# Patient Record
Sex: FEMALE | Race: WHITE | NOT HISPANIC OR LATINO | Employment: PART TIME | URBAN - METROPOLITAN AREA
[De-identification: names, ages, dates, MRNs, and addresses within clinical notes are randomized per-mention and may not be internally consistent; named-entity substitution may affect disease eponyms.]

---

## 2017-01-30 ENCOUNTER — OFFICE VISIT (OUTPATIENT)
Dept: DERMATOLOGY | Facility: CLINIC | Age: 52
End: 2017-01-30
Payer: COMMERCIAL

## 2017-01-30 VITALS — SYSTOLIC BLOOD PRESSURE: 120 MMHG | DIASTOLIC BLOOD PRESSURE: 85 MMHG | OXYGEN SATURATION: 95 % | HEART RATE: 87 BPM

## 2017-01-30 DIAGNOSIS — B07.0 VERRUCA PLANTARIS: Primary | ICD-10-CM

## 2017-01-30 PROCEDURE — 11900 INJECT SKIN LESIONS </W 7: CPT | Performed by: PHYSICIAN ASSISTANT

## 2017-01-30 NOTE — NURSING NOTE
"Initial /85 mmHg  Pulse 87  SpO2 95% Estimated body mass index is 32.25 kg/(m^2) as calculated from the following:    Height as of 12/28/16: 1.626 m (5' 4\").    Weight as of 12/28/16: 85.276 kg (188 lb). .      "

## 2017-01-30 NOTE — PROGRESS NOTES
HPI:   Susu Lagos is a 51 year old female who presents for recheck of warts on the bottom of the foot s/p candin x 1  chief complaint  Location: left plantar surface   Condition present for:  years.   Previous treatments include: LN2, cantharidin, candin x 1    Review Of Systems  Eyes: negative  Ears/Nose/Throat: negative  Respiratory: No shortness of breath, dyspnea on exertion, cough, or hemoptysis  Cardiovascular: negative  Gastrointestinal: negative  Genitourinary: negative  Musculoskeletal: negative  Neurologic: negative  Psychiatric: negative        PHYSICAL EXAM:      Skin exam performed as follows: Type 2 skin. Mood appropriate  Alert and Oriented X 3. Well developed, well nourished in no distress.  General appearance: Normal  Head including face: Normal  Eyes: conjunctiva and lids: Normal  Mouth: Lips, teeth, gums: Normal  Neck: Normal  Chest-breast/axillae: Normal  Back: Normal  Spleen and liver: Normal  Cardiovascular: Exam of peripheral vascular system by observation for swelling, varicosities, edema: Normal  Genitalia: groin, buttocks: Normal  Extremities: digits/nails (clubbing): Normal  Eccrine and Apocrine glands: Normal  Right upper extremity: Normal  Left upper extremity: Normal  Right lower extremity: Normal  Left lower extremity: Normal  Skin: Scalp and body hair: See below    1. Verrucous plaques x 5 on left plantar surface; x 1 on left 2nd periungual toe    ASSESSMENT/PLAN:     Wart on left plantar surface x 6 - advised on diagnosis and treatment options. S/p Candin x 1, has also tried LN2, cantharidin in the past. Discussed LN2, cantharidin, Candin, imiquimod and OTC treatments. Discussed likely need for multiple treatments. After lengthy discussion, she would like to proceed with candin.   --Candin injected to left plantar surfaces x 3. A total of 0.3 cc used. Pt tolerated well, no complications. Advised that there will be mild tenderness for the next few days; call if area is very  tender, red or swollen.           Follow-up: 1 month  CC:   Scribed By: Caroline Calderon MS, PAMargeC

## 2017-02-17 ENCOUNTER — OFFICE VISIT (OUTPATIENT)
Dept: URGENT CARE | Facility: URGENT CARE | Age: 52
End: 2017-02-17
Payer: COMMERCIAL

## 2017-02-17 ENCOUNTER — RADIANT APPOINTMENT (OUTPATIENT)
Dept: GENERAL RADIOLOGY | Facility: CLINIC | Age: 52
End: 2017-02-17
Attending: NURSE PRACTITIONER
Payer: COMMERCIAL

## 2017-02-17 VITALS
RESPIRATION RATE: 22 BRPM | HEART RATE: 93 BPM | TEMPERATURE: 100 F | SYSTOLIC BLOOD PRESSURE: 119 MMHG | DIASTOLIC BLOOD PRESSURE: 82 MMHG | OXYGEN SATURATION: 96 %

## 2017-02-17 DIAGNOSIS — R05.9 COUGH: ICD-10-CM

## 2017-02-17 DIAGNOSIS — R07.0 THROAT PAIN: ICD-10-CM

## 2017-02-17 DIAGNOSIS — J20.9 ACUTE BRONCHITIS, UNSPECIFIED ORGANISM: Primary | ICD-10-CM

## 2017-02-17 LAB
DEPRECATED S PYO AG THROAT QL EIA: NORMAL
MICRO REPORT STATUS: NORMAL
SPECIMEN SOURCE: NORMAL

## 2017-02-17 PROCEDURE — 99214 OFFICE O/P EST MOD 30 MIN: CPT | Performed by: NURSE PRACTITIONER

## 2017-02-17 PROCEDURE — 87081 CULTURE SCREEN ONLY: CPT | Performed by: NURSE PRACTITIONER

## 2017-02-17 PROCEDURE — 87880 STREP A ASSAY W/OPTIC: CPT | Performed by: NURSE PRACTITIONER

## 2017-02-17 PROCEDURE — 71020 XR CHEST 2 VW: CPT

## 2017-02-17 RX ORDER — PREDNISONE 20 MG/1
20 TABLET ORAL 2 TIMES DAILY
Qty: 10 TABLET | Refills: 0 | Status: SHIPPED | OUTPATIENT
Start: 2017-02-17 | End: 2018-02-16

## 2017-02-17 RX ORDER — ALBUTEROL SULFATE 90 UG/1
2 AEROSOL, METERED RESPIRATORY (INHALATION) EVERY 6 HOURS PRN
Qty: 1 INHALER | Refills: 0 | Status: SHIPPED | OUTPATIENT
Start: 2017-02-17 | End: 2018-02-16

## 2017-02-17 NOTE — MR AVS SNAPSHOT
"              After Visit Summary   2/17/2017    Susu Lagos    MRN: [id number]           Patient Information     Date Of Birth          1965        Visit Information        Provider Department      2/17/2017 5:45 PM Jackelyn Hicks APRN [facility]        Today's Diagnoses     Cough    -  1    Throat pain        Acute bronchitis, unspecified organism          Care Instructions    Use Medication as directed    Hydrate with fluids, rest, cool humidifier.  May use acetaminophen, ibuprofen prn.    For your Cough   The Buckwheat Honey Dose: Given   hour Prior to Bedtime  For children age under 1 year -Do not use due to botulism risk     2-5 years -  tsp (2.5 mL)    6-11 years -1 tsp (5 mL)    12-18 years -2 tsp (10 mL)     Guaifenesin     Adult dose -Not to exceed 2.4 g (2400mg) per day    Child age 6-12 years -100 mg every 4 hr, not to exceed 1.2 g (1200mg) per day     Pediatric, 2-6 years -50 mg every 4 hr as needed, not to exceed 600 mg per day    Cough medications is not recommended in children under 2 years.  With use of cough medications have combination medications be aware of products in the cough medication you are using to avoid overdose    Follow up with PCP in 2 weeks.    Go to Emergency Room if sx worsen or change, Shortness of breath, chest pain, persistent fevers, or painful breathing occur.     Patient voiced understanding of instructions given.                Using an Inhaler  Your healthcare provider may prescribe medicine that you breathe in using a metered-dose inhaler (MDI). An inhaler sends a measured amount of medicine in a fine mist.  Step 1:    Shake the inhaler and remove the cap.    Take a deep breath and let it out.  Step 2:    Close your lips around the end of the inhaler mouthpiece. Or if you were told to use the \"open-mouth\" method, hold the inhaler 1 to 2 inches from your mouth.  Step 3:    Breathe in slowly and deeply as you press down on " the inhaler to release the medication.    Inhale fully.  Step 4:    Hold your breath for a count of 10.    Then breathe out slowly through your mouth.    Repeat these steps for each puff of medicine prescribed.          Important    If the inhaler is being used for the first time or has not been used for a while, prime it as directed by the product maker. You can find important information about the medicine in the package insert. This is the paper that comes with the medicine.    If you use more than one inhaler, make sure you know which one to use first.    Your healthcare provider or pharmacist can show you how to use your inhaler the right way. Even if you think you are using it the right way, it is still a good idea to check.     1607-1850 [employer]. [address] [place] [address], [place] [address]. All rights reserved. This information is not intended as a substitute for professional medical care. Always follow your healthcare professional's instructions.        Bronchitis, Viral (Adult)    You have a viral bronchitis. Bronchitis is inflammation and swelling of the lining of the lungs. This is often caused by an infection. Symptoms include a dry, hacking cough that is worse at night. The cough may bring up yellow-green mucus. You may also feel short of breath or wheeze. Other symptoms may include tiredness, chest discomfort, and chills.  Bronchitis that is caused by a virus is not treated with antibiotics. Instead, medicines may be given to help relieve symptoms. Symptoms can last up to 2 weeks, although the cough may last much longer.  This illness is contagious during the first few days and is spread through the air by coughing and sneezing, or by direct contact (touching the sick person and then touching your own eyes, nose, or mouth).  Most viral illnesses resolve within 10 to 14 days with rest and simple home remedies, although they may sometimes last for several weeks.  Home care    If symptoms  are severe, rest at home for the first 2 to 3 days. When you go back to your usual activities, don't let yourself get too tired.    Do not smoke. Also avoid being exposed to secondhand smoke.    You may use over-the-counter medicine to control fever or pain, unless another pain medicine was prescribed. (Note: If you have chronic liver or kidney disease or have ever had a stomach ulcer or gastrointestinal bleeding, talk with your healthcare provider before using these medicines. Also talk to your provider if you are taking medicine to prevent blood clots.) Aspirin should never be given to anyone younger than 18 years of age who is ill with a viral infection or fever. It may cause severe liver or brain damage.    Your appetite may be poor, so a light diet is fine. Avoid dehydration by drinking 6 to 8 glasses of fluids per day (such as water, soft drinks, sports drinks, juices, tea, or soup). Extra fluids will help loosen secretions in the nose and lungs.    Over-the-counter cough, cold, and sore-throat medicines will not shorten the length of the illness, but they may help to reduce symptoms. (Note: Do not use decongestants if you have high blood pressure.)  Follow-up care  Follow up with your healthcare provider, or as advised.  If you had an X-ray or ECG (electrocardiogram), a specialist will review it. You will be notified of any new findings that may affect your care.  Note: If you are age 65 or older, or if you have a chronic lung disease or condition that affects your immune system, or you smoke, talk to your healthcare provider about having pneumococcal vaccinations and a yearly influenza vaccination (flu shot).  When to seek medical advice   Call your healthcare provider right away if any of these occur:    Fever of 100.4 F (38 C) or higher    Coughing up increased amounts of colored sputum    Weakness, drowsiness, headache, facial pain, ear pain, or a stiff neck  Call 911, or get immediate medical  care  Contact emergency services right away if any of these occur:    Coughing up blood    Worsening weakness, drowsiness, headache, or stiff neck    Trouble breathing, wheezing, or pain with breathing    0894-0462 [employer]. [address] [place] [address], [place] [address]. All rights reserved. This information is not intended as a substitute for professional medical care. Always follow your healthcare professional's instructions.              Follow-ups after your visit        Your next 10 appointments already scheduled     Feb 27, 2017 10:15 AM CST   [facility]    [address] [facility] [place] [address] [phone]              Who to contact     If you have questions or need follow up information about today's clinic visit or your schedule please contact [facility] directly at [phone].  Normal or non-critical lab and imaging results will be communicated to you by MyChart, letter or phone within 4 business days after the clinic has received the results. If you do not hear from us within 7 days, please contact the clinic through [employer] or phone. If you have a critical or abnormal lab result, we will notify you by phone as soon as possible.  Submit refill requests through [employer] or call your pharmacy and they will forward the refill request to us. Please allow 3 business days for your refill to be completed.          Additional Information About Your Visit        [facility] Information     [employer] gives you secure access to your electronic health record. If you see a primary care provider, you can also send messages to your care team and make appointments. If you have questions, please call your primary care clinic.  If you do not have a primary care provider, please call [phone] and they will assist you.        Care EveryWhere ID     This is your Care EveryWhere ID. This  could be used by other organizations to access your [facility] medical records  [id number]        Your Vitals Were     Pulse Temperature Respirations Pulse Oximetry          93 100  F (37.8  C) (Tympanic) 22 96%         Blood Pressure from Last 3 Encounters:   02/17/17 119/82   01/30/17 120/85   12/29/16 119/81    Weight from Last 3 Encounters:   12/28/16 188 lb (85.3 kg)   12/14/16 188 lb (85.3 kg)   08/19/16 188 lb 3.2 oz (85.4 kg)              We Performed the Following     Beta strep group A culture     Strep, Rapid Screen          Today's Medication Changes          These changes are accurate as of: 2/17/17  6:38 PM.  If you have any questions, ask your nurse or doctor.               Start taking these medicines.        Dose/Directions    albuterol 108 (90 BASE) MCG/ACT Inhaler   Commonly known as:  PROAIR HFA/PROVENTIL HFA/VENTOLIN HFA   Used for:  Throat pain, Acute bronchitis, unspecified organism   Started by:  Jackelyn Hicks APRN CNP        Dose:  2 puff   Inhale 2 puffs into the lungs every 6 hours as needed for shortness of breath / dyspnea or wheezing   Quantity:  1 Inhaler   Refills:  0       predniSONE 20 MG tablet   Commonly known as:  DELTASONE   Used for:  Acute bronchitis, unspecified organism   Started by:  Jackelyn Hicks APRN CNP        Dose:  20 mg   Take 1 tablet (20 mg) by mouth 2 times daily   Quantity:  10 tablet   Refills:  0            Where to get your medicines      These medications were sent to [id number] [address]  [address] [facility] [address]    Hours:  Closed 10-16-08 business to [facility] Phone:  [phone]     albuterol 108 (90 BASE) MCG/ACT Inhaler    predniSONE 20 MG tablet                Primary Care Provider    [facility]       No address on file        Thank you!     Thank you for choosing [facility]  for your care. Our goal is always to provide you with  excellent care. Hearing back from our patients is one way we can continue to improve our services. Please take a few minutes to complete the written survey that you may receive in the mail after your visit with us. Thank you!             Your Updated Medication List - Protect others around you: Learn how to safely use, store and throw away your medicines at www.disposemymeds.org.          This list is accurate as of: 2/17/17  6:38 PM.  Always use your most recent med list.                   Brand Name Dispense Instructions for use    albuterol 108 (90 BASE) MCG/ACT Inhaler    PROAIR HFA/PROVENTIL HFA/VENTOLIN HFA    1 Inhaler    Inhale 2 puffs into the lungs every 6 hours as needed for shortness of breath / dyspnea or wheezing       ibuprofen 200 MG tablet    ADVIL/MOTRIN     Take 3 tablets (600 mg) by mouth every 6 hours as needed for mild pain       MULTI-VITAMINS PO          predniSONE 20 MG tablet    DELTASONE    10 tablet    Take 1 tablet (20 mg) by mouth 2 times daily       VITAMIN D (CHOLECALCIFEROL) PO      Take by mouth daily Reported on 2/17/2017

## 2017-02-18 NOTE — PATIENT INSTRUCTIONS
"Use Medication as directed    Hydrate with fluids, rest, cool humidifier.  May use acetaminophen, ibuprofen prn.    For your Cough   The Buckwheat Honey Dose: Given   hour Prior to Bedtime  For children age under 1 year -Do not use due to botulism risk     2-5 years -  tsp (2.5 mL)    6-11 years -1 tsp (5 mL)    12-18 years -2 tsp (10 mL)     Guaifenesin     Adult dose -Not to exceed 2.4 g (2400mg) per day    Child age 6-12 years -100 mg every 4 hr, not to exceed 1.2 g (1200mg) per day     Pediatric, 2-6 years -50 mg every 4 hr as needed, not to exceed 600 mg per day    Cough medications is not recommended in children under 2 years.  With use of cough medications have combination medications be aware of products in the cough medication you are using to avoid overdose    Follow up with PCP in 2 weeks.    Go to Emergency Room if sx worsen or change, Shortness of breath, chest pain, persistent fevers, or painful breathing occur.     Patient voiced understanding of instructions given.                Using an Inhaler  Your healthcare provider may prescribe medicine that you breathe in using a metered-dose inhaler (MDI). An inhaler sends a measured amount of medicine in a fine mist.  Step 1:    Shake the inhaler and remove the cap.    Take a deep breath and let it out.  Step 2:    Close your lips around the end of the inhaler mouthpiece. Or if you were told to use the \"open-mouth\" method, hold the inhaler 1 to 2 inches from your mouth.  Step 3:    Breathe in slowly and deeply as you press down on the inhaler to release the medication.    Inhale fully.  Step 4:    Hold your breath for a count of 10.    Then breathe out slowly through your mouth.    Repeat these steps for each puff of medicine prescribed.          Important    If the inhaler is being used for the first time or has not been used for a while, prime it as directed by the product maker. You can find important information about the medicine in the package " insert. This is the paper that comes with the medicine.    If you use more than one inhaler, make sure you know which one to use first.    Your healthcare provider or pharmacist can show you how to use your inhaler the right way. Even if you think you are using it the right way, it is still a good idea to check.     8469-9781 [employer]. [address] [place] [address]. All rights reserved. This information is not intended as a substitute for professional medical care. Always follow your healthcare professional's instructions.        Bronchitis, Viral (Adult)    You have a viral bronchitis. Bronchitis is inflammation and swelling of the lining of the lungs. This is often caused by an infection. Symptoms include a dry, hacking cough that is worse at night. The cough may bring up yellow-green mucus. You may also feel short of breath or wheeze. Other symptoms may include tiredness, chest discomfort, and chills.  Bronchitis that is caused by a virus is not treated with antibiotics. Instead, medicines may be given to help relieve symptoms. Symptoms can last up to 2 weeks, although the cough may last much longer.  This illness is contagious during the first few days and is spread through the air by coughing and sneezing, or by direct contact (touching the sick person and then touching your own eyes, nose, or mouth).  Most viral illnesses resolve within 10 to 14 days with rest and simple home remedies, although they may sometimes last for several weeks.  Home care    If symptoms are severe, rest at home for the first 2 to 3 days. When you go back to your usual activities, don't let yourself get too tired.    Do not smoke. Also avoid being exposed to secondhand smoke.    You may use over-the-counter medicine to control fever or pain, unless another pain medicine was prescribed. (Note: If you have chronic liver or kidney disease or have ever had a stomach ulcer or gastrointestinal bleeding, talk with  your healthcare provider before using these medicines. Also talk to your provider if you are taking medicine to prevent blood clots.) Aspirin should never be given to anyone younger than 18 years of age who is ill with a viral infection or fever. It may cause severe liver or brain damage.    Your appetite may be poor, so a light diet is fine. Avoid dehydration by drinking 6 to 8 glasses of fluids per day (such as water, soft drinks, sports drinks, juices, tea, or soup). Extra fluids will help loosen secretions in the nose and lungs.    Over-the-counter cough, cold, and sore-throat medicines will not shorten the length of the illness, but they may help to reduce symptoms. (Note: Do not use decongestants if you have high blood pressure.)  Follow-up care  Follow up with your healthcare provider, or as advised.  If you had an X-ray or ECG (electrocardiogram), a specialist will review it. You will be notified of any new findings that may affect your care.  Note: If you are age 65 or older, or if you have a chronic lung disease or condition that affects your immune system, or you smoke, talk to your healthcare provider about having pneumococcal vaccinations and a yearly influenza vaccination (flu shot).  When to seek medical advice   Call your healthcare provider right away if any of these occur:    Fever of 100.4 F (38 C) or higher    Coughing up increased amounts of colored sputum    Weakness, drowsiness, headache, facial pain, ear pain, or a stiff neck  Call 911, or get immediate medical care  Contact emergency services right away if any of these occur:    Coughing up blood    Worsening weakness, drowsiness, headache, or stiff neck    Trouble breathing, wheezing, or pain with breathing    [id number] [employer]. [address] [place] [address], [place] [address]. All rights reserved. This information is not intended as a substitute for professional medical care. Always follow your healthcare professional's  instructions.

## 2017-02-18 NOTE — PROGRESS NOTES
SUBJECTIVE:  Susu Lagos is a 51 year old female who presents to the clinic today with a chief complaint of cough  for 5 day(s).  Her cough is described as nonproductive.    The patient's symptoms are moderate and worsening.  Associated symptoms include congestion, fever, nasal congestion and shortness of breath. The patient's symptoms are exacerbated by no particular triggers  Patient has been using ibuprofen  to improve symptoms.    Past Medical History   Diagnosis Date     Ovarian cyst 6/4/2015     Right, 6 cm dermoid on MRI-recommend removal.  7/29/2015 Surgery for right salpingoophorectomy.         Social History   Substance Use Topics     Smoking status: Never Smoker     Smokeless tobacco: Never Used     Alcohol use 0.0 oz/week     0 Standard drinks or equivalent per week      Comment: 1-2 per month          ROS  CONSTITUTIONAL:POSITIVE  for fever 102.0  INTEGUMENTARY/SKIN: NEGATIVE for worrisome rashes, moles or lesions  EYES: NEGATIVE for vision changes or irritation  ENT/MOUTH: NEGATIVE for ear, mouth and throat problems  RESP:See above   CV: NEGATIVE for chest pain, palpitations or peripheral edema  MUSCULOSKELETAL: NEGATIVE for significant arthralgias or myalgia  NEURO: NEGATIVE for weakness, dizziness or paresthesias    OBJECTIVE:  /82  Pulse 93  Temp 100  F (37.8  C) (Tympanic)  Resp 22  SpO2 96%  GENERAL APPEARANCE: healthy, alert and no distress  EYES: EOMI,  PERRL, conjunctiva clear  HENT: ear canals and TM's normal.  Nose and mouth without ulcers, erythema or lesions  NECK: supple, nontender, no lymphadenopathy  RESP: lungs clear to auscultation - no rales, rhonchi or wheezes  CV: regular rates and rhythm, normal S1 S2, no murmur noted  NEURO: Normal strength and tone, sensory exam grossly normal,  normal speech and mentation  SKIN: no suspicious lesions or rashes    Results for orders placed or performed in visit on 02/17/17   Strep, Rapid Screen   Result Value Ref Range    Specimen  Description Throat     Rapid Strep A Screen       NEGATIVE: No Group A streptococcal antigen detected by immunoassay, await   culture report.      Micro Report Status FINAL 02/17/2017        X-RAY:   XR CHEST 2 VW 2/17/2017 6:27 PM      HISTORY: Cough     COMPARISON: None.     FINDINGS: The heart is negative. The lungs are clear. The pulmonary  vasculature is normal. The bones and soft tissues are unremarkable.         IMPRESSION: No active infiltrate.     ASSESSMENT:      ICD-10-CM    1. Acute bronchitis, unspecified organism J20.9 predniSONE (DELTASONE) 20 MG tablet     albuterol (PROAIR HFA/PROVENTIL HFA/VENTOLIN HFA) 108 (90 BASE) MCG/ACT Inhaler   2. Cough R05 XR Chest 2 Views   3. Throat pain R07.0 Strep, Rapid Screen     albuterol (PROAIR HFA/PROVENTIL HFA/VENTOLIN HFA) 108 (90 BASE) MCG/ACT Inhaler     Beta strep group A culture       PLAN:  Patient Instructions     Use Medication as directed    Hydrate with fluids, rest, cool humidifier.  May use acetaminophen, ibuprofen prn.    For your Cough   The Buckwheat Honey Dose: Given   hour Prior to Bedtime  For children age under 1 year -Do not use due to botulism risk     2-5 years -  tsp (2.5 mL)    6-11 years -1 tsp (5 mL)    12-18 years -2 tsp (10 mL)     Guaifenesin     Adult dose -Not to exceed 2.4 g (2400mg) per day    Child age 6-12 years -100 mg every 4 hr, not to exceed 1.2 g (1200mg) per day     Pediatric, 2-6 years -50 mg every 4 hr as needed, not to exceed 600 mg per day    Cough medications is not recommended in children under 2 years.  With use of cough medications have combination medications be aware of products in the cough medication you are using to avoid overdose    Follow up with PCP in 2 weeks.    Go to Emergency Room if sx worsen or change, Shortness of breath, chest pain, persistent fevers, or painful breathing occur.     Patient voiced understanding of instructions given.                Using an Inhaler  Your healthcare provider may  "prescribe medicine that you breathe in using a metered-dose inhaler (MDI). An inhaler sends a measured amount of medicine in a fine mist.  Step 1:    Shake the inhaler and remove the cap.    Take a deep breath and let it out.  Step 2:    Close your lips around the end of the inhaler mouthpiece. Or if you were told to use the \"open-mouth\" method, hold the inhaler 1 to 2 inches from your mouth.  Step 3:    Breathe in slowly and deeply as you press down on the inhaler to release the medication.    Inhale fully.  Step 4:    Hold your breath for a count of 10.    Then breathe out slowly through your mouth.    Repeat these steps for each puff of medicine prescribed.          Important    If the inhaler is being used for the first time or has not been used for a while, prime it as directed by the product maker. You can find important information about the medicine in the package insert. This is the paper that comes with the medicine.    If you use more than one inhaler, make sure you know which one to use first.    Your healthcare provider or pharmacist can show you how to use your inhaler the right way. Even if you think you are using it the right way, it is still a good idea to check.     4022-8771 [employer]. [address] [place] [address], [place] [id number]. All rights reserved. This information is not intended as a substitute for professional medical care. Always follow your healthcare professional's instructions.        Bronchitis, Viral (Adult)    You have a viral bronchitis. Bronchitis is inflammation and swelling of the lining of the lungs. This is often caused by an infection. Symptoms include a dry, hacking cough that is worse at night. The cough may bring up yellow-green mucus. You may also feel short of breath or wheeze. Other symptoms may include tiredness, chest discomfort, and chills.  Bronchitis that is caused by a virus is not treated with antibiotics. Instead, medicines may be given to help " relieve symptoms. Symptoms can last up to 2 weeks, although the cough may last much longer.  This illness is contagious during the first few days and is spread through the air by coughing and sneezing, or by direct contact (touching the sick person and then touching your own eyes, nose, or mouth).  Most viral illnesses resolve within 10 to 14 days with rest and simple home remedies, although they may sometimes last for several weeks.  Home care    If symptoms are severe, rest at home for the first 2 to 3 days. When you go back to your usual activities, don't let yourself get too tired.    Do not smoke. Also avoid being exposed to secondhand smoke.    You may use over-the-counter medicine to control fever or pain, unless another pain medicine was prescribed. (Note: If you have chronic liver or kidney disease or have ever had a stomach ulcer or gastrointestinal bleeding, talk with your healthcare provider before using these medicines. Also talk to your provider if you are taking medicine to prevent blood clots.) Aspirin should never be given to anyone younger than 18 years of age who is ill with a viral infection or fever. It may cause severe liver or brain damage.    Your appetite may be poor, so a light diet is fine. Avoid dehydration by drinking 6 to 8 glasses of fluids per day (such as water, soft drinks, sports drinks, juices, tea, or soup). Extra fluids will help loosen secretions in the nose and lungs.    Over-the-counter cough, cold, and sore-throat medicines will not shorten the length of the illness, but they may help to reduce symptoms. (Note: Do not use decongestants if you have high blood pressure.)  Follow-up care  Follow up with your healthcare provider, or as advised.  If you had an X-ray or ECG (electrocardiogram), a specialist will review it. You will be notified of any new findings that may affect your care.  Note: If you are age 65 or older, or if you have a chronic lung disease or condition that  affects your immune system, or you smoke, talk to your healthcare provider about having pneumococcal vaccinations and a yearly influenza vaccination (flu shot).  When to seek medical advice   Call your healthcare provider right away if any of these occur:    Fever of 100.4 F (38 C) or higher    Coughing up increased amounts of colored sputum    Weakness, drowsiness, headache, facial pain, ear pain, or a stiff neck  Call 911, or get immediate medical care  Contact emergency services right away if any of these occur:    Coughing up blood    Worsening weakness, drowsiness, headache, or stiff neck    Trouble breathing, wheezing, or pain with breathing    1013-7726 [employer]. [address] [place], [place] [id number]. All rights reserved. This information is not intended as a substitute for professional medical care. Always follow your healthcare professional's instructions.            RADHA Griffin CNP

## 2017-02-19 LAB
BACTERIA SPEC CULT: NORMAL
MICRO REPORT STATUS: NORMAL
SPECIMEN SOURCE: NORMAL

## 2017-02-27 ENCOUNTER — OFFICE VISIT (OUTPATIENT)
Dept: DERMATOLOGY | Facility: CLINIC | Age: 52
End: 2017-02-27
Payer: COMMERCIAL

## 2017-02-27 VITALS — DIASTOLIC BLOOD PRESSURE: 89 MMHG | OXYGEN SATURATION: 97 % | SYSTOLIC BLOOD PRESSURE: 131 MMHG | HEART RATE: 90 BPM

## 2017-02-27 DIAGNOSIS — B07.0 VERRUCA PLANTARIS: Primary | ICD-10-CM

## 2017-02-27 PROCEDURE — 11900 INJECT SKIN LESIONS </W 7: CPT | Performed by: PHYSICIAN ASSISTANT

## 2017-02-27 NOTE — MR AVS SNAPSHOT
After Visit Summary   2/27/2017    Susu Lagos    MRN: [id number]           Patient Information     Date Of Birth          1965        Visit Information        Provider Department      2/27/2017 10:15 AM Caroline Calderon PA-C [facility]        Today's Diagnoses     Verruca plantaris    -  1       Follow-ups after your visit        Your next 10 appointments already scheduled     Mar 27, 2017 10:00 AM CDT   Return Visit with Caroline Calderon PA-C   [facility]    [address] [address]   [phone]              Who to contact     If you have questions or need follow up information about today's clinic visit or your schedule please contact [facility] directly at [phone].  Normal or non-critical lab and imaging results will be communicated to you by MyChart, letter or phone within 4 business days after the clinic has received the results. If you do not hear from us within 7 days, please contact the clinic through buuteeqhart or phone. If you have a critical or abnormal lab result, we will notify you by phone as soon as possible.  Submit refill requests through ieCrowd or call your pharmacy and they will forward the refill request to us. Please allow 3 business days for your refill to be completed.          Additional Information About Your Visit        MyChart Information     [employer]d gives you secure access to your electronic health record. If you see a primary care provider, you can also send messages to your care team and make appointments. If you have questions, please call your primary care clinic.  If you do not have a primary care provider, please call [phone] and they will assist you.        Care EveryWhere ID     This is your Care EveryWhere ID. This could be used by other organizations to access your [facility] medical records  [id number]        Your Vitals Were     Pulse Pulse  Oximetry                90 97%           Blood Pressure from Last 3 Encounters:   02/27/17 131/89   02/17/17 119/82   01/30/17 120/85    Weight from Last 3 Encounters:   12/28/16 85.3 kg (188 lb)   12/14/16 85.3 kg (188 lb)   08/19/16 85.4 kg (188 lb 3.2 oz)              We Performed the Following     DRUGS UNCLASSIFIED INJECTION     INJECTION INTO SKIN LESIONS <=7        Primary Care Provider    [facility]       No address on file        Thank you!     Thank you for choosing [facility]  for your care. Our goal is always to provide you with excellent care. Hearing back from our patients is one way we can continue to improve our services. Please take a few minutes to complete the written survey that you may receive in the mail after your visit with us. Thank you!             Your Updated Medication List - Protect others around you: Learn how to safely use, store and throw away your medicines at www.disposemymeds.org.          This list is accurate as of: 2/27/17 10:40 AM.  Always use your most recent med list.                   Brand Name Dispense Instructions for use    albuterol 108 (90 BASE) MCG/ACT Inhaler    PROAIR HFA/PROVENTIL HFA/VENTOLIN HFA    1 Inhaler    Inhale 2 puffs into the lungs every 6 hours as needed for shortness of breath / dyspnea or wheezing       ibuprofen 200 MG tablet    ADVIL/MOTRIN     Take 3 tablets (600 mg) by mouth every 6 hours as needed for mild pain       MULTI-VITAMINS PO          predniSONE 20 MG tablet    DELTASONE    10 tablet    Take 1 tablet (20 mg) by mouth 2 times daily       VITAMIN D (CHOLECALCIFEROL) PO      Take by mouth daily Reported on 2/17/2017

## 2017-02-27 NOTE — PROGRESS NOTES
HPI:   Susu Lagos is a 51 year old female who presents for recheck of warts on the bottom of the foot s/p candin x 2 - seems to be improving; had a lot of peeling on largest wart  chief complaint  Location: left plantar surface   Condition present for:  years.   Previous treatments include: LN2, cantharidin, candin x 1    Review Of Systems  Eyes: negative  Ears/Nose/Throat: negative  Respiratory: No shortness of breath, dyspnea on exertion, cough, or hemoptysis  Cardiovascular: negative  Gastrointestinal: negative  Genitourinary: negative  Musculoskeletal: negative  Neurologic: negative  Psychiatric: negative        PHYSICAL EXAM:      Skin exam performed as follows: Type 2 skin. Mood appropriate  Alert and Oriented X 3. Well developed, well nourished in no distress.  General appearance: Normal  Head including face: Normal  Eyes: conjunctiva and lids: Normal  Mouth: Lips, teeth, gums: Normal  Neck: Normal  Chest-breast/axillae: Normal  Back: Normal  Spleen and liver: Normal  Cardiovascular: Exam of peripheral vascular system by observation for swelling, varicosities, edema: Normal  Genitalia: groin, buttocks: Normal  Extremities: digits/nails (clubbing): Normal  Eccrine and Apocrine glands: Normal  Right upper extremity: Normal  Left upper extremity: Normal  Right lower extremity: Normal  Left lower extremity: Normal  Skin: Scalp and body hair: See below    1. Verrucous plaques x 5 on left plantar surface; x 1 on left 2nd periungual toe    ASSESSMENT/PLAN:     Wart on left plantar surface x 6 - advised on diagnosis and treatment options. S/p Candin x 2, has also tried LN2, cantharidin in the past. Discussed LN2, cantharidin, Candin, imiquimod and OTC treatments. Discussed likely need for multiple treatments. After lengthy discussion, she would like to proceed with candin.   --Candin injected to left plantar surfaces x 3. A total of 0.3 cc used. Pt tolerated well, no complications. Advised that there will be  mild tenderness for the next few days; call if area is very tender, red or swollen.           Follow-up: 1 month  CC:   Scribed By: Caroline Calderon MS, PAANGELA

## 2017-02-27 NOTE — NURSING NOTE
"Chief Complaint   Patient presents with     Derm Problem     recheck plantar warts       Initial /89  Pulse 90  SpO2 97% Estimated body mass index is 32.27 kg/(m^2) as calculated from the following:    Height as of 12/28/16: 1.626 m (5' 4\").    Weight as of 12/28/16: 85.3 kg (188 lb).  BP completed using cuff size: linda Toussaint LPN    "

## 2017-03-27 ENCOUNTER — OFFICE VISIT (OUTPATIENT)
Dept: DERMATOLOGY | Facility: CLINIC | Age: 52
End: 2017-03-27
Payer: COMMERCIAL

## 2017-03-27 VITALS — HEIGHT: 64 IN | DIASTOLIC BLOOD PRESSURE: 90 MMHG | HEART RATE: 76 BPM | SYSTOLIC BLOOD PRESSURE: 127 MMHG

## 2017-03-27 DIAGNOSIS — B07.0 VERRUCA PLANTARIS: Primary | ICD-10-CM

## 2017-03-27 PROCEDURE — 11900 INJECT SKIN LESIONS </W 7: CPT | Performed by: PHYSICIAN ASSISTANT

## 2017-03-27 NOTE — MR AVS SNAPSHOT
After Visit Summary   3/27/2017    Susu Lagos    MRN: [id number]           Patient Information     Date Of Birth          1965        Visit Information        Provider Department      3/27/2017 10:00 AM Caroline Calderon PA-C [facility]        Today's Diagnoses     Verruca plantaris    -  1       Follow-ups after your visit        Your next 10 appointments already scheduled     Apr 24, 2017 10:00 AM CDT   Return Visit with Caroline Calderon PA-C   [facility] ([facility]    [address] [address]   [phone]              Who to contact     If you have questions or need follow up information about today's clinic visit or your schedule please contact [facility] directly at [phone].  Normal or non-critical lab and imaging results will be communicated to you by Sportboomhart, letter or phone within 4 business days after the clinic has received the results. If you do not hear from us within 7 days, please contact the clinic through Sportboomhart or phone. If you have a critical or abnormal lab result, we will notify you by phone as soon as possible.  Submit refill requests through [employer] or call your pharmacy and they will forward the refill request to us. Please allow 3 business days for your refill to be completed.          Additional Information About Your Visit        MyChart Information     [employer] gives you secure access to your electronic health record. If you see a primary care provider, you can also send messages to your care team and make appointments. If you have questions, please call your primary care clinic.  If you do not have a primary care provider, please call [phone] and they will assist you.        Care EveryWhere ID     This is your Care EveryWhere ID. This could be used by other organizations to access your [facility] medical records  [id number]        Your Vitals Were     Pulse Height        "         76 1.626 m (5' 4\")           Blood Pressure from Last 3 Encounters:   03/27/17 127/90   02/27/17 131/89   02/17/17 119/82    Weight from Last 3 Encounters:   12/28/16 85.3 kg (188 lb)   12/14/16 85.3 kg (188 lb)   08/19/16 85.4 kg (188 lb 3.2 oz)              We Performed the Following     DRUGS UNCLASSIFIED INJECTION     INJECTION INTO SKIN LESIONS <=7        Primary Care Provider    [facility]       No address on file        Thank you!     Thank you for choosing [facility]  for your care. Our goal is always to provide you with excellent care. Hearing back from our patients is one way we can continue to improve our services. Please take a few minutes to complete the written survey that you may receive in the mail after your visit with us. Thank you!             Your Updated Medication List - Protect others around you: Learn how to safely use, store and throw away your medicines at www.disposemymeds.org.          This list is accurate as of: 3/27/17 11:12 AM.  Always use your most recent med list.                   Brand Name Dispense Instructions for use    albuterol 108 (90 BASE) MCG/ACT Inhaler    PROAIR HFA/PROVENTIL HFA/VENTOLIN HFA    1 Inhaler    Inhale 2 puffs into the lungs every 6 hours as needed for shortness of breath / dyspnea or wheezing       ibuprofen 200 MG tablet    ADVIL/MOTRIN     Take 3 tablets (600 mg) by mouth every 6 hours as needed for mild pain       MULTI-VITAMINS PO          predniSONE 20 MG tablet    DELTASONE    10 tablet    Take 1 tablet (20 mg) by mouth 2 times daily       VITAMIN D (CHOLECALCIFEROL) PO      Take by mouth daily Reported on 2/17/2017         "

## 2017-03-27 NOTE — PROGRESS NOTES
HPI:   Susu Lagos is a 51 year old female who presents for recheck of warts on the bottom of the foot s/p candin x 3 - seems to be improving; had a lot of peeling on largest wart  chief complaint  Location: left plantar surface   Condition present for:  years.   Previous treatments include: LN2, cantharidin, candin x 1    Review Of Systems  Eyes: negative  Ears/Nose/Throat: negative  Respiratory: No shortness of breath, dyspnea on exertion, cough, or hemoptysis  Cardiovascular: negative  Gastrointestinal: negative  Genitourinary: negative  Musculoskeletal: negative  Neurologic: negative  Psychiatric: negative        PHYSICAL EXAM:      Skin exam performed as follows: Type 2 skin. Mood appropriate  Alert and Oriented X 3. Well developed, well nourished in no distress.  General appearance: Normal  Head including face: Normal  Eyes: conjunctiva and lids: Normal  Mouth: Lips, teeth, gums: Normal  Neck: Normal  Chest-breast/axillae: Normal  Back: Normal  Spleen and liver: Normal  Cardiovascular: Exam of peripheral vascular system by observation for swelling, varicosities, edema: Normal  Genitalia: groin, buttocks: Normal  Extremities: digits/nails (clubbing): Normal  Eccrine and Apocrine glands: Normal  Right upper extremity: Normal  Left upper extremity: Normal  Right lower extremity: Normal  Left lower extremity: Normal  Skin: Scalp and body hair: See below    1. Verrucous plaques x 5 on left plantar surface; x 1 on left 2nd periungual toe    ASSESSMENT/PLAN:     Wart on left plantar surface x 6 - advised on diagnosis and treatment options. S/p Candin x 3, has also tried LN2, cantharidin in the past. Discussed LN2, cantharidin, Candin, imiquimod and OTC treatments. Discussed likely need for multiple treatments. After lengthy discussion, she would like to proceed with candin.   --Candin injected to left plantar surfaces x 3. A total of 0.3 cc used. Pt tolerated well, no complications. Advised that there will be  mild tenderness for the next few days; call if area is very tender, red or swollen.   --Can call for imiquimod to use at [facility] if desires; our clinic is now out of network for her.           Follow-up: 1 month  CC:   Scribed By: Caroline Calderon MS, PA-C

## 2017-03-27 NOTE — NURSING NOTE
"Initial /90  Pulse 76  Ht 1.626 m (5' 4\") Estimated body mass index is 32.27 kg/(m^2) as calculated from the following:    Height as of 12/28/16: 1.626 m (5' 4\").    Weight as of 12/28/16: 85.3 kg (188 lb). .      "

## 2017-04-20 ENCOUNTER — TELEPHONE (OUTPATIENT)
Dept: DERMATOLOGY | Facility: CLINIC | Age: 52
End: 2017-04-20

## 2017-04-20 DIAGNOSIS — B07.9 VIRAL WARTS, UNSPECIFIED TYPE: Primary | ICD-10-CM

## 2017-04-20 RX ORDER — IMIQUIMOD 12.5 MG/.25G
CREAM TOPICAL
Qty: 20 PACKET | Refills: 3 | Status: SHIPPED | OUTPATIENT
Start: 2017-04-20 | End: 2018-02-16

## 2017-04-20 NOTE — TELEPHONE ENCOUNTER
Pt calling stating she would like to try the at home option for warts on feet.   Last office visit states:  --Can call for imiquimod to use at  if desires; our clinic is now out of network for her  Please send to [facility] in [place]     Please call    Ester Ward  Specialty CSS

## 2017-04-20 NOTE — TELEPHONE ENCOUNTER
Unable to reach patient. Message left that rx sent to [facility] pharmacy as requested. Call if questions.  Harmony Alberto RN

## 2018-02-16 ENCOUNTER — OFFICE VISIT (OUTPATIENT)
Dept: FAMILY MEDICINE | Facility: CLINIC | Age: 53
End: 2018-02-16
Payer: COMMERCIAL

## 2018-02-16 VITALS
DIASTOLIC BLOOD PRESSURE: 76 MMHG | BODY MASS INDEX: 32.26 KG/M2 | HEIGHT: 65 IN | TEMPERATURE: 98 F | SYSTOLIC BLOOD PRESSURE: 114 MMHG | HEART RATE: 72 BPM | WEIGHT: 193.6 LBS

## 2018-02-16 DIAGNOSIS — M21.619 BUNION: ICD-10-CM

## 2018-02-16 DIAGNOSIS — Z01.818 PREOP GENERAL PHYSICAL EXAM: Primary | ICD-10-CM

## 2018-02-16 PROCEDURE — 99214 OFFICE O/P EST MOD 30 MIN: CPT | Performed by: PHYSICIAN ASSISTANT

## 2018-02-16 NOTE — PATIENT INSTRUCTIONS
Cleared for surgery    Set up full physical in April/May  Before Your Surgery      Call your surgeon if there is any change in your health. This includes signs of a cold or flu (such as a sore throat, runny nose, cough, rash or fever).    Do not smoke, drink alcohol or take over the counter medicine (unless your surgeon or primary care doctor tells you to) for the 24 hours before and after surgery.    If you take prescribed drugs: Follow your doctor s orders about which medicines to take and which to stop until after surgery.    Eating and drinking prior to surgery: follow the instructions from your surgeon    Take a shower or bath the night before surgery. Use the soap your surgeon gave you to gently clean your skin. If you do not have soap from your surgeon, use your regular soap. Do not shave or scrub the surgery site.  Wear clean pajamas and have clean sheets on your bed.

## 2018-02-16 NOTE — PROGRESS NOTES
[facility]  [address] [place] [id number]  [phone]  Dept: [phone]    PRE-OP EVALUATION:  Today's date: 2018    Susu Lagos (: 1965) presents for pre-operative evaluation assessment as requested by Dr. Sesar Giles.  She requires evaluation and anesthesia risk assessment prior to undergoing surgery/procedure for treatment of bunion right foot.  Proposed procedure: bunion repair    Date of Surgery/ Procedure: 2018  Time of Surgery/ Procedure: to be determined  Hospital/Surgical Facility: [facility]  Fax number for surgical facility: [phone]  Primary Physician: Gabriel Samuels [facility]  Type of Anesthesia Anticipated: to be determined    Patient has a Health Care Directive or Living Will:  NO    1. NO - Do you have a history of heart attack, stroke, stent, bypass or surgery on an artery in the head, neck, heart or legs?  2. NO - Do you ever have any pain or discomfort in your chest?  3. NO - Do you have a history of  Heart Failure?  4. NO - Are you troubled by shortness of breath when: walking on the level, up a slight hill or at night?  5. NO - Do you currently have a cold, bronchitis or other respiratory infection?  6. NO - Do you have a cough, shortness of breath or wheezing?  7. NO - Do you sometimes get pains in the calves of your legs when you walk?  8. NO - Do you or anyone in your family have previous history of blood clots?  9. NO - Do you or does anyone in your family have a serious bleeding problem such as prolonged bleeding following surgeries or cuts?  10. YES - Have you ever had problems with anemia or been told to take iron pills?  menorrhagia  11. YES - HAVE YOU HAD ANY ABNORMAL BLOOD LOSS SUCH AS BLACK, TARRY OR BLOODY STOOLS, OR ABNORMAL VAGINAL BLEEDING? History of menorrhagia, resolved  12. NO - Have you ever had a blood transfusion?  13. YES - HAVE YOU OR ANY OF YOUR RELATIVES EVER HAD PROBLEMS WITH  ANESTHESIA? Grandmother, can't recall what problem was.  Patient has been fine with past surgeries.    14. NO - Do you have sleep apnea, excessive snoring or daytime drowsiness?  15. NO - Do you have any prosthetic heart valves?  16. NO - Do you have prosthetic joints?  17. NO - Is there any chance that you may be pregnant?  11. Yes - Have you had any abnormal blood loss such as black, tarry or bloody stools, or abnormal vaginal bleeding?  menorrhagia    HPI:                                                      Brief HPI related to upcoming procedure: bunion    See problem list for active medical problems.  Problems all longstanding and stable, except as noted/documented.  See ROS for pertinent symptoms related to these conditions.                                                                                                 MEDICAL HISTORY:                                                      Patient Active Problem List    Diagnosis Date Noted     Menorrhagia with irregular cycle 02/28/2016     Priority: Medium     Perimenopause 06/04/2015     Priority: Medium     Hyperlipidemia with target LDL less than 160 02/25/2015     Priority: Medium      Past Medical History:   Diagnosis Date     Ovarian cyst 6/4/2015    Right, 6 cm dermoid on MRI-recommend removal.  7/29/2015 Surgery for right salpingoophorectomy.       Past Surgical History:   Procedure Laterality Date     CHOLECYSTECTOMY  2013    C     DILATION AND CURETTAGE, OPERATIVE HYSTEROSCOPY, COMBINED N/A 2/29/2016    Procedure: COMBINED DILATION AND CURETTAGE, OPERATIVE HYSTEROSCOPY;  Surgeon: Panda Knight MD;  Location: [facility] OR     GYN SURGERY  1995    tubes tied     LAPAROSCOPY DIAGNOSTIC (GYN) Right 7/29/2015    Procedure: LAPAROSCOPY DIAGNOSTIC (GYN);  Surgeon: Lian Betancur DO;  Location: [facility] OR     Current Outpatient Prescriptions   Medication Sig Dispense Refill     VITAMIN D, CHOLECALCIFEROL, PO Take by mouth daily Reported on  "2/17/2017       Multiple Vitamin (MULTI-VITAMINS PO)        OTC products: None, except as noted above    Allergies   Allergen Reactions     Rabies Vaccine Unknown     Scratch test resulted, it was not a good idea to give it to her.     Shellfish-Derived Products Nausea and Vomiting     Sulfa Drugs Rash      Latex Allergy: NO    Social History   Substance Use Topics     Smoking status: Never Smoker     Smokeless tobacco: Never Used     Alcohol use 0.0 oz/week     0 Standard drinks or equivalent per week      Comment: 1-2 per month      History   Drug Use No       REVIEW OF SYSTEMS:                                                    Constitutional, neuro, ENT, endocrine, pulmonary, cardiac, gastrointestinal, genitourinary, musculoskeletal, integument and psychiatric systems are negative, except as otherwise noted.    EXAM:                                                    /76 (BP Location: Right arm, Patient Position: Chair, Cuff Size: Adult Large)  Pulse 72  Temp 98  F (36.7  C) (Tympanic)  Ht 5' 4.5\" (1.638 m)  Wt 193 lb 9.6 oz (87.8 kg)  BMI 32.72 kg/m2    GENERAL APPEARANCE: healthy, alert and no distress     EYES: PERRL     HENT: ear canals and TM's normal and nose and mouth without ulcers or lesions     NECK: no adenopathy, no asymmetry, masses, or scars and thyroid normal to palpation     RESP: lungs clear to auscultation - no rales, rhonchi or wheezes     CV: regular rates and rhythm, normal S1 S2, no S3 or S4 and no murmur, click or rub     ABDOMEN:  soft, nontender, no HSM or masses and bowel sounds normal     MS: extremities normal- no gross deformities noted, no evidence of inflammation in joints, FROM in all extremities.     SKIN: no suspicious lesions or rashes     NEURO: Normal strength and tone, sensory exam grossly normal, mentation intact and speech normal     PSYCH: mentation appears normal. and affect normal/bright    DIAGNOSTICS:                                                    No " labs or EKG required for low risk surgery (cataract, skin procedure, breast biopsy, etc)    Recent Labs   Lab Test  02/05/16   1455  01/21/16   1357   05/07/15   1607  02/20/15   1548   HGB  12.2  13.3   < >  10.1*  13.3   PLT   --   234   --   261  201   NA   --    --    --    --   138   POTASSIUM   --    --    --    --   3.6   CR   --    --    --    --   0.71    < > = values in this interval not displayed.     IMPRESSION:                                                    Reason for surgery/procedure: bunion    The proposed surgical procedure is considered LOW risk.    REVISED CARDIAC RISK INDEX  The patient has the following serious cardiovascular risks for perioperative complications such as (MI, PE, VFib and 3  AV Block):  No serious cardiac risks  INTERPRETATION: 0 risks: Class I (very low risk - 0.4% complication rate)    The patient has the following additional risks for perioperative complications:  No identified additional risks      ICD-10-CM    1. Preop general physical exam Z01.818    2. Bunion M21.619        RECOMMENDATIONS:                                                      --Consult hospital rounder / IM to assist post-op medical management    --Patient is to take all scheduled medications on the day of surgery EXCEPT for modifications listed below.    APPROVAL GIVEN to proceed with proposed procedure, without further diagnostic evaluation       Signed Electronically by: Lakesha Aguirre PA-C    Copy of this evaluation report is provided to requesting physician.    [facility] Preop Guidelines    Patient Instructions   Cleared for surgery    Set up full physical in April/May  Before Your Surgery      Call your surgeon if there is any change in your health. This includes signs of a cold or flu (such as a sore throat, runny nose, cough, rash or fever).    Do not smoke, drink alcohol or take over the counter medicine (unless your surgeon or primary care doctor tells you to) for the 24 hours before  and after surgery.    If you take prescribed drugs: Follow your doctor s orders about which medicines to take and which to stop until after surgery.    Eating and drinking prior to surgery: follow the instructions from your surgeon    Take a shower or bath the night before surgery. Use the soap your surgeon gave you to gently clean your skin. If you do not have soap from your surgeon, use your regular soap. Do not shave or scrub the surgery site.  Wear clean pajamas and have clean sheets on your bed.

## 2018-02-16 NOTE — MR AVS SNAPSHOT
After Visit Summary   2/16/2018    Susu Lagos    MRN: [id number]           Patient Information     Date Of Birth          1965        Visit Information        Provider Department      2/16/2018 8:40 AM Lakesha Aguirre PA-C [facility]        Today's Diagnoses     Preop general physical exam    -  1      Care Instructions    Cleared for surgery    Set up full physical in April/May  Before Your Surgery      Call your surgeon if there is any change in your health. This includes signs of a cold or flu (such as a sore throat, runny nose, cough, rash or fever).    Do not smoke, drink alcohol or take over the counter medicine (unless your surgeon or primary care doctor tells you to) for the 24 hours before and after surgery.    If you take prescribed drugs: Follow your doctor s orders about which medicines to take and which to stop until after surgery.    Eating and drinking prior to surgery: follow the instructions from your surgeon    Take a shower or bath the night before surgery. Use the soap your surgeon gave you to gently clean your skin. If you do not have soap from your surgeon, use your regular soap. Do not shave or scrub the surgery site.  Wear clean pajamas and have clean sheets on your bed.           Follow-ups after your visit        Who to contact     If you have questions or need follow up information about today's clinic visit or your schedule please contact [facility] directly at [phone].  Normal or non-critical lab and imaging results will be communicated to you by MyChart, letter or phone within 4 business days after the clinic has received the results. If you do not hear from us within 7 days, please contact the clinic through [employer] or phone. If you have a critical or abnormal lab result, we will notify you by phone as soon as possible.  Submit refill requests through [employer] or call your pharmacy and they will forward the  "refill request to us. Please allow 3 business days for your refill to be completed.          Additional Information About Your Visit        [employer] Information     [employer] gives you secure access to your electronic health record. If you see a primary care provider, you can also send messages to your care team and make appointments. If you have questions, please call your primary care clinic.  If you do not have a primary care provider, please call [phone] and they will assist you.        Care EveryWhere ID     This is your Care EveryWhere ID. This could be used by other organizations to access your [facility] medical records  [id number]        Your Vitals Were     Pulse Temperature Height BMI (Body Mass Index)          72 98  F (36.7  C) (Tympanic) 5' 4.5\" (1.638 m) 32.72 kg/m2         Blood Pressure from Last 3 Encounters:   02/16/18 114/76   03/27/17 127/90   02/27/17 131/89    Weight from Last 3 Encounters:   02/16/18 193 lb 9.6 oz (87.8 kg)   12/28/16 188 lb (85.3 kg)   12/14/16 188 lb (85.3 kg)              Today, you had the following     No orders found for display       Primary Care Provider Office Phone # Fax #    [facility] [phone] [address] [facility] [address]        Equal Access to Services     HAILY GUERRERO : Hadii aad ku hadasho Soomaali, waaxda luqadaha, qaybta kaalmada adeegyada, prince trejo. So [facility] [phone].    ATENCIÓN: Si habla español, tiene a tiwari disposición servicios gratuitos de asistencia lingüística. Llame al [phone].    We comply with applicable federal civil rights laws and [place] laws. We do not discriminate on the basis of race, color, national origin, age, disability, sex, sexual orientation, or gender identity.            Thank you!     Thank you for choosing [facility]  for your care. Our goal is always to provide you with excellent care. Hearing back from our patients is " one way we can continue to improve our services. Please take a few minutes to complete the written survey that you may receive in the mail after your visit with us. Thank you!             Your Updated Medication List - Protect others around you: Learn how to safely use, store and throw away your medicines at www.disposemymeds.org.          This list is accurate as of 2/16/18  9:15 AM.  Always use your most recent med list.                   Brand Name Dispense Instructions for use Diagnosis    MULTI-VITAMINS PO           VITAMIN D (CHOLECALCIFEROL) PO      Take by mouth daily Reported on 2/17/2017

## 2018-02-16 NOTE — NURSING NOTE
"Chief Complaint   Patient presents with     Pre-Op Exam       Initial /76 (BP Location: Right arm, Patient Position: Chair, Cuff Size: Adult Large)  Pulse 72  Temp 98  F (36.7  C) (Tympanic)  Ht 5' 4.5\" (1.638 m)  Wt 193 lb 9.6 oz (87.8 kg)  BMI 32.72 kg/m2 Estimated body mass index is 32.72 kg/(m^2) as calculated from the following:    Height as of this encounter: 5' 4.5\" (1.638 m).    Weight as of this encounter: 193 lb 9.6 oz (87.8 kg).      Health Maintenance that is potentially due pending provider review:  NONE    n/a    Is there anyone who you would like to be able to receive your results? No  If yes have patient fill out WILLY    "

## 2018-04-04 ENCOUNTER — TELEPHONE (OUTPATIENT)
Dept: FAMILY MEDICINE | Facility: CLINIC | Age: 53
End: 2018-04-04

## 2018-04-04 NOTE — TELEPHONE ENCOUNTER
4/4/2018      Patient scheduled VIP Mammogram and will wait on Colonoscopy & Pap.             Outreach ,  Aurelia Corrales

## 2018-04-04 NOTE — TELEPHONE ENCOUNTER
4/4/2018    Attempt 1    Contacted patient in regards to scheduling VIP mammogram  Message on voicemail     Patient is also due for - Preventive Health Screening Colonoscopy and Cervical/PAP    Comments:       Outreach   AT

## 2018-04-16 ENCOUNTER — RADIANT APPOINTMENT (OUTPATIENT)
Dept: MAMMOGRAPHY | Facility: CLINIC | Age: 53
End: 2018-04-16
Payer: COMMERCIAL

## 2018-04-16 DIAGNOSIS — Z12.31 VISIT FOR SCREENING MAMMOGRAM: ICD-10-CM

## 2018-04-16 PROCEDURE — 77067 SCR MAMMO BI INCL CAD: CPT | Mod: TC

## 2018-05-09 ENCOUNTER — HOSPITAL ENCOUNTER (OUTPATIENT)
Dept: PHYSICAL THERAPY | Facility: CLINIC | Age: 53
Setting detail: THERAPIES SERIES
End: 2018-05-09
Attending: PODIATRIST
Payer: COMMERCIAL

## 2018-05-09 PROCEDURE — 40000718 ZZHC STATISTIC PT DEPARTMENT ORTHO VISIT: Performed by: PHYSICAL THERAPIST

## 2018-05-09 PROCEDURE — 97161 PT EVAL LOW COMPLEX 20 MIN: CPT | Mod: GP | Performed by: PHYSICAL THERAPIST

## 2018-05-09 PROCEDURE — 97110 THERAPEUTIC EXERCISES: CPT | Mod: GP | Performed by: PHYSICAL THERAPIST

## 2018-05-09 NOTE — PROGRESS NOTES
05/09/18 1300   General Information   Type of Visit Initial OP Ortho PT Evaluation   Start of Care Date 05/09/18   Referring Physician Tisha   Patient/Family Goals Statement walking, stairs, balance to carry heavy objects, bicycling   Orders Evaluate and Treat   Date of Order 04/30/18   Insurance Type Health Digerati   Medical Diagnosis R Toe   Surgical/Medical history reviewed Yes   Precautions/Limitations no known precautions/limitations   Body Part(s)   Body Part(s) Ankle/Foot   Presentation and Etiology   Pertinent history of current problem (include personal factors and/or comorbidities that impact the POC) 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 uses her walking boot from car to classroom, elevator. middle school substitute teaching. pt does wear her regular shoes all day. in the classroom, they are careful about not coming too close, teaches math. pt feeds birds outside, difficulty walking while holding objects. surgery 2/28/2018. having the other one done in June   Impairments A. Pain;B. Decreased WB tolerance;C. Swelling;D. Decreased ROM;E. Decreased flexibility;K. Numbness   Functional Limitations perform activities of daily living;perform desired leisure / sports activities   Symptom Location R toe   How/Where did it occur Other  (post op)   Onset date of current episode/exacerbation 02/28/18   Chronicity New   Pain rating (0-10 point scale) Best (/10);Worst (/10)   Best (/10) 0   Worst (/10) 5   Pain quality C. Aching;H. Other  (while taking a stride)   Frequency of pain/symptoms C. With activity   Pain/symptoms exacerbated by B. Walking   Pain/symptoms eased by C. Rest;H. Cold   Current Level of Function   Patient role/employment history A. Employed   Fall Risk Screen   Fall screen completed by PT   Have you fallen 2 or more times in the past year? No   Have you fallen and had an injury in the past year? No   Is patient a fall risk? No   Ankle/Foot Objective Findings   Side (if  bilateral, select both right and left) Right   Integumentary surgical incision closed with normal wound healing observed.   Gait/Locomotion decreased step length and decreased WB on RLE   Ankle/Foot Strength Comments great toe flexion 3+/5 R, flexion 3+/5 R   Palpation increased tightness along closed surgical incision over first MET.   Right DF (Knee Ext) AROM 6* left. 0* R.    Right PF AROM lacking 5 * from full plantarflexion.    Right Great Toe Flexion AROM lacking 50% R   Right PF Strength 4-/5 R. 5/5 L   Planned Therapy Interventions   Planned Therapy Interventions balance training;IADL retraining;joint mobilization;manual therapy;motor coordination training;neuromuscular re-education;ROM;strengthening;stretching;transfer training;bed mobility training   Clinical Impression   Criteria for Skilled Therapeutic Interventions Met yes, treatment indicated   PT Diagnosis Decreased ROM and strength secondary to R bunionectomy   Influenced by the following impairments decreased ROM and strength   Functional limitations due to impairments decreased participation with lifting, walking   Clinical Presentation Stable/Uncomplicated   Clinical Presentation Rationale pt presents with stable comorbidities, motivated to partiicpate   Clinical Decision Making (Complexity) Low complexity   Therapy Frequency 1 time/week   Predicted Duration of Therapy Intervention (days/wks) 8 weeks   Risk & Benefits of therapy have been explained Yes   Patient, Family & other staff in agreement with plan of care Yes   Education Assessment   Preferred Learning Style Pictures/video   Barriers to Learning No barriers   ORTHO GOALS   PT Ortho Eval Goals 1;2;3   Ortho Goal 1   Goal Identifier ROM   Goal Description Pt will demonstrate atleast 6* dorsiflexion RLE in order to participate with gait and stairs.   Target Date 07/04/18   Ortho Goal 2   Goal Identifier Strength   Goal Description Pt will demonstrate improved great toe strength of atleast  4+/5 in order to participate with carrying objects.   Target Date 07/04/18   Ortho Goal 3   Goal Identifier HEP   Goal Description Pt will demonstrates independence with HEP in order to encourage self progression depending on symptoms.   Target Date 06/20/18   Total Evaluation Time   Total Evaluation Time 25

## 2018-05-16 ENCOUNTER — HOSPITAL ENCOUNTER (OUTPATIENT)
Dept: PHYSICAL THERAPY | Facility: CLINIC | Age: 53
Setting detail: THERAPIES SERIES
End: 2018-05-16
Attending: PODIATRIST
Payer: COMMERCIAL

## 2018-05-16 PROCEDURE — 40000718 ZZHC STATISTIC PT DEPARTMENT ORTHO VISIT: Performed by: PHYSICAL THERAPIST

## 2018-05-16 PROCEDURE — 97110 THERAPEUTIC EXERCISES: CPT | Mod: GP | Performed by: PHYSICAL THERAPIST

## 2018-05-21 ENCOUNTER — HOSPITAL ENCOUNTER (OUTPATIENT)
Dept: PHYSICAL THERAPY | Facility: CLINIC | Age: 53
Setting detail: THERAPIES SERIES
End: 2018-05-21
Attending: PODIATRIST
Payer: COMMERCIAL

## 2018-05-21 PROCEDURE — 40000718 ZZHC STATISTIC PT DEPARTMENT ORTHO VISIT: Performed by: PHYSICAL THERAPIST

## 2018-05-21 PROCEDURE — 97110 THERAPEUTIC EXERCISES: CPT | Mod: GP | Performed by: PHYSICAL THERAPIST

## 2018-06-04 ENCOUNTER — HOSPITAL ENCOUNTER (OUTPATIENT)
Dept: PHYSICAL THERAPY | Facility: CLINIC | Age: 53
Setting detail: THERAPIES SERIES
End: 2018-06-04
Attending: PODIATRIST
Payer: COMMERCIAL

## 2018-06-04 PROCEDURE — 40000718 ZZHC STATISTIC PT DEPARTMENT ORTHO VISIT: Performed by: PHYSICAL THERAPIST

## 2018-06-04 PROCEDURE — 97110 THERAPEUTIC EXERCISES: CPT | Mod: GP | Performed by: PHYSICAL THERAPIST

## 2018-06-11 ENCOUNTER — OFFICE VISIT (OUTPATIENT)
Dept: FAMILY MEDICINE | Facility: CLINIC | Age: 53
End: 2018-06-11
Payer: COMMERCIAL

## 2018-06-11 VITALS
DIASTOLIC BLOOD PRESSURE: 78 MMHG | HEART RATE: 64 BPM | HEIGHT: 65 IN | RESPIRATION RATE: 16 BRPM | BODY MASS INDEX: 32.15 KG/M2 | SYSTOLIC BLOOD PRESSURE: 114 MMHG | TEMPERATURE: 97.9 F | WEIGHT: 193 LBS

## 2018-06-11 DIAGNOSIS — Z13.220 LIPID SCREENING: ICD-10-CM

## 2018-06-11 DIAGNOSIS — Z12.11 SPECIAL SCREENING FOR MALIGNANT NEOPLASMS, COLON: ICD-10-CM

## 2018-06-11 DIAGNOSIS — Z13.1 SCREENING FOR DIABETES MELLITUS: ICD-10-CM

## 2018-06-11 DIAGNOSIS — Z01.818 PREOP GENERAL PHYSICAL EXAM: Primary | ICD-10-CM

## 2018-06-11 DIAGNOSIS — M21.619 BUNION: ICD-10-CM

## 2018-06-11 PROCEDURE — 99214 OFFICE O/P EST MOD 30 MIN: CPT | Performed by: PHYSICIAN ASSISTANT

## 2018-06-11 NOTE — PATIENT INSTRUCTIONS
Cleared for surgery     Suggest stopping herbal supplement until after surgery     Colonoscopy ordered, schedule when ready     Labs for cholesterol and blood sugar whenever you'd like - lab only appointment     Optional shingles vaccine    Pap due by April 2020    Call if questions  Before Your Surgery      Call your surgeon if there is any change in your health. This includes signs of a cold or flu (such as a sore throat, runny nose, cough, rash or fever).    Do not smoke, drink alcohol or take over the counter medicine (unless your surgeon or primary care doctor tells you to) for the 24 hours before and after surgery.    If you take prescribed drugs: Follow your doctor s orders about which medicines to take and which to stop until after surgery.    Eating and drinking prior to surgery: follow the instructions from your surgeon    Take a shower or bath the night before surgery. Use the soap your surgeon gave you to gently clean your skin. If you do not have soap from your surgeon, use your regular soap. Do not shave or scrub the surgery site.  Wear clean pajamas and have clean sheets on your bed.

## 2018-06-11 NOTE — PROGRESS NOTES
[facility]  [address] [place] [id number]  [phone]  Dept: [phone]    PRE-OP EVALUATION:  Today's date: 2018    Susu Lagos (: 1965) presents for pre-operative evaluation assessment as requested by Dr. Ayesha Giles.  She requires evaluation and anesthesia risk assessment prior to undergoing surgery/procedure for treatment of Bunion, left.    Fax number for surgical facility: [phone]  Primary Physician: Lakesha Aguirre  Type of Anesthesia Anticipated: Local with MAC    Patient has a Health Care Directive or Living Will:  NO    Preop Questions 2018   Who is doing your surgery? Dr Sesar Giles   What are you having done? bunionectomy   Date of Surgery/Procedure: 2018   Facility or Hospital where procedure/surgery will be performed: [facility]   1.  Do you have a history of Heart attack, stroke, stent, coronary bypass surgery, or other heart surgery? No   2.  Do you ever have any pain or discomfort in your chest? No   3.  Do you have a history of  Heart Failure? No   4.   Are you troubled by shortness of breath when:  walking on a level surface, or up a slight hill, or at night? No   5.  Do you currently have a cold, bronchitis or other respiratory infection? No   6.  Do you have a cough, shortness of breath, or wheezing? No   7.  Do you sometimes get pains in the calves of your legs when you walk? No   8. Do you or anyone in your family have previous history of blood clots? No   9.  Do you or does anyone in your family have a serious bleeding problem such as prolonged bleeding following surgeries or cuts? No   10. Have you ever had problems with anemia or been told to take iron pills? YES - history of menorrhagia, resolved   11. Have you had any abnormal blood loss such as black, tarry or bloody stools, or abnormal vaginal bleeding? YES - History of menorrhagia, resolved   12. Have you ever had a blood transfusion? No   13.  Have you or any of your relatives ever had problems with anesthesia? No   14. Do you have sleep apnea, excessive snoring or daytime drowsiness? No   15. Do you have any prosthetic heart valves? No   16. Do you have prosthetic joints? No   17. Is there any chance that you may be pregnant? No - tubal ligation     HPI:     HPI related to upcoming procedure: bunion     See problem list for active medical problems.  Problems all longstanding and stable, except as noted/documented.  See ROS for pertinent symptoms related to these conditions.     MEDICAL HISTORY:     Patient Active Problem List    Diagnosis Date Noted     Menorrhagia with irregular cycle 02/28/2016     Priority: Medium     Perimenopause 06/04/2015     Priority: Medium     Hyperlipidemia with target LDL less than 160 02/25/2015     Priority: Medium      Past Medical History:   Diagnosis Date     Ovarian cyst 6/4/2015    Right, 6 cm dermoid on MRI-recommend removal.  7/29/2015 Surgery for right salpingoophorectomy.       Past Surgical History:   Procedure Laterality Date     CHOLECYSTECTOMY  2013    LSC     DILATION AND CURETTAGE, OPERATIVE HYSTEROSCOPY, COMBINED N/A 2/29/2016    Procedure: COMBINED DILATION AND CURETTAGE, OPERATIVE HYSTEROSCOPY;  Surgeon: Panda Knight MD;  Location: [facility] OR     GYN SURGERY  1995    tubes tied     LAPAROSCOPY DIAGNOSTIC (GYN) Right 7/29/2015    Procedure: LAPAROSCOPY DIAGNOSTIC (GYN);  Surgeon: Lian Betancur DO;  Location: [facility] OR     Current Outpatient Prescriptions   Medication Sig Dispense Refill     Multiple Vitamin (MULTI-VITAMINS PO)        VITAMIN D, CHOLECALCIFEROL, PO Take by mouth daily Reported on 2/17/2017       OTC products: herbals and vitamins - unknown supplement combination    Allergies   Allergen Reactions     Rabies Vaccine Unknown     Scratch test resulted, it was not a good idea to give it to her.     Shellfish-Derived Products Nausea and Vomiting     Sulfa Drugs Rash      Latex  "Allergy: NO    Social History   Substance Use Topics     Smoking status: Never Smoker     Smokeless tobacco: Never Used     Alcohol use 0.0 oz/week     0 Standard drinks or equivalent per week      Comment: 1-2 per month      History   Drug Use No       REVIEW OF SYSTEMS:   Constitutional, neuro, ENT, endocrine, pulmonary, cardiac, gastrointestinal, genitourinary, musculoskeletal, integument and psychiatric systems are negative, except as otherwise noted.    EXAM:   /78 (BP Location: Right arm, Patient Position: Chair, Cuff Size: Adult Large)  Pulse 64  Temp 97.9  F (36.6  C) (Tympanic)  Resp 16  Ht 5' 4.5\" (1.638 m)  Wt 193 lb (87.5 kg)  BMI 32.62 kg/m2    GENERAL APPEARANCE: healthy, alert and no distress     EYES: EOMI, PERRL     HENT: ear canals and TM's normal and nose and mouth without ulcers or lesions     NECK: no adenopathy, no asymmetry, masses, or scars and thyroid normal to palpation     RESP: lungs clear to auscultation - no rales, rhonchi or wheezes     CV: regular rates and rhythm, normal S1 S2, no S3 or S4 and no murmur, click or rub     ABDOMEN:  soft, nontender, no HSM or masses and bowel sounds normal     MS: extremities normal- no gross deformities noted, no evidence of inflammation in joints, FROM in all extremities.     SKIN: no suspicious lesions or rashes     NEURO: Normal strength and tone, sensory exam grossly normal, mentation intact and speech normal     PSYCH: mentation appears normal. and affect normal/bright    DIAGNOSTICS:   No labs or EKG required for low risk surgery (cataract, skin procedure, breast biopsy, etc)    Recent Labs   Lab Test  02/05/16   1455  01/21/16   1357   05/07/15   1607  02/20/15   1548   HGB  12.2  13.3   < >  10.1*  13.3   PLT   --   234   --   261  201   NA   --    --    --    --   138   POTASSIUM   --    --    --    --   3.6   CR   --    --    --    --   0.71    < > = values in this interval not displayed.     IMPRESSION:   Reason for " surgery/procedure: bunion    The proposed surgical procedure is considered LOW risk.    REVISED CARDIAC RISK INDEX  The patient has the following serious cardiovascular risks for perioperative complications such as (MI, PE, VFib and 3  AV Block):  No serious cardiac risks  INTERPRETATION: 0 risks: Class I (very low risk - 0.4% complication rate)    The patient has the following additional risks for perioperative complications:  No identified additional risks      ICD-10-CM    1. Preop general physical exam Z01.818    2. Bunion M21.619    3. Special screening for malignant neoplasms, colon Z12.11 GASTROENTEROLOGY ADULT REF PROCEDURE ONLY [facility] [phone]   4. Screening for diabetes mellitus Z13.1 Glucose   5. Lipid screening Z13.220 Lipid panel reflex to direct LDL Non-fasting       RECOMMENDATIONS:     --Consult hospital rounder / IM to assist post-op medical management    --Patient is to take all scheduled medications on the day of surgery EXCEPT for modifications listed below.  --Patient is on no chronic medications    APPROVAL GIVEN to proceed with proposed procedure, without further diagnostic evaluation    Signed Electronically by: Lakesha Aguirre PA-C    Copy of this evaluation report is provided to requesting physician.    [facility] Preop Guidelines    Revised Cardiac Risk Index    Patient Instructions   Cleared for surgery     Suggest stopping herbal supplement until after surgery     Colonoscopy ordered, schedule when ready     Labs for cholesterol and blood sugar whenever you'd like - lab only appointment     Optional shingles vaccine    Pap due by April 2020    Call if questions  Before Your Surgery      Call your surgeon if there is any change in your health. This includes signs of a cold or flu (such as a sore throat, runny nose, cough, rash or fever).    Do not smoke, drink alcohol or take over the counter medicine (unless your surgeon or primary care doctor tells you to) for the 24  hours before and after surgery.    If you take prescribed drugs: Follow your doctor s orders about which medicines to take and which to stop until after surgery.    Eating and drinking prior to surgery: follow the instructions from your surgeon    Take a shower or bath the night before surgery. Use the soap your surgeon gave you to gently clean your skin. If you do not have soap from your surgeon, use your regular soap. Do not shave or scrub the surgery site.  Wear clean pajamas and have clean sheets on your bed.

## 2018-06-11 NOTE — NURSING NOTE
"Chief Complaint   Patient presents with     Pre-Op Exam       Initial /78 (BP Location: Right arm, Patient Position: Chair, Cuff Size: Adult Large)  Pulse 64  Temp 97.9  F (36.6  C) (Tympanic)  Resp 16  Ht 5' 4.5\" (1.638 m)  Wt 193 lb (87.5 kg)  BMI 32.62 kg/m2 Estimated body mass index is 32.62 kg/(m^2) as calculated from the following:    Height as of this encounter: 5' 4.5\" (1.638 m).    Weight as of this encounter: 193 lb (87.5 kg).      Health Maintenance that is potentially due pending provider review:  Pap Smear and Colonoscopy/FIT    Pt will schedule physical appt.    Is there anyone who you would like to be able to receive your results? No  If yes have patient fill out WILLY      "

## 2018-06-11 NOTE — MR AVS SNAPSHOT
After Visit Summary   6/11/2018    Susu Lagos    MRN: [id number]           Patient Information     Date Of Birth          1965        Visit Information        Provider Department      6/11/2018 8:00 AM Lakesha Aguirre PA-C [facility]        Today's Diagnoses     Preop general physical exam    -  1    Special screening for malignant neoplasms, colon        Screening for diabetes mellitus        Lipid screening          Care Instructions    Cleared for surgery     Suggest stopping herbal supplement until after surgery     Colonoscopy ordered, schedule when ready     Labs for cholesterol and blood sugar whenever you'd like - lab only appointment     Optional shingles vaccine    Pap due by April 2020    Call if questions  Before Your Surgery      Call your surgeon if there is any change in your health. This includes signs of a cold or flu (such as a sore throat, runny nose, cough, rash or fever).    Do not smoke, drink alcohol or take over the counter medicine (unless your surgeon or primary care doctor tells you to) for the 24 hours before and after surgery.    If you take prescribed drugs: Follow your doctor s orders about which medicines to take and which to stop until after surgery.    Eating and drinking prior to surgery: follow the instructions from your surgeon    Take a shower or bath the night before surgery. Use the soap your surgeon gave you to gently clean your skin. If you do not have soap from your surgeon, use your regular soap. Do not shave or scrub the surgery site.  Wear clean pajamas and have clean sheets on your bed.           Follow-ups after your visit        Additional Services     GASTROENTEROLOGY ADULT REF PROCEDURE ONLY [facility] [phone]       Last Lab Result: Creatinine (mg/dL)       Date                     Value                 02/20/2015               0.71             ----------  Body mass index is 32.62  kg/(m^2).     Needed:  No  Language:  English    Patient will be contacted to schedule procedure.     Please be aware that coverage of these services is subject to the terms and limitations of your health insurance plan.  Call member services at your health plan with any benefit or coverage questions.  Any procedures must be performed at a [facility] facility OR coordinated by your clinic's referral office.    Please bring the following with you to your appointment:    (1) Any X-Rays, CTs or MRIs which have been performed.  Contact the facility where they were done to arrange for  prior to your scheduled appointment.    (2) List of current medications   (3) This referral request   (4) Any documents/labs given to you for this referral                  Your next 10 appointments already scheduled     Jun 20, 2018  7:00 AM CDT   Ortho Treatment with Frida Gutierrez PT   [facility] ([facility])    [address] [place] [id number]   [phone]              Future tests that were ordered for you today     Open Future Orders        Priority Expected Expires Ordered    Lipid panel reflex to direct LDL Non-fasting Routine  6/11/2019 6/11/2018    Glucose Routine  6/11/2019 6/11/2018            Who to contact     If you have questions or need follow up information about today's clinic visit or your schedule please contact [facility] directly at [phone].  Normal or non-critical lab and imaging results will be communicated to you by MyChart, letter or phone within 4 business days after the clinic has received the results. If you do not hear from us within 7 days, please contact the clinic through MyChart or phone. If you have a critical or abnormal lab result, we will notify you by phone as soon as possible.  Submit refill requests through [facility] or call your pharmacy and they will forward the refill request to us. Please allow 3  "business days for your refill to be completed.          Additional Information About Your Visit        MyChart Information     [facility] gives you secure access to your electronic health record. If you see a primary care provider, you can also send messages to your care team and make appointments. If you have questions, please call your primary care clinic.  If you do not have a primary care provider, please call [phone] and they will assist you.        Care EveryWhere ID     This is your Care EveryWhere ID. This could be used by other organizations to access your [facility] medical records  [id number]        Your Vitals Were     Pulse Temperature Respirations Height BMI (Body Mass Index)       64 97.9  F (36.6  C) (Tympanic) 16 5' 4.5\" (1.638 m) 32.62 kg/m2        Blood Pressure from Last 3 Encounters:   06/11/18 114/78   02/16/18 114/76   03/27/17 127/90    Weight from Last 3 Encounters:   06/11/18 193 lb (87.5 kg)   02/16/18 193 lb 9.6 oz (87.8 kg)   12/28/16 188 lb (85.3 kg)              We Performed the Following     GASTROENTEROLOGY ADULT REF PROCEDURE ONLY [facility] [phone]        Primary Care Provider Office Phone # Fax #    Lakesha Aguirre PA-C [phone] [address] [place] [address]        Equal Access to Services     MINGO GUERRERO : Hadii aad ku hadasho Sogregorioali, waaxda luqadaha, qaybta kaalmada adeshamiryada, prince fernandez . So [facility] [phone].    ATENCIÓN: Si habla español, tiene a tiwari disposición servicios gratuitos de asistencia lingüística. Llame al [phone].    We comply with applicable federal civil rights laws and [place] laws. We do not discriminate on the basis of race, color, national origin, age, disability, sex, sexual orientation, or gender identity.            Thank you!     Thank you for choosing [facility]  for your care. Our goal is always to provide you with excellent care. Hearing back " from our patients is one way we can continue to improve our services. Please take a few minutes to complete the written survey that you may receive in the mail after your visit with us. Thank you!             Your Updated Medication List - Protect others around you: Learn how to safely use, store and throw away your medicines at www.disposemymeds.org.          This list is accurate as of 6/11/18  8:33 AM.  Always use your most recent med list.                   Brand Name Dispense Instructions for use Diagnosis    MULTI-VITAMINS PO           VITAMIN D (CHOLECALCIFEROL) PO      Take by mouth daily Reported on 2/17/2017

## 2018-06-20 ENCOUNTER — HOSPITAL ENCOUNTER (OUTPATIENT)
Dept: PHYSICAL THERAPY | Facility: CLINIC | Age: 53
Setting detail: THERAPIES SERIES
End: 2018-06-20
Attending: PODIATRIST
Payer: COMMERCIAL

## 2018-06-20 PROCEDURE — 97110 THERAPEUTIC EXERCISES: CPT | Mod: GP | Performed by: PHYSICAL THERAPIST

## 2018-06-20 PROCEDURE — 40000718 ZZHC STATISTIC PT DEPARTMENT ORTHO VISIT: Performed by: PHYSICAL THERAPIST

## 2018-06-20 NOTE — PROGRESS NOTES
Outpatient Physical Therapy Discharge Note     Patient: Susu Lagos  : 1965    Beginning/End Dates of Reporting Period:  2018 to 2018    Referring Provider: Tisha    Therapy Diagnosis:  Decreased ROM and strength secondary to R foot surgery.     Client Self Report: pt states she feels full strength in her R foot.    Objective Measurements:  Objective Measure: strength R toe  Details:   Objective Measure: AROM  Details: 8* dorsiflexion R. 70* plantarflexion R.         Goals:  Goal Identifier ROM   Goal Description Pt will demonstrate atleast 6* dorsiflexion RLE in order to participate with gait and stairs.   Target Date 18   Date Met  18   Progress:     Goal Identifier Strength   Goal Description Pt will demonstrate improved great toe strength of atleast 4+/5 in order to participate with carrying objects.   Target Date 18   Date Met  18   Progress:     Goal Identifier HEP   Goal Description Pt will demonstrates independence with HEP in order to encourage self progression depending on symptoms.   Target Date 18   Date Met  18   Progress:       Progress Toward Goals:   Progress this reporting period: pt demonstrates full achievement of goals and is motivated to continue HEP independently.          Plan:  Discharge from therapy.    Discharge:    Reason for Discharge: Patient has met all goals.    Equipment Issued: none    Discharge Plan: Patient to continue home program.

## 2018-10-02 ENCOUNTER — HOSPITAL ENCOUNTER (OUTPATIENT)
Dept: PHYSICAL THERAPY | Facility: CLINIC | Age: 53
Setting detail: THERAPIES SERIES
End: 2018-10-02
Attending: PODIATRIST
Payer: COMMERCIAL

## 2018-10-02 PROCEDURE — 40000718 ZZHC STATISTIC PT DEPARTMENT ORTHO VISIT

## 2018-10-02 PROCEDURE — 97161 PT EVAL LOW COMPLEX 20 MIN: CPT | Mod: GP

## 2018-10-02 PROCEDURE — 97110 THERAPEUTIC EXERCISES: CPT | Mod: GP

## 2018-10-02 NOTE — PROGRESS NOTES
10/02/18 1300   General Information   Type of Visit Initial OP Ortho PT Evaluation   Start of Care Date 10/02/18   Referring Physician Dr Giles   Patient/Family Goals Statement getting more ROM in the toe so she squat down and lift up on her toes   Orders Evaluate and Treat   Orders Comment work on ROM 1st MTP joint   Date of Order 09/25/18   Insurance Type [employer]   Medical Diagnosis s/p Hallux valgus correction   Surgical/Medical history reviewed Yes  (anemia, menopause, bunionectomy 2/2018 and 6/2018)   Body Part(s)   Body Part(s) Ankle/Foot   Presentation and Etiology   Pertinent history of current problem (include personal factors and/or comorbidities that impact the POC) Pt underwent L bunion surgery end of June 2018.    Impairments D. Decreased ROM;E. Decreased flexibility;G. Impaired balance;A. Pain   Functional Limitations perform activities of daily living;perform required work activities;perform desired leisure / sports activities  (squatting, lifting up on toes, lifting)   Symptom Location L MTP joint top and bottom, L med ankle   Onset date of current episode/exacerbation 06/27/18   Chronicity New   Pain rating (0-10 point scale) Best (/10);Worst (/10)  (currently, 0/10 sitting, 1-2/10 walking)   Best (/10) 0   Worst (/10) 5  (working out)   Pain quality B. Dull;C. Aching   Frequency of pain/symptoms C. With activity   Pain/symptoms are: Worse in the morning   Pain/symptoms exacerbated by B. Walking;D. Carrying;G. Certain positions;H. Overhead reach   Pain/symptoms eased by C. Rest;H. Cold   Progression of symptoms since onset: Improved   Prior Level of Function   Functional Level Prior Comment independent   Current Level of Function   Current Community Support Family/friend caregiver   Patient role/employment history A. Employed   Employment Comments teacher   Living environment House/townhome   Home/community accessibility stairs, drives   Current equipment-Gait/Locomotion None   Fall Risk  "Screen   Fall screen completed by PT   Have you fallen 2 or more times in the past year? No   Have you fallen and had an injury in the past year? Yes   Timed Up and Go score (seconds) 12   Is patient a fall risk? No   Fall screen comments pt tripped up the stairs   Functional Scales   Functional Scales Other   Other Scales  LEFS 44/80   Ankle/Foot Objective Findings   Side (if bilateral, select both right and left) Left   Integumentary girth around MTP joints: L 23.75 cm, R 24 cm   Gait/Locomotion Amb without AD with slight limp on L   Balance/ Proprioception (Single Leg Stance) L 45\", R 60+\"   Ankle/Foot ROM Comment B ankle ROM WNL; L great toe abd 0*, R lacking 5*   Ankle/Foot Strength Comments R 5/5   Palpation tender medial MTP joint and along incision   Left Great Toe Flexion AROM 5*  (R 5*)   Left DF/Inversion Strength 4/5   Left DF/Eversion Strength 5/5   Left PF Strength 3-/5   Left Great Toe Ext AROM 10*  (R 50* )   Planned Therapy Interventions   Planned Therapy Interventions joint mobilization;manual therapy;ROM;strengthening;stretching;gait training   Clinical Impression   Criteria for Skilled Therapeutic Interventions Met yes, treatment indicated   PT Diagnosis s/p L bunionectomy   Influenced by the following impairments pain, weakness, decr ROM   Functional limitations due to impairments walking, stairs, squatting, lifting, tip toes   Clinical Presentation Stable/Uncomplicated   Clinical Presentation Rationale clinical judgement   Clinical Decision Making (Complexity) Low complexity   Therapy Frequency 1 time/week   Predicted Duration of Therapy Intervention (days/wks) 6 weeks   Risk & Benefits of therapy have been explained Yes   Patient, Family & other staff in agreement with plan of care Yes   Education Assessment   Preferred Learning Style Listening;Demonstration;Pictures/video   Barriers to Learning No barriers   ORTHO GOALS   PT Ortho Eval Goals 1;2;3;4   Ortho Goal 1   Goal Identifier 1   Goal " Description Pt will be able to amb with a normal gait pattern.   Target Date 10/23/18   Ortho Goal 2   Goal Identifier 2   Goal Description Pt will be able to navigate stairs reciprocally.   Target Date 11/06/18   Ortho Goal 3   Goal Identifier 3   Goal Description Pt will be able to stand on tip toes to reach a high shelf.   Target Date 11/13/18   Ortho Goal 4   Goal Identifier 4   Goal Description Pt will be independent with HEP for optimal functional recovery.   Target Date 11/13/18   Total Evaluation Time   Total Evaluation Time 20     Alayna Lagos PT

## 2018-10-09 ENCOUNTER — HOSPITAL ENCOUNTER (OUTPATIENT)
Dept: PHYSICAL THERAPY | Facility: CLINIC | Age: 53
Setting detail: THERAPIES SERIES
End: 2018-10-09
Attending: PODIATRIST
Payer: COMMERCIAL

## 2018-10-09 PROCEDURE — 40000718 ZZHC STATISTIC PT DEPARTMENT ORTHO VISIT

## 2018-10-09 PROCEDURE — 97110 THERAPEUTIC EXERCISES: CPT | Mod: GP

## 2018-10-15 ENCOUNTER — HOSPITAL ENCOUNTER (OUTPATIENT)
Dept: PHYSICAL THERAPY | Facility: CLINIC | Age: 53
Setting detail: THERAPIES SERIES
End: 2018-10-15
Attending: PODIATRIST
Payer: COMMERCIAL

## 2018-10-15 PROCEDURE — 97110 THERAPEUTIC EXERCISES: CPT | Mod: GP

## 2018-10-15 PROCEDURE — 40000718 ZZHC STATISTIC PT DEPARTMENT ORTHO VISIT

## 2018-10-15 PROCEDURE — 97140 MANUAL THERAPY 1/> REGIONS: CPT | Mod: GP

## 2018-10-30 ENCOUNTER — HOSPITAL ENCOUNTER (OUTPATIENT)
Dept: PHYSICAL THERAPY | Facility: CLINIC | Age: 53
Setting detail: THERAPIES SERIES
End: 2018-10-30
Attending: PODIATRIST
Payer: COMMERCIAL

## 2018-10-30 PROCEDURE — 97110 THERAPEUTIC EXERCISES: CPT | Mod: GP

## 2018-10-30 PROCEDURE — 40000718 ZZHC STATISTIC PT DEPARTMENT ORTHO VISIT

## 2018-10-30 PROCEDURE — 97140 MANUAL THERAPY 1/> REGIONS: CPT | Mod: GP

## 2018-11-12 ENCOUNTER — ANESTHESIA EVENT (OUTPATIENT)
Dept: GASTROENTEROLOGY | Facility: CLINIC | Age: 53
End: 2018-11-12
Payer: COMMERCIAL

## 2018-11-12 NOTE — ANESTHESIA PREPROCEDURE EVALUATION
Anesthesia Evaluation     . Pt has had prior anesthetic. Type: General and MAC    No history of anesthetic complications          ROS/MED HX    ENT/Pulmonary:  - neg pulmonary ROS     Neurologic:  - neg neurologic ROS     Cardiovascular:     (+) Dyslipidemia, ----. : . . . :. .       METS/Exercise Tolerance:  >4 METS   Hematologic:  - neg hematologic  ROS       Musculoskeletal:  - neg musculoskeletal ROS       GI/Hepatic:  - neg GI/hepatic ROS       Renal/Genitourinary:  - ROS Renal section negative       Endo:  - neg endo ROS       Psychiatric:  - neg psychiatric ROS       Infectious Disease:  - neg infectious disease ROS       Malignancy:      - no malignancy   Other:                     Physical Exam  Normal systems: cardiovascular, pulmonary and dental    Airway   Mallampati: II  TM distance: >3 FB  Neck ROM: full    Dental     Cardiovascular       Pulmonary                     Anesthesia Plan      History & Physical Review  History and physical reviewed and following examination; no interval change.    ASA Status:  1 .    NPO Status:  > 4 hours    Plan for MAC Reason for MAC:  Deep or markedly invasive procedure (G8)         Postoperative Care      Consents  Anesthetic plan, risks, benefits and alternatives discussed with:  Patient and Spouse..                          .

## 2018-11-13 ENCOUNTER — SURGERY (OUTPATIENT)
Age: 53
End: 2018-11-13

## 2018-11-13 ENCOUNTER — HOSPITAL ENCOUNTER (OUTPATIENT)
Facility: CLINIC | Age: 53
Discharge: HOME | End: 2018-11-13
Attending: SURGERY | Admitting: SURGERY
Payer: COMMERCIAL

## 2018-11-13 ENCOUNTER — ANESTHESIA (OUTPATIENT)
Dept: GASTROENTEROLOGY | Facility: CLINIC | Age: 53
End: 2018-11-13
Payer: COMMERCIAL

## 2018-11-13 VITALS
HEIGHT: 65 IN | SYSTOLIC BLOOD PRESSURE: 115 MMHG | BODY MASS INDEX: 30.82 KG/M2 | OXYGEN SATURATION: 98 % | RESPIRATION RATE: 16 BRPM | DIASTOLIC BLOOD PRESSURE: 78 MMHG | TEMPERATURE: 98 F | WEIGHT: 185 LBS

## 2018-11-13 LAB — COLONOSCOPY: NORMAL

## 2018-11-13 PROCEDURE — 25000125 ZZHC RX 250: Performed by: SURGERY

## 2018-11-13 PROCEDURE — 25000128 H RX IP 250 OP 636: Performed by: SURGERY

## 2018-11-13 PROCEDURE — 25000125 ZZHC RX 250: Performed by: NURSE ANESTHETIST, CERTIFIED REGISTERED

## 2018-11-13 PROCEDURE — 37000008 ZZH ANESTHESIA TECHNICAL FEE, 1ST 30 MIN: Performed by: SURGERY

## 2018-11-13 PROCEDURE — G0121 COLON CA SCRN NOT HI RSK IND: HCPCS | Performed by: SURGERY

## 2018-11-13 PROCEDURE — 45378 DIAGNOSTIC COLONOSCOPY: CPT | Performed by: SURGERY

## 2018-11-13 PROCEDURE — 25000128 H RX IP 250 OP 636: Performed by: NURSE ANESTHETIST, CERTIFIED REGISTERED

## 2018-11-13 RX ORDER — PROPOFOL 10 MG/ML
INJECTION, EMULSION INTRAVENOUS PRN
Status: DISCONTINUED | OUTPATIENT
Start: 2018-11-13 | End: 2018-11-13

## 2018-11-13 RX ORDER — PROPOFOL 10 MG/ML
INJECTION, EMULSION INTRAVENOUS CONTINUOUS PRN
Status: DISCONTINUED | OUTPATIENT
Start: 2018-11-13 | End: 2018-11-13

## 2018-11-13 RX ORDER — SODIUM CHLORIDE, SODIUM LACTATE, POTASSIUM CHLORIDE, CALCIUM CHLORIDE 600; 310; 30; 20 MG/100ML; MG/100ML; MG/100ML; MG/100ML
INJECTION, SOLUTION INTRAVENOUS CONTINUOUS
Status: DISCONTINUED | OUTPATIENT
Start: 2018-11-13 | End: 2018-11-13 | Stop reason: HOSPADM

## 2018-11-13 RX ORDER — LIDOCAINE 40 MG/G
CREAM TOPICAL
Status: DISCONTINUED | OUTPATIENT
Start: 2018-11-13 | End: 2018-11-13 | Stop reason: HOSPADM

## 2018-11-13 RX ORDER — LIDOCAINE HYDROCHLORIDE 10 MG/ML
INJECTION, SOLUTION INFILTRATION; PERINEURAL PRN
Status: DISCONTINUED | OUTPATIENT
Start: 2018-11-13 | End: 2018-11-13

## 2018-11-13 RX ORDER — ONDANSETRON 2 MG/ML
4 INJECTION INTRAMUSCULAR; INTRAVENOUS
Status: DISCONTINUED | OUTPATIENT
Start: 2018-11-13 | End: 2018-11-13 | Stop reason: HOSPADM

## 2018-11-13 RX ADMIN — PROPOFOL 200 MCG/KG/MIN: 10 INJECTION, EMULSION INTRAVENOUS at 09:20

## 2018-11-13 RX ADMIN — SODIUM CHLORIDE, POTASSIUM CHLORIDE, SODIUM LACTATE AND CALCIUM CHLORIDE: 600; 310; 30; 20 INJECTION, SOLUTION INTRAVENOUS at 07:54

## 2018-11-13 RX ADMIN — PROPOFOL 100 MG: 10 INJECTION, EMULSION INTRAVENOUS at 09:18

## 2018-11-13 RX ADMIN — LIDOCAINE HYDROCHLORIDE 1 ML: 10 INJECTION, SOLUTION EPIDURAL; INFILTRATION; INTRACAUDAL; PERINEURAL at 07:54

## 2018-11-13 RX ADMIN — PROPOFOL 50 MG: 10 INJECTION, EMULSION INTRAVENOUS at 09:22

## 2018-11-13 RX ADMIN — PROPOFOL 175 MCG/KG/MIN: 10 INJECTION, EMULSION INTRAVENOUS at 09:18

## 2018-11-13 RX ADMIN — LIDOCAINE HYDROCHLORIDE 50 MG: 10 INJECTION, SOLUTION INFILTRATION; PERINEURAL at 09:18

## 2018-11-13 NOTE — H&P
53 year old year old female here for colonoscopy for screening.    Patient Active Problem List   Diagnosis     Hyperlipidemia with target LDL less than 160     Perimenopause     Menorrhagia with irregular cycle       Past Medical History:   Diagnosis Date     Ovarian cyst 6/4/2015    Right, 6 cm dermoid on MRI-recommend removal.  7/29/2015 Surgery for right salpingoophorectomy.         Past Surgical History:   Procedure Laterality Date     CHOLECYSTECTOMY  2013    LSC     DILATION AND CURETTAGE, OPERATIVE HYSTEROSCOPY, COMBINED N/A 2/29/2016    Procedure: COMBINED DILATION AND CURETTAGE, OPERATIVE HYSTEROSCOPY;  Surgeon: Panda Knight MD;  Location: [facility]     GYN SURGERY  1995    tubes tied     LAPAROSCOPY DIAGNOSTIC (GYN) Right 7/29/2015    Procedure: LAPAROSCOPY DIAGNOSTIC (GYN);  Surgeon: Lian Betancur DO;  Location: [facility]       @[facility]@     current outpatient prescriptions on file.       Allergies   Allergen Reactions     Rabies Vaccine Unknown     Scratch test resulted, it was not a good idea to give it to her.     Shellfish-Derived Products Nausea and Vomiting     Sulfa Drugs Rash       Pt reports that she has never smoked. She has never used smokeless tobacco. She reports that she drinks alcohol. She reports that she does not use illicit drugs.    Exam:  LMP 01/29/2016    Awake, Alert OX3  Lungs - CTA bilaterally  CV - RRR, no murmurs, distal pulses intact  Abd - soft, non-distended, non-tender, +BS  Extr - No cyanosis or edema    A/P 53 year old year old female in need of colonoscopy for screening. Risks, benefits, alternatives, and complications were discussed including the possibility of perforation and the patient agreed to proceed    Sesar Dos Santos MD

## 2018-11-13 NOTE — BRIEF OP NOTE
[facility]   Brief Operative Note    Pre-operative diagnosis: screening   Post-operative diagnosis normal colon     Procedure: Procedure(s):  colonoscopy   Surgeon(s): Surgeon(s) and Role:     * Sesar Dos Santos MD - Primary   Estimated blood loss: * No values recorded between 11/13/2018 12:00 AM and 11/13/2018  9:29 AM *    Specimens: * No specimens in log *   Findings: Normal colon

## 2018-11-13 NOTE — ANESTHESIA POSTPROCEDURE EVALUATION
Patient: Ssuu Lagos    Procedure(s):  colonoscopy    Diagnosis:screening  Diagnosis Additional Information: No value filed.    Anesthesia Type:  MAC    Note:  Anesthesia Post Evaluation    Patient location during evaluation: Phase 2 and Bedside  Patient participation: Able to fully participate in evaluation  Level of consciousness: awake and alert  Pain management: adequate  Airway patency: patent  Cardiovascular status: acceptable  Respiratory status: acceptable  Hydration status: acceptable  PONV: none     Anesthetic complications: None          Last vitals:  Vitals:    11/13/18 0733 11/13/18 0934 11/13/18 0945   BP: 121/90 119/85 115/79   Resp: 18 16    Temp: 36.7  C (98  F)     SpO2: 95% 96% 96%         Electronically Signed By: RADHA Oro CRNA  November 13, 2018  9:51 AM

## 2018-11-13 NOTE — ANESTHESIA CARE TRANSFER NOTE
Patient: Susu Lagos    Procedure(s):  colonoscopy    Diagnosis: screening  Diagnosis Additional Information: No value filed.    Anesthesia Type:   MAC     Note:  Airway :Room Air  Patient transferred to:Phase II  Comments: Patient's VSS. Spontaneous respirations. Patient awake and oriented. IV patent. Report to RN.Handoff Report: Identifed the Patient, Identified the Reponsible Provider, Reviewed the pertinent medical history, Discussed the surgical course, Reviewed Intra-OP anesthesia mangement and issues during anesthesia, Set expectations for post-procedure period and Allowed opportunity for questions and acknowledgement of understanding      Vitals: (Last set prior to Anesthesia Care Transfer)    CRNA VITALS  11/13/2018 0902 - 11/13/2018 0932      11/13/2018             Pulse: 78    SpO2: 98 %                Electronically Signed By: RADHA Oro CRNA  November 13, 2018  9:32 AM

## 2018-12-07 NOTE — PROGRESS NOTES
Outpatient Physical Therapy Discharge Note     Patient: Susu Lagos  : 1965    Beginning/End Dates of Reporting Period:  10/2/2018 to 10/30/2018    Referring Provider: Dr Giles    Therapy Diagnosis: s/p L bunionectomy     Client Self Report: Pt reports toe is a little sore today. Might be from the heel raises. Instructed pt to decrease reps so pain does not increase more than 2 numbers on the pain scale.    Objective Measurements:  Objective Measure: L great toe AROM  Details: flex 17*, ext 31*    Objective Measure: Strength  Details: L inv 4/5, PF 4+/5           Goals:  Goal Identifier 1   Goal Description Pt will be able to amb with a normal gait pattern.   Target Date 10/23/18   Date Met  10/15/18   Progress:     Goal Identifier 2   Goal Description Pt will be able to navigate stairs reciprocally.   Target Date 18   Date Met  10/30/18   Progress:     Goal Identifier 3   Goal Description Pt will be able to stand on tip toes to reach a high shelf.   Target Date 18   Date Met      Progress:     Goal Identifier 4   Goal Description Pt will be independent with HEP for optimal functional recovery.   Target Date 18   Date Met      Progress:         Progress Toward Goals:   Progress this reporting period: Pt has made good progress towards more function and less pain. She met 2/4 goals. She has not returned to PT so current status is unknown.          Plan:  Discharge from therapy.    Discharge:    Reason for Discharge: Patient has failed to schedule further appointments.      Discharge Plan: Patient to continue home program.    Alayna Lagos PT

## 2019-04-19 ENCOUNTER — ANCILLARY PROCEDURE (OUTPATIENT)
Dept: GENERAL RADIOLOGY | Facility: CLINIC | Age: 54
End: 2019-04-19
Attending: NURSE PRACTITIONER
Payer: COMMERCIAL

## 2019-04-19 ENCOUNTER — OFFICE VISIT (OUTPATIENT)
Dept: FAMILY MEDICINE | Facility: CLINIC | Age: 54
End: 2019-04-19
Payer: COMMERCIAL

## 2019-04-19 VITALS
HEART RATE: 64 BPM | DIASTOLIC BLOOD PRESSURE: 56 MMHG | SYSTOLIC BLOOD PRESSURE: 118 MMHG | TEMPERATURE: 98 F | RESPIRATION RATE: 18 BRPM

## 2019-04-19 DIAGNOSIS — S62.502A FRACTURE OF UNSPECIFIED PHALANX OF LEFT THUMB, INITIAL ENCOUNTER FOR CLOSED FRACTURE: Primary | ICD-10-CM

## 2019-04-19 DIAGNOSIS — M79.645 PAIN OF LEFT THUMB: ICD-10-CM

## 2019-04-19 PROCEDURE — 73140 X-RAY EXAM OF FINGER(S): CPT | Mod: LT

## 2019-04-19 PROCEDURE — 99214 OFFICE O/P EST MOD 30 MIN: CPT | Performed by: NURSE PRACTITIONER

## 2019-04-19 ASSESSMENT — PAIN SCALES - GENERAL: PAINLEVEL: MILD PAIN (2)

## 2019-04-19 NOTE — PROGRESS NOTES
SUBJECTIVE:   Susu Lagos is a 53 year old female who presents to clinic today for the following   health issues:    Joint Pain    Onset: 2/15/2019    Description:   Location: Left thumb   Character: Dull ache and Burning    Intensity: moderate    Progression of Symptoms: same    Accompanying Signs & Symptoms:  Other symptoms: warmth some times     History:   Previous similar pain: YES- Has sprained thumbs before      Precipitating factors:   Trauma or overuse: YES- was shoveling heavy snow at end of driveway around Valentines Day    Alleviating factors:  Improved by: nothing    Therapies Tried and outcome: Has been wearing a brace.  Just seems to not be healing the like before             Additional history: as documented    Reviewed  and updated as needed this visit by clinical staff  Tobacco  Allergies  Meds  Problems  Med Hx  Surg Hx  Fam Hx  Soc Hx          Reviewed and updated as needed this visit by Provider  Tobacco  Allergies  Meds  Problems  Med Hx  Surg Hx  Fam Hx         Patient Active Problem List   Diagnosis     Hyperlipidemia with target LDL less than 160     Perimenopause     Menorrhagia with irregular cycle     Past Surgical History:   Procedure Laterality Date     CHOLECYSTECTOMY  2013    LSC     COLONOSCOPY N/A 11/13/2018    Procedure: colonoscopy;  Surgeon: Sesar Dos Santos MD;  Location: [facility]     DILATION AND CURETTAGE, OPERATIVE HYSTEROSCOPY, COMBINED N/A 2/29/2016    Procedure: COMBINED DILATION AND CURETTAGE, OPERATIVE HYSTEROSCOPY;  Surgeon: Panda Knight MD;  Location: [facility]     GYN SURGERY  1995    tubes tied     LAPAROSCOPY DIAGNOSTIC (GYN) Right 7/29/2015    Procedure: LAPAROSCOPY DIAGNOSTIC (GYN);  Surgeon: Lian Betancur DO;  Location: [facility]       Social History     Tobacco Use     Smoking status: Never Smoker     Smokeless tobacco: Never Used   Substance Use Topics     Alcohol use: Yes     Alcohol/week: 0.0 oz     Comment: 1-2 per month       Family History   Problem Relation Age of Onset     Hypertension Father      Myocardial Infarction Maternal Grandfather      Melanoma No family hx of          Current Outpatient Medications   Medication Sig Dispense Refill     Multiple Vitamin (MULTI-VITAMINS PO)        order for DME Equipment being ordered: thumb splint 1 Device 0     VITAMIN D, CHOLECALCIFEROL, PO Take by mouth daily Reported on 2/17/2017       Allergies   Allergen Reactions     Rabies Vaccine Unknown     Scratch test resulted, it was not a good idea to give it to her.     Shellfish-Derived Products Nausea and Vomiting     Sulfa Drugs Rash     Labs reviewed in EPIC    ROS:  Constitutional, HEENT, cardiovascular, pulmonary, GI, , musculoskeletal, neuro, skin, endocrine and psych systems are negative, except as otherwise noted.    OBJECTIVE:     /56 (BP Location: Right arm, Patient Position: Chair, Cuff Size: Adult Regular)   Pulse 64   Temp 98  F (36.7  C) (Tympanic)   Resp 18   LMP 01/29/2016   There is no height or weight on file to calculate BMI.   GENERAL: healthy, alert and no distress, nontoxic in appearance  EYES: Eyes grossly normal to inspection, PERRL and conjunctivae and sclerae normal  HENT: normocephalic  NECK: supple with full ROM  ABDOMEN: soft, nontender  MS: no gross musculoskeletal defects noted, no edema, base of left thumb is painful. No snuff box tenderness. Normal ROM and no swelling or redness.    Diagnostic Test Results:LEFT FINGER TWO OR MORE VIEWS   4/19/2019 12:55 PM      HISTORY: Pain of left thumb.     COMPARISON: None.                                                                      IMPRESSION: Questionable nondisplaced intra-articular fracture through  the base of the proximal phalanx of the thumb along the dorsoradial  surface.     ESAU MORENO MD  No results found for this or any previous visit (from the past 24 hour(s)).    ASSESSMENT/PLAN:     Problem List Items Addressed This Visit     None       Visit Diagnoses     Fracture of unspecified phalanx of left thumb, initial encounter for closed fracture    -  Primary    Relevant Medications    order for DME    Other Relevant Orders    ORTHO  REFERRAL    Pain of left thumb        Relevant Orders    XR Finger Left G/E 2 Views (Completed)               Patient Instructions   Set up appointment with orthopedics    Keep thumb splinted.    Increase rest and fluids. Tylenol and/or Ibuprofen for comfort.      Indications for emergent return to emergency department discussed with patient, who verbalized good understanding and agreement.  Patient understands the limitations of today's evaluation.           Patient Education     Closed Thumb Fracture  You have a broken (fractured) thumb. This causes local pain, swelling, and often bruising. This injury will usually take about 4 to 6 weeks or longer to heal. Thumb fractures may be treated with a splint or cast. This protects the thumb and holds the bone in place while it heals. More serious fractures may need surgery.     If the thumbnail has been severely injured, it may fall off in 1 to 2 weeks. A new thumbnail will usually start to grow back within a month.  Home care  Follow these guidelines when caring for yourself at home:    Keep your arm elevated to reduce pain and swelling. When sitting or lying down elevate your arm above the level of your heart. You can do this by placing your arm on a pillow that rests on your chest or on a pillow at your side. This is most important during the first 2 days (48 hours) after the injury.    Put an ice pack on the injured area. Do this for 20 minutes every 1 to 2 hours the first day for pain relief. You can make an ice pack by wrapping a plastic bag of ice cubes in a thin towel. As the ice melts, be careful that the cast or splint doesn t get wet. Continue using the ice pack 3 to 4 times a day for the next 2 days. Then use the ice pack as needed to ease pain and  swelling.    If a splint was put on, leave this in place for the time advised. This will keep the bones from moving out of position.    Keep the cast or splint completely dry at all times. Bathe with your cast or splint out of the water. Protect it with a large plastic bag, rubber-banded at the top end. If a fiberglass cast or splint gets wet, you can dry it with a hair dryer.    You may use acetaminophen or ibuprofen to control pain, unless another pain medicine was prescribed. If you have chronic liver or kidney disease, talk with your healthcare provider before using these medicines. Also talk with your provider if you ve had a stomach ulcer or gastrointestinal bleeding.    Don t put creams or objects under the cast if you have itching.  Follow-up care  Follow up with your healthcare provider in 1 week, or as advised. This is to make sure the bone is healing the way it should. Talk with your provider about when it is safe to return to sports or work.  X-rays may be taken. You will be told of any new findings that may affect your care.  When to seek medical advice  Call your healthcare provider right away if any of these occur:    The cast or splint cracks    The plaster cast or splint becomes wet or soft    The fiberglass cast or splint stays wet for more than 24 hours    Bad odor from the cast or wound fluid stains the cast    Pain or swelling gets worse    Redness or warmth in the hand    Fingers or hand become cold, blue, numb, or tingly    You can t move your hand or fingers    Skin around cast or splint becomes red    Fever of 100.4 F (38 C) or higher, or as directed by your healthcare provider  Date Last Reviewed: 2/1/2017 2000-2018 [employer]. [address] [place] [id number]. All rights reserved. This information is not intended as a substitute for professional medical care. Always follow your healthcare professional's instructions.               RADHA Velasco  [facility]

## 2019-04-19 NOTE — PATIENT INSTRUCTIONS
Set up appointment with orthopedics    Keep thumb splinted.    Increase rest and fluids. Tylenol and/or Ibuprofen for comfort.      Indications for emergent return to emergency department discussed with patient, who verbalized good understanding and agreement.  Patient understands the limitations of today's evaluation.           Patient Education     Closed Thumb Fracture  You have a broken (fractured) thumb. This causes local pain, swelling, and often bruising. This injury will usually take about 4 to 6 weeks or longer to heal. Thumb fractures may be treated with a splint or cast. This protects the thumb and holds the bone in place while it heals. More serious fractures may need surgery.     If the thumbnail has been severely injured, it may fall off in 1 to 2 weeks. A new thumbnail will usually start to grow back within a month.  Home care  Follow these guidelines when caring for yourself at home:    Keep your arm elevated to reduce pain and swelling. When sitting or lying down elevate your arm above the level of your heart. You can do this by placing your arm on a pillow that rests on your chest or on a pillow at your side. This is most important during the first 2 days (48 hours) after the injury.    Put an ice pack on the injured area. Do this for 20 minutes every 1 to 2 hours the first day for pain relief. You can make an ice pack by wrapping a plastic bag of ice cubes in a thin towel. As the ice melts, be careful that the cast or splint doesn t get wet. Continue using the ice pack 3 to 4 times a day for the next 2 days. Then use the ice pack as needed to ease pain and swelling.    If a splint was put on, leave this in place for the time advised. This will keep the bones from moving out of position.    Keep the cast or splint completely dry at all times. Bathe with your cast or splint out of the water. Protect it with a large plastic bag, rubber-banded at the top end. If a fiberglass cast or splint gets wet,  you can dry it with a hair dryer.    You may use acetaminophen or ibuprofen to control pain, unless another pain medicine was prescribed. If you have chronic liver or kidney disease, talk with your healthcare provider before using these medicines. Also talk with your provider if you ve had a stomach ulcer or gastrointestinal bleeding.    Don t put creams or objects under the cast if you have itching.  Follow-up care  Follow up with your healthcare provider in 1 week, or as advised. This is to make sure the bone is healing the way it should. Talk with your provider about when it is safe to return to sports or work.  X-rays may be taken. You will be told of any new findings that may affect your care.  When to seek medical advice  Call your healthcare provider right away if any of these occur:    The cast or splint cracks    The plaster cast or splint becomes wet or soft    The fiberglass cast or splint stays wet for more than 24 hours    Bad odor from the cast or wound fluid stains the cast    Pain or swelling gets worse    Redness or warmth in the hand    Fingers or hand become cold, blue, numb, or tingly    You can t move your hand or fingers    Skin around cast or splint becomes red    Fever of 100.4 F (38 C) or higher, or as directed by your healthcare provider  Date Last Reviewed: 2/1/2017 2000-2018 [employer]. [address] [place] [address], [place] [address]. All rights reserved. This information is not intended as a substitute for professional medical care. Always follow your healthcare professional's instructions.

## 2019-04-19 NOTE — NURSING NOTE
"Chief Complaint   Patient presents with     Thumb Discomfort       Initial /56 (BP Location: Right arm, Patient Position: Chair, Cuff Size: Adult Regular)   Pulse 64   Temp 98  F (36.7  C) (Tympanic)   Resp 18   LMP 01/29/2016  Estimated body mass index is 31.26 kg/m  as calculated from the following:    Height as of 11/13/18: 1.638 m (5' 4.5\").    Weight as of 11/13/18: 83.9 kg (185 lb).    Patient presents to the clinic using No DME    Health Maintenance that is potentially due pending provider review:  NONE    n/a    Is there anyone who you would like to be able to receive your results? No  If yes have patient fill out WILLY  Felipe Felix M.A.        "

## 2019-04-22 ENCOUNTER — TRANSFERRED RECORDS (OUTPATIENT)
Dept: HEALTH INFORMATION MANAGEMENT | Facility: CLINIC | Age: 54
End: 2019-04-22

## 2019-10-03 ENCOUNTER — HEALTH MAINTENANCE LETTER (OUTPATIENT)
Age: 54
End: 2019-10-03

## 2020-02-05 ENCOUNTER — OFFICE VISIT (OUTPATIENT)
Dept: FAMILY MEDICINE | Facility: CLINIC | Age: 55
End: 2020-02-05
Payer: COMMERCIAL

## 2020-02-05 VITALS
SYSTOLIC BLOOD PRESSURE: 110 MMHG | OXYGEN SATURATION: 98 % | HEART RATE: 79 BPM | DIASTOLIC BLOOD PRESSURE: 74 MMHG | WEIGHT: 171.4 LBS | RESPIRATION RATE: 12 BRPM | BODY MASS INDEX: 28.56 KG/M2 | HEIGHT: 65 IN | TEMPERATURE: 97.6 F

## 2020-02-05 DIAGNOSIS — K92.1 BLOOD IN STOOL: Primary | ICD-10-CM

## 2020-02-05 LAB
ERYTHROCYTE [DISTWIDTH] IN BLOOD BY AUTOMATED COUNT: 12.1 % (ref 10–15)
HCT VFR BLD AUTO: 46 % (ref 35–47)
HGB BLD-MCNC: 15.4 G/DL (ref 11.7–15.7)
MCH RBC QN AUTO: 31.2 PG (ref 26.5–33)
MCHC RBC AUTO-ENTMCNC: 33.5 G/DL (ref 31.5–36.5)
MCV RBC AUTO: 93 FL (ref 78–100)
PLATELET # BLD AUTO: 193 10E9/L (ref 150–450)
RBC # BLD AUTO: 4.93 10E12/L (ref 3.8–5.2)
WBC # BLD AUTO: 6.8 10E9/L (ref 4–11)

## 2020-02-05 PROCEDURE — 99214 OFFICE O/P EST MOD 30 MIN: CPT | Performed by: NURSE PRACTITIONER

## 2020-02-05 PROCEDURE — 36415 COLL VENOUS BLD VENIPUNCTURE: CPT | Performed by: NURSE PRACTITIONER

## 2020-02-05 PROCEDURE — 85027 COMPLETE CBC AUTOMATED: CPT | Performed by: NURSE PRACTITIONER

## 2020-02-05 ASSESSMENT — MIFFLIN-ST. JEOR: SCORE: 1384.6

## 2020-02-05 NOTE — PATIENT INSTRUCTIONS
Patient Education     Evaluating and Treating Rectal Bleeding     As part of your evaluation, a flexible sigmoidoscopy or colonoscopy may be done. You may also have an upper endoscopy if your stools are darker.     To find the site and cause of your bleeding, you will have a physical exam. You will be asked about your health history. Tests may be done to help confirm the diagnosis and plan your treatment.  Tests you may have  Any of these procedures may be done:    Stool sample. A small amount of your stool will be checked for blood.    Anoscopy. This test uses a small tube (anoscope) to examine the anus. It checks for problems such as hemorrhoids.    Sigmoidoscopy. This test uses a lighted tube to check your rectum and the part of the large intestine that is closest to the rectum (the sigmoid colon).    Colonoscopy. This test looks at your rectum and entire colon. You may be given medicine through an IV to help you relax.    Lower GI (gastrointestinal) series, or barium enema. This is an X-ray test to view your colon. A milky liquid containing barium is passed through your rectum and into the colon. This liquid makes it easy to see your colon on the X-ray.    Upper endoscopy. This test checks your esophagus, stomach, and upper small intestine. It is done in cases of rectal bleeding along with other symptoms like low blood pressure and rapid heartbeat. This test may also be done if your stools are dark black and tarry.    Capsule endoscopy. For this test, you swallow a pill that has a tiny camera inside. The camera takes pictures of your small intestine. It can get to areas that are hard to reach with colonoscopy and upper endoscopy.    Balloon enteroscopy. This test uses a special tube (scope) to get deep into the small intestine.    Tagged red blood cell scan. This test marks (tags) red blood cells with very small amounts of radioactive material. The cells can then be seen and tracked on a scan.    Angiography.  This test threads a tube (catheter) through a vein, often in the leg. The tube injects dye into your blood vessels to see where the bleeding is taking place.  Your treatment plan  Your treatment will depend on the cause of your rectal bleeding. Your healthcare provider will create a treatment plan that s right for you. Sometimes rectal bleeding stops on its own. If it does, be sure to see your provider to check that the problem wasn t serious.  What you can do  Follow all your doctor s instructions. Keep working with your doctor after your treatment. Make and keep your follow-up visits. If you have more rectal bleeding, call your doctor. It may be a sign of the same or another health problem.   Date Last Reviewed: 7/1/2016 2000-2019 [employer]. [address] [place] [id number]. All rights reserved. This information is not intended as a substitute for professional medical care. Always follow your healthcare professional's instructions.           Patient Education     Understanding Rectal Bleeding    Rectal bleeding is when blood passes through your rectum and anus. It can happen with or without a bowel movement. Rectal bleeding may be a sign of a serious problem in your rectum, colon, or upper GI tract. Call your healthcare provider right away if you have any rectal bleeding.  The GI Tract  The gastrointestinal (GI) tract includes the mouth, esophagus, stomach, small intestine, large intestine (colon), rectum, and anus. The food you eat is digested as it passes through the GI tract. Solid waste leaves the body through the rectum.   Rectal bleeding and GI problems  The cause of rectal bleeding may be found in any region of the GI tract. The colon or rectum may be the site of your bleeding problem. Or, bleeding may be due to problems farther up the GI tract, such as in the small intestine, duodenum, or stomach.  Causes of rectal bleeding  Rectal bleeding causes include the  following:    Hemorrhoids (swollen veins in the rectum and anus)    Fissures (tears in or near the anus)    Diverticulosis (inflamed pockets in the colon wall)    Infection    Ischemia (low blood flow)    Radiation damage    Inflammatory bowel disease (Crohn's disease or ulcerative colitis)    Ulcers in the upper GI tract and inflammation of the large intestine    Abnormal tissue growths (tumors or polyps) in the GI tract    A bulging rectum (also called a rectal prolapse)    Abnormal blood vessels in the small intestine or in the colon  Common symptoms  Common symptoms include the following:    Rectal pain, itching, or soreness    Belly pain or epigastric pain    Minor occasional drops of blood that appear on the stool or toilet paper, to greater amounts of stool that appear black or tarry   Rectal bleeding can also happen without pain.  Date Last Reviewed: 7/1/2016 2000-2019 [employer]. [address] [place] [id number]. All rights reserved. This information is not intended as a substitute for professional medical care. Always follow your healthcare professional's instructions.

## 2020-02-05 NOTE — PROGRESS NOTES
Subjective     Susu Lagos is a 54 year old female who presents to clinic today for the following health issues:    HPI   Blood in stool     Onset: 3-4 months    Description:   Frequency of bowel movements: 1-2 times daily.  Stool consistency: soft formed    Progression of Symptoms:  worsening    Accompanying Signs & Symptoms:  Abdominal pain (cramping?): no   Blood in stool: YES  Rectal pain: no   Nausea/vomiting: no   Weight loss or gain: no    History:   History of abdominal surgery: YES- gallbladder removal, tubal ligation, ovarian cyst removal    Precipitating factors:   Recent use of narcotics, anticholinergics, calcium channel blockers, antacids, or iron supplements: no   Chronic Laxative Use: no          Therapies Tried and outcome: none      Patient Active Problem List   Diagnosis     Hyperlipidemia with target LDL less than 160     Perimenopause     Menorrhagia with irregular cycle     Past Surgical History:   Procedure Laterality Date     CHOLECYSTECTOMY  2013    LSC     COLONOSCOPY N/A 11/13/2018    Procedure: colonoscopy;  Surgeon: Sesar Dos Santos MD;  Location: [facility]     DILATION AND CURETTAGE, OPERATIVE HYSTEROSCOPY, COMBINED N/A 2/29/2016    Procedure: COMBINED DILATION AND CURETTAGE, OPERATIVE HYSTEROSCOPY;  Surgeon: Panda Knight MD;  Location: [facility]     GYN SURGERY  1995    tubes tied     LAPAROSCOPY DIAGNOSTIC (GYN) Right 7/29/2015    Procedure: LAPAROSCOPY DIAGNOSTIC (GYN);  Surgeon: Lian Betancur DO;  Location: [facility]       Social History     Tobacco Use     Smoking status: Never Smoker     Smokeless tobacco: Never Used   Substance Use Topics     Alcohol use: Yes     Alcohol/week: 0.0 standard drinks     Comment: 1-2 per month      Family History   Problem Relation Age of Onset     Hypertension Father      Myocardial Infarction Maternal Grandfather      Melanoma No family hx of          Current Outpatient Medications   Medication Sig Dispense Refill     Multiple  Vitamin (MULTI-VITAMINS PO)        Omega-3 Fatty Acids (OMEGA 3 PO)        Allergies   Allergen Reactions     Rabies Vaccine Unknown     Scratch test resulted, it was not a good idea to give it to her.     Shellfish-Derived Products Nausea and Vomiting     Sulfa Drugs Rash     Recent Labs   Lab Test 05/07/15  1607 02/20/15  1548   ALT  --  33   CR  --  0.71   GFRESTIMATED  --  88   GFRESTBLACK  --  >90   GFR Calc     POTASSIUM  --  3.6   TSH 1.51 0.83      BP Readings from Last 3 Encounters:   02/05/20 110/74   04/19/19 118/56   11/13/18 115/78    Wt Readings from Last 3 Encounters:   02/05/20 77.7 kg (171 lb 6.4 oz)   11/13/18 83.9 kg (185 lb)   06/11/18 87.5 kg (193 lb)                    Reviewed and updated as needed this visit by Provider         Review of Systems   ROS COMP: Constitutional, HEENT, cardiovascular, pulmonary, gi and gu systems are negative, except as otherwise noted.      Objective    LMP 01/29/2016   Body mass index is 28.73 kg/m .     Physical Exam   GENERAL: healthy, alert and no distress  EYES: Eyes grossly normal to inspection, PERRL and conjunctivae and sclerae normal  HENT: ear canals and TM's normal, nose and mouth without ulcers or lesions  NECK: no adenopathy, no asymmetry, masses, or scars and thyroid normal to palpation  RESP: lungs clear to auscultation - no rales, rhonchi or wheezes  CV: regular rate and rhythm, normal S1 S2, no S3 or S4, no murmur, click or rub, no peripheral edema and peripheral pulses strong  ABDOMEN: soft, nontender, no hepatosplenomegaly, no masses and bowel sounds normal  MS: no gross musculoskeletal defects noted, no edema  SKIN: no suspicious lesions or rashes  NEURO: Normal strength and tone, mentation intact and speech normal  PSYCH: mentation appears normal, affect normal/bright    Results for orders placed or performed in visit on 02/05/20   CBC with platelets     Status: None   Result Value Ref Range    WBC 6.8 4.0 - 11.0 10e9/L    RBC  "Count 4.93 3.8 - 5.2 10e12/L    Hemoglobin 15.4 11.7 - 15.7 g/dL    Hematocrit 46.0 35.0 - 47.0 %    MCV 93 78 - 100 fl    MCH 31.2 26.5 - 33.0 pg    MCHC 33.5 31.5 - 36.5 g/dL    RDW 12.1 10.0 - 15.0 %    Platelet Count 193 150 - 450 10e9/L             Assessment & Plan     (K92.1) Blood in stool  (primary encounter diagnosis)  Comment: No evidence of hemorrhoids recommend having patient obtain colonoscopy.  CBC within normal limits.  Patient to continue to monitor closely if large amount of blood or blood clots noted in stool should return immediately will obtain colonoscopy  Plan: CBC with platelets, GASTROENTEROLOGY ADULT REF         PROCEDURE ONLY            BMI:   Estimated body mass index is 28.73 kg/m  as calculated from the following:    Height as of this encounter: 1.645 m (5' 4.76\").    Weight as of this encounter: 77.7 kg (171 lb 6.4 oz).           See Patient Instructions    Return in about 1 week (around 2/12/2020), or if symptoms worsen or fail to improve.    RADHA Griffin [facility]    "

## 2020-02-28 ENCOUNTER — ANESTHESIA EVENT (OUTPATIENT)
Dept: GASTROENTEROLOGY | Facility: CLINIC | Age: 55
End: 2020-02-28
Payer: COMMERCIAL

## 2020-02-28 NOTE — ANESTHESIA PREPROCEDURE EVALUATION
Anesthesia Pre-Procedure Evaluation    Patient: Susu Lagos   MRN: [id number] : 1965          Preoperative Diagnosis: Blood in stool [K92.1]    Procedure(s):  COLONOSCOPY    Past Medical History:   Diagnosis Date     Ovarian cyst 2015    Right, 6 cm dermoid on MRI-recommend removal.  2015 Surgery for right salpingoophorectomy.       Past Surgical History:   Procedure Laterality Date     CHOLECYSTECTOMY      LSC     COLONOSCOPY N/A 2018    Procedure: colonoscopy;  Surgeon: Sesar Dos Santos MD;  Location: [facility]     DILATION AND CURETTAGE, OPERATIVE HYSTEROSCOPY, COMBINED N/A 2016    Procedure: COMBINED DILATION AND CURETTAGE, OPERATIVE HYSTEROSCOPY;  Surgeon: Panda Knight MD;  Location: [facility]     GYN SURGERY      tubes tied     LAPAROSCOPY DIAGNOSTIC (GYN) Right 2015    Procedure: LAPAROSCOPY DIAGNOSTIC (GYN);  Surgeon: Lian Betancur DO;  Location: [facility]       Anesthesia Evaluation     . Pt has had prior anesthetic. Type: General and MAC    No history of anesthetic complications          ROS/MED HX    ENT/Pulmonary:  - neg pulmonary ROS     Neurologic:  - neg neurologic ROS     Cardiovascular:     (+) Dyslipidemia, ----. : . . . :. .       METS/Exercise Tolerance:     Hematologic:  - neg hematologic  ROS       Musculoskeletal:  - neg musculoskeletal ROS       GI/Hepatic: Comment: Bleeding per rectum        Renal/Genitourinary:  - ROS Renal section negative       Endo:     (+) Obesity, .      Psychiatric:  - neg psychiatric ROS       Infectious Disease:  - neg infectious disease ROS       Malignancy:      - no malignancy   Other:                          Physical Exam  Normal systems: cardiovascular, pulmonary and dental    Airway   Mallampati: II  TM distance: >3 FB  Neck ROM: full    Dental     Cardiovascular       Pulmonary             Lab Results   Component Value Date    WBC 6.8 2020    HGB 15.4 2020    HCT 46.0 2020      "02/05/2020     02/20/2015    POTASSIUM 3.6 02/20/2015    CHLORIDE 104 02/20/2015    CO2 29 02/20/2015    BUN 11 02/20/2015    CR 0.71 02/20/2015    GLC 85 02/20/2015    BARBIE 9.0 02/20/2015    ALBUMIN 3.5 02/20/2015    PROTTOTAL 7.0 02/20/2015    ALT 33 02/20/2015    AST 24 02/20/2015    ALKPHOS 47 02/20/2015    BILITOTAL 0.2 02/20/2015    TSH 1.51 05/07/2015    HCG Negative 02/29/2016       Preop Vitals  BP Readings from Last 3 Encounters:   02/05/20 110/74   04/19/19 118/56   11/13/18 115/78    Pulse Readings from Last 3 Encounters:   02/05/20 79   04/19/19 64   06/11/18 64      Resp Readings from Last 3 Encounters:   02/05/20 12   04/19/19 18   11/13/18 16    SpO2 Readings from Last 3 Encounters:   02/05/20 98%   11/13/18 98%   02/27/17 97%      Temp Readings from Last 1 Encounters:   02/05/20 36.4  C (97.6  F) (Tympanic)    Ht Readings from Last 1 Encounters:   02/05/20 1.645 m (5' 4.76\")      Wt Readings from Last 1 Encounters:   02/05/20 77.7 kg (171 lb 6.4 oz)    Estimated body mass index is 28.73 kg/m  as calculated from the following:    Height as of 2/5/20: 1.645 m (5' 4.76\").    Weight as of 2/5/20: 77.7 kg (171 lb 6.4 oz).       Anesthesia Plan      History & Physical Review  History and physical reviewed and following examination; no interval change.    ASA Status:  2 .    NPO Status:  > 4 hours    Plan for General and MAC Reason for MAC:  Deep or markedly invasive procedure (G8)         Postoperative Care      Consents  Anesthetic plan, risks, benefits and alternatives discussed with:  Patient..                 Kb Lea CRNA, APRN CRNA  "

## 2020-02-29 ENCOUNTER — NURSE TRIAGE (OUTPATIENT)
Dept: NURSING | Facility: CLINIC | Age: 55
End: 2020-02-29

## 2020-02-29 NOTE — TELEPHONE ENCOUNTER
General message left for a call back if further assistance is required.    Herminia Blake RN on 2/29/2020 at 12:55 PM        Message from Arely Villareal sent at 2/29/2020 10:36 AM CST     Summary: NONE    Reason for call:  Other   Patient called regarding (reason for call): appointment  Additional comments: Procedure at [facility] on March 3, 2020; States got a call about it; Wondering what time to arrive and information on appointment.    Phone number to reach patient:  Home number on file [phone] (home)    Best Time:  ANYTIME    Can we leave a detailed message on this number?  NO    Travel screening: Not Applicable                Additional Information    Negative: Caller is angry or rude (e.g., hangs up, verbally abusive, yelling)    Negative: Caller hangs up    Negative: Caller has already spoken with the PCP and has no further questions.    Negative: Caller has already spoken with another triager and has no further questions.    Negative: Caller has already spoken with another triager or PCP AND has further questions AND triager able to answer questions.    Message left on unidentified voice mail.  Phone number verified.    Negative: Busy signal.  First attempt to contact caller.  Follow-up call scheduled within 15 minutes.    Negative: No answer.  First attempt to contact caller.  Follow-up call scheduled within 15 minutes.    Negative: Message left on identified voice mail    Protocols used: NO CONTACT OR DUPLICATE CONTACT CALL-A-

## 2020-03-03 ENCOUNTER — ANESTHESIA (OUTPATIENT)
Dept: GASTROENTEROLOGY | Facility: CLINIC | Age: 55
End: 2020-03-03
Payer: COMMERCIAL

## 2020-03-03 ENCOUNTER — HOSPITAL ENCOUNTER (OUTPATIENT)
Facility: CLINIC | Age: 55
Discharge: HOME | End: 2020-03-03
Attending: SURGERY | Admitting: SURGERY
Payer: COMMERCIAL

## 2020-03-03 VITALS
DIASTOLIC BLOOD PRESSURE: 80 MMHG | RESPIRATION RATE: 16 BRPM | OXYGEN SATURATION: 97 % | SYSTOLIC BLOOD PRESSURE: 103 MMHG | BODY MASS INDEX: 27.66 KG/M2 | HEART RATE: 78 BPM | HEIGHT: 65 IN | TEMPERATURE: 98.1 F | WEIGHT: 166 LBS

## 2020-03-03 PROCEDURE — 88341 IMHCHEM/IMCYTCHM EA ADD ANTB: CPT | Performed by: SURGERY

## 2020-03-03 PROCEDURE — 37000009 ZZH ANESTHESIA TECHNICAL FEE, EACH ADDTL 15 MIN: Performed by: SURGERY

## 2020-03-03 PROCEDURE — 88342 IMHCHEM/IMCYTCHM 1ST ANTB: CPT | Performed by: SURGERY

## 2020-03-03 PROCEDURE — 25000125 ZZHC RX 250: Performed by: NURSE ANESTHETIST, CERTIFIED REGISTERED

## 2020-03-03 PROCEDURE — 88305 TISSUE EXAM BY PATHOLOGIST: CPT | Mod: 26 | Performed by: SURGERY

## 2020-03-03 PROCEDURE — 37000008 ZZH ANESTHESIA TECHNICAL FEE, 1ST 30 MIN: Performed by: SURGERY

## 2020-03-03 PROCEDURE — 25000128 H RX IP 250 OP 636: Performed by: NURSE ANESTHETIST, CERTIFIED REGISTERED

## 2020-03-03 PROCEDURE — 88341 IMHCHEM/IMCYTCHM EA ADD ANTB: CPT | Mod: 26 | Performed by: SURGERY

## 2020-03-03 PROCEDURE — 45380 COLONOSCOPY AND BIOPSY: CPT | Performed by: SURGERY

## 2020-03-03 PROCEDURE — 25000125 ZZHC RX 250: Performed by: SURGERY

## 2020-03-03 PROCEDURE — 45378 DIAGNOSTIC COLONOSCOPY: CPT | Performed by: SURGERY

## 2020-03-03 PROCEDURE — 25800030 ZZH RX IP 258 OP 636: Performed by: SURGERY

## 2020-03-03 PROCEDURE — 88342 IMHCHEM/IMCYTCHM 1ST ANTB: CPT | Mod: 26 | Performed by: SURGERY

## 2020-03-03 PROCEDURE — 88305 TISSUE EXAM BY PATHOLOGIST: CPT | Performed by: SURGERY

## 2020-03-03 RX ORDER — GLYCOPYRROLATE 0.2 MG/ML
INJECTION, SOLUTION INTRAMUSCULAR; INTRAVENOUS PRN
Status: DISCONTINUED | OUTPATIENT
Start: 2020-03-03 | End: 2020-03-03

## 2020-03-03 RX ORDER — LIDOCAINE HYDROCHLORIDE 10 MG/ML
INJECTION, SOLUTION INFILTRATION; PERINEURAL PRN
Status: DISCONTINUED | OUTPATIENT
Start: 2020-03-03 | End: 2020-03-03

## 2020-03-03 RX ORDER — PROPOFOL 10 MG/ML
INJECTION, EMULSION INTRAVENOUS PRN
Status: DISCONTINUED | OUTPATIENT
Start: 2020-03-03 | End: 2020-03-03

## 2020-03-03 RX ORDER — ONDANSETRON 2 MG/ML
4 INJECTION INTRAMUSCULAR; INTRAVENOUS
Status: DISCONTINUED | OUTPATIENT
Start: 2020-03-03 | End: 2020-03-03 | Stop reason: HOSPADM

## 2020-03-03 RX ORDER — SODIUM CHLORIDE, SODIUM LACTATE, POTASSIUM CHLORIDE, CALCIUM CHLORIDE 600; 310; 30; 20 MG/100ML; MG/100ML; MG/100ML; MG/100ML
INJECTION, SOLUTION INTRAVENOUS CONTINUOUS
Status: DISCONTINUED | OUTPATIENT
Start: 2020-03-03 | End: 2020-03-03 | Stop reason: HOSPADM

## 2020-03-03 RX ORDER — LIDOCAINE 40 MG/G
CREAM TOPICAL
Status: DISCONTINUED | OUTPATIENT
Start: 2020-03-03 | End: 2020-03-03 | Stop reason: HOSPADM

## 2020-03-03 RX ORDER — PROPOFOL 10 MG/ML
INJECTION, EMULSION INTRAVENOUS CONTINUOUS PRN
Status: DISCONTINUED | OUTPATIENT
Start: 2020-03-03 | End: 2020-03-03

## 2020-03-03 RX ADMIN — PROPOFOL 100 MG: 10 INJECTION, EMULSION INTRAVENOUS at 12:04

## 2020-03-03 RX ADMIN — LIDOCAINE HYDROCHLORIDE 50 MG: 10 INJECTION, SOLUTION INFILTRATION; PERINEURAL at 12:04

## 2020-03-03 RX ADMIN — GLYCOPYRROLATE 0.2 MG: 0.2 INJECTION, SOLUTION INTRAMUSCULAR; INTRAVENOUS at 12:04

## 2020-03-03 RX ADMIN — SODIUM CHLORIDE, POTASSIUM CHLORIDE, SODIUM LACTATE AND CALCIUM CHLORIDE: 600; 310; 30; 20 INJECTION, SOLUTION INTRAVENOUS at 10:46

## 2020-03-03 RX ADMIN — LIDOCAINE HYDROCHLORIDE 0.3 ML: 10 INJECTION, SOLUTION EPIDURAL; INFILTRATION; INTRACAUDAL; PERINEURAL at 10:46

## 2020-03-03 RX ADMIN — PROPOFOL 200 MCG/KG/MIN: 10 INJECTION, EMULSION INTRAVENOUS at 12:04

## 2020-03-03 ASSESSMENT — MIFFLIN-ST. JEOR: SCORE: 1360.04

## 2020-03-03 NOTE — BRIEF OP NOTE
[facility]   Brief Operative Note    Pre-operative diagnosis: Blood in stool [K92.1]   Post-operative diagnosis polyp vs mass at dentate line, otherwise normal     Procedure: Procedure(s):  COLONOSCOPY, WITH POLYPECTOMY AND BIOPSY   Surgeon(s): Surgeon(s) and Role:     * Sesar Dos Santos MD - Primary   Estimated blood loss: * No values recorded between 3/3/2020 12:08 PM and 3/3/2020 12:33 PM *    Specimens: ID Type Source Tests Collected by Time Destination   A : Mass at dentate line Biopsy Colon SURGICAL PATHOLOGY EXAM Sesar Dos Santos MD 3/3/2020 12:22 PM       Findings: 1. One cm mass at dentate line (left anterior position) - either a low polyp or an anal cancer  2. Colon otherwise normal

## 2020-03-03 NOTE — H&P
"54 year old year old female here for colonoscopy for blood in stool.    Patient Active Problem List   Diagnosis     Hyperlipidemia with target LDL less than 160     Perimenopause     Menorrhagia with irregular cycle       Past Medical History:   Diagnosis Date     Ovarian cyst 6/4/2015    Right, 6 cm dermoid on MRI-recommend removal.  7/29/2015 Surgery for right salpingoophorectomy.         Past Surgical History:   Procedure Laterality Date     CHOLECYSTECTOMY  2013    LSC     COLONOSCOPY N/A 11/13/2018    Procedure: colonoscopy;  Surgeon: Sesar Dos Santos MD;  Location: [facility]     DILATION AND CURETTAGE, OPERATIVE HYSTEROSCOPY, COMBINED N/A 2/29/2016    Procedure: COMBINED DILATION AND CURETTAGE, OPERATIVE HYSTEROSCOPY;  Surgeon: Panda Knight MD;  Location: [facility]     GYN SURGERY  1995    tubes tied     LAPAROSCOPY DIAGNOSTIC (GYN) Right 7/29/2015    Procedure: LAPAROSCOPY DIAGNOSTIC (GYN);  Surgeon: Lian Betancur DO;  Location: [facility]       @[facility]@    No current outpatient medications on file.       Allergies   Allergen Reactions     Rabies Vaccine Unknown     Scratch test resulted, it was not a good idea to give it to her.     Shellfish-Derived Products Nausea and Vomiting     Sulfa Drugs Rash       Pt reports that she has never smoked. She has never used smokeless tobacco. She reports current alcohol use. She reports that she does not use drugs.    Exam:  /86 (BP Location: Left arm)   Pulse 70   Temp 98.1  F (36.7  C) (Oral)   Resp 16   Ht 1.645 m (5' 4.76\")   Wt 75.3 kg (166 lb)   LMP 01/29/2016   SpO2 98%   BMI 27.83 kg/m      Awake, Alert OX3  Lungs - CTA bilaterally  CV - RRR, no murmurs, distal pulses intact  Abd - soft, non-distended, non-tender, +BS  Extr - No cyanosis or edema    A/P 54 year old year old female in need of colonoscopy for blood in stool. Risks, benefits, alternatives, and complications were discussed including the possibility of perforation and the patient " agreed to proceed.    Sesar Dos Santos MD

## 2020-03-03 NOTE — ANESTHESIA CARE TRANSFER NOTE
Patient: Susu Lagos    Procedure(s):  COLONOSCOPY, WITH POLYPECTOMY AND BIOPSY    Diagnosis: Blood in stool [K92.1]  Diagnosis Additional Information: No value filed.    Anesthesia Type:   General, MAC     Note:  Airway :Nasal Cannula  Patient transferred to:Phase II  Handoff Report: Identifed the Patient, Identified the Reponsible Provider, Reviewed the pertinent medical history, Discussed the surgical course, Reviewed Intra-OP anesthesia mangement and issues during anesthesia, Set expectations for post-procedure period and Allowed opportunity for questions and acknowledgement of understanding      Vitals: (Last set prior to Anesthesia Care Transfer)    CRNA VITALS  3/3/2020 1201 - 3/3/2020 1232      3/3/2020             Pulse:  76    Ht Rate:  81    SpO2:  98 %                Electronically Signed By: RADHA Oro CRNA  March 3, 2020  12:32 PM

## 2020-03-03 NOTE — ANESTHESIA POSTPROCEDURE EVALUATION
Patient: Susu Lagos    Procedure(s):  COLONOSCOPY, WITH POLYPECTOMY AND BIOPSY    Diagnosis:Blood in stool [K92.1]  Diagnosis Additional Information: No value filed.    Anesthesia Type:  General, MAC    Note:  Anesthesia Post Evaluation    Patient location during evaluation: Phase 2 and Bedside  Patient participation: Able to fully participate in evaluation  Level of consciousness: awake and alert  Pain management: adequate  Airway patency: patent  Cardiovascular status: acceptable and hemodynamically stable  Respiratory status: acceptable and room air  Hydration status: acceptable  PONV: none     Anesthetic complications: None          Last vitals:  Vitals:    03/03/20 0954   BP: 108/86   Pulse: 70   Resp: 16   Temp: 36.7  C (98.1  F)   SpO2: 98%         Electronically Signed By: RADHA Oro CRNA  March 3, 2020  12:34 PM

## 2020-03-04 LAB — COLONOSCOPY: NORMAL

## 2020-03-06 DIAGNOSIS — C21.0 ANAL SQUAMOUS CELL CARCINOMA (H): Primary | ICD-10-CM

## 2020-03-06 LAB — COPATH REPORT: NORMAL

## 2020-03-10 NOTE — TELEPHONE ENCOUNTER
Oncology/Surgical Oncology Referral Request:     Specialty Requested: Medical Oncology     Referring Provider: Dr Sesar Dos Santos MD    Referring Clinic/Organization: [facility]    Records location: [facility]     Requested Provider (if specified): Not Specified

## 2020-03-10 NOTE — TELEPHONE ENCOUNTER
"RECORDS STATUS - ALL OTHER DIAGNOSIS      RECORDS RECEIVED FROM: Epic/   DATE RECEIVED: 3/11/2020    NOTES STATUS DETAILS   OFFICE NOTE from referring provider Complete  referred internally by Sesar Luis MD   OFFICE NOTE from medical oncologist N/A    DISCHARGE SUMMARY from hospital Complete Order- 36/220 Anal squamous Cell Carcinoma     Colonoscopy 3/3/2020    DISCHARGE REPORT from the ER     OPERATIVE REPORT Complete Colonoscopies: 3/3/2020 and 11/13/2018   MEDICATION LIST Complete [facility]   CLINICAL TRIAL TREATMENTS TO DATE     LABS     PATHOLOGY REPORTS Complete  [facility] 3/3/2020   \"Colon\", at dentate line, mass, biopsy:   - Squamous cell carcinoma, focally keratinizing, cannot rule out invasion   - see description    ANYTHING RELATED TO DIAGNOSIS Complete Labs last updated on 2/5/2020    GENONOMIC TESTING     TYPE:     IMAGING (NEED IMAGES & REPORT)     CT SCANS     MRI     MAMMO     ULTRASOUND Complete US Pelvic Complete 5/14/2015    PET       Action    Action Taken 3/10/2020 1:30pm     I called pt Susu - there are no images or outside records- per pt.          "

## 2020-03-11 ENCOUNTER — ONCOLOGY VISIT (OUTPATIENT)
Dept: ONCOLOGY | Facility: CLINIC | Age: 55
End: 2020-03-11
Attending: SURGERY
Payer: COMMERCIAL

## 2020-03-11 ENCOUNTER — PRE VISIT (OUTPATIENT)
Dept: ONCOLOGY | Facility: CLINIC | Age: 55
End: 2020-03-11

## 2020-03-11 ENCOUNTER — HOSPITAL ENCOUNTER (OUTPATIENT)
Dept: LAB | Facility: CLINIC | Age: 55
Discharge: HOME | End: 2020-03-11
Attending: INTERNAL MEDICINE | Admitting: INTERNAL MEDICINE
Payer: COMMERCIAL

## 2020-03-11 VITALS
SYSTOLIC BLOOD PRESSURE: 115 MMHG | DIASTOLIC BLOOD PRESSURE: 80 MMHG | HEIGHT: 65 IN | BODY MASS INDEX: 28.54 KG/M2 | HEART RATE: 74 BPM | RESPIRATION RATE: 18 BRPM | TEMPERATURE: 96.9 F | WEIGHT: 171.3 LBS | OXYGEN SATURATION: 96 %

## 2020-03-11 DIAGNOSIS — C21.0 ANAL SQUAMOUS CELL CARCINOMA (H): ICD-10-CM

## 2020-03-11 DIAGNOSIS — C21.0 ANAL SQUAMOUS CELL CARCINOMA (H): Primary | ICD-10-CM

## 2020-03-11 LAB
ALBUMIN SERPL-MCNC: 4.1 G/DL (ref 3.4–5)
ALP SERPL-CCNC: 43 U/L (ref 40–150)
ALT SERPL W P-5'-P-CCNC: 70 U/L (ref 0–50)
ANION GAP SERPL CALCULATED.3IONS-SCNC: 3 MMOL/L (ref 3–14)
AST SERPL W P-5'-P-CCNC: 36 U/L (ref 0–45)
BASOPHILS # BLD AUTO: 0 10E9/L (ref 0–0.2)
BASOPHILS NFR BLD AUTO: 0.5 %
BILIRUB SERPL-MCNC: 0.3 MG/DL (ref 0.2–1.3)
BUN SERPL-MCNC: 18 MG/DL (ref 7–30)
CALCIUM SERPL-MCNC: 9.6 MG/DL (ref 8.5–10.1)
CHLORIDE SERPL-SCNC: 107 MMOL/L (ref 94–109)
CO2 SERPL-SCNC: 31 MMOL/L (ref 20–32)
CREAT SERPL-MCNC: 0.72 MG/DL (ref 0.52–1.04)
DIFFERENTIAL METHOD BLD: NORMAL
EOSINOPHIL # BLD AUTO: 0.2 10E9/L (ref 0–0.7)
EOSINOPHIL NFR BLD AUTO: 2.7 %
ERYTHROCYTE [DISTWIDTH] IN BLOOD BY AUTOMATED COUNT: 11.7 % (ref 10–15)
GFR SERPL CREATININE-BSD FRML MDRD: >90 ML/MIN/{1.73_M2}
GLUCOSE SERPL-MCNC: 94 MG/DL (ref 70–99)
HCT VFR BLD AUTO: 46.1 % (ref 35–47)
HGB BLD-MCNC: 15.4 G/DL (ref 11.7–15.7)
IMM GRANULOCYTES # BLD: 0 10E9/L (ref 0–0.4)
IMM GRANULOCYTES NFR BLD: 0.2 %
LYMPHOCYTES # BLD AUTO: 1.9 10E9/L (ref 0.8–5.3)
LYMPHOCYTES NFR BLD AUTO: 33.8 %
MCH RBC QN AUTO: 31.6 PG (ref 26.5–33)
MCHC RBC AUTO-ENTMCNC: 33.4 G/DL (ref 31.5–36.5)
MCV RBC AUTO: 95 FL (ref 78–100)
MONOCYTES # BLD AUTO: 0.5 10E9/L (ref 0–1.3)
MONOCYTES NFR BLD AUTO: 9.1 %
NEUTROPHILS # BLD AUTO: 3 10E9/L (ref 1.6–8.3)
NEUTROPHILS NFR BLD AUTO: 53.7 %
NRBC # BLD AUTO: 0 10*3/UL
NRBC BLD AUTO-RTO: 0 /100
PLATELET # BLD AUTO: 189 10E9/L (ref 150–450)
POTASSIUM SERPL-SCNC: 4.2 MMOL/L (ref 3.4–5.3)
PROT SERPL-MCNC: 7.8 G/DL (ref 6.8–8.8)
RBC # BLD AUTO: 4.88 10E12/L (ref 3.8–5.2)
SODIUM SERPL-SCNC: 141 MMOL/L (ref 133–144)
WBC # BLD AUTO: 5.6 10E9/L (ref 4–11)

## 2020-03-11 PROCEDURE — 36415 COLL VENOUS BLD VENIPUNCTURE: CPT | Performed by: INTERNAL MEDICINE

## 2020-03-11 PROCEDURE — 99205 OFFICE O/P NEW HI 60 MIN: CPT | Performed by: INTERNAL MEDICINE

## 2020-03-11 PROCEDURE — 80053 COMPREHEN METABOLIC PANEL: CPT | Performed by: INTERNAL MEDICINE

## 2020-03-11 PROCEDURE — 85025 COMPLETE CBC W/AUTO DIFF WBC: CPT | Performed by: INTERNAL MEDICINE

## 2020-03-11 PROCEDURE — 87389 HIV-1 AG W/HIV-1&-2 AB AG IA: CPT | Performed by: INTERNAL MEDICINE

## 2020-03-11 PROCEDURE — G0463 HOSPITAL OUTPT CLINIC VISIT: HCPCS

## 2020-03-11 ASSESSMENT — MIFFLIN-ST. JEOR: SCORE: 1379.95

## 2020-03-11 ASSESSMENT — PAIN SCALES - GENERAL: PAINLEVEL: NO PAIN (0)

## 2020-03-11 NOTE — LETTER
"    3/11/2020         RE: Susu Lagos  [address]  [facility] [address]        Dear Colleague,    Thank you for referring your patient, Susu Lagos, to the [facility]. Please see a copy of my visit note below.    Oncology Rooming Note    March 11, 2020 10:00 AM   Susu Lagos is a 54 year old female who presents for:    Chief Complaint   Patient presents with     Oncology Clinic Visit     NEW, Anal squamous cell carcinoma.      Initial Vitals: /80 (BP Location: Right arm, Patient Position: Sitting, Cuff Size: Adult Regular)   Pulse 74   Temp 96.9  F (36.1  C) (Tympanic)   Resp 18   Ht 1.638 m (5' 4.5\")   Wt 77.7 kg (171 lb 4.8 oz)   LMP 01/29/2016   SpO2 96%   Breastfeeding No   BMI 28.95 kg/m   Estimated body mass index is 28.95 kg/m  as calculated from the following:    Height as of this encounter: 1.638 m (5' 4.5\").    Weight as of this encounter: 77.7 kg (171 lb 4.8 oz). Body surface area is 1.88 meters squared.  No Pain (0) Comment: Data Unavailable   Patient's last menstrual period was 01/29/2016.  Allergies reviewed: Yes  Medications reviewed: Yes    Medications: Medication refills not needed today.  Pharmacy name entered into [employer]:    [facility], [place] - [address] [place] [address] PHARMACY [id number] [place], [place] - [address] [facility]    Clinical concerns: NEW, Anal squamous cell carcinoma. Bleeding x 4 months.       Dionna Singh CMA              DATE OF VISIT: Mar 11, 2020    REASON FOR REFERRAL: Management of anal carcinoma.    CHIEF COMPLAINT:   Chief Complaint   Patient presents with     Oncology Clinic Visit     NEW, Anal squamous cell carcinoma.        HISTORY OF PRESENT ILLNESS:   Susu Lagos is a 55-year-old female patient who presented to the primary care physician with 3 to 4 months history of blood in the stool.  Subsequently the patient was referred for colonoscopy on March 3, 2020.  The palpable rectal mass on " digital examination.  Biopsy came back positive for squamous cell carcinoma, focally keratinizing.  The patient has a previous colonoscopy on November 2018 which shows no abnormalities.  She denies any pain or nausea or vomiting or diarrhea.  She denies any fever or chills.    REVIEW OF SYSTEMS:   Constitutional: Negative for fever, chills, and night sweats.  Skin: negative.  Eyes: negative.  Ears/Nose/Throat: negative.  Respiratory: No shortness of breath, dyspnea on exertion, cough, or hemoptysis.  Cardiovascular: negative.  Gastrointestinal: Bleeding per rectum as mentioned above  Genitourinary: negative.  Musculoskeletal: negative.  Neurologic: negative.  Psychiatric: negative.  Hematologic/Lymphatic/Immunologic: negative.  Endocrine: negative.    PAST MEDICAL HISTORY:   Past Medical History:   Diagnosis Date     Ovarian cyst 6/4/2015    Right, 6 cm dermoid on MRI-recommend removal.  7/29/2015 Surgery for right salpingoophorectomy.         PAST SURGICAL HISTORY:   Past Surgical History:   Procedure Laterality Date     CHOLECYSTECTOMY  2013    [facility]     COLONOSCOPY N/A 11/13/2018    Procedure: colonoscopy;  Surgeon: Sesar Dos Santos MD;  Location: [facility]     COLONOSCOPY N/A 3/3/2020    Procedure: COLONOSCOPY, WITH POLYPECTOMY AND BIOPSY;  Surgeon: Sesar Dos Santos MD;  Location: [facility]     DILATION AND CURETTAGE, OPERATIVE HYSTEROSCOPY, COMBINED N/A 2/29/2016    Procedure: COMBINED DILATION AND CURETTAGE, OPERATIVE HYSTEROSCOPY;  Surgeon: Panda Knight MD;  Location: [facility]     GYN SURGERY  1995    tubes tied     LAPAROSCOPY DIAGNOSTIC (GYN) Right 7/29/2015    Procedure: LAPAROSCOPY DIAGNOSTIC (GYN);  Surgeon: Lian Betancur DO;  Location: [facility]       ALLERGIES:   Allergies as of 03/11/2020 - Reviewed 03/11/2020   Allergen Reaction Noted     Rabies vaccine Unknown 02/20/2015     Shellfish-derived products Nausea and Vomiting 12/29/2016     Sulfa drugs Rash 02/20/2015       MEDICATIONS:   Current  "Outpatient Medications   Medication Sig Dispense Refill     Multiple Vitamin (MULTI-VITAMINS PO)        Omega-3 Fatty Acids (OMEGA 3 PO)           FAMILY HISTORY:   Family History   Problem Relation Age of Onset     Hypertension Father      Myocardial Infarction Maternal Grandfather      Melanoma No family hx of         SOCIAL HISTORY:   Social History     Socioeconomic History     Marital status:      Spouse name: None     Number of children: 3     Years of education: None     Highest education level: None   Occupational History     None   Social Needs     Financial resource strain: None     Food insecurity     Worry: None     Inability: None     Transportation needs     Medical: None     Non-medical: None   Tobacco Use     Smoking status: Never Smoker     Smokeless tobacco: Never Used   Substance and Sexual Activity     Alcohol use: Yes     Alcohol/week: 0.0 standard drinks     Comment: 1-2 per month      Drug use: No     Sexual activity: Yes     Partners: Male     Birth control/protection: Surgical     Comment: Tubal ligation with RSO   Lifestyle     Physical activity     Days per week: None     Minutes per session: None     Stress: None   Relationships     Social connections     Talks on phone: None     Gets together: None     Attends Religion service: None     Active member of club or organization: None     Attends meetings of clubs or organizations: None     Relationship status: None     Intimate partner violence     Fear of current or ex partner: None     Emotionally abused: None     Physically abused: None     Forced sexual activity: None   Other Topics Concern     Parent/sibling w/ CABG, MI or angioplasty before 65F 55M? Yes   Social History Narrative     None       PHYSICAL EXAMINATION:   /80 (BP Location: Right arm, Patient Position: Sitting, Cuff Size: Adult Regular)   Pulse 74   Temp 96.9  F (36.1  C) (Tympanic)   Resp 18   Ht 1.638 m (5' 4.5\")   Wt 77.7 kg (171 lb 4.8 oz)   LMP " "01/29/2016   SpO2 96%   Breastfeeding No   BMI 28.95 kg/m    Wt Readings from Last 10 Encounters:   03/13/20 76.2 kg (168 lb)   03/11/20 77.7 kg (171 lb 4.8 oz)   03/03/20 75.3 kg (166 lb)   02/05/20 77.7 kg (171 lb 6.4 oz)   11/13/18 83.9 kg (185 lb)   06/11/18 87.5 kg (193 lb)   02/16/18 87.8 kg (193 lb 9.6 oz)   12/28/16 85.3 kg (188 lb)   12/14/16 85.3 kg (188 lb)   08/19/16 85.4 kg (188 lb 3.2 oz)      ECOG performance status:0  GENERAL APPEARANCE: Healthy, alert and in no acute distress.  HEENT: Sclerae anicteric. PERRLA. Oropharynx without ulcers, lesions, or thrush.  NECK: Supple. No asymmetry or masses.  LYMPHATICS: No palpable cervical, supraclavicular, axillary, or inguinal lymphadenopathy.  RESP: Lungs clear to auscultation bilaterally without rales, rhonchi or wheezes.  CARDIOVASCULAR: Regular rate and rhythm. Normal S1, S2; no S3 or S4. No murmur, gallop, or rub.  ABDOMEN: Soft, nontender. Bowel sounds normal. No palpable organomegaly or masses.  MUSCULOSKELETAL: Extremities without gross deformities noted. No edema of bilateral lower extremities.  SKIN: No suspicious lesions or rashes.  NEURO: Alert and oriented x 3. Cranial nerves II-XII grossly intact.  PSYCHIATRIC: Mentation and affect appear normal.    LABORATORY RESULTS:  Admission on 03/03/2020, Discharged on 03/03/2020   Component Date Value Ref Range Status     Copath Report 03/03/2020    Final                    Value:Patient Name: FUAD ECHAVARRIA  MR#: [id number]  Specimen #: [id number]  Collected: 3/3/2020  Received: 3/4/2020  Reported: 3/6/2020 06:35  Ordering Phy(s): RADHA CONLEY    For improved result formatting, select 'View Enhanced Report Format' under   Linked Documents section.    SPECIMEN(S):  Colon biopsy, mass at dentate line    FINAL DIAGNOSIS:  \"Colon\", at dentate line, mass, biopsy:  - Squamous cell carcinoma, focally keratinizing, cannot rule out invasion   - see description    COMMENT:  The findings were communicated to " "Dr. Dos Santos via an Epic staff message on   03/05/2020 at 3:12 PM.    Intradepartmental consultation is obtained.    Electronically signed out by:    José Miguel Flores M.D., PhD    CLINICAL HISTORY:  54 year old female.  Hematochezia.  Palpable rectal mass on colonoscopy.    GROSS:  The specimen is received in formalin with the proper patient information,   labeled \"mass at dentate line\". The  specimen consists of 6 tan-white mucosal fragments, 0.1-0.5 cm, entirely   sub                          mitted in one cassette. (Dictated  by: Rell Ayala 3/4/2020 08:09 AM)    MICROSCOPIC:  Sections show multiple tissue fragments; some show solely colorectal   glandular mucosa; some show solely  squamous mucosa, while others show a combination of both.  The squamous   mucosa shows architectural and  cytologic atypia with occasional mitotic figures and apoptotic bodies.    There is focal involvement of the  glandular crypts by atypical squamous epithelium.   Focal keratinization   is present.  The biopsy is  superficial which limits assessment of invasion.  However, in the context   of a mass and in the presence of  focal questionable desmoplasia, invasion is likely and cannot be excluded.    The neoplastic cells show diffuse  and strong immunoreactivity for p16.  Ki67 index is high in the neoplastic   cells.  Based on the reported location of the biopsy (dentate line); this probably   represents anal squamous cell  carcinoma; however endoscopic/radiologic correlation is recommended.                              The technical component of this testing was completed at the [facility], with the professional component performed   at the [facility], [address] [place] [phone] ([phone])    CPT Codes:  A: 99863-MG0, 25376-ONJ, 91971-VUL    COLLECTION SITE:  Client: [facility]  Location: [facility] ([facility])       COLONOSCOPY 03/03/2020    Corrected                    Value:_______________________________________________________________________________  Patient Name: Susu Lagos         Procedure Date: 3/3/2020 11:58 AM  MRN: [id number]                       YOB: 1965  Admit Type: Outpatient                Age: 54  Gender: Female                        Attending MD: Sesar Dos Santos MD  Total Sedation Time:                    _______________________________________________________________________________     Procedure:           Colonoscopy  Indications:         Hematochezia  Providers:           Sesar Dos Santos MD, Vanessa Be, KELVIN  Referring MD:        Jackelyn Hicks  Medicines:           Monitored Anesthesia Care  Complications:       No immediate complications.  _______________________________________________________________________________  Procedure:           Pre-Anesthesia Assessment:                       - Prior to the procedure, a History and Physical was                        performed, and patient medications, allergies and                                                  sensitivities were reviewed. The patient's tolerance of                        previous anesthesia was reviewed.                       - The risks and benefits of the procedure and the                        sedation options and risks were discussed with the                        patient. All questions were answered and informed                        consent was obtained.                       - Patient identification and proposed procedure were                        verified prior to the procedure by the physician, the                        nurse, the anesthetist and the technician. The procedure                        was verified in the pre-procedure area in the procedure                        room in the endoscopy suite.                       After obtaining informed consent, the  colonoscope was                        passed under direct vision. Throughout the procedure,                        the patient's blood pressure, pulse, and oxygen                                                  saturations were monitored continuously. The Colonoscope                        was introduced through the anus and advanced to the                        cecum, identified by appendiceal orifice and ileocecal                        valve. The colonoscopy was performed without difficulty.                        The patient tolerated the procedure well. The quality of                        the bowel preparation was good.                                                                                   Findings:       The perianal examination was normal.       The digital rectal exam findings include palpable rectal mass.       The exam was otherwise normal throughout the examined colon.       No additional abnormalities were found on retroflexion.                                                                                   Impression:          - Palpable rectal mass found on digital rectal exam.                       - No specimens collected.  Recommendation:      - Discharge                           patient to home (ambulatory).                       - Await pathology results.                       - Repeat colonoscopy after studies are complete for                        surveillance based on pathology results.                                                                                     Signed electronically by Sesar Dos Santos MD  _______________  Sesar Dos Santos MD  3/3/2020 12:41:54 PM  I was physically present for the entire viewing portion of the exam.  [id number] MD Raya  Number of Addenda: 1    Note Initiated On: 3/3/2020 11:58 AM  Scope In: 12:07:38 PM  Scope Out: 12:30:51 PM  _______________________________________________________________________________    Addendum Number: 1    Addendum Date: 3/4/2020 11:57:52 AM           A polypoid mass was seen at the dentate line in the left anterior        position. This was palpable and was seen on retroflexion       Multiple biopsies were obtained with a biopsy forceps.       The mass was less than 1/3rd circumferential. The                           original Provation        note did not include the biopsy.        Signed electronically by Sesar Dos Santos MD  _______________  Sesar Dos Santos MD  3/4/2020 12:00:41 PM  I was physically present for the entire viewing portion of the exam.  [id number] MD Raya         IMAGING RESULTS:  No results found for this or any previous visit (from the past 744 hour(s)).    ASSESSMENT AND PLAN:  (C21.0) Anal squamous cell carcinoma (H)  (primary encounter diagnosis)  This is a 54-year-old female patient with a squamous cell carcinoma of the anal canal.  I discussed with the patient the natural history of the disease.  I would recommend to proceed with staging work-up including a PET scan and pelvic MRI.  We will also refer the patient to see OB/GYN to rule out cervical carcinoma.  I gave the patient an overview about management of anal carcinoma.  Treatment will include concurrent chemoradiation therapy.  We will referred the patient to see radiation therapy as well.  I will see the patient when the results are available to discuss further management plan.    The patient is ready to learn, no apparent learning barriers were identified, Diagnosis and treatment plans were explained to the patient. The patient expressed understanding of the content. The patient questions were answered to her satisfaction.    Sophia Real MD    Chart documentation with Dragon Voice recognition Software. Although reviewed after completion, some words and grammatical errors may remain.      Again, thank you for allowing me to participate in the care of your patient.        Sincerely,        Sophia Real MD

## 2020-03-11 NOTE — PROGRESS NOTES
"Oncology Rooming Note    March 11, 2020 10:00 AM   Susu Lagos is a 54 year old female who presents for:    Chief Complaint   Patient presents with     Oncology Clinic Visit     NEW, Anal squamous cell carcinoma.      Initial Vitals: /80 (BP Location: Right arm, Patient Position: Sitting, Cuff Size: Adult Regular)   Pulse 74   Temp 96.9  F (36.1  C) (Tympanic)   Resp 18   Ht 1.638 m (5' 4.5\")   Wt 77.7 kg (171 lb 4.8 oz)   LMP 01/29/2016   SpO2 96%   Breastfeeding No   BMI 28.95 kg/m   Estimated body mass index is 28.95 kg/m  as calculated from the following:    Height as of this encounter: 1.638 m (5' 4.5\").    Weight as of this encounter: 77.7 kg (171 lb 4.8 oz). Body surface area is 1.88 meters squared.  No Pain (0) Comment: Data Unavailable   Patient's last menstrual period was 01/29/2016.  Allergies reviewed: Yes  Medications reviewed: Yes    Medications: Medication refills not needed today.  Pharmacy name entered into [employer]:    [facility], [place] - [address] [place] [address] PHARMACY [address] [place], [place] - [address] [facility]    Clinical concerns: NEW, Anal squamous cell carcinoma. Bleeding x 4 months.       Dionna Singh, [facility]            "

## 2020-03-12 LAB — HIV 1+2 AB+HIV1 P24 AG SERPL QL IA: NONREACTIVE

## 2020-03-13 ENCOUNTER — OFFICE VISIT (OUTPATIENT)
Dept: OBGYN | Facility: CLINIC | Age: 55
End: 2020-03-13
Payer: COMMERCIAL

## 2020-03-13 VITALS
HEART RATE: 90 BPM | DIASTOLIC BLOOD PRESSURE: 76 MMHG | OXYGEN SATURATION: 98 % | SYSTOLIC BLOOD PRESSURE: 113 MMHG | TEMPERATURE: 97.7 F | HEIGHT: 65 IN | WEIGHT: 168 LBS | BODY MASS INDEX: 27.99 KG/M2

## 2020-03-13 DIAGNOSIS — Z12.4 CERVICAL CANCER SCREENING: Primary | ICD-10-CM

## 2020-03-13 DIAGNOSIS — C21.0 ANAL SQUAMOUS CELL CARCINOMA (H): ICD-10-CM

## 2020-03-13 PROCEDURE — G0145 SCR C/V CYTO,THINLAYER,RESCR: HCPCS | Performed by: OBSTETRICS & GYNECOLOGY

## 2020-03-13 PROCEDURE — 87624 HPV HI-RISK TYP POOLED RSLT: CPT | Performed by: OBSTETRICS & GYNECOLOGY

## 2020-03-13 PROCEDURE — 99203 OFFICE O/P NEW LOW 30 MIN: CPT | Performed by: OBSTETRICS & GYNECOLOGY

## 2020-03-13 ASSESSMENT — MIFFLIN-ST. JEOR: SCORE: 1364.98

## 2020-03-13 NOTE — PROGRESS NOTES
CC; Susu Lagos was referred to me by Dr. Eulalia Real for evaluation of cervical cancer screening.    HPI: Susu Lagos is a 54 year old  who was recently diagnosed with squamous cell carcinoma of the anus.  She was referred to me by her oncologist for cervical cancer screening prior to start of any therapy.  She feels like she has a remote history of abnormal pap, but no memorable follow-up or procedures related.  Her last pap was  and was nil, neg HPV.  She is otherwise without complaints.    ROS:  C: NEGATIVE for fever, chills, change in weight  I: NEGATIVE for worrisome rashes, moles or lesions  E: NEGATIVE for vision changes or irritation  E/M: NEGATIVE for ear, mouth and throat problems  R: NEGATIVE for significant cough or SOB  CV: NEGATIVE for chest pain, palpitations or peripheral edema  GI: NEGATIVE for nausea, abdominal pain, heartburn, or change in bowel habits  : NEGATIVE for frequency, dysuria, hematuria, vaginal discharge  M: NEGATIVE for significant arthralgias or myalgia  N: NEGATIVE for weakness, dizziness or paresthesias  E: NEGATIVE for temperature intolerance, skin/hair changes  P: NEGATIVE for changes in mood or affect    Past Medical History:   Diagnosis Date     Ovarian cyst 2015    Right, 6 cm dermoid on MRI-recommend removal.  2015 Surgery for right salpingoophorectomy.       Past Surgical History:   Procedure Laterality Date     CHOLECYSTECTOMY      [facility]     COLONOSCOPY N/A 2018    Procedure: colonoscopy;  Surgeon: Sesar Dos Santos MD;  Location: [facility]     COLONOSCOPY N/A 3/3/2020    Procedure: COLONOSCOPY, WITH POLYPECTOMY AND BIOPSY;  Surgeon: Sesar Dos Santos MD;  Location: [facility]     DILATION AND CURETTAGE, OPERATIVE HYSTEROSCOPY, COMBINED N/A 2016    Procedure: COMBINED DILATION AND CURETTAGE, OPERATIVE HYSTEROSCOPY;  Surgeon: Panda Knight MD;  Location: [facility]     GYN SURGERY      tubes tied     LAPAROSCOPY DIAGNOSTIC (GYN)  Right 2015    Procedure: LAPAROSCOPY DIAGNOSTIC (GYN);  Surgeon: Lian Betancur DO;  Location: [facility]     OB History    Para Term  AB Living   3 3 3 0 0 3   SAB TAB Ectopic Multiple Live Births   0 0 0 0 0      # Outcome Date GA Lbr Gagan/2nd Weight Sex Delivery Anes PTL Lv   3 Term    4.564 kg (10 lb 1 oz) M       2 Term    4.72 kg (10 lb 6.5 oz) M       1 Term     F          Obstetric Comments   PPTL        Allergies   Allergen Reactions     Rabies Vaccine Unknown     Scratch test resulted, it was not a good idea to give it to her.     Shellfish-Derived Products Nausea and Vomiting     Sulfa Drugs Rash       Current Outpatient Medications:      Multiple Vitamin (MULTI-VITAMINS PO), , Disp: , Rfl:      Omega-3 Fatty Acids (OMEGA 3 PO), , Disp: , Rfl:     Social History     Socioeconomic History     Marital status:      Spouse name: Not on file     Number of children: 3     Years of education: Not on file     Highest education level: Not on file   Occupational History     Not on file   Social Needs     Financial resource strain: Not on file     Food insecurity     Worry: Not on file     Inability: Not on file     Transportation needs     Medical: Not on file     Non-medical: Not on file   Tobacco Use     Smoking status: Never Smoker     Smokeless tobacco: Never Used   Substance and Sexual Activity     Alcohol use: Yes     Alcohol/week: 0.0 standard drinks     Comment: 1-2 per month      Drug use: No     Sexual activity: Yes     Partners: Male     Birth control/protection: Surgical     Comment: Tubal ligation with RSO   Lifestyle     Physical activity     Days per week: Not on file     Minutes per session: Not on file     Stress: Not on file   Relationships     Social connections     Talks on phone: Not on file     Gets together: Not on file     Attends Christianity service: Not on file     Active member of club or organization: Not on file     Attends meetings  "of clubs or organizations: Not on file     Relationship status: Not on file     Intimate partner violence     Fear of current or ex partner: Not on file     Emotionally abused: Not on file     Physically abused: Not on file     Forced sexual activity: Not on file   Other Topics Concern     Parent/sibling w/ CABG, MI or angioplasty before 65F 55M? Yes   Social History Narrative     Not on file     Past medical, surgical, social and family history were reviewed and updated in EPIC.    PE: /76   Pulse 90   Temp 97.7  F (36.5  C) (Oral)   Ht 1.638 m (5' 4.5\")   Wt 76.2 kg (168 lb)   LMP 01/29/2016   SpO2 98%   BMI 28.39 kg/m      Psych: normal affect, appropriate eye contact  Resp: no increased work of breathing  CV: no peripheral edema  Abd; SNT, no palpable masses  Lymph: no enlarged inquinal nodes  Pelvic: normal appearing vulva and vagina without obvious lesions or skin changes. Cervix without lesions or CMT.  Pap smear done  Skin: no visible rashes or lesions.    A/P: cervical cancer screening   Pap/HPV done, discussed results should be sent via [employer] within 1-2wks, but oncologist or PCP can check chart for results sooner as there may be a slight lag time from pap RN resulting.  Questions answered.      ANTONY LEE MD      "

## 2020-03-17 LAB
COPATH REPORT: NORMAL
PAP: NORMAL

## 2020-03-17 NOTE — PROGRESS NOTES
DATE OF VISIT: Mar 11, 2020    REASON FOR REFERRAL: Management of anal carcinoma.    CHIEF COMPLAINT:   Chief Complaint   Patient presents with     Oncology Clinic Visit     NEW, Anal squamous cell carcinoma.        HISTORY OF PRESENT ILLNESS:   Susu Lagos is a 55-year-old female patient who presented to the primary care physician with 3 to 4 months history of blood in the stool.  Subsequently the patient was referred for colonoscopy on March 3, 2020.  The palpable rectal mass on digital examination.  Biopsy came back positive for squamous cell carcinoma, focally keratinizing.  The patient has a previous colonoscopy on November 2018 which shows no abnormalities.  She denies any pain or nausea or vomiting or diarrhea.  She denies any fever or chills.    REVIEW OF SYSTEMS:   Constitutional: Negative for fever, chills, and night sweats.  Skin: negative.  Eyes: negative.  Ears/Nose/Throat: negative.  Respiratory: No shortness of breath, dyspnea on exertion, cough, or hemoptysis.  Cardiovascular: negative.  Gastrointestinal: Bleeding per rectum as mentioned above  Genitourinary: negative.  Musculoskeletal: negative.  Neurologic: negative.  Psychiatric: negative.  Hematologic/Lymphatic/Immunologic: negative.  Endocrine: negative.    PAST MEDICAL HISTORY:   Past Medical History:   Diagnosis Date     Ovarian cyst 6/4/2015    Right, 6 cm dermoid on MRI-recommend removal.  7/29/2015 Surgery for right salpingoophorectomy.         PAST SURGICAL HISTORY:   Past Surgical History:   Procedure Laterality Date     CHOLECYSTECTOMY  2013    [facility]     COLONOSCOPY N/A 11/13/2018    Procedure: colonoscopy;  Surgeon: Sesar Dos Santos MD;  Location: [facility]     COLONOSCOPY N/A 3/3/2020    Procedure: COLONOSCOPY, WITH POLYPECTOMY AND BIOPSY;  Surgeon: Sesar Dos Santos MD;  Location: [facility]     DILATION AND CURETTAGE, OPERATIVE HYSTEROSCOPY, COMBINED N/A 2/29/2016    Procedure: COMBINED DILATION AND CURETTAGE, OPERATIVE HYSTEROSCOPY;   Surgeon: Panda Knight MD;  Location: [facility]     GYN SURGERY  1995    tubes tied     LAPAROSCOPY DIAGNOSTIC (GYN) Right 7/29/2015    Procedure: LAPAROSCOPY DIAGNOSTIC (GYN);  Surgeon: Lian Betancur DO;  Location: [facility]       ALLERGIES:   Allergies as of 03/11/2020 - Reviewed 03/11/2020   Allergen Reaction Noted     Rabies vaccine Unknown 02/20/2015     Shellfish-derived products Nausea and Vomiting 12/29/2016     Sulfa drugs Rash 02/20/2015       MEDICATIONS:   Current Outpatient Medications   Medication Sig Dispense Refill     Multiple Vitamin (MULTI-VITAMINS PO)        Omega-3 Fatty Acids (OMEGA 3 PO)           FAMILY HISTORY:   Family History   Problem Relation Age of Onset     Hypertension Father      Myocardial Infarction Maternal Grandfather      Melanoma No family hx of         SOCIAL HISTORY:   Social History     Socioeconomic History     Marital status:      Spouse name: None     Number of children: 3     Years of education: None     Highest education level: None   Occupational History     None   Social Needs     Financial resource strain: None     Food insecurity     Worry: None     Inability: None     Transportation needs     Medical: None     Non-medical: None   Tobacco Use     Smoking status: Never Smoker     Smokeless tobacco: Never Used   Substance and Sexual Activity     Alcohol use: Yes     Alcohol/week: 0.0 standard drinks     Comment: 1-2 per month      Drug use: No     Sexual activity: Yes     Partners: Male     Birth control/protection: Surgical     Comment: Tubal ligation with RSO   Lifestyle     Physical activity     Days per week: None     Minutes per session: None     Stress: None   Relationships     Social connections     Talks on phone: None     Gets together: None     Attends [employer] service: None     Active member of club or organization: None     Attends meetings of clubs or organizations: None     Relationship status: None     Intimate partner violence      "Fear of current or ex partner: None     Emotionally abused: None     Physically abused: None     Forced sexual activity: None   Other Topics Concern     Parent/sibling w/ CABG, MI or angioplasty before 65F 55M? Yes   Social History Narrative     None       PHYSICAL EXAMINATION:   /80 (BP Location: Right arm, Patient Position: Sitting, Cuff Size: Adult Regular)   Pulse 74   Temp 96.9  F (36.1  C) (Tympanic)   Resp 18   Ht 1.638 m (5' 4.5\")   Wt 77.7 kg (171 lb 4.8 oz)   LMP 01/29/2016   SpO2 96%   Breastfeeding No   BMI 28.95 kg/m    Wt Readings from Last 10 Encounters:   03/13/20 76.2 kg (168 lb)   03/11/20 77.7 kg (171 lb 4.8 oz)   03/03/20 75.3 kg (166 lb)   02/05/20 77.7 kg (171 lb 6.4 oz)   11/13/18 83.9 kg (185 lb)   06/11/18 87.5 kg (193 lb)   02/16/18 87.8 kg (193 lb 9.6 oz)   12/28/16 85.3 kg (188 lb)   12/14/16 85.3 kg (188 lb)   08/19/16 85.4 kg (188 lb 3.2 oz)      ECOG performance status:0  GENERAL APPEARANCE: Healthy, alert and in no acute distress.  HEENT: Sclerae anicteric. PERRLA. Oropharynx without ulcers, lesions, or thrush.  NECK: Supple. No asymmetry or masses.  LYMPHATICS: No palpable cervical, supraclavicular, axillary, or inguinal lymphadenopathy.  RESP: Lungs clear to auscultation bilaterally without rales, rhonchi or wheezes.  CARDIOVASCULAR: Regular rate and rhythm. Normal S1, S2; no S3 or S4. No murmur, gallop, or rub.  ABDOMEN: Soft, nontender. Bowel sounds normal. No palpable organomegaly or masses.  MUSCULOSKELETAL: Extremities without gross deformities noted. No edema of bilateral lower extremities.  SKIN: No suspicious lesions or rashes.  NEURO: Alert and oriented x 3. Cranial nerves II-XII grossly intact.  PSYCHIATRIC: Mentation and affect appear normal.    LABORATORY RESULTS:  Admission on 03/03/2020, Discharged on 03/03/2020   Component Date Value Ref Range Status     Copath Report 03/03/2020    Final                    Value:Patient Name: FUAD ECHAVARRIA  MR#: " "[id number]  Specimen #: [id number]  Collected: 3/3/2020  Received: 3/4/2020  Reported: 3/6/2020 06:35  Ordering Phy(s): RADHA DOS SANTOS    For improved result formatting, select 'View Enhanced Report Format' under   Linked Documents section.    SPECIMEN(S):  Colon biopsy, mass at dentate line    FINAL DIAGNOSIS:  \"Colon\", at dentate line, mass, biopsy:  - Squamous cell carcinoma, focally keratinizing, cannot rule out invasion   - see description    COMMENT:  The findings were communicated to Dr. Dos Santos via an Epic staff message on   03/05/2020 at 3:12 PM.    Intradepartmental consultation is obtained.    Electronically signed out by:    José Miguel Flores M.D., PhD    CLINICAL HISTORY:  54 year old female.  Hematochezia.  Palpable rectal mass on colonoscopy.    GROSS:  The specimen is received in formalin with the proper patient information,   labeled \"mass at dentate line\". The  specimen consists of 6 tan-white mucosal fragments, 0.1-0.5 cm, entirely   sub                          mitted in one cassette. (Dictated  by: Rell Ayala 3/4/2020 08:09 AM)    MICROSCOPIC:  Sections show multiple tissue fragments; some show solely colorectal   glandular mucosa; some show solely  squamous mucosa, while others show a combination of both.  The squamous   mucosa shows architectural and  cytologic atypia with occasional mitotic figures and apoptotic bodies.    There is focal involvement of the  glandular crypts by atypical squamous epithelium.   Focal keratinization   is present.  The biopsy is  superficial which limits assessment of invasion.  However, in the context   of a mass and in the presence of  focal questionable desmoplasia, invasion is likely and cannot be excluded.    The neoplastic cells show diffuse  and strong immunoreactivity for p16.  Ki67 index is high in the neoplastic   cells.  Based on the reported location of the biopsy (dentate line); this probably   represents anal squamous cell  carcinoma; however " endoscopic/radiologic correlation is recommended.                              The technical component of this testing was completed at the [facility], with the professional component performed   at the [facility], [address] [place] [id number] ([phone])    CPT Codes:  A: 54142-TW8, 54239-OQF, 15268-ZBB    COLLECTION SITE:  Client: [facility]  Location: [facility] ()       COLONOSCOPY 03/03/2020    Corrected                    Value:_______________________________________________________________________________  Patient Name: Susu Lagos         Procedure Date: 3/3/2020 11:58 AM  MRN: [id number]                       YOB: 1965  Admit Type: Outpatient                Age: 54  Gender: Female                        Attending MD: Sesar Dos Santos MD  Total Sedation Time:                    _______________________________________________________________________________     Procedure:           Colonoscopy  Indications:         Hematochezia  Providers:           Sesar Dos Santos MD, Vanessa Be RN  Referring MD:        aJckelyn Hicks  Medicines:           Monitored Anesthesia Care  Complications:       No immediate complications.  _______________________________________________________________________________  Procedure:           Pre-Anesthesia Assessment:                       - Prior to the procedure, a History and Physical was                        performed, and patient medications, allergies and                                                  sensitivities were reviewed. The patient's tolerance of                        previous anesthesia was reviewed.                       - The risks and benefits of the procedure and the                        sedation options and risks were discussed with the                        patient. All questions were  answered and informed                        consent was obtained.                       - Patient identification and proposed procedure were                        verified prior to the procedure by the physician, the                        nurse, the anesthetist and the technician. The procedure                        was verified in the pre-procedure area in the procedure                        room in the endoscopy suite.                       After obtaining informed consent, the colonoscope was                        passed under direct vision. Throughout the procedure,                        the patient's blood pressure, pulse, and oxygen                                                  saturations were monitored continuously. The Colonoscope                        was introduced through the anus and advanced to the                        cecum, identified by appendiceal orifice and ileocecal                        valve. The colonoscopy was performed without difficulty.                        The patient tolerated the procedure well. The quality of                        the bowel preparation was good.                                                                                   Findings:       The perianal examination was normal.       The digital rectal exam findings include palpable rectal mass.       The exam was otherwise normal throughout the examined colon.       No additional abnormalities were found on retroflexion.                                                                                   Impression:          - Palpable rectal mass found on digital rectal exam.                       - No specimens collected.  Recommendation:      - Discharge                           patient to home (ambulatory).                       - Await pathology results.                       - Repeat colonoscopy after studies are complete for                        surveillance based on pathology results.                                                                                      Signed electronically by Sesar Dos Santos MD  _______________  Sesar Dos Santos MD  3/3/2020 12:41:54 PM  I was physically present for the entire viewing portion of the exam.  Sharee Dos Santos MD  Number of Addenda: 1    Note Initiated On: 3/3/2020 11:58 AM  Scope In: 12:07:38 PM  Scope Out: 12:30:51 PM  _______________________________________________________________________________    Addendum Number: 1   Addendum Date: 3/4/2020 11:57:52 AM           A polypoid mass was seen at the dentate line in the left anterior        position. This was palpable and was seen on retroflexion       Multiple biopsies were obtained with a biopsy forceps.       The mass was less than 1/3rd circumferential. The                           original Provation        note did not include the biopsy.        Signed electronically by Sesar Dos Santos MD  _______________  Sesar Dos Santos MD  3/4/2020 12:00:41 PM  I was physically present for the entire viewing portion of the exam.  Sharee Dos Santos MD         IMAGING RESULTS:  No results found for this or any previous visit (from the past 744 hour(s)).    ASSESSMENT AND PLAN:  (C21.0) Anal squamous cell carcinoma (H)  (primary encounter diagnosis)  This is a 54-year-old female patient with a squamous cell carcinoma of the anal canal.  I discussed with the patient the natural history of the disease.  I would recommend to proceed with staging work-up including a PET scan and pelvic MRI.  We will also refer the patient to see OB/GYN to rule out cervical carcinoma.  I gave the patient an overview about management of anal carcinoma.  Treatment will include concurrent chemoradiation therapy.  We will referred the patient to see radiation therapy as well.  I will see the patient when the results are available to discuss further management plan.    The patient is ready to learn, no apparent learning barriers were identified,  Diagnosis and treatment plans were explained to the patient. The patient expressed understanding of the content. The patient questions were answered to her satisfaction.    Sophia Real MD    Chart documentation with Dragon Voice recognition Software. Although reviewed after completion, some words and grammatical errors may remain.

## 2020-03-19 ENCOUNTER — HOSPITAL ENCOUNTER (OUTPATIENT)
Dept: PET IMAGING | Facility: CLINIC | Age: 55
End: 2020-03-19
Attending: INTERNAL MEDICINE
Payer: COMMERCIAL

## 2020-03-19 ENCOUNTER — HOSPITAL ENCOUNTER (OUTPATIENT)
Dept: MRI IMAGING | Facility: CLINIC | Age: 55
End: 2020-03-19
Attending: INTERNAL MEDICINE
Payer: COMMERCIAL

## 2020-03-19 DIAGNOSIS — C21.0 ANAL SQUAMOUS CELL CARCINOMA (H): ICD-10-CM

## 2020-03-19 LAB
FINAL DIAGNOSIS: NORMAL
HPV HR 12 DNA CVX QL NAA+PROBE: NEGATIVE
HPV16 DNA SPEC QL NAA+PROBE: NEGATIVE
HPV18 DNA SPEC QL NAA+PROBE: NEGATIVE
SPECIMEN DESCRIPTION: NORMAL
SPECIMEN SOURCE CVX/VAG CYTO: NORMAL

## 2020-03-19 PROCEDURE — 25500064 ZZH RX 255 OP 636: Performed by: INTERNAL MEDICINE

## 2020-03-19 PROCEDURE — 34300033 ZZH RX 343: Performed by: INTERNAL MEDICINE

## 2020-03-19 PROCEDURE — 72197 MRI PELVIS W/O & W/DYE: CPT

## 2020-03-19 PROCEDURE — A9585 GADOBUTROL INJECTION: HCPCS | Performed by: INTERNAL MEDICINE

## 2020-03-19 PROCEDURE — 78816 PET IMAGE W/CT FULL BODY: CPT | Mod: PI

## 2020-03-19 PROCEDURE — A9552 F18 FDG: HCPCS | Performed by: INTERNAL MEDICINE

## 2020-03-19 RX ORDER — GADOBUTROL 604.72 MG/ML
7 INJECTION INTRAVENOUS ONCE
Status: COMPLETED | OUTPATIENT
Start: 2020-03-19 | End: 2020-03-19

## 2020-03-19 RX ADMIN — FLUDEOXYGLUCOSE F-18 13.5 MCI.: 500 INJECTION, SOLUTION INTRAVENOUS at 09:21

## 2020-03-19 RX ADMIN — GADOBUTROL 7 ML: 604.72 INJECTION INTRAVENOUS at 11:23

## 2020-03-20 PROBLEM — Z12.4 CERVICAL CANCER SCREENING: Status: ACTIVE | Noted: 2020-03-20

## 2020-03-24 ENCOUNTER — OFFICE VISIT (OUTPATIENT)
Dept: RADIATION THERAPY | Facility: OUTPATIENT CENTER | Age: 55
End: 2020-03-24
Payer: COMMERCIAL

## 2020-03-24 ENCOUNTER — TELEPHONE (OUTPATIENT)
Dept: SURGERY | Facility: CLINIC | Age: 55
End: 2020-03-24

## 2020-03-24 VITALS
DIASTOLIC BLOOD PRESSURE: 81 MMHG | WEIGHT: 172.6 LBS | RESPIRATION RATE: 18 BRPM | BODY MASS INDEX: 29.17 KG/M2 | OXYGEN SATURATION: 95 % | SYSTOLIC BLOOD PRESSURE: 119 MMHG | HEART RATE: 75 BPM

## 2020-03-24 DIAGNOSIS — C21.0 ANAL SQUAMOUS CELL CARCINOMA (H): Primary | ICD-10-CM

## 2020-03-24 ASSESSMENT — PAIN SCALES - GENERAL: PAINLEVEL: NO PAIN (0)

## 2020-03-24 NOTE — TELEPHONE ENCOUNTER
[facility] Call Center    Phone Message    May a detailed message be left on voicemail: yes     Reason for Call: Appointment Intake    Referring Provider Name: Dr. Tyree Remy at [facility]  Diagnosis and/or Symptoms: anal squamous cell carcinoma    Referral/records are in system    Action Taken: Message routed to:  [facility]    Travel Screening: Not Applicable

## 2020-03-24 NOTE — PROGRESS NOTES
Department of Radiation Oncology  Radiation Therapy Center  [facility]  [address], [address]  [place] [address]  [phone]       Consultation Note    Name: Susu Lagos MRN: [id number]   : 1965   Date of Service: 3/24/2020  Referring: Dr. Real     Reason for consultation: Squamous cell carcinoma of the anal canal, clinical T1N0.  Evaluate role for radiation therapy.    History of Present Illness   Ms. 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  She presents today to discuss potential role of radiation therapy as a part of her treatment strategy.  The patient initially presented with symptoms of hematochezia for several months eventually prompting further evaluation.    On 3/3/2020 the patient underwent colonoscopy which demonstrated a mass located at the dentate line in the left anterior position.  The mass was less than one third circumferentially involved.  Biopsy of the mass obtained demonstrated squamous cell carcinoma.  The patient was evaluated by gynecology on 3/13/2020, Pap smear was negative.  On 3/19/2020 the patient underwent a PET scan.  Imaging demonstrated a focal hypermetabolic activity in the anal canal, max SUV 12.6.  No regional or distant disease was noted.  On 3/19/2020 the patient underwent MRI which demonstrated a 1.2 x 0.9 cm mass located 2.7 cm above the anal verge.  No enlarged regional nodes were noted.  The patient was seen by Dr. Real of medical oncology who discussed treatment with chemoradiation therapy.  Patient presents to our clinic today to discuss potential role of radiation therapy as a part of treatment strategy.    Patient is overall doing fairly well.  She denies any significant pain.  Denies any current significant hematochezia.  No pelvic pressure.  No hematuria or vaginal bleeding.  She did have prior colonoscopy 2018 which was negative.  No  prior radiation.  No history inflammatory bowel disease. No pacemaker.      Past Medical History:   Past Medical History:   Diagnosis Date     Ovarian cyst 6/4/2015    Right, 6 cm dermoid on MRI-recommend removal.  7/29/2015 Surgery for right salpingoophorectomy.         Past Surgical History:   Past Surgical History:   Procedure Laterality Date     CHOLECYSTECTOMY  2013    LSC     COLONOSCOPY N/A 11/13/2018    Procedure: colonoscopy;  Surgeon: Sesar Dos Santos MD;  Location: [facility]     COLONOSCOPY N/A 3/3/2020    Procedure: COLONOSCOPY, WITH POLYPECTOMY AND BIOPSY;  Surgeon: Sesar Dos Santos MD;  Location: [facility]     DILATION AND CURETTAGE, OPERATIVE HYSTEROSCOPY, COMBINED N/A 2/29/2016    Procedure: COMBINED DILATION AND CURETTAGE, OPERATIVE HYSTEROSCOPY;  Surgeon: Panda Knight MD;  Location: [facility]     GYN SURGERY  1995    tubes tied     LAPAROSCOPY DIAGNOSTIC (GYN) Right 7/29/2015    Procedure: LAPAROSCOPY DIAGNOSTIC (GYN);  Surgeon: Lian Betancur DO;  Location: [facility]       Chemotherapy History:  None    Radiation History:  None    Pregnant: No  Implanted Cardiac Devices: No    Medications:  Current Outpatient Medications   Medication     Multiple Vitamin (MULTI-VITAMINS PO)     Omega-3 Fatty Acids (OMEGA 3 PO)     No current facility-administered medications for this visit.          Allergies:     Allergies   Allergen Reactions     Rabies Vaccine Unknown     Scratch test resulted, it was not a good idea to give it to her.     Shellfish-Derived Products Nausea and Vomiting     Sulfa Drugs Rash         Family History:  Family History   Problem Relation Age of Onset     Hypertension Father      Myocardial Infarction Maternal Grandfather      Melanoma No family hx of        Review of Systems   A 10-point review of systems was performed. Pertinent findings are noted in the HPI.    Physical Exam   ECOG Status: 1    Vitals:  LMP 01/29/2016     Gen: Alert, in NAD  Neck: Full ROM, supple, no palpable  adenopathy  Pulm: No wheezing, stridor or respiratory distress  CV: Extremities are warm and well-perfused, no cyanosis, no pedal edema  Abdominal: Normal bowel sounds, soft, nontender, no masses  Musculoskeletal: Normal bulk and tone  Skin: Normal color and turgor  Neuro: A/Ox3, CN II-XII intact, normal gait  JOSUÉ: Deferred today, will perform on day of CT simulation.     Imaging/Path/Labs   Imaging:   3/19/20  PET  FINDINGS:      HEAD/NECK:  There is no  suspicious FDG uptake in the neck.      The paranasal sinuses are clear. The mastoid air cells clear.      The mucosal pharyngeal space, the , prevertebral and carotid  spaces are within normal limits.      No masses, mass effect or pathologically enlarged lymph nodes are  evident. The thyroid gland unremarkable.     CHEST:  There is no suspicious FDG uptake in the chest.      There are no pathologically enlarged mediastinal, hilar or axillary  lymph nodes.  There are no suspicious lung nodules or evidence for infection.      There is no significant pericardial or plural effusions.     ABDOMEN AND PELVIS:  Focal area of abnormal FDG avidity involving the level of the anus  with max SUV of about 12.6. There is collapse of the anus at this  location and a localized mass is difficult to visualize. No other  areas of abnormal FDG avidity within the abdomen or pelvis. No  evidence for FDG avid lymphadenopathy/metastatic disease.     No bowel dilatation or obstruction. Appendix unremarkable. Kidneys  demonstrate normal shape, size and position. No urinary collecting  system dilatation. Adrenal glands negative. Normal-sized spleen. Fatty  infiltration adjacent to the falciform ligament of the liver.  Low-attenuation lesion in the anterior aspect medial segment left  hepatic lobe measuring 0.7 cm. Mild geographic fatty infiltration of  the liver. Normal caliber abdominal aorta.     LOWER EXTREMITIES:   No abnormal masses or hypermetabolic lesions.     BONES:  "  There are no suspicious lytic or blastic osseous lesions.  There is no  abnormal FDG uptake in the skeleton. Postoperative changes involving  both first metatarsals.                                                                         IMPRESSION:   1. Focal area of abnormal FDG avidity involving the level of the anus  with max SUV 12.6. This corresponds to the patient's recent biopsy  squamous cell carcinoma of the anus.  2. No other areas of abnormal FDG avidity. Specifically no definitive  evidence for regional or nonregional FDG avid lymphadenopathy.  3. Diffuse fatty infiltration of the liver. Low-attenuation 0.7 cm  lesion involving the anterior aspect of the medial segment left  hepatic lobe without evidence for focal abnormal FDG avidity in this  area. This likely represents a benign low-attenuation lesion such as a  cyst in the background of diffuse fatty infiltration.  4. No evidence for abnormal FDG avidity involving the neck, chest or  musculoskeletal structures.      3/19/20  MRI  FINDINGS: Approximately 2.7 cm above the anal verge, there is an area  of abnormal enhancement in the anterior rectum that measures  approximately 1.2 x 0.9 cm (series 10, image 33). No involvement of  the sphincters is demonstrated. No definite extension beyond the  muscularis propria. No invasion of the vagina. No surrounding  suspicious lymph nodes.                                                                      IMPRESSION: Small area of abnormal enhancement measures up to 1.2 cm  in the mid anus. No evidence of spread beyond the anus or into  regional lymph nodes.         Path:  3/3/20  SPECIMEN(S):   Colon biopsy, mass at dentate line     FINAL DIAGNOSIS:   \"Colon\", at dentate line, mass, biopsy:   - Squamous cell carcinoma, focally keratinizing, cannot rule out invasion   - see description     3/13/20   Pap imaged thin layer prep screening (Surepath, FocalPoint with guided   screening)   SPECIMEN ADEQUACY: "   Satisfactory for evaluation.   -Transformation zone component absent.     CYTOLOGIC INTERPRETATION:     Negative for intraepithelial lesion or malignancy    Assessment    Ms. 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  She presents today to discuss potential role of radiation therapy as a part of her treatment strategy.  The patient initially presented with symptoms of hematochezia for several months eventually prompting further evaluation.    Plan     1. Today we discussed the natural history of squamous cell carcinoma of the anal canal.      2. Today we discussed the treatment treatment options with the patient including surgical resection with APR vs. definitive chemo-radiation.  Definitive chemo-radiation is the standard of care per NCCN guidelines for anal carcinoma.  We are in agreement with pursuing concurrent chemoradiation to preserve sphincter function.      3. We discussed with the patient that treatment would consist of concurrent chemoradiation using 5-FU based therapy and mitomycin-C. Radiation treatment would target the primary tumor and draining lymphatic regions including the groin and pelvic lymph nodes. Radiation treatment would consist of 28  to a toal dose of 50.4 Gy.     4. We plan to treat according to RTOG 0529 which used IMRT concurrently with 5-FU based therapy and mitomycin- where they found that IMRT significantly reduced grade 2 hematologic and grade 3 dermatologic and GI toxicity.     5. We discussed that the risks from radiation therapy include, but are not limited to skin desquamation, fatigue, loose stools, abdominal cramping, painful urination, urinary frequency, decreased blood counts, bowel damage, bladder damage, rectal damage, rectal bleeding, bone necrosis, early menopause, vaginal stenosis, and secondary malignancies were explained to the patient. The patient has acknowledged.      6. Consent obtained. CT  simulation will be scheduled for this Thursday.  We will coordinate start of RT with chemotherapy.    7. We will also refer the patient to our colo-rectal colleagues as she will benefit from evaluation and continued oncologic surveillance.     Tyree Remy MD  Department of Radiation Oncology  [facility]

## 2020-03-24 NOTE — TELEPHONE ENCOUNTER
RECORDS RECEIVED FROM:    DATE RECEIVED: 3.31.2020   NOTES STATUS DETAILS   OFFICE NOTE from referring provider  Internal 3.24.2020 Dr. Remy, [facility]   OFFICE NOTE from other specialist   Internal 3.11.2020 Dr. Real, [facility]  2.5.2020 DENI Ortega   DISCHARGE SUMMARY from hospital  Internal Colonoscopy 3.3.2020    DISCHARGE REPORT from the ER N/A    OPERATIVE REPORT  N/A    MEDICATION LIST Internal    LABS     PFC TESTING N/A    ANAL PAP N/A    BIOPSIES/PATHOLOGY RELATED TO DIAGNOSIS Internal 3.3.2020   DIAGNOSTIC PROCEDURES     COLONOSCOPY Internal 3.3.2020  11.13.18   UPPER ENDOSCOPY (EGD) N/A    FLEX SIGMOIDOSCOPY  N/A    ERCP N/A    IMAGING (DISC & REPORT)      CT  N/A    MRI Internal 3.19.2020 pelvis   XRAY N/A    ULTRASOUND (ENDOANAL/ENDORECTAL) N/A

## 2020-03-24 NOTE — TELEPHONE ENCOUNTER
Called patient to schedule New Cancer Consult with Dr. Banuelos. Patient schedule don 3/31 with Dr. Banuelos at 9:00 am. Appointment details verified and Covid-19 restrictions verified.

## 2020-03-24 NOTE — NURSING NOTE
"REASON FOR APPOINTMENT   Type of Cancer: anal cancer  Date of Symptom Onset: bleeding with BM's since November 2019    TREATMENT TO-DATE FOR THIS CANCER  Surgery ? Needs to see surgeon   Chemotherapy ? Will need port, had initial visit with Dr. Real - needs follow up for chemo teaching, port discussion.  Other Treatments for this Cancer ? Discussion today for radiation    PERSONAL HISTORY OF CANCER   Previous Cancer ? no   Prior Radiation ? no   Prior Chemotherapy ? no   Prior Hormonal Therapy ? no     RECENT IMAGING STUDIES  PET and MRI    REFERRALS NEEDED  Surgeon consult    VITALS  /81   Pulse 75   Resp 18   Wt 78.3 kg (172 lb 9.6 oz)   LMP 01/29/2016   SpO2 95%   BMI 29.17 kg/m      PACEMAKER/IMPLANTED CARDIAC DEVICE NO    PAIN  Denies    PSYCHOSOCIAL  Marital Status:   Patient lives in [place] with -Stu  Number of children: 3 adult children. 3 adult step children  Working status:  - currently unemployed due to COVID-19/schools shut down  Do you feel safe in your home? Yes    REVIEW OF SYSTEMS  Skin: negative  Eyes: glasses  Ears/Nose/Throat: negative  Respiratory: No shortness of breath, dyspnea on exertion, cough, or hemoptysis  Cardiovascular: negative  Gastrointestinal: hematochezia  Genitourinary: negative  Musculoskeletal: negative (pt states \"I have bad hips from doing gymnastics')  Neurologic: negative  Psychiatric: negative  Hematologic/Lymphatic/Immunologic: negative  Endocrine: negative    WOMEN ONLY  Any chance you may be pregnant: No, tubal ligation, RSO. 11/2016      Radiation Oncology Patient Teaching    Current Concern: \"how will I feel, what stage am I, what about treating during COVID?\"    Person involved with teaching: Patient and spouse, Stu via speaker phone  Patient asked Questions: Yes  Patient was cooperative: Yes  Patient was receptive (willing to accept information given): Yes    Education Assessment  Comprehension ability: " Medium  Knowledge level: Medium  Factors affecting teaching: stress of brand new diagnosis, unknowns of treatment.    Education Materials Given  Radiation Therapy and You  Radiation to the Pelvis  Caring for Your Skin During Radiation ...    Educational Topics Discussed  Side effects, Medications, Pain management, Wound care, Activity, Nutrition, Adjustment to illness and When to call MD/RN    Response To Teaching  More review necessary    GYN Only  Vaginal Dilator-given and educated: not given    Do you have an advanced directive or living will? no  Are you DNR/DNI? no

## 2020-03-24 NOTE — LETTER
3/24/2020      RE: Susu Lagos  [address]  [facility] [address]       Consultation Note    Name: Susu Lagos MRN: [id number]   : 1965   Date of Service: 3/24/2020  Referring: Dr. Real     Reason for consultation: Squamous cell carcinoma of the anal canal, clinical T1N0.  Evaluate role for radiation therapy.    History of Present Illness   Ms. 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  She presents today to discuss potential role of radiation therapy as a part of her treatment strategy.  The patient initially presented with symptoms of hematochezia for several months eventually prompting further evaluation.    On 3/3/2020 the patient underwent colonoscopy which demonstrated a mass located at the dentate line in the left anterior position.  The mass was less than one third circumferentially involved.  Biopsy of the mass obtained demonstrated squamous cell carcinoma.  The patient was evaluated by gynecology on 3/13/2020, Pap smear was negative.  On 3/19/2020 the patient underwent a PET scan.  Imaging demonstrated a focal hypermetabolic activity in the anal canal, max SUV 12.6.  No regional or distant disease was noted.  On 3/19/2020 the patient underwent MRI which demonstrated a 1.2 x 0.9 cm mass located 2.7 cm above the anal verge.  No enlarged regional nodes were noted.  The patient was seen by Dr. Real of medical oncology who discussed treatment with chemoradiation therapy.  Patient presents to our clinic today to discuss potential role of radiation therapy as a part of treatment strategy.    Patient is overall doing fairly well.  She denies any significant pain.  Denies any current significant hematochezia.  No pelvic pressure.  No hematuria or vaginal bleeding.  She did have prior colonoscopy 2018 which was negative.  No prior radiation.  No history inflammatory bowel disease. No pacemaker.      Past Medical  History:   Past Medical History:   Diagnosis Date     Ovarian cyst 6/4/2015    Right, 6 cm dermoid on MRI-recommend removal.  7/29/2015 Surgery for right salpingoophorectomy.         Past Surgical History:   Past Surgical History:   Procedure Laterality Date     CHOLECYSTECTOMY  2013    LSC     COLONOSCOPY N/A 11/13/2018    Procedure: colonoscopy;  Surgeon: Sesar Dos Santos MD;  Location: [facility]     COLONOSCOPY N/A 3/3/2020    Procedure: COLONOSCOPY, WITH POLYPECTOMY AND BIOPSY;  Surgeon: Sesar Dos Santos MD;  Location: [facility]     DILATION AND CURETTAGE, OPERATIVE HYSTEROSCOPY, COMBINED N/A 2/29/2016    Procedure: COMBINED DILATION AND CURETTAGE, OPERATIVE HYSTEROSCOPY;  Surgeon: Panda Knight MD;  Location: [facility]     GYN SURGERY  1995    tubes tied     LAPAROSCOPY DIAGNOSTIC (GYN) Right 7/29/2015    Procedure: LAPAROSCOPY DIAGNOSTIC (GYN);  Surgeon: Lian Betancur DO;  Location: [facility]       Chemotherapy History:  None    Radiation History:  None    Pregnant: No  Implanted Cardiac Devices: No    Medications:  Current Outpatient Medications   Medication     Multiple Vitamin (MULTI-VITAMINS PO)     Omega-3 Fatty Acids (OMEGA 3 PO)     No current facility-administered medications for this visit.          Allergies:     Allergies   Allergen Reactions     Rabies Vaccine Unknown     Scratch test resulted, it was not a good idea to give it to her.     Shellfish-Derived Products Nausea and Vomiting     Sulfa Drugs Rash         Family History:  Family History   Problem Relation Age of Onset     Hypertension Father      Myocardial Infarction Maternal Grandfather      Melanoma No family hx of        Review of Systems   A 10-point review of systems was performed. Pertinent findings are noted in the HPI.    Physical Exam   ECOG Status: 1    Vitals:  LMP 01/29/2016     Gen: Alert, in NAD  Neck: Full ROM, supple, no palpable adenopathy  Pulm: No wheezing, stridor or respiratory distress  CV: Extremities are warm  and well-perfused, no cyanosis, no pedal edema  Abdominal: Normal bowel sounds, soft, nontender, no masses  Musculoskeletal: Normal bulk and tone  Skin: Normal color and turgor  Neuro: A/Ox3, CN II-XII intact, normal gait  JOSUÉ: Deferred today, will perform on day of CT simulation.     Imaging/Path/Labs   Imaging:   3/19/20  PET  FINDINGS:      HEAD/NECK:  There is no  suspicious FDG uptake in the neck.      The paranasal sinuses are clear. The mastoid air cells clear.      The mucosal pharyngeal space, the , prevertebral and carotid  spaces are within normal limits.      No masses, mass effect or pathologically enlarged lymph nodes are  evident. The thyroid gland unremarkable.     CHEST:  There is no suspicious FDG uptake in the chest.      There are no pathologically enlarged mediastinal, hilar or axillary  lymph nodes.  There are no suspicious lung nodules or evidence for infection.      There is no significant pericardial or plural effusions.     ABDOMEN AND PELVIS:  Focal area of abnormal FDG avidity involving the level of the anus  with max SUV of about 12.6. There is collapse of the anus at this  location and a localized mass is difficult to visualize. No other  areas of abnormal FDG avidity within the abdomen or pelvis. No  evidence for FDG avid lymphadenopathy/metastatic disease.     No bowel dilatation or obstruction. Appendix unremarkable. Kidneys  demonstrate normal shape, size and position. No urinary collecting  system dilatation. Adrenal glands negative. Normal-sized spleen. Fatty  infiltration adjacent to the falciform ligament of the liver.  Low-attenuation lesion in the anterior aspect medial segment left  hepatic lobe measuring 0.7 cm. Mild geographic fatty infiltration of  the liver. Normal caliber abdominal aorta.     LOWER EXTREMITIES:   No abnormal masses or hypermetabolic lesions.     BONES:   There are no suspicious lytic or blastic osseous lesions.  There is no  abnormal FDG uptake  "in the skeleton. Postoperative changes involving  both first metatarsals.                                                                         IMPRESSION:   1. Focal area of abnormal FDG avidity involving the level of the anus  with max SUV 12.6. This corresponds to the patient's recent biopsy  squamous cell carcinoma of the anus.  2. No other areas of abnormal FDG avidity. Specifically no definitive  evidence for regional or nonregional FDG avid lymphadenopathy.  3. Diffuse fatty infiltration of the liver. Low-attenuation 0.7 cm  lesion involving the anterior aspect of the medial segment left  hepatic lobe without evidence for focal abnormal FDG avidity in this  area. This likely represents a benign low-attenuation lesion such as a  cyst in the background of diffuse fatty infiltration.  4. No evidence for abnormal FDG avidity involving the neck, chest or  musculoskeletal structures.      3/19/20  MRI  FINDINGS: Approximately 2.7 cm above the anal verge, there is an area  of abnormal enhancement in the anterior rectum that measures  approximately 1.2 x 0.9 cm (series 10, image 33). No involvement of  the sphincters is demonstrated. No definite extension beyond the  muscularis propria. No invasion of the vagina. No surrounding  suspicious lymph nodes.                                                                      IMPRESSION: Small area of abnormal enhancement measures up to 1.2 cm  in the mid anus. No evidence of spread beyond the anus or into  regional lymph nodes.         Path:  3/3/20  SPECIMEN(S):   Colon biopsy, mass at dentate line     FINAL DIAGNOSIS:   \"Colon\", at dentate line, mass, biopsy:   - Squamous cell carcinoma, focally keratinizing, cannot rule out invasion   - see description     3/13/20   Pap imaged thin layer prep screening (Surepath, FocalPoint with guided   screening)   SPECIMEN ADEQUACY:   Satisfactory for evaluation.   -Transformation zone component absent.     CYTOLOGIC " INTERPRETATION:     Negative for intraepithelial lesion or malignancy    Assessment    Ms. 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  She presents today to discuss potential role of radiation therapy as a part of her treatment strategy.  The patient initially presented with symptoms of hematochezia for several months eventually prompting further evaluation.    Plan     1. Today we discussed the natural history of squamous cell carcinoma of the anal canal.      2. Today we discussed the treatment treatment options with the patient including surgical resection with APR vs. definitive chemo-radiation.  Definitive chemo-radiation is the standard of care per NCCN guidelines for anal carcinoma.  We are in agreement with pursuing concurrent chemoradiation to preserve sphincter function.      3. We discussed with the patient that treatment would consist of concurrent chemoradiation using 5-FU based therapy and mitomycin-C. Radiation treatment would target the primary tumor and draining lymphatic regions including the groin and pelvic lymph nodes. Radiation treatment would consist of 28  to a toal dose of 50.4 Gy.     4. We plan to treat according to RTOG 0529 which used IMRT concurrently with 5-FU based therapy and mitomycin- where they found that IMRT significantly reduced grade 2 hematologic and grade 3 dermatologic and GI toxicity.     5. We discussed that the risks from radiation therapy include, but are not limited to skin desquamation, fatigue, loose stools, abdominal cramping, painful urination, urinary frequency, decreased blood counts, bowel damage, bladder damage, rectal damage, rectal bleeding, bone necrosis, early menopause, vaginal stenosis, and secondary malignancies were explained to the patient. The patient has acknowledged.      6. Consent obtained. CT simulation will be scheduled for this Thursday.  We will coordinate start of RT with  chemotherapy.    7. We will also refer the patient to our colo-rectal colleagues as she will benefit from evaluation and continued oncologic surveillance.     Tyree Remy MD  Department of Radiation Oncology  [facility]

## 2020-03-25 ENCOUNTER — TELEPHONE (OUTPATIENT)
Dept: SURGERY | Facility: CLINIC | Age: 55
End: 2020-03-25

## 2020-03-25 ENCOUNTER — VIRTUAL VISIT (OUTPATIENT)
Dept: ONCOLOGY | Facility: CLINIC | Age: 55
End: 2020-03-25
Attending: INTERNAL MEDICINE
Payer: COMMERCIAL

## 2020-03-25 VITALS — BODY MASS INDEX: 29.47 KG/M2 | WEIGHT: 172.6 LBS | HEIGHT: 64 IN

## 2020-03-25 DIAGNOSIS — C21.0 ANAL SQUAMOUS CELL CARCINOMA (H): Primary | ICD-10-CM

## 2020-03-25 PROCEDURE — 99214 OFFICE O/P EST MOD 30 MIN: CPT | Performed by: INTERNAL MEDICINE

## 2020-03-25 ASSESSMENT — MIFFLIN-ST. JEOR: SCORE: 1385.66

## 2020-03-25 NOTE — PATIENT INSTRUCTIONS
Arrange for a Port-A-Cath.  5-FU and mitomycin regimen concurrently with radiation therapy.  Follow-up 1 week after starting treatment

## 2020-03-25 NOTE — PROGRESS NOTES
"Susu Lagos is a 54 year old female who is being evaluated via a billable telephone visit.      The patient has been notified of following:     \"This telephone visit will be conducted via a call between you and your physician/provider. We have found that certain health care needs can be provided without the need for a physical exam.  This service lets us provide the care you need with a short phone conversation.  If a prescription is necessary we can send it directly to your pharmacy.  If lab work is needed we can place an order for that and you can then stop by our lab to have the test done at a later time.    If during the course of the call the physician/provider feels a telephone visit is not appropriate, you will not be charged for this service.\"       Susu Lagos complains of    Chief Complaint   Patient presents with     Oncology Clinic Visit     Anal squamous cell carcinoma       I have reviewed and updated the patient's Past Medical History, Social History, Family History and Medication List.    ALLERGIES  Rabies vaccine; Shellfish-derived products; and Sulfa drugs    Additional provider notes:  I reviewed with the patient today the management plan in details.  I will recommend to proceed with chemoradiation therapy patient will be receiving mitomycin and infusional 5-FU.  I reviewed with the patient the potential side effects in great details.  Reviewed with her the schedule as well.  I gave an overview about the natural history of anal cancer management, follow-up and prognosis.  She will be starting her chemotherapy on the first day of radiation therapy.    Assessment/Plan:  Anal squamous cell carcinoma  We will recommend to proceed with chemoradiation therapy with 5-FU and mitomycin.    I have reviewed the note as documented above.  This accurately captures the substance of my conversation with the patient.        Phone call contact time  Call Started at 8:15 AM  Call Ended at 8:40 " "AM    Sophia Real MD     Oncology Rooming Note    March 25, 2020 8:12 AM   Susu Lagos is a 54 year old female who presents for:    Chief Complaint   Patient presents with     Oncology Clinic Visit     Anal squamous cell carcinoma     Initial Vitals: Ht 1.638 m (5' 4.49\")   Wt 78.3 kg (172 lb 9.6 oz)   LMP 01/29/2016   BMI 29.18 kg/m   Estimated body mass index is 29.18 kg/m  as calculated from the following:    Height as of this encounter: 1.638 m (5' 4.49\").    Weight as of this encounter: 78.3 kg (172 lb 9.6 oz). Body surface area is 1.89 meters squared.  No Pain (0) Comment: Data Unavailable   Patient's last menstrual period was 01/29/2016.  Allergies reviewed: Yes  Medications reviewed: Yes    Medications: Medication refills not needed today.  Pharmacy name entered into [facility]:    [facility] [place], [place] - [address] [place] [address]      Clinical concerns: Anal squamous cell carcinoma      Elizabeth Boland CMA              "

## 2020-03-25 NOTE — TELEPHONE ENCOUNTER
Reason for Call:  Other     Detailed comments: Pt has been referred for a port consult (earliest pt can start chemo and radiation is 04/13) - Please contact pt     Phone Number Patient can be reached at: Home number on file [phone] (home)    Best Time: Any    Can we leave a detailed message on this number? YES    Call taken on 3/25/2020 at 9:21 AM by Denise Behrendt

## 2020-03-26 ENCOUNTER — TELEPHONE (OUTPATIENT)
Dept: SURGERY | Facility: CLINIC | Age: 55
End: 2020-03-26

## 2020-03-26 ENCOUNTER — PATIENT OUTREACH (OUTPATIENT)
Dept: ONCOLOGY | Facility: CLINIC | Age: 55
End: 2020-03-26

## 2020-03-26 ENCOUNTER — OFFICE VISIT (OUTPATIENT)
Dept: RADIATION THERAPY | Facility: OUTPATIENT CENTER | Age: 55
End: 2020-03-26
Payer: COMMERCIAL

## 2020-03-26 ENCOUNTER — HOSPITAL ENCOUNTER (OUTPATIENT)
Facility: CLINIC | Age: 55
End: 2020-03-26
Attending: SURGERY | Admitting: SURGERY
Payer: COMMERCIAL

## 2020-03-26 DIAGNOSIS — C21.0 ANAL SQUAMOUS CELL CARCINOMA (H): Primary | ICD-10-CM

## 2020-03-26 DIAGNOSIS — C21.0 ANAL CANCER (H): ICD-10-CM

## 2020-03-26 DIAGNOSIS — C21.0 ANAL CANCER (H): Primary | ICD-10-CM

## 2020-03-26 RX ORDER — PROCHLORPERAZINE MALEATE 10 MG
10 TABLET ORAL EVERY 6 HOURS PRN
Qty: 30 TABLET | Refills: 1 | Status: SHIPPED | OUTPATIENT
Start: 2020-03-26 | End: 2020-06-10

## 2020-03-26 NOTE — TELEPHONE ENCOUNTER
Talked with Susu and set up her surgery for March 31st @ 12:15, santosh instructions were sent to her email.  Marie Locke MA

## 2020-03-26 NOTE — TELEPHONE ENCOUNTER
Talked with Susu and scheduled her appointment  with Dr. Burnett @ 1:00 on March 31st.  Marie Locke MA

## 2020-03-26 NOTE — LETTER
3/26/2020      RE: Susu Lagos   [address]  [facility] [address]       Patient underwent CT simulation.     Tyree Remy M.D.  Department of Radiation Oncology  [facility]       Tyree Remy MD

## 2020-03-27 ENCOUNTER — ANESTHESIA EVENT (OUTPATIENT)
Dept: SURGERY | Facility: CLINIC | Age: 55
End: 2020-03-27

## 2020-03-27 RX ORDER — ACETAMINOPHEN 325 MG/1
975 TABLET ORAL ONCE
Status: CANCELLED | OUTPATIENT
Start: 2020-03-27 | End: 2020-03-27

## 2020-03-27 RX ORDER — LIDOCAINE 40 MG/G
CREAM TOPICAL
Status: CANCELLED | OUTPATIENT
Start: 2020-03-27

## 2020-03-27 RX ORDER — SODIUM CHLORIDE, SODIUM LACTATE, POTASSIUM CHLORIDE, CALCIUM CHLORIDE 600; 310; 30; 20 MG/100ML; MG/100ML; MG/100ML; MG/100ML
INJECTION, SOLUTION INTRAVENOUS CONTINUOUS
Status: CANCELLED | OUTPATIENT
Start: 2020-03-27

## 2020-03-27 RX ORDER — GABAPENTIN 300 MG/1
300 CAPSULE ORAL ONCE
Status: CANCELLED | OUTPATIENT
Start: 2020-03-27 | End: 2020-03-27

## 2020-03-27 NOTE — ANESTHESIA PREPROCEDURE EVALUATION
Anesthesia Pre-Procedure Evaluation    Patient: Susu Lagos   MRN: [id number] : 1965          Preoperative Diagnosis: Anal cancer (H) [C21.0]    Procedure(s):  Port-a-cath placement    Past Medical History:   Diagnosis Date     Ovarian cyst 2015    Right, 6 cm dermoid on MRI-recommend removal.  2015 Surgery for right salpingoophorectomy.       Past Surgical History:   Procedure Laterality Date     CHOLECYSTECTOMY      LSC     COLONOSCOPY N/A 2018    Procedure: colonoscopy;  Surgeon: Sesar Dos Santos MD;  Location: [facility]     COLONOSCOPY N/A 3/3/2020    Procedure: COLONOSCOPY, WITH POLYPECTOMY AND BIOPSY;  Surgeon: Sesar Dos Santos MD;  Location: [facility]     DILATION AND CURETTAGE, OPERATIVE HYSTEROSCOPY, COMBINED N/A 2016    Procedure: COMBINED DILATION AND CURETTAGE, OPERATIVE HYSTEROSCOPY;  Surgeon: Panda Knight MD;  Location: [facility]     GYN SURGERY      tubes tied     LAPAROSCOPY DIAGNOSTIC (GYN) Right 2015    Procedure: LAPAROSCOPY DIAGNOSTIC (GYN);  Surgeon: Lian Betancur DO;  Location: [facility]       Anesthesia Evaluation     . Pt has had prior anesthetic. Type: General and MAC           ROS/MED HX    ENT/Pulmonary:       Neurologic:       Cardiovascular:     (+) Dyslipidemia, ----. : . . . :. .       METS/Exercise Tolerance:     Hematologic:         Musculoskeletal:         GI/Hepatic:         Renal/Genitourinary:         Endo:         Psychiatric:         Infectious Disease:         Malignancy:   (+) Malignancy History of Other  Other CA Anal and Cervical cancers Active status post         Other:                                 Lab Results   Component Value Date    WBC 5.6 2020    HGB 15.4 2020    HCT 46.1 2020     2020     2020    POTASSIUM 4.2 2020    CHLORIDE 107 2020    CO2 31 2020    BUN 18 2020    CR 0.72 2020    GLC 94 2020    BARBIE 9.6 2020    ALBUMIN 4.1  "03/11/2020    PROTTOTAL 7.8 03/11/2020    ALT 70 (H) 03/11/2020    AST 36 03/11/2020    ALKPHOS 43 03/11/2020    BILITOTAL 0.3 03/11/2020    TSH 1.51 05/07/2015    HCG Negative 02/29/2016       Preop Vitals  BP Readings from Last 3 Encounters:   03/24/20 119/81   03/13/20 113/76   03/11/20 115/80    Pulse Readings from Last 3 Encounters:   03/24/20 75   03/13/20 90   03/11/20 74      Resp Readings from Last 3 Encounters:   03/24/20 18   03/11/20 18   03/03/20 (P) 16    SpO2 Readings from Last 3 Encounters:   03/24/20 95%   03/13/20 98%   03/11/20 96%      Temp Readings from Last 1 Encounters:   03/13/20 36.5  C (97.7  F) (Oral)    Ht Readings from Last 1 Encounters:   03/25/20 1.638 m (5' 4.49\")      Wt Readings from Last 1 Encounters:   03/25/20 78.3 kg (172 lb 9.6 oz)    Estimated body mass index is 29.18 kg/m  as calculated from the following:    Height as of 3/25/20: 1.638 m (5' 4.49\").    Weight as of 3/25/20: 78.3 kg (172 lb 9.6 oz).                   Simran Mcmahon APRN CRNA  "

## 2020-03-30 NOTE — PROGRESS NOTES
"Colon and Rectal Surgery Clinic Note      RE: Susu Lagos  : 1965  MALGORZATA: 3/31/2020    Susu is a 54 year old female who presents today for new anal cancer.  She initially presented with rectal bleeding approximately 2 months ago.  She has a possible past history of an abnormal cervical Pap smear 10 years ago but has had no further problems related to this.  She has never smoked cigarettes.  She is joined by telephone with her  Stu.    HPI:  Colonoscopy 3/3/2020 by Dr. Sesar Dos Santos for hematochezia with \"Palpable rectal mass found on digital rectal exam\".        Pathology: Squamous cell carcinoma, focally keratinizing, cannot rule out invasion     She saw Dr. Real who recommended staging imaging and possible chemoradiation    PET 3/19/20:  1. Focal area of abnormal FDG avidity involving the level of the anus  with max SUV 12.6. This corresponds to the patient's recent biopsy  squamous cell carcinoma of the anus.  2. No other areas of abnormal FDG avidity. Specifically no definitive  evidence for regional or nonregional FDG avid lymphadenopathy.  3. Diffuse fatty infiltration of the liver. Low-attenuation 0.7 cm  lesion involving the anterior aspect of the medial segment left  hepatic lobe without evidence for focal abnormal FDG avidity in this  area. This likely represents a benign low-attenuation lesion such as a  cyst in the background of diffuse fatty infiltration.  4. No evidence for abnormal FDG avidity involving the neck, chest or  musculoskeletal structures.    MR Pelvis 3/19/20:  FINDINGS: Approximately 2.7 cm above the anal verge, there is an area  of abnormal enhancement in the anterior rectum that measures  approximately 1.2 x 0.9 cm (series 10, image 33). No involvement of  the sphincters is demonstrated. No definite extension beyond the  muscularis propria. No invasion of the vagina. No surrounding  suspicious lymph nodes.                                                          " "   IMPRESSION: Small area of abnormal enhancement measures up to 1.2 cm  in the mid anus. No evidence of spread beyond the anus or into  regional lymph nodes.      She saw  Gynecology with negative Pap smear    She met with Dr. Remy on 3/24/20 and planned for chemoradiation    Physical examination:  Examination was chaperoned by Juana White, EMT.     Vitals: /72 (BP Location: Left arm, Patient Position: Sitting, Cuff Size: Adult Regular)   Pulse 66   Temp 97.9  F (36.6  C) (Oral)   Ht 5' 4.5\"   Wt 172 lb 9.6 oz   LMP 01/29/2016   SpO2 99%   BMI 29.17 kg/m    BMI= Body mass index is 29.17 kg/m .    Groin examination shows no evidence of inguinal adenopathy.  Perianal examination shows modest skin tags but no other lesions.  Digital rectal examination shows a soft roughly 1.5-2 cm diameter lesion minimally to the left of midline anteriorly.  The lesion starts about 3 cm from the anal margin.  Anoscopy confirms the presence of this lesion almost due anterior.  It is friable and bleeds easily to the touch with a Q-tip.    Laboratory data:    Recent Labs   Lab Test 03/11/20  1101   WBC 5.6   HGB 15.4      CR 0.72   ALBUMIN 4.1   BILITOTAL 0.3   ALKPHOS 43   ALT 70*   AST 36       Assessment/plan: Otherwise healthy 54-year-old woman with a recent diagnosis of squamous cell carcinoma of the anal canal.  This is quite a small lesion and there is no evidence of regional or distant metastasis.  The patient has already seen Dr. Daniel and Dr. Remy and chemoradiation is planned.  I concur with this recommendation.  I reviewed that for anal canal cancers that this is considered to be standard therapy.  As this is a small lesion I did discuss the potential alternative of local excision, but I do not feel this would be optimal therapy and advised that in my opinion the great majority of authorities would feel the same way.  I advised follow-up with me for reexamination 2 months following completion of her " chemoradiation, and I told Susu and that she would need close periodic surveillance following that time.    I answered all of Susu and Damaris's questions to their stated satisfaction.  They expressed her understanding and agreement.    Total time 30 minutes.  Greater than half the time was spent counseling.          For details of past medical history, surgical history, family history, medications, allergies, and review of systems, please see details below.    Medical history:  Past Medical History:   Diagnosis Date     Ovarian cyst 6/4/2015    Right, 6 cm dermoid on MRI-recommend removal.  7/29/2015 Surgery for right salpingoophorectomy.         Surgical history:  Past Surgical History:   Procedure Laterality Date     CHOLECYSTECTOMY  2013    LSC     COLONOSCOPY N/A 11/13/2018    Procedure: colonoscopy;  Surgeon: Sesar Dos Santos MD;  [facility]     COLONOSCOPY N/A 3/3/2020    Procedure: COLONOSCOPY, WITH POLYPECTOMY AND BIOPSY;  Surgeon: Sesar Dos Santos MD;  [facility]     DILATION AND CURETTAGE, OPERATIVE HYSTEROSCOPY, COMBINED N/A 2/29/2016    Procedure: COMBINED DILATION AND CURETTAGE, OPERATIVE HYSTEROSCOPY;  Surgeon: Panda Knight MD;  [facility]     GYN SURGERY  1995    tubes tied     LAPAROSCOPY DIAGNOSTIC (GYN) Right 7/29/2015    Procedure: LAPAROSCOPY DIAGNOSTIC (GYN);  Surgeon: Lian Betancur DO;  [facility]       Family history:  Family History   Problem Relation Age of Onset     Hypertension Father      Myocardial Infarction Maternal Grandfather      Melanoma No family hx of        Medications:  Current Outpatient Medications   Medication Sig Dispense Refill     Magnesium Oxide 250 MG TABS Take 2 tablets by mouth 2 times daily       Multiple Vitamin (MULTI-VITAMINS PO)        Omega-3 Fatty Acids (OMEGA 3 PO)        prochlorperazine (COMPAZINE) 10 MG tablet Take 1 tablet (10 mg) by mouth every 6 hours as needed (Nausea/Vomiting) (Patient not taking: Reported  "on 3/31/2020) 30 tablet 1       Allergies:  The patientis allergic to rabies vaccine; shellfish-derived products; and sulfa drugs.    Social history:  Social History     Tobacco Use     Smoking status: Never Smoker     Smokeless tobacco: Never Used   Substance Use Topics     Alcohol use: Yes     Alcohol/week: 0.0 standard drinks     Comment: 1-2 per month      Marital status: .    Review of Systems:  Nursing Notes:   Juana White EMT  3/31/2020  9:13 AM  Signed  Chief Complaint   Patient presents with     Consult     Anal cancer.       Vitals:    03/31/20 0833   BP: 110/72   BP Location: Left arm   Patient Position: Sitting   Cuff Size: Adult Regular   Pulse: 66   Temp: 97.9  F (36.6  C)   TempSrc: Oral   SpO2: 99%   Weight: 172 lb 9.6 oz   Height: 5' 4.5\"       Body mass index is 29.17 kg/m .      Juana White, JULIA Banuelos MD   Professor and Chief  Division of Colon and Rectal Surgery  [facility]      Referring Provider:  Tyree Remy MD  [address] [place] [address]     Primary Care Provider:  Lakesha Aguirre    This note was created using speech recognition software and may contain unintended word substitutions.  "

## 2020-03-31 ENCOUNTER — PRE VISIT (OUTPATIENT)
Dept: SURGERY | Facility: CLINIC | Age: 55
End: 2020-03-31

## 2020-03-31 ENCOUNTER — OFFICE VISIT (OUTPATIENT)
Dept: SURGERY | Facility: CLINIC | Age: 55
End: 2020-03-31
Payer: COMMERCIAL

## 2020-03-31 ENCOUNTER — ANESTHESIA (OUTPATIENT)
Dept: SURGERY | Facility: CLINIC | Age: 55
End: 2020-03-31

## 2020-03-31 VITALS
DIASTOLIC BLOOD PRESSURE: 72 MMHG | HEIGHT: 65 IN | TEMPERATURE: 97.9 F | SYSTOLIC BLOOD PRESSURE: 110 MMHG | HEART RATE: 66 BPM | OXYGEN SATURATION: 99 % | BODY MASS INDEX: 28.76 KG/M2 | WEIGHT: 172.6 LBS

## 2020-03-31 DIAGNOSIS — C21.0 ANAL SQUAMOUS CELL CARCINOMA (H): Primary | ICD-10-CM

## 2020-03-31 ASSESSMENT — MIFFLIN-ST. JEOR: SCORE: 1385.85

## 2020-03-31 ASSESSMENT — PAIN SCALES - GENERAL: PAINLEVEL: NO PAIN (0)

## 2020-03-31 NOTE — NURSING NOTE
"Chief Complaint   Patient presents with     Consult     Anal cancer.       Vitals:    03/31/20 0833   BP: 110/72   BP Location: Left arm   Patient Position: Sitting   Cuff Size: Adult Regular   Pulse: 66   Temp: 97.9  F (36.6  C)   TempSrc: Oral   SpO2: 99%   Weight: 172 lb 9.6 oz   Height: 5' 4.5\"       Body mass index is 29.17 kg/m .      Juana White, EMT                      "

## 2020-03-31 NOTE — PATIENT INSTRUCTIONS
Follow up with Dr. Banuelos in clinic 2 months after completes chemo/radiation.      Please call with any questions or concerns regarding your clinic visit today.    It is a pleasure being involved in your health care.    Contacts post-consultation depending on your need:    Radiology Appointments [phone]    Schedule Clinic Appointments [phone] # 1   M-F 7:30 - 5 pm    [facility] [phone]    Clinic Fax Number [phone]    Surgery Scheduling [phone]    My Chart is available 24 hours a day and is a secure way to access your records and communicate with your care team.  I strongly recommend signing up if you haven't already done so, if you are comfortable with computers.  If you would like to inquire about this or are having problems with My Chart access, you may call [phone] or go online at [email or web address].  Please allow at least 24 hours for a response and extra time on weekends and Holidays.

## 2020-03-31 NOTE — LETTER
"3/31/2020       RE: Susu Lagos  [address]  [facility] [address]     Dear Colleague,    Thank you for referring your patient, Susu Lagos, to the [facility] COLON AND RECTAL SURGERY at [facility]. Please see a copy of my visit note below.    Colon and Rectal Surgery Clinic Note      RE: Susu Lagos  : 1965  MALGORZATA: 3/31/2020    Susu is a 54 year old female who presents today for new anal cancer.  She initially presented with rectal bleeding approximately 2 months ago.  She has a possible past history of an abnormal cervical Pap smear 10 years ago but has had no further problems related to this.  She has never smoked cigarettes.  She is joined by telephone with her  Stu.    HPI:  Colonoscopy 3/3/2020 by Dr. Sesar Dos Santos for hematochezia with \"Palpable rectal mass found on digital rectal exam\".        Pathology: Squamous cell carcinoma, focally keratinizing, cannot rule out invasion     She saw Dr. Real who recommended staging imaging and possible chemoradiation    PET 3/19/20:  1. Focal area of abnormal FDG avidity involving the level of the anus  with max SUV 12.6. This corresponds to the patient's recent biopsy  squamous cell carcinoma of the anus.  2. No other areas of abnormal FDG avidity. Specifically no definitive  evidence for regional or nonregional FDG avid lymphadenopathy.  3. Diffuse fatty infiltration of the liver. Low-attenuation 0.7 cm  lesion involving the anterior aspect of the medial segment left  hepatic lobe without evidence for focal abnormal FDG avidity in this  area. This likely represents a benign low-attenuation lesion such as a  cyst in the background of diffuse fatty infiltration.  4. No evidence for abnormal FDG avidity involving the neck, chest or  musculoskeletal structures.    MR Pelvis 3/19/20:  FINDINGS: Approximately 2.7 cm above the anal verge, there is an area  of abnormal enhancement in the anterior rectum " "that measures  approximately 1.2 x 0.9 cm (series 10, image 33). No involvement of  the sphincters is demonstrated. No definite extension beyond the  muscularis propria. No invasion of the vagina. No surrounding  suspicious lymph nodes.                                                             IMPRESSION: Small area of abnormal enhancement measures up to 1.2 cm  in the mid anus. No evidence of spread beyond the anus or into  regional lymph nodes.      She saw  Gynecology with negative Pap smear    She met with Dr. Remy on 3/24/20 and planned for chemoradiation    Physical examination:  Examination was chaperoned by Juana White, EMT.     Vitals: /72 (BP Location: Left arm, Patient Position: Sitting, Cuff Size: Adult Regular)   Pulse 66   Temp 97.9  F (36.6  C) (Oral)   Ht 5' 4.5\"   Wt 172 lb 9.6 oz   LMP 01/29/2016   SpO2 99%   BMI 29.17 kg/m    BMI= Body mass index is 29.17 kg/m .    Groin examination shows no evidence of inguinal adenopathy.  Perianal examination shows modest skin tags but no other lesions.  Digital rectal examination shows a soft roughly 1.5-2 cm diameter lesion minimally to the left of midline anteriorly.  The lesion starts about 3 cm from the anal margin.  Anoscopy confirms the presence of this lesion almost due anterior.  It is friable and bleeds easily to the touch with a Q-tip.    Laboratory data:    Recent Labs   Lab Test 03/11/20  1101   WBC 5.6   HGB 15.4      CR 0.72   ALBUMIN 4.1   BILITOTAL 0.3   ALKPHOS 43   ALT 70*   AST 36       Assessment/plan: Otherwise healthy 54-year-old woman with a recent diagnosis of squamous cell carcinoma of the anal canal.  This is quite a small lesion and there is no evidence of regional or distant metastasis.  The patient has already seen Dr. Daniel and Dr. Remy and chemoradiation is planned.  I concur with this recommendation.  I reviewed that for anal canal cancers that this is considered to be standard therapy.  As this is a " small lesion I did discuss the potential alternative of local excision, but I do not feel this would be optimal therapy and advised that in my opinion the great majority of authorities would feel the same way.  I advised follow-up with me for reexamination 2 months following completion of her chemoradiation, and I told Susu and that she would need close periodic surveillance following that time.    I answered all of Vernon's questions to their stated satisfaction.  They expressed her understanding and agreement.    Total time 30 minutes.  Greater than half the time was spent counseling.          For details of past medical history, surgical history, family history, medications, allergies, and review of systems, please see details below.    Medical history:  Past Medical History:   Diagnosis Date     Ovarian cyst 6/4/2015    Right, 6 cm dermoid on MRI-recommend removal.  7/29/2015 Surgery for right salpingoophorectomy.         Surgical history:  Past Surgical History:   Procedure Laterality Date     CHOLECYSTECTOMY  2013    LSC     COLONOSCOPY N/A 11/13/2018    Procedure: colonoscopy;  Surgeon: Sesar Dos Santos MD;  [facility]: [facility]     COLONOSCOPY N/A 3/3/2020    Procedure: COLONOSCOPY, WITH POLYPECTOMY AND BIOPSY;  Surgeon: Sesar Dos Santos MD;  [facility]     DILATION AND CURETTAGE, OPERATIVE HYSTEROSCOPY, COMBINED N/A 2/29/2016    Procedure: COMBINED DILATION AND CURETTAGE, OPERATIVE HYSTEROSCOPY;  Surgeon: Panda Knight MD;  [facility]: [facility]     GYN SURGERY  1995    tubes tied     LAPAROSCOPY DIAGNOSTIC (GYN) Right 7/29/2015    Procedure: LAPAROSCOPY DIAGNOSTIC (GYN);  Surgeon: Lina Betancur DO;  Location: [facility]       Family history:  Family History   Problem Relation Age of Onset     Hypertension Father      Myocardial Infarction Maternal Grandfather      Melanoma No family hx of        Medications:  Current Outpatient Medications   Medication Sig Dispense Refill      "Magnesium Oxide 250 MG TABS Take 2 tablets by mouth 2 times daily       Multiple Vitamin (MULTI-VITAMINS PO)        Omega-3 Fatty Acids (OMEGA 3 PO)        prochlorperazine (COMPAZINE) 10 MG tablet Take 1 tablet (10 mg) by mouth every 6 hours as needed (Nausea/Vomiting) (Patient not taking: Reported on 3/31/2020) 30 tablet 1       Allergies:  The patientis allergic to rabies vaccine; shellfish-derived products; and sulfa drugs.    Social history:  Social History     Tobacco Use     Smoking status: Never Smoker     Smokeless tobacco: Never Used   Substance Use Topics     Alcohol use: Yes     Alcohol/week: 0.0 standard drinks     Comment: 1-2 per month      Marital status: .    Review of Systems:  Nursing Notes:   Juana White EMT  3/31/2020  9:13 AM  Signed  Chief Complaint   Patient presents with     Consult     Anal cancer.       Vitals:    03/31/20 0833   BP: 110/72   BP Location: Left arm   Patient Position: Sitting   Cuff Size: Adult Regular   Pulse: 66   Temp: 97.9  F (36.6  C)   TempSrc: Oral   SpO2: 99%   Weight: 172 lb 9.6 oz   Height: 5' 4.5\"       Body mass index is 29.17 kg/m .      Juana White, EMT                             Stefano Banuelos MD   Professor and Chief  Division of Colon and Rectal Surgery  [facility]      Referring Provider:  Tyree Remy MD  [address] [place] [address]     Primary Care Provider:  Lakesha Aguirre    This note was created using speech recognition software and may contain unintended word substitutions.    Again, thank you for allowing me to participate in the care of your patient.      Sincerely,    Stefano Banuelos MD      "

## 2020-04-01 ENCOUNTER — PATIENT OUTREACH (OUTPATIENT)
Dept: ONCOLOGY | Facility: CLINIC | Age: 55
End: 2020-04-01

## 2020-04-01 ENCOUNTER — HOME INFUSION (PRE-WILLOW HOME INFUSION) (OUTPATIENT)
Dept: PHARMACY | Facility: CLINIC | Age: 55
End: 2020-04-01

## 2020-04-01 RX ORDER — HEPARIN SODIUM,PORCINE 10 UNIT/ML
2-5 VIAL (ML) INTRAVENOUS
Status: CANCELLED | OUTPATIENT
Start: 2020-04-01

## 2020-04-01 RX ORDER — LIDOCAINE 40 MG/G
CREAM TOPICAL
Status: CANCELLED | OUTPATIENT
Start: 2020-04-01

## 2020-04-01 NOTE — PROGRESS NOTES
Therapy: 5FU  Insurance: [email or web address]           Max Out of Pocket: $4100  Met: $4100    Please contact Intake with any questions, [phone] or In Basket pool, [facility] ([phone]).  IN REFERENCE TO REFERRAL MADE ON 04/01/2020 TO CHECK 5FU COVERAGE

## 2020-04-01 NOTE — PROGRESS NOTES
Notified [facility] of patients start date- referral placed as patient will be getting 5FU starting on 4/9. Also called PICC [facility] and confirmed they will be placing a picc line on 4/9 at 8:00 in the morning. Claudette Pablo RN on 4/1/2020 at 10:55 AM

## 2020-04-06 DIAGNOSIS — C21.0 ANAL SQUAMOUS CELL CARCINOMA (H): Primary | ICD-10-CM

## 2020-04-06 RX ORDER — HEPARIN SODIUM,PORCINE 10 UNIT/ML
5 VIAL (ML) INTRAVENOUS
Status: CANCELLED | OUTPATIENT
Start: 2020-04-09

## 2020-04-06 RX ORDER — MEPERIDINE HYDROCHLORIDE 25 MG/ML
25 INJECTION INTRAMUSCULAR; INTRAVENOUS; SUBCUTANEOUS EVERY 30 MIN PRN
Status: CANCELLED | OUTPATIENT
Start: 2020-05-07

## 2020-04-06 RX ORDER — HEPARIN SODIUM,PORCINE 10 UNIT/ML
5 VIAL (ML) INTRAVENOUS
Status: CANCELLED | OUTPATIENT
Start: 2020-05-07

## 2020-04-06 RX ORDER — MITOMYCIN 5 MG/10ML
10 INJECTION, POWDER, LYOPHILIZED, FOR SOLUTION INTRAVENOUS ONCE
Status: CANCELLED
Start: 2020-04-09

## 2020-04-06 RX ORDER — ALBUTEROL SULFATE 90 UG/1
1-2 AEROSOL, METERED RESPIRATORY (INHALATION)
Status: CANCELLED
Start: 2020-04-09

## 2020-04-06 RX ORDER — DIPHENHYDRAMINE HYDROCHLORIDE 50 MG/ML
50 INJECTION INTRAMUSCULAR; INTRAVENOUS
Status: CANCELLED
Start: 2020-05-07

## 2020-04-06 RX ORDER — LORAZEPAM 2 MG/ML
0.5 INJECTION INTRAMUSCULAR EVERY 4 HOURS PRN
Status: CANCELLED
Start: 2020-05-07

## 2020-04-06 RX ORDER — ALBUTEROL SULFATE 90 UG/1
1-2 AEROSOL, METERED RESPIRATORY (INHALATION)
Status: CANCELLED
Start: 2020-05-07

## 2020-04-06 RX ORDER — MEPERIDINE HYDROCHLORIDE 25 MG/ML
25 INJECTION INTRAMUSCULAR; INTRAVENOUS; SUBCUTANEOUS EVERY 30 MIN PRN
Status: CANCELLED | OUTPATIENT
Start: 2020-04-09

## 2020-04-06 RX ORDER — SODIUM CHLORIDE 9 MG/ML
1000 INJECTION, SOLUTION INTRAVENOUS CONTINUOUS PRN
Status: CANCELLED
Start: 2020-05-07

## 2020-04-06 RX ORDER — HEPARIN SODIUM (PORCINE) LOCK FLUSH IV SOLN 100 UNIT/ML 100 UNIT/ML
5 SOLUTION INTRAVENOUS
Status: CANCELLED | OUTPATIENT
Start: 2020-05-07

## 2020-04-06 RX ORDER — HEPARIN SODIUM (PORCINE) LOCK FLUSH IV SOLN 100 UNIT/ML 100 UNIT/ML
5 SOLUTION INTRAVENOUS
Status: CANCELLED | OUTPATIENT
Start: 2020-04-09

## 2020-04-06 RX ORDER — EPINEPHRINE 1 MG/ML
0.3 INJECTION, SOLUTION, CONCENTRATE INTRAVENOUS EVERY 5 MIN PRN
Status: CANCELLED | OUTPATIENT
Start: 2020-05-07

## 2020-04-06 RX ORDER — DIPHENHYDRAMINE HYDROCHLORIDE 50 MG/ML
50 INJECTION INTRAMUSCULAR; INTRAVENOUS
Status: CANCELLED
Start: 2020-04-09

## 2020-04-06 RX ORDER — MITOMYCIN 5 MG/10ML
10 INJECTION, POWDER, LYOPHILIZED, FOR SOLUTION INTRAVENOUS ONCE
Status: CANCELLED
Start: 2020-05-07

## 2020-04-06 RX ORDER — ALBUTEROL SULFATE 0.83 MG/ML
2.5 SOLUTION RESPIRATORY (INHALATION)
Status: CANCELLED | OUTPATIENT
Start: 2020-04-09

## 2020-04-06 RX ORDER — EPINEPHRINE 0.3 MG/.3ML
0.3 INJECTION SUBCUTANEOUS EVERY 5 MIN PRN
Status: CANCELLED | OUTPATIENT
Start: 2020-05-07

## 2020-04-06 RX ORDER — SODIUM CHLORIDE 9 MG/ML
1000 INJECTION, SOLUTION INTRAVENOUS CONTINUOUS PRN
Status: CANCELLED
Start: 2020-04-09

## 2020-04-06 RX ORDER — ALBUTEROL SULFATE 0.83 MG/ML
2.5 SOLUTION RESPIRATORY (INHALATION)
Status: CANCELLED | OUTPATIENT
Start: 2020-05-07

## 2020-04-06 RX ORDER — NALOXONE HYDROCHLORIDE 0.4 MG/ML
.1-.4 INJECTION, SOLUTION INTRAMUSCULAR; INTRAVENOUS; SUBCUTANEOUS
Status: CANCELLED | OUTPATIENT
Start: 2020-05-07

## 2020-04-06 RX ORDER — METHYLPREDNISOLONE SODIUM SUCCINATE 125 MG/2ML
125 INJECTION, POWDER, LYOPHILIZED, FOR SOLUTION INTRAMUSCULAR; INTRAVENOUS
Status: CANCELLED
Start: 2020-05-07

## 2020-04-06 RX ORDER — EPINEPHRINE 1 MG/ML
0.3 INJECTION, SOLUTION, CONCENTRATE INTRAVENOUS EVERY 5 MIN PRN
Status: CANCELLED | OUTPATIENT
Start: 2020-04-09

## 2020-04-06 RX ORDER — NALOXONE HYDROCHLORIDE 0.4 MG/ML
.1-.4 INJECTION, SOLUTION INTRAMUSCULAR; INTRAVENOUS; SUBCUTANEOUS
Status: CANCELLED | OUTPATIENT
Start: 2020-04-09

## 2020-04-06 RX ORDER — LORAZEPAM 2 MG/ML
0.5 INJECTION INTRAMUSCULAR EVERY 4 HOURS PRN
Status: CANCELLED
Start: 2020-04-09

## 2020-04-06 RX ORDER — METHYLPREDNISOLONE SODIUM SUCCINATE 125 MG/2ML
125 INJECTION, POWDER, LYOPHILIZED, FOR SOLUTION INTRAMUSCULAR; INTRAVENOUS
Status: CANCELLED
Start: 2020-04-09

## 2020-04-06 RX ORDER — EPINEPHRINE 0.3 MG/.3ML
0.3 INJECTION SUBCUTANEOUS EVERY 5 MIN PRN
Status: CANCELLED | OUTPATIENT
Start: 2020-04-09

## 2020-04-08 ENCOUNTER — PATIENT OUTREACH (OUTPATIENT)
Dept: ONCOLOGY | Facility: CLINIC | Age: 55
End: 2020-04-08

## 2020-04-08 NOTE — PROGRESS NOTES
Called patient to finish Chemo teach over the phone. Left message to call back. Otherwise will do with patient when she is here tomorrow for treatment if no call back. Chemo book given to patient on 3/26 and pre-meds released. Patient also having picc placement tomorrow morning. Claudette Pablo RN on 4/8/2020 at 2:11 PM

## 2020-04-08 NOTE — PROGRESS NOTES
"Chemotherapy Education    Patient is a 54 year old female called today for chemotherapy education via telephone visit,   Pt has a cancer diagnosis of Metastatic Lung Cancer.  Their Oncologist is Dr. Real..    Reviewed the following with the patient and their support person:    Treatment Goal: Palliative    Regimen: Fluorouracil and Mitomycin with Radiation    Duration: x 1 Cycles and rationale for strict adherence, specific supportive medication Compazine and side effects (including long-term and short-term) and management; skin changes/hand-foot syndrome, anemia, neutropenia, thrombocytopenia, diarrhea/constipation, hair loss syndrome, memory changes/ \"chemobrain\", mouth sores, taste changes, neuropathy, fatigue, infertility, myelosuppression, and risk of extravasation or infiltration.    Infection prevention and monitoring of lab values, what lab tests and what changes of these values meant, along with the possibility of hydration or blood product transfusion, or the need to defer or hold treatment.      General Chemotherapy Information, including ways it is excreted from the body and cleaning and containment of vomitus or other bodily fluid, use of the bathroom, sexual health and intimacy, what to do if needing to miss a treatment, when to call a provider and the need for staff to wear protective equipment.    Oral Chemotherapy: No      Importance of Central line care (port) or IV site care.    Written Information:   \"My Cancer Guidebook\" Binder   \"Getting Ready for Chemotherapy: What to Expect, Before, During, and After  your Treatment\",   Via Oncology medication information sheets,   Information on when to contact the provider,   Various programs offered at [facility], and   SonoPlot card with contact information given; Oncology Clinic, RN Case  Manager, and the after-hours Nurse Advise Line.  No barriers to learning identified. Patient and family verbalized understanding of all written and verbal " information. All questions answered to patients satisfaction.   General Orientation to the Medical Oncology department, Infusion Services department, Huc/scheduling, bathrooms and usual flow of the treatment day provided as well as introduction to the Infusion nurses.    Bottle Pump Infuser: Fluorouracil pump discussed with patient.     Other Concerns: No concerns noted at this time per patient.      Pt instructed to call with further questions or concerns.  Patient states understanding and is in agreement with this plan.  Copy of appointments, and after visit summary (AVS) given to patient. Patient discharged 4/8/20.    Claudette Pablo RN  Oncology Hematology   [facility]  Phone [phone]

## 2020-04-09 ENCOUNTER — INFUSION THERAPY VISIT (OUTPATIENT)
Dept: INFUSION THERAPY | Facility: CLINIC | Age: 55
End: 2020-04-09
Attending: INTERNAL MEDICINE
Payer: COMMERCIAL

## 2020-04-09 ENCOUNTER — HOSPITAL ENCOUNTER (OUTPATIENT)
Dept: GENERAL RADIOLOGY | Facility: CLINIC | Age: 55
Discharge: HOME | End: 2020-04-09
Attending: INTERNAL MEDICINE | Admitting: INTERNAL MEDICINE
Payer: COMMERCIAL

## 2020-04-09 ENCOUNTER — HOME INFUSION (PRE-WILLOW HOME INFUSION) (OUTPATIENT)
Dept: PHARMACY | Facility: CLINIC | Age: 55
End: 2020-04-09

## 2020-04-09 ENCOUNTER — APPOINTMENT (OUTPATIENT)
Dept: RADIATION THERAPY | Facility: OUTPATIENT CENTER | Age: 55
End: 2020-04-09
Payer: COMMERCIAL

## 2020-04-09 VITALS — HEART RATE: 68 BPM | SYSTOLIC BLOOD PRESSURE: 116 MMHG | TEMPERATURE: 98.1 F | DIASTOLIC BLOOD PRESSURE: 76 MMHG

## 2020-04-09 DIAGNOSIS — C21.0 ANAL SQUAMOUS CELL CARCINOMA (H): Primary | ICD-10-CM

## 2020-04-09 LAB
ALBUMIN SERPL-MCNC: 3.7 G/DL (ref 3.4–5)
ALP SERPL-CCNC: 39 U/L (ref 40–150)
ALT SERPL W P-5'-P-CCNC: 74 U/L (ref 0–50)
ANION GAP SERPL CALCULATED.3IONS-SCNC: 3 MMOL/L (ref 3–14)
AST SERPL W P-5'-P-CCNC: 34 U/L (ref 0–45)
BASOPHILS # BLD AUTO: 0 10E9/L (ref 0–0.2)
BASOPHILS NFR BLD AUTO: 0.7 %
BILIRUB SERPL-MCNC: 0.2 MG/DL (ref 0.2–1.3)
BUN SERPL-MCNC: 18 MG/DL (ref 7–30)
CALCIUM SERPL-MCNC: 8.9 MG/DL (ref 8.5–10.1)
CHLORIDE SERPL-SCNC: 111 MMOL/L (ref 94–109)
CO2 SERPL-SCNC: 28 MMOL/L (ref 20–32)
CREAT SERPL-MCNC: 0.58 MG/DL (ref 0.52–1.04)
DIFFERENTIAL METHOD BLD: NORMAL
EOSINOPHIL # BLD AUTO: 0.2 10E9/L (ref 0–0.7)
EOSINOPHIL NFR BLD AUTO: 2.5 %
ERYTHROCYTE [DISTWIDTH] IN BLOOD BY AUTOMATED COUNT: 11.7 % (ref 10–15)
GFR SERPL CREATININE-BSD FRML MDRD: >90 ML/MIN/{1.73_M2}
GLUCOSE SERPL-MCNC: 93 MG/DL (ref 70–99)
HCT VFR BLD AUTO: 42.4 % (ref 35–47)
HGB BLD-MCNC: 14 G/DL (ref 11.7–15.7)
IMM GRANULOCYTES # BLD: 0 10E9/L (ref 0–0.4)
IMM GRANULOCYTES NFR BLD: 0.2 %
LYMPHOCYTES # BLD AUTO: 1.9 10E9/L (ref 0.8–5.3)
LYMPHOCYTES NFR BLD AUTO: 31 %
MCH RBC QN AUTO: 30.8 PG (ref 26.5–33)
MCHC RBC AUTO-ENTMCNC: 33 G/DL (ref 31.5–36.5)
MCV RBC AUTO: 93 FL (ref 78–100)
MONOCYTES # BLD AUTO: 0.5 10E9/L (ref 0–1.3)
MONOCYTES NFR BLD AUTO: 8.3 %
NEUTROPHILS # BLD AUTO: 3.5 10E9/L (ref 1.6–8.3)
NEUTROPHILS NFR BLD AUTO: 57.3 %
NRBC # BLD AUTO: 0 10*3/UL
NRBC BLD AUTO-RTO: 0 /100
PLATELET # BLD AUTO: 177 10E9/L (ref 150–450)
POTASSIUM SERPL-SCNC: 4.1 MMOL/L (ref 3.4–5.3)
PROT SERPL-MCNC: 6.7 G/DL (ref 6.8–8.8)
RBC # BLD AUTO: 4.55 10E12/L (ref 3.8–5.2)
SODIUM SERPL-SCNC: 142 MMOL/L (ref 133–144)
WBC # BLD AUTO: 6 10E9/L (ref 4–11)

## 2020-04-09 PROCEDURE — 25000128 H RX IP 250 OP 636: Performed by: INTERNAL MEDICINE

## 2020-04-09 PROCEDURE — 36569 INSJ PICC 5 YR+ W/O IMAGING: CPT

## 2020-04-09 PROCEDURE — 85025 COMPLETE CBC W/AUTO DIFF WBC: CPT | Performed by: INTERNAL MEDICINE

## 2020-04-09 PROCEDURE — 96409 CHEMO IV PUSH SNGL DRUG: CPT

## 2020-04-09 PROCEDURE — 25800030 ZZH RX IP 258 OP 636: Performed by: INTERNAL MEDICINE

## 2020-04-09 PROCEDURE — 80053 COMPREHEN METABOLIC PANEL: CPT | Performed by: INTERNAL MEDICINE

## 2020-04-09 PROCEDURE — 27211136 ZZH TRAY POWER PICC SOLO 4FR SINGLE LUMEN

## 2020-04-09 PROCEDURE — G0498 CHEMO EXTEND IV INFUS W/PUMP: HCPCS

## 2020-04-09 PROCEDURE — 96375 TX/PRO/DX INJ NEW DRUG ADDON: CPT

## 2020-04-09 PROCEDURE — 40000986 XR CHEST PORT 1 VW

## 2020-04-09 RX ORDER — MITOMYCIN 5 MG/10ML
10 INJECTION, POWDER, LYOPHILIZED, FOR SOLUTION INTRAVENOUS ONCE
Status: COMPLETED | OUTPATIENT
Start: 2020-04-09 | End: 2020-04-09

## 2020-04-09 RX ADMIN — DEXAMETHASONE SODIUM PHOSPHATE 12 MG: 10 INJECTION, SOLUTION INTRAMUSCULAR; INTRAVENOUS at 10:05

## 2020-04-09 RX ADMIN — MITOMYCIN 19 MG: 20 INJECTION, POWDER, LYOPHILIZED, FOR SOLUTION INTRAVENOUS at 10:21

## 2020-04-09 RX ADMIN — SODIUM CHLORIDE 250 ML: 9 INJECTION, SOLUTION INTRAVENOUS at 09:50

## 2020-04-09 RX ADMIN — FAMOTIDINE 20 MG: 20 INJECTION, SOLUTION INTRAVENOUS at 09:50

## 2020-04-09 NOTE — PROGRESS NOTES
[facility] PICC  Procedure Note           Peripherally Inserted Central Line Catheter (PICC):       Susu Lagos  [id number]   April 9, 2020 Indication: chemo           Pause for the cause: Consent for catheter placement procedure signed  Time out completed  Patient ID's verified using two distinct indicators  All necessary equipment is present  Site marked if extremity to be used has been predetermined   Type of line to be used: PICC   Full barrier precautions used: Yes   Skin preparation: Chloraprep   Date of insertion: April 9, 2020   Device type: Single lumen, valved, 4.0   Catheter brand: Flud   Lot number: [id number]   Insertion location: Left basilic vein   Method of placement: Venipuncture  MST  Ultrasound   Number of attempts: With ultrasound: 1   Without ultrasound: 0   Difficulty threading: No   Midline IV device: Dressing dry and intact  Transparent semmipermeable dressing applied  Chlorhexidine patch  Catheter securement device   Arm circumference: Adults 10 cm   Midline extremity circumference: 35 cm   Vein diameter:  4mm    Internal length: 49 cm   Midline visible catheter length: 1 cm   Total catheter length: 50 cm   Tip termination: svc/RA jct   Method of verification: Chest x-ray   Midline patency post placement: Positive blood return  Flushes without difficulty  Saline locked   Line flush: Line flush documented on the eMAR  y   Placement verified by: Physician   Catheter placed by: Terri Avendaño   Discontinuation form initiated: No   Patient tolerance: Tolerated well   PICC Educational information to patient (Information from PICC package):  Yes   VAD flushing orders entered:  Yes     Summary:  This procedure was performed without difficulty. There were no immediate complications. OK to use

## 2020-04-09 NOTE — PROGRESS NOTES
Infusion Nursing Note:  Susu Lagos presents today for C1D1 Mitomycin/5 F U pump   Patient seen by provider today: No   present during visit today: Not Applicable.    Note: Orientated pt to chair space and infusion center. Went over symptoms with pt of mitomycin and Fluorouracil 4 day pump.  Pt has a thermometer and is aware of when to call with neutropenic s/sx.  Discussed care of picc line and pump.   Gave pt information on pump and paperwork signed.  Answered all of pt's questions to her satisfaction and she verbalized understanding of information provided.     Intravenous Access:  Labs drawn without difficulty.  PICC.    Treatment Conditions:  Lab Results   Component Value Date    HGB 14.0 04/09/2020     Lab Results   Component Value Date    WBC 6.0 04/09/2020      Lab Results   Component Value Date    ANEU 3.5 04/09/2020     Lab Results   Component Value Date     04/09/2020      Lab Results   Component Value Date     04/09/2020                   Lab Results   Component Value Date    POTASSIUM 4.1 04/09/2020           No results found for: MAG         Lab Results   Component Value Date    CR 0.58 04/09/2020                   Lab Results   Component Value Date    BARBIE 8.9 04/09/2020                Lab Results   Component Value Date    BILITOTAL 0.2 04/09/2020           Lab Results   Component Value Date    ALBUMIN 3.7 04/09/2020                    Lab Results   Component Value Date    ALT 74 04/09/2020           Lab Results   Component Value Date    AST 34 04/09/2020       Results reviewed, labs MET treatment parameters, ok to proceed with treatment.      Post Infusion Assessment:  Patient tolerated infusion without incident.  Blood return noted pre and post infusion.  Site patent and intact, free from redness, edema or discomfort.  No evidence of extravasations.     Prior to discharge: Port is secured in place with tegaderm and flushed with 10cc NS with positive blood return noted.   "Continuous home infusion Dosi-Fuser pump connected.    All connectors secured in place and clamps taped open.    Pump started, \"running\" noted on display (CADD): Not Applicable.  Patient instructed to call our clinic or [facility] with any questions or concerns at home.  Patient verbalized understanding.    Patient set up for pump disconnect at our clinic on 4/13/20 at 2:30 pm.        Discharge Plan:   Patient discharged in stable condition accompanied by: self.  Departure Mode: Ambulatory.  Pt to return on 4/13/20 at 2:30 pm for pump disconnect.      Swati Duff RN                        "

## 2020-04-10 ENCOUNTER — APPOINTMENT (OUTPATIENT)
Dept: RADIATION THERAPY | Facility: OUTPATIENT CENTER | Age: 55
End: 2020-04-10
Payer: COMMERCIAL

## 2020-04-10 ENCOUNTER — HOME INFUSION (PRE-WILLOW HOME INFUSION) (OUTPATIENT)
Dept: PHARMACY | Facility: CLINIC | Age: 55
End: 2020-04-10

## 2020-04-11 ENCOUNTER — HOME INFUSION (PRE-WILLOW HOME INFUSION) (OUTPATIENT)
Dept: PHARMACY | Facility: CLINIC | Age: 55
End: 2020-04-11

## 2020-04-13 ENCOUNTER — PATIENT OUTREACH (OUTPATIENT)
Dept: ONCOLOGY | Facility: CLINIC | Age: 55
End: 2020-04-13

## 2020-04-13 ENCOUNTER — INFUSION THERAPY VISIT (OUTPATIENT)
Dept: INFUSION THERAPY | Facility: CLINIC | Age: 55
End: 2020-04-13
Attending: INTERNAL MEDICINE
Payer: COMMERCIAL

## 2020-04-13 ENCOUNTER — APPOINTMENT (OUTPATIENT)
Dept: RADIATION THERAPY | Facility: OUTPATIENT CENTER | Age: 55
End: 2020-04-13
Payer: COMMERCIAL

## 2020-04-13 VITALS — DIASTOLIC BLOOD PRESSURE: 64 MMHG | TEMPERATURE: 98.1 F | SYSTOLIC BLOOD PRESSURE: 118 MMHG

## 2020-04-13 DIAGNOSIS — R11.0 NAUSEA: Primary | ICD-10-CM

## 2020-04-13 DIAGNOSIS — C21.0 ANAL SQUAMOUS CELL CARCINOMA (H): Primary | ICD-10-CM

## 2020-04-13 RX ORDER — HEPARIN SODIUM (PORCINE) LOCK FLUSH IV SOLN 100 UNIT/ML 100 UNIT/ML
5 SOLUTION INTRAVENOUS
Status: DISCONTINUED | OUTPATIENT
Start: 2020-04-13 | End: 2020-04-13 | Stop reason: HOSPADM

## 2020-04-13 RX ORDER — ONDANSETRON 8 MG/1
8 TABLET, FILM COATED ORAL EVERY 8 HOURS PRN
Qty: 30 TABLET | Refills: 0 | Status: ON HOLD | OUTPATIENT
Start: 2020-04-13 | End: 2020-05-20

## 2020-04-13 NOTE — PROGRESS NOTES
This is a recent snapshot of the patient's [facility] medical record.  For current drug dose and complete information and questions, call [phone] or In Basket pool, fv home infusion ([id number])  CSN Number:  [id number]

## 2020-04-13 NOTE — PROGRESS NOTES
Infusion Nursing Note:  Susu Lagos presents today for pump disconnect.    Patient seen by provider today: No   present during visit today: Not Applicable.    Note: N/A.    Intravenous Access:  PICC.  Dressing and cap changes done with betadine and opsite dressing, see note below.  Treatment Conditions:  Fluorouracil infusion completed.  Pt c/o itching around dressing and there is visible redness at the edges of the dressing.      Discharge Plan:   Patient discharged in stable condition accompanied by: self.  Departure Mode: Ambulatory.    Frida Mayen RN

## 2020-04-13 NOTE — PROGRESS NOTES
Called to do status check with patient today after receiving first chemo treatment last week. Patient states she is doing ok. She has been having some frequent naussea/queasiness with no emesis. Compazine helps some but not for the whole 6 hours. She also has some decreased taste noted, mild fatigue and a little looser stools. She states she has not been using the immodium as she only has a few a day and she states her body is also adjusting to being off her keto diet over thel last year. Patient states she will take the immodium if the loose stools become more frequent. Will also call in zofran for patient today. Instructions reviewed with patient and patient will call if symptoms worsen. Claudette Pablo RN on 4/13/2020 at 11:57 AM

## 2020-04-13 NOTE — PROGRESS NOTES
This is a recent snapshot of the patient's [facility] medical record.  For current drug dose and complete information and questions, call [phone] or In Basket pool, fv home infusion ([id number])  CSN Number:  [id number]

## 2020-04-14 ENCOUNTER — APPOINTMENT (OUTPATIENT)
Dept: RADIATION THERAPY | Facility: OUTPATIENT CENTER | Age: 55
End: 2020-04-14
Payer: COMMERCIAL

## 2020-04-15 ENCOUNTER — APPOINTMENT (OUTPATIENT)
Dept: RADIATION THERAPY | Facility: OUTPATIENT CENTER | Age: 55
End: 2020-04-15
Payer: COMMERCIAL

## 2020-04-15 ENCOUNTER — OFFICE VISIT (OUTPATIENT)
Dept: RADIATION THERAPY | Facility: OUTPATIENT CENTER | Age: 55
End: 2020-04-15
Payer: COMMERCIAL

## 2020-04-15 VITALS
BODY MASS INDEX: 28.56 KG/M2 | HEART RATE: 86 BPM | SYSTOLIC BLOOD PRESSURE: 124 MMHG | OXYGEN SATURATION: 96 % | RESPIRATION RATE: 18 BRPM | WEIGHT: 169 LBS | DIASTOLIC BLOOD PRESSURE: 82 MMHG

## 2020-04-15 DIAGNOSIS — G89.3 CANCER RELATED PAIN: ICD-10-CM

## 2020-04-15 DIAGNOSIS — K12.31 MUCOSITIS DUE TO CHEMOTHERAPY: Primary | ICD-10-CM

## 2020-04-15 DIAGNOSIS — G89.3 CANCER RELATED PAIN: Primary | ICD-10-CM

## 2020-04-15 ASSESSMENT — PAIN SCALES - GENERAL: PAINLEVEL: NO PAIN (0)

## 2020-04-15 NOTE — LETTER
4/15/2020      RE: Susu Lagos  [address]  [facility] [address]       [facility] PHYSICIANS  SPECIALIZING IN BREAKTHROUGHS  Radiation Oncology    On Treatment Visit Note      Susu Lagos      Date: 4/15/2020   MRN: [id number]   : 1965  Diagnosis: Anal ca      Reason for Visit:  On Radiation Treatment Visit     Treatment Summary to Date  Treatment Site: pelvis Current Dose: 900/5040 cGy Fractions:       Chemotherapy  Chemo concurrent with radx?: Yes  Oncologist: Dr. Real  Drug Name/Frequency 1: 5FU  Drug Name/Frequency 1: Mitomycin    Subjective:   Doing well. No tunde-anal pain. Intermittent diarrhea, controlled with Imodium. No bleeding. Some oral mouth sores from chemo.     Nursing ROS:   Nutrition Alteration  Diet Type: Patient's Preference  Skin  Skin Reaction: 0 - No changes     ENT and Mouth Exam  ENT/Mouth Note: has mouth sores secondary to chemo infusion - doing salt/soda  Cardiovascular  Respiratory effort: 1 - Normal - without distress  Gastrointestinal  Diarrhea: 0 - None  Genitourinary  Urinary Status: 0 - Normal     Pain Assessment  0-10 Pain Scale: 0      Objective:   /82   Pulse 86   Resp 18   Wt 76.7 kg (169 lb)   LMP 2016   SpO2 96%   BMI 28.56 kg/m    +oral mouth sores from chemo.  Did not examine tunde-anal region today.    Labs:  CBC RESULTS:   Recent Labs   Lab Test 20  0915   WBC 6.0   RBC 4.55   HGB 14.0   HCT 42.4   MCV 93   MCH 30.8   MCHC 33.0   RDW 11.7        ELECTROLYTES:  Recent Labs   Lab Test 20  0915      POTASSIUM 4.1   CHLORIDE 111*   BARBIE 8.9   CO2 28   BUN 18   CR 0.58   GLC 93       Assessment:  Ms. 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  She is undergoing definitve CRT.     Tolerating radiation therapy well.  All questions and concerns addressed.    Plan:   1. Continue current therapy.    2. Mucositis from chemo. Magic mouthwash  PRN.   3. Skin care. Aquaphor, Squirt bottle for irrigation, baby wipes. Will send morphine gel to pharmacy.      Mosaiq chart and setup information reviewed  Ports checked    Medication Review  Med list reviewed with patient?: Yes    Educational Topic Discussed  Education Instructions: reviewed skin care - creams, sitz baths      Tyree Remy MD

## 2020-04-15 NOTE — PROGRESS NOTES
[facility] PHYSICIANS  SPECIALIZING IN BREAKTHROUGHS  Radiation Oncology    On Treatment Visit Note      Susu Lagos      Date: 4/15/2020   MRN: [id number]   : 1965  Diagnosis: Anal ca      Reason for Visit:  On Radiation Treatment Visit     Treatment Summary to Date  Treatment Site: pelvis Current Dose: 900/5040 cGy Fractions:       Chemotherapy  Chemo concurrent with radx?: Yes  Oncologist: Dr. Real  Drug Name/Frequency 1: 5FU  Drug Name/Frequency 1: Mitomycin    Subjective:   Doing well. No tunde-anal pain. Intermittent diarrhea, controlled with Imodium. No bleeding. Some oral mouth sores from chemo.     Nursing ROS:   Nutrition Alteration  Diet Type: Patient's Preference  Skin  Skin Reaction: 0 - No changes     ENT and Mouth Exam  ENT/Mouth Note: has mouth sores secondary to chemo infusion - doing salt/soda  Cardiovascular  Respiratory effort: 1 - Normal - without distress  Gastrointestinal  Diarrhea: 0 - None  Genitourinary  Urinary Status: 0 - Normal     Pain Assessment  0-10 Pain Scale: 0      Objective:   /82   Pulse 86   Resp 18   Wt 76.7 kg (169 lb)   LMP 2016   SpO2 96%   BMI 28.56 kg/m    +oral mouth sores from chemo.  Did not examine tunde-anal region today.    Labs:  CBC RESULTS:   Recent Labs   Lab Test 20  0915   WBC 6.0   RBC 4.55   HGB 14.0   HCT 42.4   MCV 93   MCH 30.8   MCHC 33.0   RDW 11.7        ELECTROLYTES:  Recent Labs   Lab Test 20  0915      POTASSIUM 4.1   CHLORIDE 111*   BARBIE 8.9   CO2 28   BUN 18   CR 0.58   GLC 93       Assessment:  Ms. 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  She is undergoing definitve CRT.     Tolerating radiation therapy well.  All questions and concerns addressed.    Plan:   1. Continue current therapy.    2. Mucositis from chemo. Magic mouthwash PRN.   3. Skin care. Aquaphor, Squirt bottle for irrigation, baby wipes. Will  send morphine gel to pharmacy.      Mosaiq chart and setup information reviewed  Ports checked    Medication Review  Med list reviewed with patient?: Yes    Educational Topic Discussed  Education Instructions: reviewed skin care - creams, sitz baths      Tyree Remy MD

## 2020-04-16 ENCOUNTER — APPOINTMENT (OUTPATIENT)
Dept: RADIATION THERAPY | Facility: OUTPATIENT CENTER | Age: 55
End: 2020-04-16
Payer: COMMERCIAL

## 2020-04-16 ENCOUNTER — INFUSION THERAPY VISIT (OUTPATIENT)
Dept: INFUSION THERAPY | Facility: CLINIC | Age: 55
End: 2020-04-16
Attending: INTERNAL MEDICINE
Payer: COMMERCIAL

## 2020-04-16 DIAGNOSIS — C21.0 ANAL SQUAMOUS CELL CARCINOMA (H): Primary | ICD-10-CM

## 2020-04-16 PROCEDURE — G0463 HOSPITAL OUTPT CLINIC VISIT: HCPCS

## 2020-04-16 NOTE — PROGRESS NOTES
Infusion Nursing Note:  Susu Lagos presents today for PICC dressing change.    Patient seen by provider today: No   present during visit today: Not Applicable.    Note: PICC dressing change per policy/procedure. Due to sensitivity, site cleaned with betadine & Opsite dressing applied.    Intravenous Access:  PICC.    Treatment Conditions:  Not Applicable.      Post Infusion Assessment:  + blood return. PICC SL'd.  Site patent and intact, free from redness, edema or discomfort.  No evidence of extravasations.       Discharge Plan:   Patient discharged in stable condition accompanied by: self.  Departure Mode: Ambulatory.    Gurvinder Panda RN

## 2020-04-16 NOTE — PROGRESS NOTES
[facility] Hematology and Oncology Progress Note    Patient: Susu Lagos  MRN: [id number]  Date of Service: [unfilled]        Reason for Visit    1. Anal squamous cell cancer, chemoradiaton    ______________________________________________________________________________    History of Present Illness/ Interval History    Ms. Susu Lagos  is a 54 year old who recently initiated concurrent chemoradiation with 5FU and mitomycin  on 4/9. She is back for a one-week toxicity evaluation.    Review of Systems    Tolerated chemo generally well.  Nausea without emesis, controlled with Compazine or Zofran.  Intermittent diarrhea, controlled with Imodium. No bleeding.  Oral mouth sores from chemo - doing salt/soda rinses. Magic Mouthwash as needed.   Eating and drinking well.   No change in rectal pain, but has skincare measures on hand    Oncology History/Treatment  Diagnosis/Stage:   March 3, 2020: anal squamous cell cancer  -3-4 mth blood in stool  -Colonoscopy: palpable rectal mass on digital exam. Mass located at dentate line in left anterior position <1/3 circumferentially involved. Biopsy + squamous cell cancer focally keratinizing (Colonscopy 2018 was negative)  -Gyne exam and Pap smear neg for cervical primary  -PET: anal canal mass without regional nor distant mets  -Pelvic MRI: 1.2 cm mass located 2.7 cm above anal verge. No nodes.    Treatment:  4/9/2020 - present: Concurrent chemoradiation with 5FU 1000mg/m2 4-day infusion and mitomycin 10 mg/m2. 28 fx of Radiation planned      Past History  Past Medical History:   Diagnosis Date     Ovarian cyst 6/4/2015    Right, 6 cm dermoid on MRI-recommend removal.  7/29/2015 Surgery for right salpingoophorectomy.           Past Surgical History:   Procedure Laterality Date     CHOLECYSTECTOMY  2013    LSC     COLONOSCOPY N/A 11/13/2018    Procedure: colonoscopy;  Surgeon: Sesar Dos Santos MD;  Location: [facility]     COLONOSCOPY N/A 3/3/2020     Procedure: COLONOSCOPY, WITH POLYPECTOMY AND BIOPSY;  Surgeon: Sesar Dos Santos MD;  Location: [facility]     DILATION AND CURETTAGE, OPERATIVE HYSTEROSCOPY, COMBINED N/A 2/29/2016    Procedure: COMBINED DILATION AND CURETTAGE, OPERATIVE HYSTEROSCOPY;  Surgeon: Panda Knight MD;  Location: [facility]     GYN SURGERY  1995    tubes tied     LAPAROSCOPY DIAGNOSTIC (GYN) Right 7/29/2015    Procedure: LAPAROSCOPY DIAGNOSTIC (GYN);  Surgeon: Lian Betancur DO;  Location: [facility]       Physical Exam    No flowsheet data found.    GENERAL: Alert and oriented to time place and person. Seated comfortably. In no distress.  HEAD: Atraumatic and normocephalic. No alopecia.  EYES: ABIGAIL, EOMI. No erythema. No icterus.  ORAL CAVITY: Moist. No mucosal lesion or tonsillar enlargement noted.  LYMPH NODES: No palpable supraclavicular, cervical lymphadenopathy.  CHEST: clear to auscultation bilaterally. Resonant to percussion throughout bilaterally. Symmetrical breath movements bilaterally.  CVS: S1 and S2 are heard. Regular rate and rhythm. No murmur or gallop or rub heard.  ABDOMEN: Soft. Not tender. Not distended. No palpable hepatomegaly or splenomegaly. No other mass palpable. Bowel sounds present.    Lab Results    None    Imaging    None    Assessment/Plan  1. Anal squamous cell cancer  Tolerating treatment well, thus far.   She will return on day 29 (5/7/2020) for her next cycle.     2.   Chemotherapy-induced stomatitis  Well-managed with rinses and Magic Mouthwash. Eating and drinking well. Discussed modified soft diet and supplement drinks when needed.    Billing  Total face to face time 15 minutes, with 10 minutes of that dedicated to counseling, education or coordination of care.     Signed by: Ashly Espino NP

## 2020-04-17 ENCOUNTER — APPOINTMENT (OUTPATIENT)
Dept: RADIATION THERAPY | Facility: OUTPATIENT CENTER | Age: 55
End: 2020-04-17
Payer: COMMERCIAL

## 2020-04-17 ENCOUNTER — ONCOLOGY VISIT (OUTPATIENT)
Dept: ONCOLOGY | Facility: CLINIC | Age: 55
End: 2020-04-17
Attending: INTERNAL MEDICINE
Payer: COMMERCIAL

## 2020-04-17 VITALS
RESPIRATION RATE: 16 BRPM | BODY MASS INDEX: 28.56 KG/M2 | HEART RATE: 83 BPM | TEMPERATURE: 97.2 F | HEIGHT: 65 IN | SYSTOLIC BLOOD PRESSURE: 126 MMHG | OXYGEN SATURATION: 97 % | WEIGHT: 171.4 LBS | DIASTOLIC BLOOD PRESSURE: 72 MMHG

## 2020-04-17 DIAGNOSIS — C21.0 ANAL SQUAMOUS CELL CARCINOMA (H): Primary | ICD-10-CM

## 2020-04-17 DIAGNOSIS — K12.30 STOMATITIS AND MUCOSITIS: ICD-10-CM

## 2020-04-17 DIAGNOSIS — K12.1 STOMATITIS AND MUCOSITIS: ICD-10-CM

## 2020-04-17 PROCEDURE — G0463 HOSPITAL OUTPT CLINIC VISIT: HCPCS

## 2020-04-17 PROCEDURE — 99213 OFFICE O/P EST LOW 20 MIN: CPT | Performed by: NURSE PRACTITIONER

## 2020-04-17 ASSESSMENT — PAIN SCALES - GENERAL: PAINLEVEL: NO PAIN (0)

## 2020-04-17 ASSESSMENT — MIFFLIN-ST. JEOR: SCORE: 1380.41

## 2020-04-17 NOTE — PROGRESS NOTES
"Oncology Rooming Note    April 17, 2020 3:28 PM   Susu Lagos is a 54 year old female who presents for:    Chief Complaint   Patient presents with     Oncology Clinic Visit     1 week follow up anal squamous cell carcinoma.      Initial Vitals: Temp 97.2  F (36.2  C) (Tympanic)   Resp 16   Ht 1.638 m (5' 4.5\")   Wt 77.7 kg (171 lb 6.4 oz)   LMP 01/29/2016   Breastfeeding No   BMI 28.97 kg/m   Estimated body mass index is 28.97 kg/m  as calculated from the following:    Height as of this encounter: 1.638 m (5' 4.5\").    Weight as of this encounter: 77.7 kg (171 lb 6.4 oz). Body surface area is 1.88 meters squared.  No Pain (0) Comment: Data Unavailable   Patient's last menstrual period was 01/29/2016.  Allergies reviewed: Yes  Medications reviewed: Yes    Medications: Medication refills not needed today.  Pharmacy name entered into [facility]:    [facility] [place], [place] - [address] [place] [address] [id number] [place], [place] - [address] [facility]    Clinical concerns: 1 week follow up anal squamous cell carcinoma.       Dionna Singh, MARAL            "

## 2020-04-17 NOTE — LETTER
4/17/2020         RE: Susu Lagos  [address]  [facility] [address]        Dear Colleague,    Thank you for referring your patient, Susu Lagos, to the [facility]. Please see a copy of my visit note below.    [facility] Progress Note    Patient: Susu Lagos  MRN: [id number]  Date of Service: [unfilled]        Reason for Visit    1. Anal squamous cell cancer, chemoradiaton    ______________________________________________________________________________    History of Present Illness/ Interval History    Ms. Susu Lagos  is a 54 year old who recently initiated concurrent chemoradiation with 5FU and mitomycin  on 4/9. She is back for a one-week toxicity evaluation.    Review of Systems    Tolerated chemo generally well.  Nausea without emesis, controlled with Compazine or Zofran.  Intermittent diarrhea, controlled with Imodium. No bleeding.  Oral mouth sores from chemo - doing salt/soda rinses. Magic Mouthwash as needed.   Eating and drinking well.   No change in rectal pain, but has skincare measures on hand    Oncology History/Treatment  Diagnosis/Stage:   March 3, 2020: anal squamous cell cancer  -3-4 mth blood in stool  -Colonoscopy: palpable rectal mass on digital exam. Mass located at dentate line in left anterior position <1/3 circumferentially involved. Biopsy + squamous cell cancer focally keratinizing (Colonscopy 2018 was negative)  -Gyne exam and Pap smear neg for cervical primary  -PET: anal canal mass without regional nor distant mets  -Pelvic MRI: 1.2 cm mass located 2.7 cm above anal verge. No nodes.    Treatment:  4/9/2020 - present: Concurrent chemoradiation with 5FU 1000mg/m2 4-day infusion and mitomycin 10 mg/m2. 28 fx of Radiation planned      Past History  Past Medical History:   Diagnosis Date     Ovarian cyst 6/4/2015    Right, 6 cm dermoid on MRI-recommend removal.  7/29/2015 Surgery for right salpingoophorectomy.            Past Surgical History:   Procedure Laterality Date     CHOLECYSTECTOMY  2013    LSC     COLONOSCOPY N/A 11/13/2018    Procedure: colonoscopy;  Surgeon: Sesar Dos Santos MD;  Location: [facility]     COLONOSCOPY N/A 3/3/2020    Procedure: COLONOSCOPY, WITH POLYPECTOMY AND BIOPSY;  Surgeon: Sesar Dos Santos MD;  Location: [facility]     DILATION AND CURETTAGE, OPERATIVE HYSTEROSCOPY, COMBINED N/A 2/29/2016    Procedure: COMBINED DILATION AND CURETTAGE, OPERATIVE HYSTEROSCOPY;  Surgeon: Panda Knight MD;  Location: [facility]     GYN SURGERY  1995    tubes tied     LAPAROSCOPY DIAGNOSTIC (GYN) Right 7/29/2015    Procedure: LAPAROSCOPY DIAGNOSTIC (GYN);  Surgeon: Lian Betancur DO;  Location: [facility]       Physical Exam    No flowsheet data found.    GENERAL: Alert and oriented to time place and person. Seated comfortably. In no distress.  HEAD: Atraumatic and normocephalic. No alopecia.  EYES: ABIGAIL, EOMI. No erythema. No icterus.  ORAL CAVITY: Moist. No mucosal lesion or tonsillar enlargement noted.  LYMPH NODES: No palpable supraclavicular, cervical lymphadenopathy.  CHEST: clear to auscultation bilaterally. Resonant to percussion throughout bilaterally. Symmetrical breath movements bilaterally.  CVS: S1 and S2 are heard. Regular rate and rhythm. No murmur or gallop or rub heard.  ABDOMEN: Soft. Not tender. Not distended. No palpable hepatomegaly or splenomegaly. No other mass palpable. Bowel sounds present.    Lab Results    None    Imaging    None    Assessment/Plan  1. Anal squamous cell cancer  Tolerating treatment well, thus far.   She will return on day 29 (5/7/2020) for her next cycle.     2.   Chemotherapy-induced stomatitis  Well-managed with rinses and Magic Mouthwash. Eating and drinking well. Discussed modified soft diet and supplement drinks when needed.    Billing  Total face to face time 15 minutes, with 10 minutes of that dedicated to counseling, education or coordination of care.     Signed  "by: Ashly Espino NP      Oncology Rooming Note    April 17, 2020 3:28 PM   Susu Lagos is a 54 year old female who presents for:    Chief Complaint   Patient presents with     Oncology Clinic Visit     1 week follow up anal squamous cell carcinoma.      Initial Vitals: Temp 97.2  F (36.2  C) (Tympanic)   Resp 16   Ht 1.638 m (5' 4.5\")   Wt 77.7 kg (171 lb 6.4 oz)   LMP 01/29/2016   Breastfeeding No   BMI 28.97 kg/m   Estimated body mass index is 28.97 kg/m  as calculated from the following:    Height as of this encounter: 1.638 m (5' 4.5\").    Weight as of this encounter: 77.7 kg (171 lb 6.4 oz). Body surface area is 1.88 meters squared.  No Pain (0) Comment: Data Unavailable   Patient's last menstrual period was 01/29/2016.  Allergies reviewed: Yes  Medications reviewed: Yes    Medications: Medication refills not needed today.  Pharmacy name entered into [facility]:    [facility], [place] - [address] [place] [address] PHARMACY [id number] [place], [place] - [address] [facility]    Clinical concerns: 1 week follow up anal squamous cell carcinoma.       Dionna Singh, [facility]              Again, thank you for allowing me to participate in the care of your patient.        Sincerely,        Ashly Espino NP    "

## 2020-04-20 ENCOUNTER — APPOINTMENT (OUTPATIENT)
Dept: RADIATION THERAPY | Facility: OUTPATIENT CENTER | Age: 55
End: 2020-04-20
Payer: COMMERCIAL

## 2020-04-21 ENCOUNTER — APPOINTMENT (OUTPATIENT)
Dept: RADIATION THERAPY | Facility: OUTPATIENT CENTER | Age: 55
End: 2020-04-21
Payer: COMMERCIAL

## 2020-04-22 ENCOUNTER — OFFICE VISIT (OUTPATIENT)
Dept: RADIATION THERAPY | Facility: OUTPATIENT CENTER | Age: 55
End: 2020-04-22
Payer: COMMERCIAL

## 2020-04-22 ENCOUNTER — APPOINTMENT (OUTPATIENT)
Dept: RADIATION THERAPY | Facility: OUTPATIENT CENTER | Age: 55
End: 2020-04-22
Payer: COMMERCIAL

## 2020-04-22 DIAGNOSIS — G89.3 CANCER RELATED PAIN: ICD-10-CM

## 2020-04-22 DIAGNOSIS — L58.9 RADIATION DERMATITIS: Primary | ICD-10-CM

## 2020-04-22 RX ORDER — SILVER SULFADIAZINE 10 MG/G
CREAM TOPICAL 2 TIMES DAILY
Qty: 50 G | Refills: 1 | Status: ON HOLD | OUTPATIENT
Start: 2020-04-22 | End: 2020-05-20

## 2020-04-22 RX ORDER — OXYCODONE HYDROCHLORIDE 5 MG/1
5 TABLET ORAL EVERY 6 HOURS PRN
Qty: 60 TABLET | Refills: 0 | Status: ON HOLD | OUTPATIENT
Start: 2020-04-22 | End: 2020-05-20

## 2020-04-22 NOTE — PROGRESS NOTES
[facility] PHYSICIANS  SPECIALIZING IN BREAKTHROUGHS  Radiation Oncology    On Treatment Visit Note      Susu Lagos      Date: 2020   MRN: [id number]   : 1965  Diagnosis: Anal ca      Reason for Visit:  On Radiation Treatment Visit     Treatment Summary to Date  Treatment Site: pelvis Current Dose: 1800/5040 cGy Fractions: 10/28      Chemotherapy  Chemo concurrent with radx?: Yes  Oncologist: Dr. Real  Drug Name/Frequency 1: 5FU  Drug Name/Frequency 1: Mitomycin    Subjective:   Doing well. Mild tunde-anal pain. Intermittent diarrhea, controlled with Imodium. No bleeding. Oral mouth sores from chemo improved.     Nursing ROS:   Nutrition Alteration  Diet Type: Patient's Preference  Skin  Skin Reaction: 0 - No changes     ENT and Mouth Exam  ENT/Mouth Note: has mouth sores secondary to chemo infusion - doing salt/soda  Cardiovascular  Respiratory effort: 1 - Normal - without distress  Gastrointestinal  Diarrhea: 0 - None  Genitourinary  Urinary Status: 0 - Normal     Pain Assessment  0-10 Pain Scale: 0      Objective:     Mild erythema without desquamation in tunde-anal, tunde-vaginal, and groin.    Labs:  CBC RESULTS:   Recent Labs   Lab Test 20  0915   WBC 6.0   RBC 4.55   HGB 14.0   HCT 42.4   MCV 93   MCH 30.8   MCHC 33.0   RDW 11.7        ELECTROLYTES:  Recent Labs   Lab Test 2015      POTASSIUM 4.1   CHLORIDE 111*   BARBIE 8.9   CO2 28   BUN 18   CR 0.58   GLC 93       Assessment:  Ms. 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  She is undergoing definitve CRT.     Tolerating radiation therapy well.  All questions and concerns addressed.    Plan:   1. Continue current therapy.    2. Mucositis from chemo. Magic mouthwash PRN.   3. Skin care. Aquaphor, Squirt bottle for irrigation, baby wipes. Will send morphine gel to pharmacy. Silvadene BID.   4. Cancer related pain. Oxycodone.       Mosaiq  chart and setup information reviewed  Ports checked    Medication Review  Med list reviewed with patient?: Yes    Educational Topic Discussed  Education Instructions: reviewed skin care - creams, sitz baths      Tyree Remy MD

## 2020-04-22 NOTE — LETTER
2020      RE: Susu Lagos  [address]  [facility] [place] [address]       [facility] PHYSICIANS  SPECIALIZING IN BREAKTHROUGHS  Radiation Oncology    On Treatment Visit Note      Susu Lagos      Date: 2020   MRN: [id number]   : 1965  Diagnosis: Anal ca      Reason for Visit:  On Radiation Treatment Visit     Treatment Summary to Date  Treatment Site: pelvis Current Dose: 1800/5040 cGy Fractions: 10/28      Chemotherapy  Chemo concurrent with radx?: Yes  Oncologist: Dr. Real  Drug Name/Frequency 1: 5FU  Drug Name/Frequency 1: Mitomycin    Subjective:   Doing well. Mild tunde-anal pain. Intermittent diarrhea, controlled with Imodium. No bleeding. Oral mouth sores from chemo improved.     Nursing ROS:   Nutrition Alteration  Diet Type: Patient's Preference  Skin  Skin Reaction: 0 - No changes     ENT and Mouth Exam  ENT/Mouth Note: has mouth sores secondary to chemo infusion - doing salt/soda  Cardiovascular  Respiratory effort: 1 - Normal - without distress  Gastrointestinal  Diarrhea: 0 - None  Genitourinary  Urinary Status: 0 - Normal     Pain Assessment  0-10 Pain Scale: 0      Objective:     Mild erythema without desquamation in tunde-anal, tunde-vaginal, and groin.    Labs:  CBC RESULTS:   Recent Labs   Lab Test 20  0915   WBC 6.0   RBC 4.55   HGB 14.0   HCT 42.4   MCV 93   MCH 30.8   MCHC 33.0   RDW 11.7        ELECTROLYTES:  Recent Labs   Lab Test 20  0915      POTASSIUM 4.1   CHLORIDE 111*   BARBIE 8.9   CO2 28   BUN 18   CR 0.58   GLC 93       Assessment:  Ms. 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  She is undergoing definitve CRT.     Tolerating radiation therapy well.  All questions and concerns addressed.    Plan:   1. Continue current therapy.    2. Mucositis from chemo. Magic mouthwash PRN.   3. Skin care. Aquaphor, Squirt bottle for irrigation, baby wipes. Will send morphine gel  to pharmacy. Silvadene BID.   4. Cancer related pain. Oxycodone.       Mosaiq chart and setup information reviewed  Ports checked    Medication Review  Med list reviewed with patient?: Yes    Educational Topic Discussed  Education Instructions: reviewed skin care - creams, sitz baths      MD Tyree Kuo MD

## 2020-04-23 ENCOUNTER — APPOINTMENT (OUTPATIENT)
Dept: RADIATION THERAPY | Facility: OUTPATIENT CENTER | Age: 55
End: 2020-04-23
Payer: COMMERCIAL

## 2020-04-23 ENCOUNTER — INFUSION THERAPY VISIT (OUTPATIENT)
Dept: INFUSION THERAPY | Facility: CLINIC | Age: 55
End: 2020-04-23
Attending: INTERNAL MEDICINE
Payer: COMMERCIAL

## 2020-04-23 DIAGNOSIS — C21.0 ANAL SQUAMOUS CELL CARCINOMA (H): Primary | ICD-10-CM

## 2020-04-23 PROCEDURE — 40000268 ZZH STATISTIC NO CHARGES

## 2020-04-23 NOTE — PROGRESS NOTES
Infusion Nursing Note:  Susu Lagos presents today for PICC dsg change.    Patient seen by provider today: No   present during visit today: Not Applicable.    Note: Dressing change per policy/procedure    Intravenous Access:  PICC.    Treatment Conditions:  Not Applicable.      Post Infusion Assessment:  Site patent and intact, free from redness, edema or discomfort.  No evidence of extravasations.       Discharge Plan:   Patient discharged in stable condition accompanied by: self.  Departure Mode: Ambulatory.    Gurvinder Panda RN

## 2020-04-24 ENCOUNTER — APPOINTMENT (OUTPATIENT)
Dept: RADIATION THERAPY | Facility: OUTPATIENT CENTER | Age: 55
End: 2020-04-24
Payer: COMMERCIAL

## 2020-04-27 ENCOUNTER — APPOINTMENT (OUTPATIENT)
Dept: RADIATION THERAPY | Facility: OUTPATIENT CENTER | Age: 55
End: 2020-04-27
Payer: COMMERCIAL

## 2020-04-28 ENCOUNTER — APPOINTMENT (OUTPATIENT)
Dept: RADIATION THERAPY | Facility: OUTPATIENT CENTER | Age: 55
End: 2020-04-28
Payer: COMMERCIAL

## 2020-04-29 ENCOUNTER — OFFICE VISIT (OUTPATIENT)
Dept: RADIATION THERAPY | Facility: OUTPATIENT CENTER | Age: 55
End: 2020-04-29
Payer: COMMERCIAL

## 2020-04-29 ENCOUNTER — APPOINTMENT (OUTPATIENT)
Dept: RADIATION THERAPY | Facility: OUTPATIENT CENTER | Age: 55
End: 2020-04-29
Payer: COMMERCIAL

## 2020-04-29 VITALS
DIASTOLIC BLOOD PRESSURE: 76 MMHG | WEIGHT: 165 LBS | BODY MASS INDEX: 27.88 KG/M2 | SYSTOLIC BLOOD PRESSURE: 120 MMHG | HEART RATE: 85 BPM

## 2020-04-29 DIAGNOSIS — C21.0 ANAL SQUAMOUS CELL CARCINOMA (H): Primary | ICD-10-CM

## 2020-04-29 NOTE — PROGRESS NOTES
[facility] PHYSICIANS  SPECIALIZING IN BREAKTHROUGHS  Radiation Oncology    On Treatment Visit Note      Susu Lagos      Date: 2020   MRN: [id number]   : 1965  Diagnosis: Anal ca      Reason for Visit:  On Radiation Treatment Visit     Treatment Summary to Date  Treatment Site: pelvis Current Dose: 2700/5040 cGy Fractions: 15/28      Chemotherapy  Chemo concurrent with radx?: Yes  Oncologist: Dr. Real  Drug Name/Frequency 1: 5FU  Drug Name/Frequency 1: Mitomycin    Subjective:   Moderate tunde-anal pain. Intermittent diarrhea, controlled with Imodium. Some intermittent bleeding.       Nursing ROS:   Nutrition Alteration  Diet Type: Patient's Preference  Skin  Skin Reaction: 0 - No changes     ENT and Mouth Exam  ENT/Mouth Note: has mouth sores secondary to chemo infusion - doing salt/soda  Cardiovascular  Respiratory effort: 1 - Normal - without distress  Gastrointestinal  Diarrhea: 0 - None  Genitourinary  Urinary Status: 0 - Normal     Pain Assessment  0-10 Pain Scale: 0      Objective:     Mild erythema without desquamation in tunde-anal, tunde-vaginal, and groin.    Labs:  CBC RESULTS:   Recent Labs   Lab Test 20  0915   WBC 6.0   RBC 4.55   HGB 14.0   HCT 42.4   MCV 93   MCH 30.8   MCHC 33.0   RDW 11.7        ELECTROLYTES:  Recent Labs   Lab Test 20  0915      POTASSIUM 4.1   CHLORIDE 111*   BARBIE 8.9   CO2 28   BUN 18   CR 0.58   GLC 93       Assessment:  Ms. 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  She is undergoing definitve CRT.     Tolerating radiation therapy well.  All questions and concerns addressed.    Plan:   1. Continue current therapy.    2. Skin care. Aquaphor, Squirt bottle for irrigation, baby wipes.  Morphine gel PRN. Silvadene BID.   3. Cancer related pain. Oxycodone.       Mosaiq chart and setup information reviewed  Ports checked    Medication Review  Med list reviewed  with patient?: Yes    Educational Topic Discussed  Education Instructions: reviewed skin care - creams, sitz baths      Tyree Remy MD

## 2020-04-29 NOTE — LETTER
2020      RE: Susu Lagos  [address]  [facility] [address]       [facility] PHYSICIANS  SPECIALIZING IN BREAKTHROUGHS  Radiation Oncology    On Treatment Visit Note      Susu Lagos      Date: 2020   MRN: [id number]   : 1965  Diagnosis: Anal ca      Reason for Visit:  On Radiation Treatment Visit     Treatment Summary to Date  Treatment Site: pelvis Current Dose: 2700/5040 cGy Fractions: 15/28      Chemotherapy  Chemo concurrent with radx?: Yes  Oncologist: Dr. Real  Drug Name/Frequency 1: 5FU  Drug Name/Frequency 1: Mitomycin    Subjective:   Moderate tunde-anal pain. Intermittent diarrhea, controlled with Imodium. Some intermittent bleeding.       Nursing ROS:   Nutrition Alteration  Diet Type: Patient's Preference  Skin  Skin Reaction: 0 - No changes     ENT and Mouth Exam  ENT/Mouth Note: has mouth sores secondary to chemo infusion - doing salt/soda  Cardiovascular  Respiratory effort: 1 - Normal - without distress  Gastrointestinal  Diarrhea: 0 - None  Genitourinary  Urinary Status: 0 - Normal     Pain Assessment  0-10 Pain Scale: 0      Objective:     Mild erythema without desquamation in tunde-anal, tunde-vaginal, and groin.    Labs:  CBC RESULTS:   Recent Labs   Lab Test 20  0915   WBC 6.0   RBC 4.55   HGB 14.0   HCT 42.4   MCV 93   MCH 30.8   MCHC 33.0   RDW 11.7        ELECTROLYTES:  Recent Labs   Lab Test 20  0915      POTASSIUM 4.1   CHLORIDE 111*   BARBIE 8.9   CO2 28   BUN 18   CR 0.58   GLC 93       Assessment:  Ms. 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  She is undergoing definitve CRT.     Tolerating radiation therapy well.  All questions and concerns addressed.    Plan:   1. Continue current therapy.    2. Skin care. Aquaphor, Squirt bottle for irrigation, baby wipes.  Morphine gel PRN. Silvadene BID.   3. Cancer related pain. Oxycodone.       Mosaiq chart and setup  information reviewed  Ports checked    Medication Review  Med list reviewed with patient?: Yes    Educational Topic Discussed  Education Instructions: reviewed skin care - creams, sitz baths      Tyree Remy MD

## 2020-04-30 ENCOUNTER — APPOINTMENT (OUTPATIENT)
Dept: RADIATION THERAPY | Facility: OUTPATIENT CENTER | Age: 55
End: 2020-04-30
Payer: COMMERCIAL

## 2020-04-30 ENCOUNTER — INFUSION THERAPY VISIT (OUTPATIENT)
Dept: INFUSION THERAPY | Facility: CLINIC | Age: 55
End: 2020-04-30
Attending: INTERNAL MEDICINE
Payer: COMMERCIAL

## 2020-04-30 DIAGNOSIS — C21.0 ANAL SQUAMOUS CELL CARCINOMA (H): Primary | ICD-10-CM

## 2020-04-30 PROCEDURE — 40000269 ZZH STATISTIC NO CHARGE FACILITY FEE

## 2020-05-01 ENCOUNTER — APPOINTMENT (OUTPATIENT)
Dept: RADIATION THERAPY | Facility: OUTPATIENT CENTER | Age: 55
End: 2020-05-01
Payer: COMMERCIAL

## 2020-05-04 ENCOUNTER — APPOINTMENT (OUTPATIENT)
Dept: RADIATION THERAPY | Facility: OUTPATIENT CENTER | Age: 55
End: 2020-05-04
Payer: COMMERCIAL

## 2020-05-04 DIAGNOSIS — G89.3 CANCER RELATED PAIN: Primary | ICD-10-CM

## 2020-05-04 RX ORDER — MORPHINE SULFATE 15 MG/1
15 TABLET, FILM COATED, EXTENDED RELEASE ORAL EVERY 12 HOURS
Qty: 60 TABLET | Refills: 0 | Status: ON HOLD | OUTPATIENT
Start: 2020-05-04 | End: 2020-05-20

## 2020-05-05 ENCOUNTER — APPOINTMENT (OUTPATIENT)
Dept: RADIATION THERAPY | Facility: OUTPATIENT CENTER | Age: 55
End: 2020-05-05
Payer: COMMERCIAL

## 2020-05-06 ENCOUNTER — INFUSION THERAPY VISIT (OUTPATIENT)
Dept: INFUSION THERAPY | Facility: CLINIC | Age: 55
End: 2020-05-06
Attending: INTERNAL MEDICINE
Payer: COMMERCIAL

## 2020-05-06 ENCOUNTER — OFFICE VISIT (OUTPATIENT)
Dept: RADIATION THERAPY | Facility: OUTPATIENT CENTER | Age: 55
End: 2020-05-06
Payer: COMMERCIAL

## 2020-05-06 ENCOUNTER — APPOINTMENT (OUTPATIENT)
Dept: RADIATION THERAPY | Facility: OUTPATIENT CENTER | Age: 55
End: 2020-05-06
Payer: COMMERCIAL

## 2020-05-06 ENCOUNTER — ONCOLOGY VISIT (OUTPATIENT)
Dept: ONCOLOGY | Facility: CLINIC | Age: 55
End: 2020-05-06
Attending: INTERNAL MEDICINE
Payer: COMMERCIAL

## 2020-05-06 VITALS
TEMPERATURE: 98.9 F | HEIGHT: 64 IN | DIASTOLIC BLOOD PRESSURE: 77 MMHG | WEIGHT: 167.5 LBS | RESPIRATION RATE: 16 BRPM | SYSTOLIC BLOOD PRESSURE: 111 MMHG | BODY MASS INDEX: 28.6 KG/M2 | OXYGEN SATURATION: 95 % | HEART RATE: 76 BPM

## 2020-05-06 VITALS
SYSTOLIC BLOOD PRESSURE: 111 MMHG | WEIGHT: 165 LBS | HEART RATE: 78 BPM | DIASTOLIC BLOOD PRESSURE: 77 MMHG | BODY MASS INDEX: 27.88 KG/M2

## 2020-05-06 DIAGNOSIS — C21.0 ANAL CANCER (H): Primary | ICD-10-CM

## 2020-05-06 DIAGNOSIS — E78.5 HYPERLIPIDEMIA WITH TARGET LDL LESS THAN 160: ICD-10-CM

## 2020-05-06 DIAGNOSIS — C21.0 ANAL SQUAMOUS CELL CARCINOMA (H): ICD-10-CM

## 2020-05-06 PROCEDURE — 99214 OFFICE O/P EST MOD 30 MIN: CPT | Performed by: INTERNAL MEDICINE

## 2020-05-06 PROCEDURE — G0463 HOSPITAL OUTPT CLINIC VISIT: HCPCS

## 2020-05-06 ASSESSMENT — MIFFLIN-ST. JEOR: SCORE: 1362.53

## 2020-05-06 NOTE — LETTER
2020      RE: Susu Lagos  [address]  [facility] [address]       [facility] PHYSICIANS  SPECIALIZING IN BREAKTHROUGHS  Radiation Oncology    On Treatment Visit Note      Susu Lagos      Date: 2020   MRN: [id number]   : 1965  Diagnosis: Anal ca      Reason for Visit:  On Radiation Treatment Visit     Treatment Summary to Date  Treatment Site: pelvis Current Dose: 3600/5040 cGy Fractions:       Chemotherapy  Chemo concurrent with radx?: Yes  Oncologist: Dr. Real  Drug Name/Frequency 1: 5FU  Drug Name/Frequency 1: Mitomycin    Subjective:   Moderate tunde-anal pain. Morphine 15 mg ER x 1 in AM. Oxycodone solution PRN (2-3x). Intermittent diarrhea, controlled with Imodium. Some intermittent bleeding.       Nursing ROS:   Nutrition Alteration  Diet Type: Patient's Preference  Skin  Skin Reaction: 0 - No changes     ENT and Mouth Exam  ENT/Mouth Note: has mouth sores secondary to chemo infusion - doing salt/soda  Cardiovascular  Respiratory effort: 1 - Normal - without distress  Gastrointestinal  Diarrhea: 0 - None  Genitourinary  Urinary Status: 0 - Normal     Pain Assessment  0-10 Pain Scale: 0      Objective:     Mild erythema with small area of  desquamation in tunde-anal region. No tunde-vaginal; or groin desquamation.    Labs:  Lab Results   Component Value Date    WBC 4.0 2020     Lab Results   Component Value Date    RBC 4.22 2020     Lab Results   Component Value Date    HGB 13.2 2020     Lab Results   Component Value Date    HCT 39.5 2020     No components found for: MCT  Lab Results   Component Value Date    MCV 94 2020     Lab Results   Component Value Date    MCH 31.3 2020     Lab Results   Component Value Date    MCHC 33.4 2020     Lab Results   Component Value Date    RDW 12.7 2020     Lab Results   Component Value Date     2020         Assessment:  Ms. Lagos is a 54 year old female a diagnosis  of squamous cell carcinoma of the anal canal, clinical T1N0.  She is undergoing definitve CRT.     Tolerating radiation therapy well.  All questions and concerns addressed.    Plan:   1. Continue current therapy.    2. Skin care. Aquaphor, Squirt bottle for irrigation, baby wipes.  Morphine gel PRN. Silvadene BID.   3. Cancer related pain. Oxycodone PRN. Morphine 15 mg ER BID (using once daily currently).      Mosaiq chart and setup information reviewed  Ports checked    Medication Review  Med list reviewed with patient?: Yes    Educational Topic Discussed  Education Instructions: reviewed skin care - creams, sitz baths      Tyree Remy MD

## 2020-05-06 NOTE — PROGRESS NOTES
"Oncology Rooming Note    May 6, 2020 3:34 PM   Susu Lagos is a 54 year old female who presents for:    Chief Complaint   Patient presents with     Oncology Clinic Visit     Recheck Anal squamous cell carcinoma, review labs / Chemo tomorrow 5-7-20     Initial Vitals: /77 (BP Location: Left arm, Patient Position: Sitting, Cuff Size: Adult Regular)   Pulse 76   Temp 98.9  F (37.2  C) (Tympanic)   Resp 16   Ht 1.638 m (5' 4.49\")   Wt 76 kg (167 lb 8 oz)   LMP 01/29/2016   SpO2 95%   BMI 28.32 kg/m   Estimated body mass index is 28.32 kg/m  as calculated from the following:    Height as of this encounter: 1.638 m (5' 4.49\").    Weight as of this encounter: 76 kg (167 lb 8 oz). Body surface area is 1.86 meters squared.  Moderate Pain (5) Comment: Data Unavailable   Patient's last menstrual period was 01/29/2016.  Allergies reviewed: Yes  Medications reviewed: Yes    Medications: Medication refills not needed today.  Pharmacy name entered into [facility]:    [facility] [place], [place] - [address] [place] [address]      Clinical concerns: Recheck Anal squamous cell carcinoma, review labs / Chemo tomorrow 5-7-20.      Elizabeth Boland [facility]              "

## 2020-05-06 NOTE — PROGRESS NOTES
[facility] PHYSICIANS  SPECIALIZING IN BREAKTHROUGHS  Radiation Oncology    On Treatment Visit Note      Susu Lagos      Date: 2020   MRN: [id number]   : 1965  Diagnosis: Anal ca      Reason for Visit:  On Radiation Treatment Visit     Treatment Summary to Date  Treatment Site: pelvis Current Dose: 3600/5040 cGy Fractions:       Chemotherapy  Chemo concurrent with radx?: Yes  Oncologist: Dr. Real  Drug Name/Frequency 1: 5FU  Drug Name/Frequency 1: Mitomycin    Subjective:   Moderate tunde-anal pain. Morphine 15 mg ER x 1 in AM. Oxycodone solution PRN (2-3x). Intermittent diarrhea, controlled with Imodium. Some intermittent bleeding.       Nursing ROS:   Nutrition Alteration  Diet Type: Patient's Preference  Skin  Skin Reaction: 0 - No changes     ENT and Mouth Exam  ENT/Mouth Note: has mouth sores secondary to chemo infusion - doing salt/soda  Cardiovascular  Respiratory effort: 1 - Normal - without distress  Gastrointestinal  Diarrhea: 0 - None  Genitourinary  Urinary Status: 0 - Normal     Pain Assessment  0-10 Pain Scale: 0      Objective:     Mild erythema with small area of  desquamation in tunde-anal region. No tunde-vaginal; or groin desquamation.    Labs:  Lab Results   Component Value Date    WBC 4.0 2020     Lab Results   Component Value Date    RBC 4.22 2020     Lab Results   Component Value Date    HGB 13.2 2020     Lab Results   Component Value Date    HCT 39.5 2020     No components found for: MCT  Lab Results   Component Value Date    MCV 94 2020     Lab Results   Component Value Date    MCH 31.3 2020     Lab Results   Component Value Date    MCHC 33.4 2020     Lab Results   Component Value Date    RDW 12.7 2020     Lab Results   Component Value Date     2020         Assessment:  Ms. 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  She is undergoing  definitve CRT.     Tolerating radiation therapy well.  All questions and concerns addressed.    Plan:   1. Continue current therapy.    2. Skin care. Aquaphor, Squirt bottle for irrigation, baby wipes.  Morphine gel PRN. Silvadene BID.   3. Cancer related pain. Oxycodone PRN. Morphine 15 mg ER BID (using once daily currently).      Mosaiq chart and setup information reviewed  Ports checked    Medication Review  Med list reviewed with patient?: Yes    Educational Topic Discussed  Education Instructions: reviewed skin care - creams, sitz baths      Tyree Remy MD

## 2020-05-06 NOTE — LETTER
"    5/6/2020         RE: Susu Lagos  [address]  [facility] [address]        Dear Colleague,    Thank you for referring your patient, Susu Lagos, to the [facility]. Please see a copy of my visit note below.    Oncology Rooming Note    May 6, 2020 3:34 PM   Susu Lagos is a 54 year old female who presents for:    Chief Complaint   Patient presents with     Oncology Clinic Visit     Recheck Anal squamous cell carcinoma, review labs / Chemo tomorrow 5-7-20     Initial Vitals: /77 (BP Location: Left arm, Patient Position: Sitting, Cuff Size: Adult Regular)   Pulse 76   Temp 98.9  F (37.2  C) (Tympanic)   Resp 16   Ht 1.638 m (5' 4.49\")   Wt 76 kg (167 lb 8 oz)   LMP 01/29/2016   SpO2 95%   BMI 28.32 kg/m   Estimated body mass index is 28.32 kg/m  as calculated from the following:    Height as of this encounter: 1.638 m (5' 4.49\").    Weight as of this encounter: 76 kg (167 lb 8 oz). Body surface area is 1.86 meters squared.  Moderate Pain (5) Comment: Data Unavailable   Patient's last menstrual period was 01/29/2016.  Allergies reviewed: Yes  Medications reviewed: Yes    Medications: Medication refills not needed today.  Pharmacy name entered into [employer]:    [facility] [place], [place] - [address] [place] [address]      Clinical concerns: Recheck Anal squamous cell carcinoma, review labs / Chemo tomorrow 5-7-20.      [facility]                Hematology/ Oncology Follow-up Visit:  May 6, 2020    Reason for Visit:   Chief Complaint   Patient presents with     Oncology Clinic Visit     Recheck Anal squamous cell carcinoma, review labs / Chemo tomorrow 5-7-20       Oncologic History:    Anal squamous cell carcinoma (H)  Susu Lagos presented to the primary care physician with 3 to 4 months history of blood in the stool.  Subsequently the patient was referred for colonoscopy on March 3, 2020.  The palpable rectal mass on digital examination.  Biopsy " came back positive for squamous cell carcinoma, focally keratinizing.  The patient has a previous colonoscopy on November 2018 which shows no abnormalities.  The patient was started on chemoradiation regimen with 5-FU and mitomycin.      Interval History:  Patient is here today for follow-up.  She has been tolerating radiation therapy very well.  She has mild fatigue.  She denies any pain or nausea or vomiting or diarrhea.  She denies any fever or chills.  She denies any shortness of breath or cough or wheezing.    Review Of Systems:  Constitutional: Negative for fever, chills, and night sweats.  Skin: negative.  Eyes: negative.  Ears/Nose/Throat: negative.  Respiratory: No shortness of breath, dyspnea on exertion, cough, or hemoptysis.  Cardiovascular: negative.  Gastrointestinal: negative.  Genitourinary: negative.  Musculoskeletal: negative.  Neurologic: negative.  Psychiatric: negative.  Hematologic/Lymphatic/Immunologic: negative.  Endocrine: negative.    All other ROS negative unless mentioned in interval history.    Past medical, social, surgical, and family histories reviewed.    Allergies:  Allergies as of 05/06/2020 - Reviewed 05/06/2020   Allergen Reaction Noted     Rabies vaccine Unknown 02/20/2015     Shellfish-derived products Nausea and Vomiting 12/29/2016     Sulfa drugs Rash 02/20/2015       Current Medications:  Current Outpatient Medications   Medication Sig Dispense Refill     magic mouthwash (ENTER INGREDIENTS IN COMMENTS) suspension Swish, gargle, and spit one to two teaspoonfuls every six hours as needed. 240 mL 3     Magnesium Oxide 250 MG TABS Take 2 tablets by mouth 2 times daily       morphine (MS CONTIN) 15 MG CR tablet Take 1 tablet (15 mg) by mouth every 12 hours maximum 2 tablet(s) per day 60 tablet 0     morphine 0.1% in intrasite topical gel Place 1 g onto the skin 4 times daily as needed for pain 100 g 0     Multiple Vitamin (MULTI-VITAMINS PO)        Omega-3 Fatty Acids (OMEGA 3  "PO)        ondansetron (ZOFRAN) 8 MG tablet Take 1 tablet (8 mg) by mouth every 8 hours as needed for nausea 30 tablet 0     oxyCODONE (ROXICODONE) 5 MG tablet Take 1 tablet (5 mg) by mouth every 6 hours as needed for severe pain 60 tablet 0     prochlorperazine (COMPAZINE) 10 MG tablet Take 1 tablet (10 mg) by mouth every 6 hours as needed (Nausea/Vomiting) 30 tablet 1     silver sulfADIAZINE (SILVADENE) 1 % external cream Apply topically 2 times daily 50 g 1        Physical Exam:  /77 (BP Location: Left arm, Patient Position: Sitting, Cuff Size: Adult Regular)   Pulse 76   Temp 98.9  F (37.2  C) (Tympanic)   Resp 16   Ht 1.638 m (5' 4.49\")   Wt 76 kg (167 lb 8 oz)   LMP 01/29/2016   SpO2 95%   BMI 28.32 kg/m    Wt Readings from Last 12 Encounters:   05/06/20 76 kg (167 lb 8 oz)   05/06/20 74.8 kg (165 lb)   04/29/20 74.8 kg (165 lb)   04/17/20 77.7 kg (171 lb 6.4 oz)   04/15/20 76.7 kg (169 lb)   03/31/20 78.3 kg (172 lb 9.6 oz)   03/25/20 78.3 kg (172 lb 9.6 oz)   03/24/20 78.3 kg (172 lb 9.6 oz)   03/13/20 76.2 kg (168 lb)   03/11/20 77.7 kg (171 lb 4.8 oz)   03/03/20 75.3 kg (166 lb)   02/05/20 77.7 kg (171 lb 6.4 oz)     ECOG performance status: 1  GENERAL APPEARANCE: Healthy, alert and in no acute distress.  HEENT: Sclerae anicteric. PERRLA. Oropharynx without ulcers, lesions, or thrush.  NECK: Supple. No asymmetry or masses.  LYMPHATICS: No palpable cervical, supraclavicular, axillary, or inguinal lymphadenopathy.  RESP: Lungs clear to auscultation bilaterally without rales, rhonchi or wheezes.  CARDIOVASCULAR: Regular rate and rhythm. Normal S1, S2; no S3 or S4. No murmur, gallop, or rub.  ABDOMEN: Soft, nontender. Bowel sounds normal. No palpable organomegaly or masses.  MUSCULOSKELETAL: Extremities without gross deformities noted. No edema of bilateral lower extremities.  SKIN: No suspicious lesions or rashes.  NEURO: Alert and oriented x 3. Cranial nerves II-XII grossly " intact.  PSYCHIATRIC: Mentation and affect appear normal.    Laboratory/Imaging Studies:  No visits with results within 2 Week(s) from this visit.   Latest known visit with results is:   Infusion Therapy Visit on 04/09/2020   Component Date Value Ref Range Status     WBC 04/09/2020 6.0  4.0 - 11.0 10e9/L Final     RBC Count 04/09/2020 4.55  3.8 - 5.2 10e12/L Final     Hemoglobin 04/09/2020 14.0  11.7 - 15.7 g/dL Final     Hematocrit 04/09/2020 42.4  35.0 - 47.0 % Final     MCV 04/09/2020 93  78 - 100 fl Final     MCH 04/09/2020 30.8  26.5 - 33.0 pg Final     MCHC 04/09/2020 33.0  31.5 - 36.5 g/dL Final     RDW 04/09/2020 11.7  10.0 - 15.0 % Final     Platelet Count 04/09/2020 177  150 - 450 10e9/L Final     Diff Method 04/09/2020 Automated Method   Final     % Neutrophils 04/09/2020 57.3  % Final     % Lymphocytes 04/09/2020 31.0  % Final     % Monocytes 04/09/2020 8.3  % Final     % Eosinophils 04/09/2020 2.5  % Final     % Basophils 04/09/2020 0.7  % Final     % Immature Granulocytes 04/09/2020 0.2  % Final     Nucleated RBCs 04/09/2020 0  0 /100 Final     Absolute Neutrophil 04/09/2020 3.5  1.6 - 8.3 10e9/L Final     Absolute Lymphocytes 04/09/2020 1.9  0.8 - 5.3 10e9/L Final     Absolute Monocytes 04/09/2020 0.5  0.0 - 1.3 10e9/L Final     Absolute Eosinophils 04/09/2020 0.2  0.0 - 0.7 10e9/L Final     Absolute Basophils 04/09/2020 0.0  0.0 - 0.2 10e9/L Final     Abs Immature Granulocytes 04/09/2020 0.0  0 - 0.4 10e9/L Final     Absolute Nucleated RBC 04/09/2020 0.0   Final     Sodium 04/09/2020 142  133 - 144 mmol/L Final     Potassium 04/09/2020 4.1  3.4 - 5.3 mmol/L Final     Chloride 04/09/2020 111* 94 - 109 mmol/L Final     Carbon Dioxide 04/09/2020 28  20 - 32 mmol/L Final     Anion Gap 04/09/2020 3  3 - 14 mmol/L Final     Glucose 04/09/2020 93  70 - 99 mg/dL Final     Urea Nitrogen 04/09/2020 18  7 - 30 mg/dL Final     Creatinine 04/09/2020 0.58  0.52 - 1.04 mg/dL Final     GFR Estimate 04/09/2020 >90   >60 mL/min/[1.73_m2] Final    Comment: Non  GFR Calc  Starting 12/18/2018, serum creatinine based estimated GFR (eGFR) will be   calculated using the Chronic Kidney Disease Epidemiology Collaboration   (CKD-EPI) equation.       GFR Estimate If Black 04/09/2020 >90  >60 mL/min/[1.73_m2] Final    Comment:  GFR Calc  Starting 12/18/2018, serum creatinine based estimated GFR (eGFR) will be   calculated using the Chronic Kidney Disease Epidemiology Collaboration   (CKD-EPI) equation.       Calcium 04/09/2020 8.9  8.5 - 10.1 mg/dL Final     Bilirubin Total 04/09/2020 0.2  0.2 - 1.3 mg/dL Final     Albumin 04/09/2020 3.7  3.4 - 5.0 g/dL Final     Protein Total 04/09/2020 6.7* 6.8 - 8.8 g/dL Final     Alkaline Phosphatase 04/09/2020 39* 40 - 150 U/L Final     ALT 04/09/2020 74* 0 - 50 U/L Final     AST 04/09/2020 34  0 - 45 U/L Final          Assessment and plan:    (C21.0) Anal cancer (H)  (primary encounter diagnosis)  Patient has been tolerating treatment well.  We will continue with the current treatment plan.  I will see the patient again in 3 months time or sooner if there are new developments or concerns.    Cancer associated pain.  Patient currently on oxycodone 5 mg every 6 hours as needed for breakthrough pain.  She is also on MS Contin 15 mg once daily.    The patient is ready to learn, no apparent learning barriers were identified.  Diagnosis and treatment plans were explained to the patient. The patient expressed understanding of the content. The patient asked appropriate questions. The patient questions were answered to her satisfaction.    Chart documentation with Dragon Voice recognition Software. Although reviewed after completion, some words and grammatical errors may remain.      Again, thank you for allowing me to participate in the care of your patient.        Sincerely,        Sophia Real MD

## 2020-05-07 ENCOUNTER — INFUSION THERAPY VISIT (OUTPATIENT)
Dept: INFUSION THERAPY | Facility: CLINIC | Age: 55
End: 2020-05-07
Attending: INTERNAL MEDICINE
Payer: COMMERCIAL

## 2020-05-07 ENCOUNTER — APPOINTMENT (OUTPATIENT)
Dept: RADIATION THERAPY | Facility: OUTPATIENT CENTER | Age: 55
End: 2020-05-07
Payer: COMMERCIAL

## 2020-05-07 VITALS — DIASTOLIC BLOOD PRESSURE: 72 MMHG | SYSTOLIC BLOOD PRESSURE: 106 MMHG | TEMPERATURE: 96.5 F | HEART RATE: 80 BPM

## 2020-05-07 DIAGNOSIS — C21.0 ANAL SQUAMOUS CELL CARCINOMA (H): Primary | ICD-10-CM

## 2020-05-07 PROCEDURE — 96409 CHEMO IV PUSH SNGL DRUG: CPT

## 2020-05-07 PROCEDURE — 25000128 H RX IP 250 OP 636: Performed by: INTERNAL MEDICINE

## 2020-05-07 PROCEDURE — 96375 TX/PRO/DX INJ NEW DRUG ADDON: CPT

## 2020-05-07 PROCEDURE — G0498 CHEMO EXTEND IV INFUS W/PUMP: HCPCS

## 2020-05-07 PROCEDURE — 25800030 ZZH RX IP 258 OP 636: Performed by: INTERNAL MEDICINE

## 2020-05-07 RX ORDER — HEPARIN SODIUM (PORCINE) LOCK FLUSH IV SOLN 100 UNIT/ML 100 UNIT/ML
5 SOLUTION INTRAVENOUS
Status: CANCELLED | OUTPATIENT
Start: 2020-05-07

## 2020-05-07 RX ORDER — MITOMYCIN 5 MG/10ML
10 INJECTION, POWDER, LYOPHILIZED, FOR SOLUTION INTRAVENOUS ONCE
Status: COMPLETED | OUTPATIENT
Start: 2020-05-07 | End: 2020-05-07

## 2020-05-07 RX ADMIN — FAMOTIDINE 20 MG: 20 INJECTION, SOLUTION INTRAVENOUS at 11:15

## 2020-05-07 RX ADMIN — MITOMYCIN 19 MG: 20 INJECTION, POWDER, LYOPHILIZED, FOR SOLUTION INTRAVENOUS at 11:59

## 2020-05-07 RX ADMIN — DEXAMETHASONE SODIUM PHOSPHATE 12 MG: 10 INJECTION, SOLUTION INTRAMUSCULAR; INTRAVENOUS at 11:27

## 2020-05-07 RX ADMIN — SODIUM CHLORIDE 250 ML: 9 INJECTION, SOLUTION INTRAVENOUS at 11:16

## 2020-05-07 NOTE — PROGRESS NOTES
Infusion Nursing Note:  Susu Lagos presents today for Mitomycin/5FU.    Patient seen by provider today: No   present during visit today: Not Applicable.    Note: N/A.    Intravenous Access:  PICC.    Treatment Conditions:  Lab Results   Component Value Date    HGB 13.2 05/06/2020     Lab Results   Component Value Date    WBC 4.0 05/06/2020      Lab Results   Component Value Date    ANEU 2.7 05/06/2020     Lab Results   Component Value Date     05/06/2020      Results reviewed, labs MET treatment parameters, ok to proceed with treatment.      Post Infusion Assessment:  Patient tolerated infusion without incident.  Blood return noted pre and post infusion.  Site patent and intact, free from redness, edema or discomfort.  No evidence of extravasations.       Discharge Plan:   Patient discharged in stable condition accompanied by: self.  Departure Mode: Ambulatory. Pt will return after radiation on Monday per her request at 1500.    Joaquim Cherry RN

## 2020-05-08 ENCOUNTER — APPOINTMENT (OUTPATIENT)
Dept: RADIATION THERAPY | Facility: OUTPATIENT CENTER | Age: 55
End: 2020-05-08
Payer: COMMERCIAL

## 2020-05-08 RX ORDER — HEPARIN SODIUM (PORCINE) LOCK FLUSH IV SOLN 100 UNIT/ML 100 UNIT/ML
5 SOLUTION INTRAVENOUS
Status: CANCELLED | OUTPATIENT
Start: 2020-05-11

## 2020-05-11 ENCOUNTER — INFUSION THERAPY VISIT (OUTPATIENT)
Dept: INFUSION THERAPY | Facility: CLINIC | Age: 55
End: 2020-05-11
Attending: INTERNAL MEDICINE
Payer: COMMERCIAL

## 2020-05-11 ENCOUNTER — APPOINTMENT (OUTPATIENT)
Dept: RADIATION THERAPY | Facility: OUTPATIENT CENTER | Age: 55
End: 2020-05-11
Payer: COMMERCIAL

## 2020-05-11 VITALS — SYSTOLIC BLOOD PRESSURE: 116 MMHG | HEART RATE: 88 BPM | DIASTOLIC BLOOD PRESSURE: 74 MMHG | TEMPERATURE: 97.6 F

## 2020-05-11 DIAGNOSIS — C21.0 ANAL SQUAMOUS CELL CARCINOMA (H): Primary | ICD-10-CM

## 2020-05-11 PROCEDURE — 25800030 ZZH RX IP 258 OP 636: Performed by: INTERNAL MEDICINE

## 2020-05-11 PROCEDURE — 96360 HYDRATION IV INFUSION INIT: CPT

## 2020-05-11 RX ORDER — HEPARIN SODIUM (PORCINE) LOCK FLUSH IV SOLN 100 UNIT/ML 100 UNIT/ML
5 SOLUTION INTRAVENOUS
Status: DISCONTINUED | OUTPATIENT
Start: 2020-05-11 | End: 2020-05-11 | Stop reason: HOSPADM

## 2020-05-11 RX ADMIN — SODIUM CHLORIDE 1000 ML: 9 INJECTION, SOLUTION INTRAVENOUS at 15:35

## 2020-05-11 NOTE — ASSESSMENT & PLAN NOTE
Susu Lagos presented to the primary care physician with 3 to 4 months history of blood in the stool.  Subsequently the patient was referred for colonoscopy on March 3, 2020.  The palpable rectal mass on digital examination.  Biopsy came back positive for squamous cell carcinoma, focally keratinizing.  The patient has a previous colonoscopy on November 2018 which shows no abnormalities.  The patient was started on chemoradiation regimen with 5-FU and mitomycin.

## 2020-05-11 NOTE — PROGRESS NOTES
Hematology/ Oncology Follow-up Visit:  May 6, 2020    Reason for Visit:   Chief Complaint   Patient presents with     Oncology Clinic Visit     Recheck Anal squamous cell carcinoma, review labs / Chemo tomorrow 5-7-20       Oncologic History:    Anal squamous cell carcinoma (H)  Susu Lagos presented to the primary care physician with 3 to 4 months history of blood in the stool.  Subsequently the patient was referred for colonoscopy on March 3, 2020.  The palpable rectal mass on digital examination.  Biopsy came back positive for squamous cell carcinoma, focally keratinizing.  The patient has a previous colonoscopy on November 2018 which shows no abnormalities.  The patient was started on chemoradiation regimen with 5-FU and mitomycin.      Interval History:  Patient is here today for follow-up.  She has been tolerating radiation therapy very well.  She has mild fatigue.  She denies any pain or nausea or vomiting or diarrhea.  She denies any fever or chills.  She denies any shortness of breath or cough or wheezing.    Review Of Systems:  Constitutional: Negative for fever, chills, and night sweats.  Skin: negative.  Eyes: negative.  Ears/Nose/Throat: negative.  Respiratory: No shortness of breath, dyspnea on exertion, cough, or hemoptysis.  Cardiovascular: negative.  Gastrointestinal: negative.  Genitourinary: negative.  Musculoskeletal: negative.  Neurologic: negative.  Psychiatric: negative.  Hematologic/Lymphatic/Immunologic: negative.  Endocrine: negative.    All other ROS negative unless mentioned in interval history.    Past medical, social, surgical, and family histories reviewed.    Allergies:  Allergies as of 05/06/2020 - Reviewed 05/06/2020   Allergen Reaction Noted     Rabies vaccine Unknown 02/20/2015     Shellfish-derived products Nausea and Vomiting 12/29/2016     Sulfa drugs Rash 02/20/2015       Current Medications:  Current Outpatient Medications   Medication Sig Dispense Refill     magic  "mouthwash (ENTER INGREDIENTS IN COMMENTS) suspension Swish, gargle, and spit one to two teaspoonfuls every six hours as needed. 240 mL 3     Magnesium Oxide 250 MG TABS Take 2 tablets by mouth 2 times daily       morphine (MS CONTIN) 15 MG CR tablet Take 1 tablet (15 mg) by mouth every 12 hours maximum 2 tablet(s) per day 60 tablet 0     morphine 0.1% in intrasite topical gel Place 1 g onto the skin 4 times daily as needed for pain 100 g 0     Multiple Vitamin (MULTI-VITAMINS PO)        Omega-3 Fatty Acids (OMEGA 3 PO)        ondansetron (ZOFRAN) 8 MG tablet Take 1 tablet (8 mg) by mouth every 8 hours as needed for nausea 30 tablet 0     oxyCODONE (ROXICODONE) 5 MG tablet Take 1 tablet (5 mg) by mouth every 6 hours as needed for severe pain 60 tablet 0     prochlorperazine (COMPAZINE) 10 MG tablet Take 1 tablet (10 mg) by mouth every 6 hours as needed (Nausea/Vomiting) 30 tablet 1     silver sulfADIAZINE (SILVADENE) 1 % external cream Apply topically 2 times daily 50 g 1        Physical Exam:  /77 (BP Location: Left arm, Patient Position: Sitting, Cuff Size: Adult Regular)   Pulse 76   Temp 98.9  F (37.2  C) (Tympanic)   Resp 16   Ht 1.638 m (5' 4.49\")   Wt 76 kg (167 lb 8 oz)   LMP 01/29/2016   SpO2 95%   BMI 28.32 kg/m    Wt Readings from Last 12 Encounters:   05/06/20 76 kg (167 lb 8 oz)   05/06/20 74.8 kg (165 lb)   04/29/20 74.8 kg (165 lb)   04/17/20 77.7 kg (171 lb 6.4 oz)   04/15/20 76.7 kg (169 lb)   03/31/20 78.3 kg (172 lb 9.6 oz)   03/25/20 78.3 kg (172 lb 9.6 oz)   03/24/20 78.3 kg (172 lb 9.6 oz)   03/13/20 76.2 kg (168 lb)   03/11/20 77.7 kg (171 lb 4.8 oz)   03/03/20 75.3 kg (166 lb)   02/05/20 77.7 kg (171 lb 6.4 oz)     ECOG performance status: 1  GENERAL APPEARANCE: Healthy, alert and in no acute distress.  HEENT: Sclerae anicteric. PERRLA. Oropharynx without ulcers, lesions, or thrush.  NECK: Supple. No asymmetry or masses.  LYMPHATICS: No palpable cervical, supraclavicular, " axillary, or inguinal lymphadenopathy.  RESP: Lungs clear to auscultation bilaterally without rales, rhonchi or wheezes.  CARDIOVASCULAR: Regular rate and rhythm. Normal S1, S2; no S3 or S4. No murmur, gallop, or rub.  ABDOMEN: Soft, nontender. Bowel sounds normal. No palpable organomegaly or masses.  MUSCULOSKELETAL: Extremities without gross deformities noted. No edema of bilateral lower extremities.  SKIN: No suspicious lesions or rashes.  NEURO: Alert and oriented x 3. Cranial nerves II-XII grossly intact.  PSYCHIATRIC: Mentation and affect appear normal.    Laboratory/Imaging Studies:  No visits with results within 2 Week(s) from this visit.   Latest known visit with results is:   Infusion Therapy Visit on 04/09/2020   Component Date Value Ref Range Status     WBC 04/09/2020 6.0  4.0 - 11.0 10e9/L Final     RBC Count 04/09/2020 4.55  3.8 - 5.2 10e12/L Final     Hemoglobin 04/09/2020 14.0  11.7 - 15.7 g/dL Final     Hematocrit 04/09/2020 42.4  35.0 - 47.0 % Final     MCV 04/09/2020 93  78 - 100 fl Final     MCH 04/09/2020 30.8  26.5 - 33.0 pg Final     MCHC 04/09/2020 33.0  31.5 - 36.5 g/dL Final     RDW 04/09/2020 11.7  10.0 - 15.0 % Final     Platelet Count 04/09/2020 177  150 - 450 10e9/L Final     Diff Method 04/09/2020 Automated Method   Final     % Neutrophils 04/09/2020 57.3  % Final     % Lymphocytes 04/09/2020 31.0  % Final     % Monocytes 04/09/2020 8.3  % Final     % Eosinophils 04/09/2020 2.5  % Final     % Basophils 04/09/2020 0.7  % Final     % Immature Granulocytes 04/09/2020 0.2  % Final     Nucleated RBCs 04/09/2020 0  0 /100 Final     Absolute Neutrophil 04/09/2020 3.5  1.6 - 8.3 10e9/L Final     Absolute Lymphocytes 04/09/2020 1.9  0.8 - 5.3 10e9/L Final     Absolute Monocytes 04/09/2020 0.5  0.0 - 1.3 10e9/L Final     Absolute Eosinophils 04/09/2020 0.2  0.0 - 0.7 10e9/L Final     Absolute Basophils 04/09/2020 0.0  0.0 - 0.2 10e9/L Final     Abs Immature Granulocytes 04/09/2020 0.0  0 - 0.4  10e9/L Final     Absolute Nucleated RBC 04/09/2020 0.0   Final     Sodium 04/09/2020 142  133 - 144 mmol/L Final     Potassium 04/09/2020 4.1  3.4 - 5.3 mmol/L Final     Chloride 04/09/2020 111* 94 - 109 mmol/L Final     Carbon Dioxide 04/09/2020 28  20 - 32 mmol/L Final     Anion Gap 04/09/2020 3  3 - 14 mmol/L Final     Glucose 04/09/2020 93  70 - 99 mg/dL Final     Urea Nitrogen 04/09/2020 18  7 - 30 mg/dL Final     Creatinine 04/09/2020 0.58  0.52 - 1.04 mg/dL Final     GFR Estimate 04/09/2020 >90  >60 mL/min/[1.73_m2] Final    Comment: Non  GFR Calc  Starting 12/18/2018, serum creatinine based estimated GFR (eGFR) will be   calculated using the Chronic Kidney Disease Epidemiology Collaboration   (CKD-EPI) equation.       GFR Estimate If Black 04/09/2020 >90  >60 mL/min/[1.73_m2] Final    Comment:  GFR Calc  Starting 12/18/2018, serum creatinine based estimated GFR (eGFR) will be   calculated using the Chronic Kidney Disease Epidemiology Collaboration   (CKD-EPI) equation.       Calcium 04/09/2020 8.9  8.5 - 10.1 mg/dL Final     Bilirubin Total 04/09/2020 0.2  0.2 - 1.3 mg/dL Final     Albumin 04/09/2020 3.7  3.4 - 5.0 g/dL Final     Protein Total 04/09/2020 6.7* 6.8 - 8.8 g/dL Final     Alkaline Phosphatase 04/09/2020 39* 40 - 150 U/L Final     ALT 04/09/2020 74* 0 - 50 U/L Final     AST 04/09/2020 34  0 - 45 U/L Final          Assessment and plan:    (C21.0) Anal cancer (H)  (primary encounter diagnosis)  Patient has been tolerating treatment well.  We will continue with the current treatment plan.  I will see the patient again in 3 months time or sooner if there are new developments or concerns.    Cancer associated pain.  Patient currently on oxycodone 5 mg every 6 hours as needed for breakthrough pain.  She is also on MS Contin 15 mg once daily.    The patient is ready to learn, no apparent learning barriers were identified.  Diagnosis and treatment plans were explained to  the patient. The patient expressed understanding of the content. The patient asked appropriate questions. The patient questions were answered to her satisfaction.    Chart documentation with Dragon Voice recognition Software. Although reviewed after completion, some words and grammatical errors may remain.

## 2020-05-11 NOTE — PROGRESS NOTES
Infusion Nursing Note:  Susu Lagos presents today for pump DC.    Patient seen by provider today: No   present during visit today: Not Applicable.    Note: Pt states that she had trouble drinking fluids today due to nausea.  Reviewed nausea med schedule with patient and 1 liter NS given IV.    Intravenous Access:  PICC.    Treatment Conditions:  Fluorouracil infusion completed.  Pt c/o nausea.       Post Infusion Assessment:  Patient tolerated infusion without incident.       Discharge Plan:   Patient discharged in stable condition accompanied by: self.  Departure Mode: Ambulatory.  Pt will return Thurs 5/14 to have PICC dressing change and fluids if needed.     Frida Mayen RN

## 2020-05-12 ENCOUNTER — APPOINTMENT (OUTPATIENT)
Dept: RADIATION THERAPY | Facility: OUTPATIENT CENTER | Age: 55
End: 2020-05-12
Payer: COMMERCIAL

## 2020-05-13 ENCOUNTER — APPOINTMENT (OUTPATIENT)
Dept: RADIATION THERAPY | Facility: OUTPATIENT CENTER | Age: 55
End: 2020-05-13
Payer: COMMERCIAL

## 2020-05-13 ENCOUNTER — OFFICE VISIT (OUTPATIENT)
Dept: RADIATION THERAPY | Facility: OUTPATIENT CENTER | Age: 55
End: 2020-05-13
Payer: COMMERCIAL

## 2020-05-13 VITALS
HEART RATE: 96 BPM | BODY MASS INDEX: 27.86 KG/M2 | WEIGHT: 164.8 LBS | SYSTOLIC BLOOD PRESSURE: 115 MMHG | DIASTOLIC BLOOD PRESSURE: 78 MMHG

## 2020-05-13 DIAGNOSIS — G89.3 CANCER RELATED PAIN: ICD-10-CM

## 2020-05-13 NOTE — LETTER
2020      RE: Susu Lagos  [address]  [facility] [address]       [facility]YS[facility]  SPECIALIZING IN BREAKTHROUGHS  Radiation Oncology    On Treatment Visit Note      Susu Lagos      Date: 2020   MRN: [id number]   : 1965  Diagnosis: Anal ca      Reason for Visit:  On Radiation Treatment Visit     Treatment Summary to Date  Treatment Site: pelvis Current Dose: 4500/5040 cGy Fractions: /      Chemotherapy  Chemo concurrent with radx?: Yes  Oncologist: Dr. Real  Drug Name/Frequency 1: 5FU  Drug Name/Frequency 1: Mitomycin    Subjective:   Moderate tunde-anal pain. Morphine 15 mg ER TID. Oxycodone solution PRN (2-3x). Intermittent diarrhea, controlled with Imodium. Some intermittent bleeding.       Nursing ROS:   Nutrition Alteration  Diet Type: Patient's Preference  Skin  Skin Reaction: 0 - No changes     ENT and Mouth Exam  ENT/Mouth Note: has mouth sores secondary to chemo infusion - doing salt/soda  Cardiovascular  Respiratory effort: 1 - Normal - without distress  Gastrointestinal  Diarrhea: 0 - None  Genitourinary  Urinary Status: 0 - Normal     Pain Assessment  0-10 Pain Scale: 0      Objective:     Mild erythema with small area of  desquamation in tunde-anal region and tunde-vaginal. No groin desquamation.    Labs:  Lab Results   Component Value Date    WBC 4.0 2020     Lab Results   Component Value Date    RBC 4.22 2020     Lab Results   Component Value Date    HGB 13.2 2020     Lab Results   Component Value Date    HCT 39.5 2020     No components found for: MCT  Lab Results   Component Value Date    MCV 94 2020     Lab Results   Component Value Date    MCH 31.3 2020     Lab Results   Component Value Date    MCHC 33.4 2020     Lab Results   Component Value Date    RDW 12.7 2020     Lab Results   Component Value Date     2020         Assessment:  Ms. Lagos is a 54 year old female a diagnosis of  squamous cell carcinoma of the anal canal, clinical T1N0.  She is undergoing definitve CRT.     Tolerating radiation therapy well.  All questions and concerns addressed.    Plan:   1. Continue current therapy.  Discussed consideration of 2 day break to allow tissue recovery. Patient would like to defer break at this time and continue finishing her remaining treatments.   2. Skin care. Aquaphor, Squirt bottle for irrigation, baby wipes.  Morphine gel PRN. Silvadene BID.   3. Cancer related pain. Oxycodone PRN. Morphine 15 mg ER TID.       Mosaiq chart and setup information reviewed  Ports checked    Medication Review  Med list reviewed with patient?: Yes    Educational Topic Discussed  Education Instructions: reviewed skin care - creams, sitz baths      Tyree Remy MD

## 2020-05-13 NOTE — PROGRESS NOTES
[facility] PHYSICIANS  SPECIALIZING IN BREAKTHROUGHS  Radiation Oncology    On Treatment Visit Note      Susu Lagos      Date: 2020   MRN: [id number]   : 1965  Diagnosis: Anal ca      Reason for Visit:  On Radiation Treatment Visit     Treatment Summary to Date  Treatment Site: pelvis Current Dose: 4500/5040 cGy Fractions:       Chemotherapy  Chemo concurrent with radx?: Yes  Oncologist: Dr. Real  Drug Name/Frequency 1: 5FU  Drug Name/Frequency 1: Mitomycin    Subjective:   Moderate tunde-anal pain. Morphine 15 mg ER TID. Oxycodone solution PRN (2-3x). Intermittent diarrhea, controlled with Imodium. Some intermittent bleeding.       Nursing ROS:   Nutrition Alteration  Diet Type: Patient's Preference  Skin  Skin Reaction: 0 - No changes     ENT and Mouth Exam  ENT/Mouth Note: has mouth sores secondary to chemo infusion - doing salt/soda  Cardiovascular  Respiratory effort: 1 - Normal - without distress  Gastrointestinal  Diarrhea: 0 - None  Genitourinary  Urinary Status: 0 - Normal     Pain Assessment  0-10 Pain Scale: 0      Objective:     Mild erythema with small area of  desquamation in tunde-anal region and tunde-vaginal. No groin desquamation.    Labs:  Lab Results   Component Value Date    WBC 4.0 2020     Lab Results   Component Value Date    RBC 4.22 2020     Lab Results   Component Value Date    HGB 13.2 2020     Lab Results   Component Value Date    HCT 39.5 2020     No components found for: MCT  Lab Results   Component Value Date    MCV 94 2020     Lab Results   Component Value Date    MCH 31.3 2020     Lab Results   Component Value Date    MCHC 33.4 2020     Lab Results   Component Value Date    RDW 12.7 2020     Lab Results   Component Value Date     2020         Assessment:  Ms. 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  She is undergoing  definitve CRT.     Tolerating radiation therapy well.  All questions and concerns addressed.    Plan:   1. Continue current therapy.  Discussed consideration of 2 day break to allow tissue recovery. Patient would like to defer break at this time and continue finishing her remaining treatments.   2. Skin care. Aquaphor, Squirt bottle for irrigation, baby wipes.  Morphine gel PRN. Silvadene BID.   3. Cancer related pain. Oxycodone PRN. Morphine 15 mg ER TID.       Mosaiq chart and setup information reviewed  Ports checked    Medication Review  Med list reviewed with patient?: Yes    Educational Topic Discussed  Education Instructions: reviewed skin care - creams, sitz baths      Tyree Remy MD

## 2020-05-14 ENCOUNTER — APPOINTMENT (OUTPATIENT)
Dept: RADIATION THERAPY | Facility: OUTPATIENT CENTER | Age: 55
End: 2020-05-14
Payer: COMMERCIAL

## 2020-05-14 ENCOUNTER — INFUSION THERAPY VISIT (OUTPATIENT)
Dept: INFUSION THERAPY | Facility: CLINIC | Age: 55
End: 2020-05-14
Attending: INTERNAL MEDICINE
Payer: COMMERCIAL

## 2020-05-14 VITALS — HEART RATE: 79 BPM | DIASTOLIC BLOOD PRESSURE: 61 MMHG | SYSTOLIC BLOOD PRESSURE: 103 MMHG | TEMPERATURE: 97.9 F

## 2020-05-14 DIAGNOSIS — C21.0 ANAL CANCER (H): Primary | ICD-10-CM

## 2020-05-14 PROCEDURE — 96360 HYDRATION IV INFUSION INIT: CPT

## 2020-05-14 PROCEDURE — 25800030 ZZH RX IP 258 OP 636: Performed by: INTERNAL MEDICINE

## 2020-05-14 RX ADMIN — SODIUM CHLORIDE 1000 ML: 9 INJECTION, SOLUTION INTRAVENOUS at 15:16

## 2020-05-14 NOTE — PROGRESS NOTES
Infusion Nursing Note:  Susu Lagos presents today for Fluids.    Patient seen by provider today: No   present during visit today: Not Applicable.    Note: Dressing and cap change per protocol, tolerated well. No s/s of infection.    Intravenous Access:  PICC.    Treatment Conditions:  Not Applicable.      Post Infusion Assessment:  Patient tolerated infusion without incident.  Blood return noted pre and post infusion.  Site patent and intact, free from redness, edema or discomfort.  No evidence of extravasations.       Discharge Plan:   Patient discharged in stable condition accompanied by: self.  Departure Mode: Ambulatory. Pt to return 5/19/2020.    Joaquim Cherry RN

## 2020-05-15 ENCOUNTER — APPOINTMENT (OUTPATIENT)
Dept: RADIATION THERAPY | Facility: OUTPATIENT CENTER | Age: 55
End: 2020-05-15
Payer: COMMERCIAL

## 2020-05-18 ENCOUNTER — APPOINTMENT (OUTPATIENT)
Dept: GENERAL RADIOLOGY | Facility: CLINIC | Age: 55
DRG: 606 | End: 2020-05-18
Attending: FAMILY MEDICINE
Payer: COMMERCIAL

## 2020-05-18 ENCOUNTER — OFFICE VISIT (OUTPATIENT)
Dept: RADIATION THERAPY | Facility: OUTPATIENT CENTER | Age: 55
End: 2020-05-18
Payer: COMMERCIAL

## 2020-05-18 ENCOUNTER — APPOINTMENT (OUTPATIENT)
Dept: RADIATION THERAPY | Facility: OUTPATIENT CENTER | Age: 55
End: 2020-05-18
Payer: COMMERCIAL

## 2020-05-18 ENCOUNTER — HOSPITAL ENCOUNTER (INPATIENT)
Facility: CLINIC | Age: 55
LOS: 1 days | Discharge: HOME | DRG: 606 | End: 2020-05-21
Attending: FAMILY MEDICINE | Admitting: FAMILY MEDICINE
Payer: COMMERCIAL

## 2020-05-18 VITALS
OXYGEN SATURATION: 97 % | SYSTOLIC BLOOD PRESSURE: 115 MMHG | HEART RATE: 125 BPM | WEIGHT: 161.8 LBS | RESPIRATION RATE: 18 BRPM | BODY MASS INDEX: 27.35 KG/M2 | DIASTOLIC BLOOD PRESSURE: 70 MMHG

## 2020-05-18 DIAGNOSIS — C21.0 ANAL SQUAMOUS CELL CARCINOMA (H): Primary | Chronic | ICD-10-CM

## 2020-05-18 DIAGNOSIS — G89.3 CANCER ASSOCIATED PAIN: ICD-10-CM

## 2020-05-18 DIAGNOSIS — R11.0 NAUSEA: ICD-10-CM

## 2020-05-18 DIAGNOSIS — R19.7 DIARRHEA, UNSPECIFIED TYPE: ICD-10-CM

## 2020-05-18 DIAGNOSIS — C21.0 ANAL SQUAMOUS CELL CARCINOMA (H): Primary | ICD-10-CM

## 2020-05-18 DIAGNOSIS — R52 INTRACTABLE PAIN: ICD-10-CM

## 2020-05-18 DIAGNOSIS — G89.3 CANCER RELATED PAIN: ICD-10-CM

## 2020-05-18 LAB
ALBUMIN SERPL-MCNC: 2.5 G/DL (ref 3.4–5)
ALBUMIN UR-MCNC: NEGATIVE MG/DL
ALP SERPL-CCNC: 45 U/L (ref 40–150)
ALT SERPL W P-5'-P-CCNC: 37 U/L (ref 0–50)
ANION GAP SERPL CALCULATED.3IONS-SCNC: 4 MMOL/L (ref 3–14)
APPEARANCE UR: CLEAR
AST SERPL W P-5'-P-CCNC: 15 U/L (ref 0–45)
BACTERIA #/AREA URNS HPF: ABNORMAL /HPF
BASOPHILS # BLD AUTO: 0 10E9/L (ref 0–0.2)
BASOPHILS NFR BLD AUTO: 0 %
BILIRUB SERPL-MCNC: 0.6 MG/DL (ref 0.2–1.3)
BILIRUB UR QL STRIP: NEGATIVE
BUN SERPL-MCNC: 10 MG/DL (ref 7–30)
CALCIUM SERPL-MCNC: 8.1 MG/DL (ref 8.5–10.1)
CHLORIDE SERPL-SCNC: 100 MMOL/L (ref 94–109)
CO2 SERPL-SCNC: 29 MMOL/L (ref 20–32)
COLOR UR AUTO: YELLOW
CREAT SERPL-MCNC: 0.65 MG/DL (ref 0.52–1.04)
DIFFERENTIAL METHOD BLD: ABNORMAL
EOSINOPHIL # BLD AUTO: 0 10E9/L (ref 0–0.7)
EOSINOPHIL NFR BLD AUTO: 4 %
ERYTHROCYTE [DISTWIDTH] IN BLOOD BY AUTOMATED COUNT: 12.3 % (ref 10–15)
GFR SERPL CREATININE-BSD FRML MDRD: >90 ML/MIN/{1.73_M2}
GLUCOSE SERPL-MCNC: 95 MG/DL (ref 70–99)
GLUCOSE UR STRIP-MCNC: NEGATIVE MG/DL
HCT VFR BLD AUTO: 27.3 % (ref 35–47)
HGB BLD-MCNC: 9.7 G/DL (ref 11.7–15.7)
HGB UR QL STRIP: ABNORMAL
KETONES UR STRIP-MCNC: 5 MG/DL
LACTATE BLD-SCNC: 1.2 MMOL/L (ref 0.7–2)
LEUKOCYTE ESTERASE UR QL STRIP: ABNORMAL
LYMPHOCYTES # BLD AUTO: 0.1 10E9/L (ref 0.8–5.3)
LYMPHOCYTES NFR BLD AUTO: 11 %
MCH RBC QN AUTO: 31.4 PG (ref 26.5–33)
MCHC RBC AUTO-ENTMCNC: 35.5 G/DL (ref 31.5–36.5)
MCV RBC AUTO: 88 FL (ref 78–100)
MONOCYTES # BLD AUTO: 0.5 10E9/L (ref 0–1.3)
MONOCYTES NFR BLD AUTO: 67 %
NEUTROPHILS # BLD AUTO: 0.1 10E9/L (ref 1.6–8.3)
NEUTROPHILS NFR BLD AUTO: 18 %
NITRATE UR QL: NEGATIVE
PH UR STRIP: 6 PH (ref 5–7)
PLATELET # BLD AUTO: 57 10E9/L (ref 150–450)
POTASSIUM SERPL-SCNC: 3.2 MMOL/L (ref 3.4–5.3)
PROT SERPL-MCNC: 5.9 G/DL (ref 6.8–8.8)
RBC # BLD AUTO: 3.09 10E12/L (ref 3.8–5.2)
RBC #/AREA URNS AUTO: 4 /HPF (ref 0–2)
SARS-COV-2 PCR COMMENT: NORMAL
SARS-COV-2 RNA SPEC QL NAA+PROBE: NEGATIVE
SARS-COV-2 RNA SPEC QL NAA+PROBE: NORMAL
SODIUM SERPL-SCNC: 133 MMOL/L (ref 133–144)
SOURCE: ABNORMAL
SP GR UR STRIP: 1 (ref 1–1.03)
SPECIMEN SOURCE: NORMAL
SPECIMEN SOURCE: NORMAL
SQUAMOUS #/AREA URNS AUTO: <1 /HPF (ref 0–1)
T4 FREE SERPL-MCNC: 1.18 NG/DL (ref 0.76–1.46)
TSH SERPL DL<=0.005 MIU/L-ACNC: 0.36 MU/L (ref 0.4–4)
UROBILINOGEN UR STRIP-MCNC: 0 MG/DL (ref 0–2)
WBC # BLD AUTO: 0.8 10E9/L (ref 4–11)
WBC #/AREA URNS AUTO: 8 /HPF (ref 0–5)

## 2020-05-18 PROCEDURE — 99220 ZZC INITIAL OBSERVATION CARE,LEVL III: CPT | Performed by: FAMILY MEDICINE

## 2020-05-18 PROCEDURE — 84439 ASSAY OF FREE THYROXINE: CPT | Performed by: FAMILY MEDICINE

## 2020-05-18 PROCEDURE — 96375 TX/PRO/DX INJ NEW DRUG ADDON: CPT

## 2020-05-18 PROCEDURE — 87040 BLOOD CULTURE FOR BACTERIA: CPT | Performed by: FAMILY MEDICINE

## 2020-05-18 PROCEDURE — 87635 SARS-COV-2 COVID-19 AMP PRB: CPT | Performed by: FAMILY MEDICINE

## 2020-05-18 PROCEDURE — 96372 THER/PROPH/DIAG INJ SC/IM: CPT

## 2020-05-18 PROCEDURE — 84132 ASSAY OF SERUM POTASSIUM: CPT | Performed by: FAMILY MEDICINE

## 2020-05-18 PROCEDURE — 87086 URINE CULTURE/COLONY COUNT: CPT | Performed by: FAMILY MEDICINE

## 2020-05-18 PROCEDURE — 25000128 H RX IP 250 OP 636: Performed by: FAMILY MEDICINE

## 2020-05-18 PROCEDURE — 99207 ZZC CDG-CODE CATEGORY CHANGED: CPT | Performed by: FAMILY MEDICINE

## 2020-05-18 PROCEDURE — 84443 ASSAY THYROID STIM HORMONE: CPT | Performed by: FAMILY MEDICINE

## 2020-05-18 PROCEDURE — 25000132 ZZH RX MED GY IP 250 OP 250 PS 637: Performed by: FAMILY MEDICINE

## 2020-05-18 PROCEDURE — 85004 AUTOMATED DIFF WBC COUNT: CPT | Performed by: FAMILY MEDICINE

## 2020-05-18 PROCEDURE — 25800030 ZZH RX IP 258 OP 636: Performed by: FAMILY MEDICINE

## 2020-05-18 PROCEDURE — 71045 X-RAY EXAM CHEST 1 VIEW: CPT

## 2020-05-18 PROCEDURE — 83605 ASSAY OF LACTIC ACID: CPT | Performed by: FAMILY MEDICINE

## 2020-05-18 PROCEDURE — 36415 COLL VENOUS BLD VENIPUNCTURE: CPT | Performed by: FAMILY MEDICINE

## 2020-05-18 PROCEDURE — 85027 COMPLETE CBC AUTOMATED: CPT | Performed by: FAMILY MEDICINE

## 2020-05-18 PROCEDURE — 25000125 ZZHC RX 250: Performed by: FAMILY MEDICINE

## 2020-05-18 PROCEDURE — 96374 THER/PROPH/DIAG INJ IV PUSH: CPT

## 2020-05-18 PROCEDURE — 85025 COMPLETE CBC W/AUTO DIFF WBC: CPT | Performed by: FAMILY MEDICINE

## 2020-05-18 PROCEDURE — 80053 COMPREHEN METABOLIC PANEL: CPT | Performed by: FAMILY MEDICINE

## 2020-05-18 PROCEDURE — G0378 HOSPITAL OBSERVATION PER HR: HCPCS

## 2020-05-18 PROCEDURE — 81001 URINALYSIS AUTO W/SCOPE: CPT | Performed by: FAMILY MEDICINE

## 2020-05-18 RX ORDER — HYDROMORPHONE HYDROCHLORIDE 1 MG/ML
0.5 INJECTION, SOLUTION INTRAMUSCULAR; INTRAVENOUS; SUBCUTANEOUS
Status: DISCONTINUED | OUTPATIENT
Start: 2020-05-18 | End: 2020-05-20

## 2020-05-18 RX ORDER — ONDANSETRON 2 MG/ML
4 INJECTION INTRAMUSCULAR; INTRAVENOUS EVERY 6 HOURS PRN
Status: DISCONTINUED | OUTPATIENT
Start: 2020-05-18 | End: 2020-05-21 | Stop reason: HOSPADM

## 2020-05-18 RX ORDER — SODIUM CHLORIDE, SODIUM LACTATE, POTASSIUM CHLORIDE, CALCIUM CHLORIDE 600; 310; 30; 20 MG/100ML; MG/100ML; MG/100ML; MG/100ML
INJECTION, SOLUTION INTRAVENOUS CONTINUOUS
Status: DISCONTINUED | OUTPATIENT
Start: 2020-05-18 | End: 2020-05-21 | Stop reason: HOSPADM

## 2020-05-18 RX ORDER — POTASSIUM CHLORIDE 1500 MG/1
20-40 TABLET, EXTENDED RELEASE ORAL
Status: DISCONTINUED | OUTPATIENT
Start: 2020-05-18 | End: 2020-05-21 | Stop reason: HOSPADM

## 2020-05-18 RX ORDER — POTASSIUM CHLORIDE 7.45 MG/ML
10 INJECTION INTRAVENOUS
Status: DISCONTINUED | OUTPATIENT
Start: 2020-05-18 | End: 2020-05-21 | Stop reason: HOSPADM

## 2020-05-18 RX ORDER — ONDANSETRON 4 MG/1
8 TABLET, ORALLY DISINTEGRATING ORAL EVERY 8 HOURS PRN
Status: DISCONTINUED | OUTPATIENT
Start: 2020-05-18 | End: 2020-05-21 | Stop reason: HOSPADM

## 2020-05-18 RX ORDER — MINERAL OIL/HYDROPHIL PETROLAT
OINTMENT (GRAM) TOPICAL PRN
COMMUNITY
End: 2021-06-18

## 2020-05-18 RX ORDER — ACETAMINOPHEN 325 MG/1
650 TABLET ORAL EVERY 4 HOURS PRN
Status: DISCONTINUED | OUTPATIENT
Start: 2020-05-18 | End: 2020-05-21 | Stop reason: HOSPADM

## 2020-05-18 RX ORDER — ACETAMINOPHEN 650 MG/1
650 SUPPOSITORY RECTAL EVERY 4 HOURS PRN
Status: DISCONTINUED | OUTPATIENT
Start: 2020-05-18 | End: 2020-05-18

## 2020-05-18 RX ORDER — POTASSIUM CL/LIDO/0.9 % NACL 10MEQ/0.1L
10 INTRAVENOUS SOLUTION, PIGGYBACK (ML) INTRAVENOUS
Status: DISCONTINUED | OUTPATIENT
Start: 2020-05-18 | End: 2020-05-21 | Stop reason: HOSPADM

## 2020-05-18 RX ORDER — NALOXONE HYDROCHLORIDE 0.4 MG/ML
.1-.4 INJECTION, SOLUTION INTRAMUSCULAR; INTRAVENOUS; SUBCUTANEOUS
Status: DISCONTINUED | OUTPATIENT
Start: 2020-05-18 | End: 2020-05-21 | Stop reason: HOSPADM

## 2020-05-18 RX ORDER — POTASSIUM CHLORIDE 1.5 G/1.58G
20-40 POWDER, FOR SOLUTION ORAL
Status: DISCONTINUED | OUTPATIENT
Start: 2020-05-18 | End: 2020-05-21 | Stop reason: HOSPADM

## 2020-05-18 RX ORDER — POTASSIUM CHLORIDE 29.8 MG/ML
20 INJECTION INTRAVENOUS
Status: DISCONTINUED | OUTPATIENT
Start: 2020-05-18 | End: 2020-05-21 | Stop reason: HOSPADM

## 2020-05-18 RX ORDER — ACETAMINOPHEN 650 MG/1
650 SUPPOSITORY RECTAL
Status: DISCONTINUED | OUTPATIENT
Start: 2020-05-18 | End: 2020-05-18

## 2020-05-18 RX ORDER — PROCHLORPERAZINE MALEATE 5 MG
10 TABLET ORAL EVERY 6 HOURS PRN
Status: DISCONTINUED | OUTPATIENT
Start: 2020-05-18 | End: 2020-05-21 | Stop reason: HOSPADM

## 2020-05-18 RX ORDER — MINERAL OIL/HYDROPHIL PETROLAT
OINTMENT (GRAM) TOPICAL 3 TIMES DAILY PRN
Status: DISCONTINUED | OUTPATIENT
Start: 2020-05-18 | End: 2020-05-21 | Stop reason: HOSPADM

## 2020-05-18 RX ORDER — ONDANSETRON 4 MG/1
4 TABLET, ORALLY DISINTEGRATING ORAL EVERY 6 HOURS PRN
Status: DISCONTINUED | OUTPATIENT
Start: 2020-05-18 | End: 2020-05-18

## 2020-05-18 RX ORDER — SILVER SULFADIAZINE 10 MG/G
CREAM TOPICAL 2 TIMES DAILY
Status: DISCONTINUED | OUTPATIENT
Start: 2020-05-18 | End: 2020-05-19

## 2020-05-18 RX ORDER — OXYCODONE HYDROCHLORIDE 5 MG/1
5-10 TABLET ORAL EVERY 4 HOURS PRN
Status: DISCONTINUED | OUTPATIENT
Start: 2020-05-18 | End: 2020-05-20

## 2020-05-18 RX ORDER — MAGNESIUM OXIDE 400 MG/1
400 TABLET ORAL 2 TIMES DAILY
Status: DISCONTINUED | OUTPATIENT
Start: 2020-05-18 | End: 2020-05-21 | Stop reason: HOSPADM

## 2020-05-18 RX ORDER — MORPHINE SULFATE 30 MG/1
30 TABLET, FILM COATED, EXTENDED RELEASE ORAL EVERY 12 HOURS SCHEDULED
Status: DISCONTINUED | OUTPATIENT
Start: 2020-05-18 | End: 2020-05-20

## 2020-05-18 RX ADMIN — Medication 400 MG: at 19:10

## 2020-05-18 RX ADMIN — FILGRASTIM 480 MCG: 480 INJECTION, SOLUTION INTRAVENOUS; SUBCUTANEOUS at 21:38

## 2020-05-18 RX ADMIN — CEFEPIME 2 G: 2 INJECTION, POWDER, FOR SOLUTION INTRAVENOUS at 22:42

## 2020-05-18 RX ADMIN — SODIUM CHLORIDE, POTASSIUM CHLORIDE, SODIUM LACTATE AND CALCIUM CHLORIDE: 600; 310; 30; 20 INJECTION, SOLUTION INTRAVENOUS at 22:41

## 2020-05-18 RX ADMIN — SODIUM CHLORIDE, POTASSIUM CHLORIDE, SODIUM LACTATE AND CALCIUM CHLORIDE 2000 ML: 600; 310; 30; 20 INJECTION, SOLUTION INTRAVENOUS at 17:44

## 2020-05-18 RX ADMIN — SILVER SULFADIAZINE: 10 CREAM TOPICAL at 23:48

## 2020-05-18 RX ADMIN — OXYCODONE HYDROCHLORIDE 5 MG: 5 TABLET ORAL at 21:43

## 2020-05-18 RX ADMIN — MORPHINE SULFATE 30 MG: 30 TABLET, FILM COATED, EXTENDED RELEASE ORAL at 17:42

## 2020-05-18 RX ADMIN — ONDANSETRON 4 MG: 2 INJECTION INTRAMUSCULAR; INTRAVENOUS at 19:05

## 2020-05-18 ASSESSMENT — PAIN SCALES - GENERAL: PAINLEVEL: WORST PAIN (10)

## 2020-05-18 NOTE — PLAN OF CARE
[facility] ADMISSION NOTE    Patient admitted to room 2310 then moved to 2315 at approximately 1615 via wheel chair from clinic. Patient was accompanied by other: oncology nurse.     Verbal SBAR report received from Parisa prior to patient arrival.     Patient ambulated to bed with stand-by assist. Patient alert and oriented X 3. Pain is controlled with current analgesics.  Medication(s) being used: narcotic analgesics including Ms contin and oxycodone.  . Admission vital signs: Blood pressure 126/77, pulse 117, temperature 100.3  F (37.9  C), temperature source Oral, resp. rate 16, last menstrual period 01/29/2016, SpO2 99 %, not currently breastfeeding. Patient was oriented to plan of care, call light, bed controls, tv, telephone, bathroom and visiting hours.     Risk Assessment    The following safety risks were identified during admission: fall and skin. Yellow risk band applied: YES.     Skin Initial Assessment    This writer admitted this patient and completed a full skin assessment and Juan score in the Adult PCS flowsheet. Appropriate interventions initiated as needed.     No secondary skin check done, PUI and patient declined.    Education    Patient has a Jackson to Observation order: Yes  Observation education completed and documented: Yes      Rashmi Michelle RN

## 2020-05-18 NOTE — H&P
[facility]    History and Physical - Hospitalist Service       Date of Admission:  5/18/2020    Assessment & Plan   Susu Lagos is a 54 year old female admitted on 5/18/2020. She presents as a direct admission from radiation therapy here at [facility] due to intractable anal pain secondary to radiation therapy for an anal cancer.    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  She is using oxycodone 2-1/2 mg as needed-I will change that to 5 to 10 mg as needed.  We will provide PRN IV Dilaudid.  I will ask for palliative care consult tomorrow if available.  Nurse wound consult has been made.  Continue Silvadene 3 times daily and Aquaphor cream as needed in between Silvadene applications.    Tachycardia  Her tachycardia is regular and relatively mild.  This may be due to volume depletion or pain.  I will obtain a CBC and complete CHEM profile along with a TSH.  Patient will be given 2 L of LR followed by maintenance fluids.Will check UA/UC.  Consider blood cultures if the patient has fever or significant white count elevation.  She does not look toxic at this point.    Covid rule out  After I saw the patient I was informed that the patient just received communication from her .  A coworker that he has been exposed to tested positive for COVID today.  We will obtain a COVID PCR and place the patient in isolation.         Diet: as tolerated  DVT Prophylaxis: ambulate  Frias Catheter: not present  Code Status: full  lines in place  PICC    Disposition Plan   Expected discharge: 1-2  Entered: Enrique Vargas MD, MD 05/18/2020, 4:23 PM     The patient's care was discussed with Dr. Remy..    Enrique Vargas MD, MD  [facility]  [place]    ______________________________________________________________________    Chief Complaint   Intractable perianal pain and skin breakdown from radiation treatment of anal cancer    History is obtained from the patient    History of Present Illness   Susu Lagos is a 54 year old female who has a history of hyperlipidemia, rosacea and a recent diagnosis of anal cancer.Patient presented earlier this year to primary care with blood in the stool.  Colonoscopy on March 3, 2020 revealed a palpable rectal mass on digital examination.  Biopsy showed a squamous cell carcinoma.  Patient was started on chemo radiation with a regimen of 5-FU and mitomycin along with radiation.  Her last date of chemo administration was 5/7/2020.  This is apparently was the last of her chemo.  She had her last radiation treatment today.  She is ultimately going to have surgical evaluation-but the hope is that she will not need surgery.  Dr. Remy sent her to the hospital for direct admit because of ongoing severe anal pain despite use of MS Contin 15 mg 3 times daily and oxycodone 5 mg's every 6 hours as needed.    The source of the patient's pain seems to be perianal irritation especially on the skin where there is skin breakdown.  She has been having multiple small loose to partially formed bowel movements.She has significant pain with these.   She has occasional abdominal cramping that is minor.  There is no fever or chills.  The pain appears to be mostly in the perianal area.  She notes hemorrhoids are present.  There is some occasional red blood with bowel movements.  She has been using Silvadene cream and Aquaphor on the perianal skin.      Review of Systems    CONSTITUTIONAL: NEGATIVE for fever, chills, change in weight  INTEGUMENTARY/SKIN: perianal skin breakdown  EYES: NEGATIVE for vision changes or irritation  ENT/MOUTH: NEGATIVE for ear, mouth and throat problems  RESP: NEGATIVE for significant cough or SOB  BREAST:  NEGATIVE for masses, tenderness or discharge  CV: NEGATIVE for chest pain, palpitations or peripheral edema  GI: Recent diarrhea, no current nausea or vomiting.  She does think she has had decreased p.o. intake recently.  : NEGATIVE for frequency, dysuria, or hematuria  MUSCULOSKELETAL: NEGATIVE for significant arthralgias or myalgia  NEURO: NEGATIVE for weakness, dizziness or paresthesias  ENDOCRINE: NEGATIVE for temperature intolerance, skin/hair changes  HEME: NEGATIVE for bleeding problems  PSYCHIATRIC: NEGATIVE for changes in mood or affect    Past Medical History    I have reviewed this patient's medical history and updated it with pertinent information if needed.   Past Medical History:   Diagnosis Date     Ovarian cyst 6/4/2015    Right, 6 cm dermoid on MRI-recommend removal.  7/29/2015 Surgery for right salpingoophorectomy.      anal cancer diagnosed 3/2020  Rosacea  hyperlipidemia     Past Surgical History   I have reviewed this patient's surgical history and updated it with pertinent information if needed.  Past Surgical History:   Procedure Laterality Date     CHOLECYSTECTOMY  2013    LSC     COLONOSCOPY N/A 11/13/2018    Procedure: colonoscopy;  Surgeon: Sesar Dos Santos MD;  Location: [facility]     COLONOSCOPY N/A 3/3/2020    Procedure: COLONOSCOPY, WITH POLYPECTOMY AND BIOPSY;  Surgeon: Sesar Dos Santos MD;  Location: [facility]     DILATION AND CURETTAGE, OPERATIVE HYSTEROSCOPY, COMBINED N/A 2/29/2016    Procedure: COMBINED DILATION AND CURETTAGE, OPERATIVE HYSTEROSCOPY;  Surgeon: Panda Knight MD;  Location: [facility]     GYN SURGERY  1995    tubes tied     LAPAROSCOPY DIAGNOSTIC (GYN) Right 7/29/2015    Procedure: LAPAROSCOPY DIAGNOSTIC (GYN);  Surgeon: Lian Betancur DO;  Location: [facility]   Refractive surgery  Lap Choley  Tubal ligation  Bunion surgery    Social History   Patient lives in [place].  She works as a  but is not currently doing that.  She is  and  has 3 kids +3 step kids.  Social History     Tobacco Use     Smoking status: Never Smoker     Smokeless tobacco: Never Used   Substance Use Topics     Alcohol use: Yes     Alcohol/week: 0.0 standard drinks     Comment: 1-2 per month      Drug use: No       Family History   I have reviewed this patient's family history and updated it with pertinent information if needed.   Family History   Problem Relation Age of Onset     Hypertension Father      Myocardial Infarction Maternal Grandfather      Melanoma No family hx of        Prior to Admission Medications   Prior to Admission Medications   Prescriptions Last Dose Informant Patient Reported? Taking?   Magnesium Oxide 250 MG TABS   Yes No   Sig: Take 2 tablets by mouth 2 times daily   Multiple Vitamin (MULTI-VITAMINS PO)   Yes No   Omega-3 Fatty Acids (OMEGA 3 PO)   Yes No   magic mouthwash (ENTER INGREDIENTS IN COMMENTS) suspension   No No   Sig: Swish, gargle, and spit one to two teaspoonfuls every six hours as needed.   morphine (MS CONTIN) 15 MG CR tablet   No No   Sig: Take 1 tablet (15 mg) by mouth every 12 hours maximum 2 tablet(s) per day   morphine 0.1% in intrasite topical gel   No No   Sig: Place 1 g onto the skin 4 times daily as needed for pain   ondansetron (ZOFRAN) 8 MG tablet   No No   Sig: Take 1 tablet (8 mg) by mouth every 8 hours as needed for nausea   oxyCODONE (ROXICODONE) 5 MG tablet   No No   Sig: Take 1 tablet (5 mg) by mouth every 6 hours as needed for severe pain   prochlorperazine (COMPAZINE) 10 MG tablet   No No   Sig: Take 1 tablet (10 mg) by mouth every 6 hours as needed (Nausea/Vomiting)   silver sulfADIAZINE (SILVADENE) 1 % external cream   No No   Sig: Apply topically 2 times daily      Facility-Administered Medications: None     Allergies   Allergies   Allergen Reactions     Rabies Vaccine Unknown     Scratch test resulted, it was not a good idea to give it to her.     Shellfish-Derived Products Nausea and Vomiting     Sulfa Drugs  Rash       Physical Exam   Vital Signs:                    Weight: 0 lbs 0 oz    General Appearance: She is alert, nondistressed her heart rate is 115  Eyes: Pupils round reactive to light  HEENT: Oral mucous membranes slightly dry, ears negative  Respiratory: Lungs clear to auscultation, no respiratory distress  Cardiovascular: S1-S2 without click rub or murmur  GI: Soft benign nontender without guarding rebound rigidity or mass  Lymph/Hematologic: No adenopathy  Genitourinary: She has a large area of skin breakdown and redness in the perianal area.  She has 2 large hemorrhoidal tags.  There may be some thrombosis and hemorrhoidal tags  Skin: Above  Musculoskeletal: Both legs without edema, Homans negative  Neurologic: She is alert, motor and cranial nerves are normal.  Psychiatric: Calm and pleasant    Data   Data reviewed today: I reviewed all medications, new labs and imaging results over the last 24 hours. I personally reviewed .    No lab results found in last 7 days.

## 2020-05-18 NOTE — PROGRESS NOTES
[facility] PHYSICIANS  SPECIALIZING IN BREAKTHROUGHS  Radiation Oncology    On Treatment Visit Note      Susu Lagos      Date: 2020   MRN: [id number]   : 1965  Diagnosis: Anal ca      Reason for Visit:  On Radiation Treatment Visit     Treatment Summary to Date  Treatment Site: pelvis Current Dose: 5040/5040 cGy Fractions:       Chemotherapy  Chemo concurrent with radx?: Yes  Oncologist: Dr. Real  Drug Name/Frequency 1: 5FU  Drug Name/Frequency 1: Mitomycin    Subjective:   Substantial increase in tunde-anal pain from radiation dermatitis. Morphine 15 mg ER TID. Oxycodone solution PRN (3-4x). Intermittent diarrhea, controlled with Imodium. Tachycardic, looking pale.      Nursing ROS:   Nutrition Alteration  Diet Type: Patient's Preference  Skin  Skin Reaction: 0 - No changes     ENT and Mouth Exam  ENT/Mouth Note: has mouth sores secondary to chemo infusion - doing salt/soda  Cardiovascular  Respiratory effort: 1 - Normal - without distress  Gastrointestinal  Diarrhea: 0 - None  Genitourinary  Urinary Status: 0 - Normal     Pain Assessment  0-10 Pain Scale: 0      Objective:   /70   Pulse 125   Resp 18   Wt 73.4 kg (161 lb 12.8 oz)   LMP 2016   SpO2 97%   BMI 27.35 kg/m    Tachycardic, pale appearing.  Moist desquamation in tunde-anal region and tunde-vaginal. No groin desquamation.      Labs:  Lab Results   Component Value Date    WBC 4.0 2020     Lab Results   Component Value Date    RBC 4.22 2020     Lab Results   Component Value Date    HGB 13.2 2020     Lab Results   Component Value Date    HCT 39.5 2020     No components found for: MCT  Lab Results   Component Value Date    MCV 94 2020     Lab Results   Component Value Date    MCH 31.3 2020     Lab Results   Component Value Date    MCHC 33.4 2020     Lab Results   Component Value Date    RDW 12.7 2020     Lab Results   Component Value Date      05/06/2020         Assessment:  Ms. 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  She is undergoing definitve CRT.     Tolerating radiation therapy well.  All questions and concerns addressed.    Plan:   1. Continue current therapy.  EOT today.   2. Cancer related pain. Substantial increase in pain despite outpatient management. Was using Oxycodone PRN. Morphine 15 mg ER TID.  Given increase in pain and tachycardia with pale appearance, I favor admission to the hospital for inpatient management of pain. Discussed with inpatient team who has accepted admission.   3. Skin care. Aquaphor, Squirt bottle for irrigation, baby wipes.  Morphine gel PRN. Silvadene BID.       Mosaiq chart and setup information reviewed  Ports checked    Medication Review  Med list reviewed with patient?: Yes    Educational Topic Discussed  Education Instructions: reviewed skin care - creams, sitz baths      Tyree Remy MD

## 2020-05-18 NOTE — PLAN OF CARE
Patient is alert and oriented x4. Up sba, some unsteadiness due to severe pain. Patient tachy and has a 100.3 temp on admission. Iv bolus infusing as ordered. Pain on bottom, worse with having to bear down to void or have a bowel movement. Ms contin 30mg given for pain. Emesis while in bathroom, unmeasured, no visible blood in emesis, pt reports due to having ate some mashed potato's at dinner. Open sores to bottom, SANDEEP now, see wound care orders.

## 2020-05-18 NOTE — LETTER
2020      RE: Susu Lagos  [address]  [facility] [address]       [facility] PHYSICIANS  SPECIALIZING IN BREAKTHROUGHS  Radiation Oncology    On Treatment Visit Note      Susu Lagos      Date: 2020   MRN: [id number]   : 1965  Diagnosis: Anal ca      Reason for Visit:  On Radiation Treatment Visit     Treatment Summary to Date  Treatment Site: pelvis Current Dose: 5040/5040 cGy Fractions:       Chemotherapy  Chemo concurrent with radx?: Yes  Oncologist: Dr. Real  Drug Name/Frequency 1: 5FU  Drug Name/Frequency 1: Mitomycin    Subjective:   Substantial increase in tunde-anal pain from radiation dermatitis. Morphine 15 mg ER TID. Oxycodone solution PRN (3-4x). Intermittent diarrhea, controlled with Imodium. Tachycardic, looking pale.      Nursing ROS:   Nutrition Alteration  Diet Type: Patient's Preference  Skin  Skin Reaction: 0 - No changes     ENT and Mouth Exam  ENT/Mouth Note: has mouth sores secondary to chemo infusion - doing salt/soda  Cardiovascular  Respiratory effort: 1 - Normal - without distress  Gastrointestinal  Diarrhea: 0 - None  Genitourinary  Urinary Status: 0 - Normal     Pain Assessment  0-10 Pain Scale: 0      Objective:   /70   Pulse 125   Resp 18   Wt 73.4 kg (161 lb 12.8 oz)   LMP 2016   SpO2 97%   BMI 27.35 kg/m    Tachycardic, pale appearing.  Moist desquamation in tunde-anal region and tunde-vaginal. No groin desquamation.      Labs:  Lab Results   Component Value Date    WBC 4.0 2020     Lab Results   Component Value Date    RBC 4.22 2020     Lab Results   Component Value Date    HGB 13.2 2020     Lab Results   Component Value Date    HCT 39.5 2020     No components found for: MCT  Lab Results   Component Value Date    MCV 94 2020     Lab Results   Component Value Date    MCH 31.3 2020     Lab Results   Component Value Date    MCHC 33.4 2020     Lab Results   Component Value Date     RDW 12.7 05/06/2020     Lab Results   Component Value Date     05/06/2020         Assessment:  Ms. 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  She is undergoing definitve CRT.     Tolerating radiation therapy well.  All questions and concerns addressed.    Plan:   1. Continue current therapy.  EOT today.   2. Cancer related pain. Substantial increase in pain despite outpatient management. Was using Oxycodone PRN. Morphine 15 mg ER TID.  Given increase in pain and tachycardia with pale appearance, I favor admission to the hospital for inpatient management of pain. Discussed with inpatient team who has accepted admission.   3. Skin care. Aquaphor, Squirt bottle for irrigation, baby wipes.  Morphine gel PRN. Silvadene BID.       Mosaiq chart and setup information reviewed  Ports checked    Medication Review  Med list reviewed with patient?: Yes    Educational Topic Discussed  Education Instructions: reviewed skin care - creams, sitz baths      MD Tyree Kuo MD

## 2020-05-18 NOTE — PLAN OF CARE
DATE:  5/18/2020   TIME OF RECEIPT FROM LAB:  1845   LAB TEST:  WBC 0.8          Platelets 57          K 3.2  LAB VALUE:  SEE ABOVE   RESULTS GIVEN WITH READ-BACK TO (PROVIDER):  Dr. Vargas was paged with results   TIME LAB VALUE REPORTED TO PROVIDER:   7036

## 2020-05-19 DIAGNOSIS — R52 INTRACTABLE PAIN: ICD-10-CM

## 2020-05-19 DIAGNOSIS — G89.3 CANCER RELATED PAIN: ICD-10-CM

## 2020-05-19 DIAGNOSIS — C21.0 ANAL SQUAMOUS CELL CARCINOMA (H): Primary | ICD-10-CM

## 2020-05-19 LAB
ANION GAP SERPL CALCULATED.3IONS-SCNC: 7 MMOL/L (ref 3–14)
BASOPHILS # BLD AUTO: 0 10E9/L (ref 0–0.2)
BASOPHILS NFR BLD AUTO: 0 %
BUN SERPL-MCNC: 8 MG/DL (ref 7–30)
CALCIUM SERPL-MCNC: 7.7 MG/DL (ref 8.5–10.1)
CHLORIDE SERPL-SCNC: 102 MMOL/L (ref 94–109)
CO2 SERPL-SCNC: 28 MMOL/L (ref 20–32)
CREAT SERPL-MCNC: 0.69 MG/DL (ref 0.52–1.04)
DACRYOCYTES BLD QL SMEAR: SLIGHT
DIFFERENTIAL METHOD BLD: ABNORMAL
DIFFERENTIAL METHOD BLD: ABNORMAL
EOSINOPHIL # BLD AUTO: 0.1 10E9/L (ref 0–0.7)
EOSINOPHIL NFR BLD AUTO: 5 %
ERYTHROCYTE [DISTWIDTH] IN BLOOD BY AUTOMATED COUNT: 12.4 % (ref 10–15)
ERYTHROCYTE [DISTWIDTH] IN BLOOD BY AUTOMATED COUNT: 12.4 % (ref 10–15)
GFR SERPL CREATININE-BSD FRML MDRD: >90 ML/MIN/{1.73_M2}
GLUCOSE SERPL-MCNC: 100 MG/DL (ref 70–99)
HCT VFR BLD AUTO: 26.1 % (ref 35–47)
HCT VFR BLD AUTO: 26.2 % (ref 35–47)
HGB BLD-MCNC: 9.2 G/DL (ref 11.7–15.7)
HGB BLD-MCNC: 9.5 G/DL (ref 11.7–15.7)
LYMPHOCYTES # BLD AUTO: 0.2 10E9/L (ref 0.8–5.3)
LYMPHOCYTES NFR BLD AUTO: 19 %
MCH RBC QN AUTO: 31.3 PG (ref 26.5–33)
MCH RBC QN AUTO: 32.2 PG (ref 26.5–33)
MCHC RBC AUTO-ENTMCNC: 35.1 G/DL (ref 31.5–36.5)
MCHC RBC AUTO-ENTMCNC: 36.4 G/DL (ref 31.5–36.5)
MCV RBC AUTO: 89 FL (ref 78–100)
MCV RBC AUTO: 89 FL (ref 78–100)
MONOCYTES # BLD AUTO: 0.4 10E9/L (ref 0–1.3)
MONOCYTES NFR BLD AUTO: 31 %
MYELOCYTES # BLD: 0 10E9/L
MYELOCYTES NFR BLD MANUAL: 2 %
NEUTROPHILS # BLD AUTO: 0.5 10E9/L (ref 1.6–8.3)
NEUTROPHILS NFR BLD AUTO: 41 %
PLATELET # BLD AUTO: 43 10E9/L (ref 150–450)
PLATELET # BLD AUTO: 49 10E9/L (ref 150–450)
PLATELET # BLD EST: ABNORMAL 10*3/UL
POLYCHROMASIA BLD QL SMEAR: SLIGHT
POTASSIUM SERPL-SCNC: 3.1 MMOL/L (ref 3.4–5.3)
POTASSIUM SERPL-SCNC: 3.2 MMOL/L (ref 3.4–5.3)
POTASSIUM SERPL-SCNC: 3.5 MMOL/L (ref 3.4–5.3)
PROMYELOCYTES # BLD MANUAL: 0 10E9/L
PROMYELOCYTES NFR BLD MANUAL: 2 %
RBC # BLD AUTO: 2.94 10E12/L (ref 3.8–5.2)
RBC # BLD AUTO: 2.95 10E12/L (ref 3.8–5.2)
SODIUM SERPL-SCNC: 137 MMOL/L (ref 133–144)
VARIANT LYMPHS BLD QL SMEAR: PRESENT
WBC # BLD AUTO: 0.9 10E9/L (ref 4–11)
WBC # BLD AUTO: 1.3 10E9/L (ref 4–11)

## 2020-05-19 PROCEDURE — G0463 HOSPITAL OUTPT CLINIC VISIT: HCPCS

## 2020-05-19 PROCEDURE — 25800030 ZZH RX IP 258 OP 636: Performed by: FAMILY MEDICINE

## 2020-05-19 PROCEDURE — 96375 TX/PRO/DX INJ NEW DRUG ADDON: CPT

## 2020-05-19 PROCEDURE — G0378 HOSPITAL OBSERVATION PER HR: HCPCS

## 2020-05-19 PROCEDURE — 99207 ZZC CDG-CODE CATEGORY CHANGED: CPT | Performed by: NURSE PRACTITIONER

## 2020-05-19 PROCEDURE — 80048 BASIC METABOLIC PNL TOTAL CA: CPT | Performed by: FAMILY MEDICINE

## 2020-05-19 PROCEDURE — 99222 1ST HOSP IP/OBS MODERATE 55: CPT | Performed by: NURSE PRACTITIONER

## 2020-05-19 PROCEDURE — 96376 TX/PRO/DX INJ SAME DRUG ADON: CPT

## 2020-05-19 PROCEDURE — 99225 ZZC SUBSEQUENT OBSERVATION CARE,LEVEL II: CPT | Performed by: INTERNAL MEDICINE

## 2020-05-19 PROCEDURE — 85025 COMPLETE CBC W/AUTO DIFF WBC: CPT | Performed by: FAMILY MEDICINE

## 2020-05-19 PROCEDURE — 25000128 H RX IP 250 OP 636: Performed by: INTERNAL MEDICINE

## 2020-05-19 PROCEDURE — 84132 ASSAY OF SERUM POTASSIUM: CPT | Performed by: INTERNAL MEDICINE

## 2020-05-19 PROCEDURE — 25000128 H RX IP 250 OP 636: Performed by: FAMILY MEDICINE

## 2020-05-19 PROCEDURE — 25000132 ZZH RX MED GY IP 250 OP 250 PS 637: Performed by: NURSE PRACTITIONER

## 2020-05-19 PROCEDURE — 25000132 ZZH RX MED GY IP 250 OP 250 PS 637: Performed by: FAMILY MEDICINE

## 2020-05-19 PROCEDURE — 96372 THER/PROPH/DIAG INJ SC/IM: CPT

## 2020-05-19 RX ORDER — SILVER SULFADIAZINE 10 MG/G
CREAM TOPICAL 3 TIMES DAILY
Status: DISCONTINUED | OUTPATIENT
Start: 2020-05-19 | End: 2020-05-21 | Stop reason: HOSPADM

## 2020-05-19 RX ORDER — LORAZEPAM 0.5 MG/1
0.25 TABLET ORAL EVERY 4 HOURS PRN
Status: DISCONTINUED | OUTPATIENT
Start: 2020-05-19 | End: 2020-05-21 | Stop reason: HOSPADM

## 2020-05-19 RX ORDER — FAMOTIDINE 20 MG/1
20 TABLET, FILM COATED ORAL 2 TIMES DAILY
Status: DISCONTINUED | OUTPATIENT
Start: 2020-05-19 | End: 2020-05-21 | Stop reason: HOSPADM

## 2020-05-19 RX ADMIN — SODIUM CHLORIDE, POTASSIUM CHLORIDE, SODIUM LACTATE AND CALCIUM CHLORIDE: 600; 310; 30; 20 INJECTION, SOLUTION INTRAVENOUS at 17:39

## 2020-05-19 RX ADMIN — WHITE PETROLATUM: 1.75 OINTMENT TOPICAL at 09:32

## 2020-05-19 RX ADMIN — CEFEPIME 2 G: 2 INJECTION, POWDER, FOR SOLUTION INTRAVENOUS at 04:27

## 2020-05-19 RX ADMIN — OXYCODONE HYDROCHLORIDE 10 MG: 5 TABLET ORAL at 20:15

## 2020-05-19 RX ADMIN — MORPHINE SULFATE 30 MG: 30 TABLET, FILM COATED, EXTENDED RELEASE ORAL at 20:23

## 2020-05-19 RX ADMIN — FAMOTIDINE 20 MG: 20 TABLET ORAL at 09:40

## 2020-05-19 RX ADMIN — ONDANSETRON 4 MG: 2 INJECTION INTRAMUSCULAR; INTRAVENOUS at 02:51

## 2020-05-19 RX ADMIN — Medication 400 MG: at 20:15

## 2020-05-19 RX ADMIN — SILVER SULFADIAZINE: 10 CREAM TOPICAL at 20:25

## 2020-05-19 RX ADMIN — SILVER SULFADIAZINE: 10 CREAM TOPICAL at 14:00

## 2020-05-19 RX ADMIN — OXYCODONE HYDROCHLORIDE 5 MG: 5 TABLET ORAL at 09:49

## 2020-05-19 RX ADMIN — MORPHINE SULFATE 30 MG: 30 TABLET, FILM COATED, EXTENDED RELEASE ORAL at 08:17

## 2020-05-19 RX ADMIN — Medication 400 MG: at 08:17

## 2020-05-19 RX ADMIN — SILVER SULFADIAZINE: 10 CREAM TOPICAL at 07:06

## 2020-05-19 RX ADMIN — FILGRASTIM 480 MCG: 480 INJECTION, SOLUTION INTRAVENOUS; SUBCUTANEOUS at 22:03

## 2020-05-19 RX ADMIN — OXYCODONE HYDROCHLORIDE 5 MG: 5 TABLET ORAL at 15:22

## 2020-05-19 RX ADMIN — ACETAMINOPHEN 650 MG: 325 TABLET, FILM COATED ORAL at 09:49

## 2020-05-19 RX ADMIN — ACETAMINOPHEN 650 MG: 325 TABLET, FILM COATED ORAL at 23:24

## 2020-05-19 RX ADMIN — OXYCODONE HYDROCHLORIDE 5 MG: 5 TABLET ORAL at 04:57

## 2020-05-19 RX ADMIN — ACETAMINOPHEN 650 MG: 325 TABLET, FILM COATED ORAL at 15:22

## 2020-05-19 RX ADMIN — CEFEPIME 2 G: 2 INJECTION, POWDER, FOR SOLUTION INTRAVENOUS at 12:56

## 2020-05-19 RX ADMIN — WHITE PETROLATUM: 1.75 OINTMENT TOPICAL at 12:58

## 2020-05-19 RX ADMIN — OXYCODONE HYDROCHLORIDE 5 MG: 5 TABLET ORAL at 08:17

## 2020-05-19 RX ADMIN — SODIUM CHLORIDE, POTASSIUM CHLORIDE, SODIUM LACTATE AND CALCIUM CHLORIDE: 600; 310; 30; 20 INJECTION, SOLUTION INTRAVENOUS at 05:37

## 2020-05-19 RX ADMIN — FAMOTIDINE 20 MG: 20 TABLET ORAL at 20:15

## 2020-05-19 RX ADMIN — CEFEPIME 2 G: 2 INJECTION, POWDER, FOR SOLUTION INTRAVENOUS at 20:23

## 2020-05-19 RX ADMIN — OXYCODONE HYDROCHLORIDE 5 MG: 5 TABLET ORAL at 12:49

## 2020-05-19 RX ADMIN — HYDROMORPHONE HYDROCHLORIDE 0.5 MG: 1 INJECTION, SOLUTION INTRAMUSCULAR; INTRAVENOUS; SUBCUTANEOUS at 15:33

## 2020-05-19 RX ADMIN — POTASSIUM CHLORIDE 20 MEQ: 29.8 INJECTION, SOLUTION INTRAVENOUS at 05:03

## 2020-05-19 RX ADMIN — HYDROMORPHONE HYDROCHLORIDE 0.5 MG: 1 INJECTION, SOLUTION INTRAMUSCULAR; INTRAVENOUS; SUBCUTANEOUS at 17:37

## 2020-05-19 NOTE — PROVIDER NOTIFICATION
DATE:  5/19/2020   TIME OF RECEIPT FROM LAB:  0858  LAB TEST:  WBC, Platelets, and absolute neutrophils  LAB VALUE:  WBC were 0.9, Platelets were 49, and absolute neutrophils were 0.2  RESULTS GIVEN WITH READ-BACK TO (PROVIDER): MD Vargas  TIME LAB VALUE REPORTED TO PROVIDER:   2139

## 2020-05-19 NOTE — PROGRESS NOTES
"Reason For Visit: Susu Lagos, 54 year old female, seen for a [facility] consultation to evaluate and treat perineal radiation induced dermatitis. Patient was admitted on 5/18/20 for intractable anal pain due to radiation of squamous cell anal cancer and radiation induced dermatitis.       History: Per H&P reviewed.  Pt has been using silvadene and aquaphor, had morphine gel also but this burned so stopped using.    States Geeta cleansing spray working well for her, baby wipes \"burn a little but better than toilet paper\"      Objective:   Physical appearance: appears stated age, average build  Mobility: independant  Current treatment plan: silvadene and aquaphor    Bilateral groin creasing with deflating and partly unroofed blisters falling in crease of groin.  Distal labia and perianal skin with moist pink/red denudement.  Mons/labia majora with slight erythema but is intact.    Pt has been having diarrhea which is making pain worse, using peribottle to flush.      Assessment:  Radiation induced dermatitis of groin, labia, perianal skin      Plan:  Can use strips of xeroform to cover raw/open areas where practical, (groin) and need to begin a strong barrier cream to protect perianal and perineal skin from loose stool, recommend triad paste.  Use dry wipes that can be wet with water and used of Geeta in place of baby wipes.      Recommend the following plan of care:    Perineal/groin wound care - three times daily and as needed following incontinence cares   1.) Rinse with warm water via peribottle   2.) Gently cleanse with Geeta Cleanse and Protect spray and dry wipes that have been wet with with warm water - can use these in place of baby wipes as baby wipes do sting some.   3.) Apply thin layer of silvadene to any open skin in groin folds, (use silvadene sparingly as can add too much moisture) as well as Aquaphor to any surrounding irritated skin)   4.) Cover with strips of xeroform, (cut from larger sheet)   5.) To " vaginal/groin area, apply silvadene sparingly then triad paste generously to coat/protect from stooling.     Discussed with pt diet for stool thickening.    Advised that skin will likely be considerable improved with in a week.    All supplies left in room at bedside.    WOC Return visit: PRN  Nursing to notify Provider and WOC Nurse if wound deteriorates.    Verbal, written, & demonstrative education provided.  Face to face time (excluding procedure): approximately 20 minutes.      Amada Lagos RN, CWOCN   [phone]

## 2020-05-19 NOTE — PROGRESS NOTES
DATE:  5/18/2020   TIME OF RECEIPT FROM LAB: 7925  LAB TEST:  COVID-19  LAB VALUE:  negative  RESULTS GIVEN WITH READ-BACK TO (PROVIDER):  Enrique Vargas MD  TIME LAB VALUE REPORTED TO PROVIDER:   Paged to Dr. Vargas at 4532  Yvonne Croft RN BSN

## 2020-05-19 NOTE — PROVIDER NOTIFICATION
DATE:  5/19/2020   TIME OF RECEIPT FROM LAB:  0601  LAB TEST:  Platelets and absolute neutrophils  LAB VALUE:  Platelets are 43 and absolute neutrophils 0.5   RESULTS GIVEN WITH READ-BACK TO (PROVIDER):  Hilary Silva  TIME LAB VALUE REPORTED TO PROVIDER:   0608

## 2020-05-19 NOTE — PLAN OF CARE
Pt has continuous pain from the radiation burns both perirectal and vaginal.  Aquafor and silver cream used with relief.  Ptl up to bathroom and cleaned and lotioned after each void.  One  BM today.  Pt sleeping at present.  ESTRELLA Fernandez RN

## 2020-05-19 NOTE — PROGRESS NOTES
8:27 PM   Pt had temp to 100.3 after admit.  Heart rate is persistently elevated.  Blood cultures times two done.  cxr neg.  UA without signs of infection  Wbc 0.8---differential still pending  Possible neutropenic fever.  Will start empiric cefepime, likely give neupogen pending CBC differential.    8:56 PM   ANC is 0.1  I ordered neupogen 480 mcg times one and ordered daily CBC with dif

## 2020-05-19 NOTE — PROGRESS NOTES
[facility]    Medicine Progress Note - Hospitalist Service       Date of Admission:  5/18/2020  Assessment & Plan   Susu Lagos is a 54 year old female admitted on 5/18/2020. She presents as a direct admission from radiation therapy here at [facility] due to intractable anal pain secondary to radiation therapy for an anal cancer.    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  She is using oxycodone 2-1/2 mg as needed-I will change that to 5 to 10 mg as needed.  We will provide PRN IV Dilaudid.  I will ask for palliative care consult tomorrow if available.  Nurse wound consult has been made.  Continue Silvadene 3 times daily and Aquaphor cream as needed in between Silvadene applications.    Neutropenic Fever  Tachycardia  Admitted w/ tachycardia, given 2 L of LR followed by maintenance fluids. Spiked Temp to 100.7, ANC 0.1,  started cefepime, given GCSF  - UA with few RBC and wbc. UC pending.    - Blood cultures x2 pending  - Continue cefepime awaiting cultures  - Repeat GCSF x1    Pancytopenia  Presume related to chemo.  - follow     Covid rule out  After I saw the patient I was informed that the patient just received communication from her .  A coworker that he has been exposed to tested positive for COVID today.    - COVID PCR negative        Diet: Advance Diet as Tolerated: Regular Diet Adult    DVT Prophylaxis: Low Risk/Ambulatory with no VTE prophylaxis indicated  Frias Catheter: not present  Code Status: Full Code           Disposition Plan   Expected discharge: 2 - 3 days, recommended to prior living arrangement once antibiotic plan established.  Entered: Jw Shah MD 05/19/2020, 6:49 PM       The patient's care was discussed with the Patient.    Jw Shah MD  Hospitalist Service  [facility]  [facility]    ______________________________________________________________________    Interval History   Pain control much better after meds adjusted    Data reviewed today: I reviewed all medications, new labs and imaging results over the last 24 hours. I personally reviewed no images or EKG's today.    Physical Exam   Vital Signs: Temp: 98  F (36.7  C) Temp src: Oral BP: 112/53 Pulse: 98   Resp: 18 SpO2: 96 % O2 Device: None (Room air)    Weight: 0 lbs 0 oz  Constitutional: awake, alert, cooperative, no apparent distress, and appears stated age    Data   CMP  Recent Labs   Lab 05/19/20  0955 05/19/20  0500 05/18/20 2245 05/18/20  1732   NA  --  137  --  133   POTASSIUM 3.5 3.1* 3.2* 3.2*   CHLORIDE  --  102  --  100   CO2  --  28  --  29   ANIONGAP  --  7  --  4   GLC  --  100*  --  95   BUN  --  8  --  10   CR  --  0.69  --  0.65   GFRESTIMATED  --  >90  --  >90   GFRESTBLACK  --  >90  --  >90   BARBIE  --  7.7*  --  8.1*   PROTTOTAL  --   --   --  5.9*   ALBUMIN  --   --   --  2.5*   BILITOTAL  --   --   --  0.6   ALKPHOS  --   --   --  45   AST  --   --   --  15   ALT  --   --   --  37     CBC  Recent Labs   Lab 05/19/20  0500 05/18/20 2245 05/18/20  1732   WBC 1.3* 0.9* 0.8*   RBC 2.94* 2.95* 3.09*   HGB 9.2* 9.5* 9.7*   HCT 26.2* 26.1* 27.3*   MCV 89 89 88   MCH 31.3 32.2 31.4   MCHC 35.1 36.4 35.5   RDW 12.4 12.4 12.3   PLT 43* 49* 57*     UA RESULTS:  Recent Labs   Lab Test 05/18/20 2050 01/21/16  1408   COLOR Yellow Yellow   APPEARANCE Clear Clear   URINEGLC Negative Negative   URINEBILI Negative Negative   URINEKETONE 5* Negative   SG 1.003 1.015   UBLD Moderate* Negative   URINEPH 6.0 7.0   PROTEIN Negative Negative   UROBILINOGEN  --  0.2   NITRITE Negative Negative   LEUKEST Small* Small*   RBCU 4* O - 2   WBCU 8* O - 2

## 2020-05-19 NOTE — CONSULTS
[facility]    Palliative Care Consultation--Inpatient  Admission Date: 5/18/2020   Visit Date: May 19, 2020  PCP: Lakesha Aguirre   Requested by: Enrique Vargas      HISTORY of PRESENT ILLNESS:  Susu Lagos is a 54 year old year old female who visited her PCP in early February with complaints of blood in stool; on 3/3/20 she underwent a colonoscopy which revealed a small mass at the dentate line which was biopsied and returned positive for squamous cell carcinoma.  She established oncologic care with Mary Real and Sandrita and has been treated with combined chemoradiation with yesterday being her last day of treatment.  She was admitted to Observation status due to increase in pain.  She was referred to Palliative Care for assistance with pain management.      ASSESSMENT & PLAN:    Squamous cell anal cancer  Radiation enteritis with frequent loose stools  Superficial radiation burns and wounds, possibly aggravated by chemotherapy  Cancer-associated rectal pain (moderate-severe) with abdominal cramping (mild)  - Trial of Pepcid 20 BID for burning sensation associated with stooling  - Monitor opioid usage; MS Contin dose was increased by 33% on admission  - Morphine 0.1% in Solosite gel PRN; patient currently holding this as it was causing a temporary increase in pain when applied  - Continue Sitz/squirt bottle PRN    Mouth ulcerations 2o chemotherapy  - gargle with saline PRN    Nausea and vomiting, possibly related to anxiety  - Ativan 0.25 mg q4h PRN  - continue Zofran PRN    Advance Care Planning:  - Code status:  Full Code  - Health Care Directive: NO  - POLST:  No        Thank you for the opportunity to be of service to this patient and family.      Tapan Miguel CNP ([facility])  Palliative Care  Private cell:  [phone]     Face to face:   4770 - 2349 and 3601-7102, 50 min, > 50% spent reviewing symptoms with patient asa well as PMFSHx with patient.   Non face to face:   0486 - 8931 and  4479-5935, 55 min, > 50% spent coordinating care with  attending, nursing and pharmacy; investigating status of morphine in Solosite gel order placed  by Dr Remy, learning it had NOT been delivered yesterday as expected by patient.      = = = = = = = = = = = = = = = =      PROBLEM LIST  Patient Active Problem List   Diagnosis     Hyperlipidemia with target LDL less than 160     Perimenopause     Menorrhagia with irregular cycle     Anal squamous cell carcinoma (H)     Cervical cancer screening     Anal cancer (H)     Intractable pain       PAST MEDICAL HISTORY  Past Medical History:   Diagnosis Date     Ovarian cyst 6/4/2015    Right, 6 cm dermoid on MRI-recommend removal.  7/29/2015 Surgery for right salpingoophorectomy.         PAST SURGICAL HISTORY  Past Surgical History:   Procedure Laterality Date     CHOLECYSTECTOMY  2013    LSC     COLONOSCOPY N/A 11/13/2018    Procedure: colonoscopy;  Surgeon: Sesar Dos Santos MD;  Location: [facility]     COLONOSCOPY N/A 3/3/2020    Procedure: COLONOSCOPY, WITH POLYPECTOMY AND BIOPSY;  Surgeon: Sesar Dos Santos MD;  Location: [facility]     DILATION AND CURETTAGE, OPERATIVE HYSTEROSCOPY, COMBINED N/A 2/29/2016    Procedure: COMBINED DILATION AND CURETTAGE, OPERATIVE HYSTEROSCOPY;  Surgeon: Panda Knight MD;  Location: [facility]     GYN SURGERY  1995    tubes tied     LAPAROSCOPY DIAGNOSTIC (GYN) Right 7/29/2015    Procedure: LAPAROSCOPY DIAGNOSTIC (GYN);  Surgeon: Lian Betancur DO;  Location: [facility]       FAMILY MEDICAL HISTORY  Family History   Problem Relation Age of Onset     Hypertension Father      Myocardial Infarction Maternal Grandfather      Melanoma No family hx of        SOCIAL HISTORY  History   Smoking Status     Never Smoker   Smokeless Tobacco     Never Used     Social History    Substance and Sexual Activity      Alcohol use: Yes        Alcohol/week: 0.0 standard drinks        Comment: 1-2 per month     History   Drug Use No     Social History      Social History Narrative    May 19, 2020:  , lives in [place], worked as a  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  Has 3 children from her first marriage, as does her .  They have grandchildren as well.  Never smoked.  Rare social drinking.    RADHA Marcelino CNP        ALLERGIES  Allergies   Allergen Reactions     Rabies Vaccine Unknown     Scratch test resulted, it was not a good idea to give it to her.     Shellfish-Derived Products Nausea and Vomiting     Sulfa Drugs Rash       MEDICATIONS  Medications Prior to Admission  Current Facility-Administered Medications   Medication     acetaminophen (TYLENOL) tablet 650 mg     ceFEPIme (MAXIPIME) 2 g vial to attach to  ml bag for ADULTS or 50 ml bag for PEDS     famotidine (PEPCID) tablet 20 mg     HYDROmorphone (PF) (DILAUDID) injection 0.5 mg     lactated ringers infusion     magnesium oxide (MAG-OX) tablet 400 mg     melatonin tablet 1 mg     mineral oil-hydrophilic petrolatum (AQUAPHOR)     morphine (MS CONTIN) 12 hr tablet 30 mg     morphine 0.1% in solosite gel 1-2 g     naloxone (NARCAN) injection 0.1-0.4 mg     ondansetron (ZOFRAN) injection 4 mg     ondansetron (ZOFRAN-ODT) ODT tab 8 mg     oxyCODONE (ROXICODONE) tablet 5-10 mg     potassium chloride (KLOR-CON) Packet 20-40 mEq     potassium chloride 10 mEq in 100 mL intermittent infusion with 10 mg lidocaine     potassium chloride 10 mEq in 100 mL sterile water intermittent infusion (premix)     potassium chloride 20 mEq in 50 mL intermittent infusion     potassium chloride ER (KLOR-CON M) CR tablet 20-40 mEq     prochlorperazine (COMPAZINE) tablet 10 mg     silver sulfADIAZINE (SILVADENE) 1 % cream       Current Medications    ceFEPIme (MAXIPIME) IV  2 g Intravenous Q8H     famotidine  20 mg Oral BID     magnesium oxide  400 mg Oral BID     morphine  30 mg Oral Q12H CLEMENCIA     silver sulfADIAZINE   Topical TID       PRN  Medications  acetaminophen, HYDROmorphone, melatonin, mineral oil-hydrophilic petrolatum, morphine 0.1% in solosite, naloxone, [DISCONTINUED] ondansetron **OR** ondansetron, ondansetron, oxyCODONE, potassium chloride, potassium chloride with lidocaine, potassium chloride, potassium chloride, potassium chloride, prochlorperazine      REVIEW OF SYSTEMS    Constitutional: Constant perianal pain, typically 6 at its worst, but yesterday up to 8.  Aggravated by sitting, accompanied by frequest loose stools which she attempts to hold   Eyes: negative   HENMT: Several mouth sores--b/l buccal surfaces and b/l tongue, undersurface.  Salt and soda mouth rinses are most soothing; tends to avoid Magic Mouthwash as it numbs her mouth so much that she then bites her cheeks.   Cardiovascular: Baseline tachycardia   Respiratory: Neg; tested negative for Covid, done as  has been exposed   Gastrointestinal: Typical stools are loose, frequent, more likely to be accompanied by fecal incontinence if standing.  Burning sensation when stool passes. Silvadene provides greater relief than does morphine in Solosite, which actually exacerbates the pain.  Had been adhering to a Keto diet until recently.  Good appetite.   Genitourinary: Continent; urine can cause a burning sensation when it has contact with  her rectal wounds, which is normal;  No actual dysuria.    Musculoskeletal: neg   Integumentary: Oral ulcers, perirectal open wounds radiation burn   Neurological: neg   Psychiatric: neg   Endocrine: neg   Heme/Lymph: Given Neupogen yesterday for low ANC       Performance Assessment:    Palliative Performance Scale, v.2     60%  Significant disease.  Normal or reduced intake. Normal LOC or confusion. Reduced ambulation, some assist w/self-care, unable to work/do housework.        PHYSICAL EXAMINATION  Patient Vitals for the past 24 hrs:   BP Temp Temp src Pulse Resp SpO2   05/19/20 0734 107/64 98.2  F (36.8  C) Oral 102 18 96 %    05/19/20 0518 105/65 98.5  F (36.9  C) Oral 103 18 95 %   05/19/20 0100 98/52 99.7  F (37.6  C) Oral 111 18 95 %   05/18/20 2000 105/63 99.2  F (37.3  C) Oral 103 18 94 %   05/18/20 1934 97/63 99.5  F (37.5  C) Oral 107 18 95 %   05/18/20 1900 -- -- -- 109 -- --   05/18/20 1859 94/57 100.7  F (38.2  C) Oral 107 16 93 %   05/18/20 1655 -- 100.3  F (37.9  C) Oral -- -- --   05/18/20 1632 126/77 -- -- 117 16 99 %      Wt Readings from Last 5 Encounters:   05/18/20 73.4 kg (161 lb 12.8 oz)   05/13/20 74.8 kg (164 lb 12.8 oz)   05/06/20 76 kg (167 lb 8 oz)   05/06/20 74.8 kg (165 lb)   04/29/20 74.8 kg (165 lb)     Constitutional:  Awake, alert, pleasant, possibly anxious   Eyes:  Anicteric, PERRL  HEENT:  B/L ulcerations on lateral undersurface of tongue, small.  Also with superficial ulcerations buccal surfaces adjacent to posterior molars   Lymph/Hematologic:  No epitrochlear, axillary, anterior or posterior cervical, or supraclavicular lymphadenopathy is appreciated  Cardiovascular:  RRR w/o m/r/g; no distal edema. Marginal tachycardia  Respiratory:  Breathing unlabored, clear to ausculatation   GI: Obese, soft to light palpation, hypoactive bowel sounds, no hepatosplenomegaly  Genitourinary:  deferred  Musculoskeletal:  Independently mobile in bed  Skin:  Generalized perianal erythema; superficial open wound in gluteal crease  Neurological:  nonfocal  Psych:  A&O x 3; pleasant but gets anxious if meds or meals are delayed even a few minutes      DATA  ROUTINE ICU LABS (Last four results)  CMP  Recent Labs   Lab 05/19/20  0500 05/18/20  2245 05/18/20  1732     --  133   POTASSIUM 3.1* 3.2* 3.2*   CHLORIDE 102  --  100   CO2 28  --  29   ANIONGAP 7  --  4   *  --  95   BUN 8  --  10   CR 0.69  --  0.65   GFRESTIMATED >90  --  >90   GFRESTBLACK >90  --  >90   BARBIE 7.7*  --  8.1*   PROTTOTAL  --   --  5.9*   ALBUMIN  --   --  2.5*   BILITOTAL  --   --  0.6   ALKPHOS  --   --  45   AST  --   --  15   ALT   "--   --  37     CBC  Recent Labs   Lab 05/19/20  0500 05/18/20  2245 05/18/20  1732   WBC 1.3* 0.9* 0.8*   RBC 2.94* 2.95* 3.09*   HGB 9.2* 9.5* 9.7*   HCT 26.2* 26.1* 27.3*   MCV 89 89 88   MCH 31.3 32.2 31.4   MCHC 35.1 36.4 35.5   RDW 12.4 12.4 12.3   PLT 43* 49* 57*     INRNo lab results found in last 7 days.  Arterial Blood GasNo lab results found in last 7 days.      Recent Results (from the past 48 hour(s))   XR Chest Port 1 View    Narrative    CHEST ONE VIEW  5/18/2020 7:46 PM     HISTORY: Leukopenia, tachycardia.    COMPARISON: April 9, 2020      Impression    IMPRESSION: No acute disease.    LISA KATHLEEN MD     Patient Name: FUAD ECHAVARRIA   MR#: [id number]   Specimen #: [id number]   Collected: 3/3/2020   Received: 3/4/2020   Reported: 3/6/2020 06:35   Ordering Phy(s): RADHA CONLEY     For improved result formatting, select 'View Enhanced Report Format' under    Linked Documents section.     SPECIMEN(S):   Colon biopsy, mass at dentate line     FINAL DIAGNOSIS:   \"Colon\", at dentate line, mass, biopsy:   - Squamous cell carcinoma, focally keratinizing, cannot rule out invasion   - see description       COMBINED REPORT OF:    PET AND CT on  3/19/2020 10:51 AM   IMPRESSION:   1. Focal area of abnormal FDG avidity involving the level of the anus  with max SUV 12.6. This corresponds to the patient's recent biopsy  squamous cell carcinoma of the anus.  2. No other areas of abnormal FDG avidity. Specifically no definitive  evidence for regional or nonregional FDG avid lymphadenopathy.  3. Diffuse fatty infiltration of the liver. Low-attenuation 0.7 cm  lesion involving the anterior aspect of the medial segment left  hepatic lobe without evidence for focal abnormal FDG avidity in this  area. This likely represents a benign low-attenuation lesion such as a  cyst in the background of diffuse fatty infiltration.  4. No evidence for abnormal FDG avidity involving the neck, chest or  musculoskeletal structures.   "   CURT L BEHRNS, MD

## 2020-05-20 PROBLEM — N19 RENAL FAILURE: Status: ACTIVE | Noted: 2020-05-20

## 2020-05-20 PROBLEM — D70.9 NEUTROPENIC FEVER (H): Status: ACTIVE | Noted: 2020-05-20

## 2020-05-20 PROBLEM — R50.81 NEUTROPENIC FEVER (H): Status: ACTIVE | Noted: 2020-05-20

## 2020-05-20 LAB
ALBUMIN SERPL-MCNC: 1.9 G/DL (ref 3.4–5)
ALP SERPL-CCNC: 35 U/L (ref 40–150)
ALT SERPL W P-5'-P-CCNC: 26 U/L (ref 0–50)
ANION GAP SERPL CALCULATED.3IONS-SCNC: 4 MMOL/L (ref 3–14)
AST SERPL W P-5'-P-CCNC: 16 U/L (ref 0–45)
BACTERIA SPEC CULT: NORMAL
BASOPHILS # BLD AUTO: 0 10E9/L (ref 0–0.2)
BASOPHILS NFR BLD AUTO: 1 %
BILIRUB SERPL-MCNC: 0.3 MG/DL (ref 0.2–1.3)
BUN SERPL-MCNC: 5 MG/DL (ref 7–30)
CALCIUM SERPL-MCNC: 7.6 MG/DL (ref 8.5–10.1)
CHLORIDE SERPL-SCNC: 107 MMOL/L (ref 94–109)
CO2 SERPL-SCNC: 30 MMOL/L (ref 20–32)
CREAT SERPL-MCNC: 0.65 MG/DL (ref 0.52–1.04)
DACRYOCYTES BLD QL SMEAR: SLIGHT
DIFFERENTIAL METHOD BLD: ABNORMAL
EOSINOPHIL # BLD AUTO: 0.3 10E9/L (ref 0–0.7)
EOSINOPHIL NFR BLD AUTO: 13 %
ERYTHROCYTE [DISTWIDTH] IN BLOOD BY AUTOMATED COUNT: 13 % (ref 10–15)
GFR SERPL CREATININE-BSD FRML MDRD: >90 ML/MIN/{1.73_M2}
GLUCOSE SERPL-MCNC: 87 MG/DL (ref 70–99)
HCT VFR BLD AUTO: 23.9 % (ref 35–47)
HGB BLD-MCNC: 8.4 G/DL (ref 11.7–15.7)
LYMPHOCYTES # BLD AUTO: 0.2 10E9/L (ref 0.8–5.3)
LYMPHOCYTES NFR BLD AUTO: 7 %
Lab: NORMAL
MCH RBC QN AUTO: 31.9 PG (ref 26.5–33)
MCHC RBC AUTO-ENTMCNC: 35.1 G/DL (ref 31.5–36.5)
MCV RBC AUTO: 91 FL (ref 78–100)
METAMYELOCYTES # BLD: 0.1 10E9/L
METAMYELOCYTES NFR BLD MANUAL: 3 %
MONOCYTES # BLD AUTO: 0.5 10E9/L (ref 0–1.3)
MONOCYTES NFR BLD AUTO: 20 %
NEUTROPHILS # BLD AUTO: 1.4 10E9/L (ref 1.6–8.3)
NEUTROPHILS NFR BLD AUTO: 56 %
PLATELET # BLD AUTO: 35 10E9/L (ref 150–450)
PLATELET # BLD EST: ABNORMAL 10*3/UL
POLYCHROMASIA BLD QL SMEAR: SLIGHT
POTASSIUM SERPL-SCNC: 3.4 MMOL/L (ref 3.4–5.3)
PROT SERPL-MCNC: 4.6 G/DL (ref 6.8–8.8)
RBC # BLD AUTO: 2.63 10E12/L (ref 3.8–5.2)
SODIUM SERPL-SCNC: 141 MMOL/L (ref 133–144)
SPECIMEN SOURCE: NORMAL
VARIANT LYMPHS BLD QL SMEAR: PRESENT
WBC # BLD AUTO: 2.5 10E9/L (ref 4–11)

## 2020-05-20 PROCEDURE — 25000132 ZZH RX MED GY IP 250 OP 250 PS 637: Performed by: INTERNAL MEDICINE

## 2020-05-20 PROCEDURE — G0378 HOSPITAL OBSERVATION PER HR: HCPCS

## 2020-05-20 PROCEDURE — 12000000 ZZH R&B MED SURG/OB

## 2020-05-20 PROCEDURE — 99231 SBSQ HOSP IP/OBS SF/LOW 25: CPT | Performed by: INTERNAL MEDICINE

## 2020-05-20 PROCEDURE — 99232 SBSQ HOSP IP/OBS MODERATE 35: CPT | Performed by: NURSE PRACTITIONER

## 2020-05-20 PROCEDURE — 25000128 H RX IP 250 OP 636: Performed by: FAMILY MEDICINE

## 2020-05-20 PROCEDURE — 25000132 ZZH RX MED GY IP 250 OP 250 PS 637: Performed by: FAMILY MEDICINE

## 2020-05-20 PROCEDURE — 85025 COMPLETE CBC W/AUTO DIFF WBC: CPT | Performed by: FAMILY MEDICINE

## 2020-05-20 PROCEDURE — 25000132 ZZH RX MED GY IP 250 OP 250 PS 637: Performed by: NURSE PRACTITIONER

## 2020-05-20 PROCEDURE — 96376 TX/PRO/DX INJ SAME DRUG ADON: CPT

## 2020-05-20 PROCEDURE — 25800030 ZZH RX IP 258 OP 636: Performed by: FAMILY MEDICINE

## 2020-05-20 PROCEDURE — 80053 COMPREHEN METABOLIC PANEL: CPT | Performed by: FAMILY MEDICINE

## 2020-05-20 RX ORDER — OXYCODONE HYDROCHLORIDE 5 MG/1
10-15 TABLET ORAL EVERY 4 HOURS PRN
Status: DISCONTINUED | OUTPATIENT
Start: 2020-05-20 | End: 2020-05-21 | Stop reason: HOSPADM

## 2020-05-20 RX ORDER — ONDANSETRON 8 MG/1
8 TABLET, FILM COATED ORAL EVERY 8 HOURS PRN
Qty: 30 TABLET | Refills: 1 | Status: SHIPPED | OUTPATIENT
Start: 2020-05-20 | End: 2020-07-16

## 2020-05-20 RX ORDER — MORPHINE SULFATE 15 MG/1
45 TABLET, FILM COATED, EXTENDED RELEASE ORAL EVERY 12 HOURS
Qty: 180 TABLET | Refills: 0 | Status: SHIPPED | OUTPATIENT
Start: 2020-05-20 | End: 2020-07-16

## 2020-05-20 RX ORDER — ACETAMINOPHEN 325 MG/1
650 TABLET ORAL EVERY 4 HOURS PRN
COMMUNITY
Start: 2020-05-20 | End: 2020-08-26

## 2020-05-20 RX ORDER — MORPHINE SULFATE 30 MG/1
30 TABLET, FILM COATED, EXTENDED RELEASE ORAL ONCE
Status: COMPLETED | OUTPATIENT
Start: 2020-05-20 | End: 2020-05-20

## 2020-05-20 RX ORDER — FAMOTIDINE 20 MG/1
20 TABLET, FILM COATED ORAL 2 TIMES DAILY
Qty: 60 TABLET | Refills: 0 | Status: SHIPPED | OUTPATIENT
Start: 2020-05-20 | End: 2020-07-16

## 2020-05-20 RX ORDER — MORPHINE SULFATE 15 MG/1
15 TABLET, FILM COATED, EXTENDED RELEASE ORAL EVERY 12 HOURS SCHEDULED
Status: DISCONTINUED | OUTPATIENT
Start: 2020-05-20 | End: 2020-05-20 | Stop reason: DRUGHIGH

## 2020-05-20 RX ORDER — OXYCODONE HYDROCHLORIDE 5 MG/1
10-15 TABLET ORAL EVERY 4 HOURS PRN
Qty: 120 TABLET | Refills: 0 | Status: SHIPPED | OUTPATIENT
Start: 2020-05-20 | End: 2020-06-05

## 2020-05-20 RX ORDER — SILVER SULFADIAZINE 10 MG/G
CREAM TOPICAL 3 TIMES DAILY
Qty: 50 G | Refills: 0 | Status: SHIPPED | OUTPATIENT
Start: 2020-05-20 | End: 2020-07-16

## 2020-05-20 RX ORDER — MORPHINE SULFATE 15 MG/1
15 TABLET, FILM COATED, EXTENDED RELEASE ORAL ONCE
Status: COMPLETED | OUTPATIENT
Start: 2020-05-20 | End: 2020-05-20

## 2020-05-20 RX ADMIN — CEFEPIME 2 G: 2 INJECTION, POWDER, FOR SOLUTION INTRAVENOUS at 12:49

## 2020-05-20 RX ADMIN — ACETAMINOPHEN 650 MG: 325 TABLET, FILM COATED ORAL at 18:12

## 2020-05-20 RX ADMIN — Medication 400 MG: at 19:51

## 2020-05-20 RX ADMIN — OXYCODONE HYDROCHLORIDE 10 MG: 5 TABLET ORAL at 18:12

## 2020-05-20 RX ADMIN — Medication 400 MG: at 05:30

## 2020-05-20 RX ADMIN — OXYCODONE HYDROCHLORIDE 10 MG: 5 TABLET ORAL at 00:52

## 2020-05-20 RX ADMIN — OXYCODONE HYDROCHLORIDE 10 MG: 5 TABLET ORAL at 23:48

## 2020-05-20 RX ADMIN — MORPHINE SULFATE 45 MG: 30 TABLET, FILM COATED, EXTENDED RELEASE ORAL at 19:52

## 2020-05-20 RX ADMIN — OXYCODONE HYDROCHLORIDE 10 MG: 5 TABLET ORAL at 09:48

## 2020-05-20 RX ADMIN — MORPHINE SULFATE 30 MG: 30 TABLET, EXTENDED RELEASE ORAL at 09:27

## 2020-05-20 RX ADMIN — SODIUM CHLORIDE, POTASSIUM CHLORIDE, SODIUM LACTATE AND CALCIUM CHLORIDE: 600; 310; 30; 20 INJECTION, SOLUTION INTRAVENOUS at 04:00

## 2020-05-20 RX ADMIN — FAMOTIDINE 20 MG: 20 TABLET ORAL at 08:27

## 2020-05-20 RX ADMIN — OXYCODONE HYDROCHLORIDE 10 MG: 5 TABLET ORAL at 13:33

## 2020-05-20 RX ADMIN — ACETAMINOPHEN 650 MG: 325 TABLET, FILM COATED ORAL at 13:33

## 2020-05-20 RX ADMIN — MORPHINE SULFATE 15 MG: 15 TABLET, EXTENDED RELEASE ORAL at 09:23

## 2020-05-20 RX ADMIN — ACETAMINOPHEN 650 MG: 325 TABLET, FILM COATED ORAL at 08:27

## 2020-05-20 RX ADMIN — SILVER SULFADIAZINE: 10 CREAM TOPICAL at 16:57

## 2020-05-20 RX ADMIN — CEFEPIME 2 G: 2 INJECTION, POWDER, FOR SOLUTION INTRAVENOUS at 21:36

## 2020-05-20 RX ADMIN — CEFEPIME 2 G: 2 INJECTION, POWDER, FOR SOLUTION INTRAVENOUS at 05:08

## 2020-05-20 RX ADMIN — WHITE PETROLATUM: 1.75 OINTMENT TOPICAL at 11:31

## 2020-05-20 RX ADMIN — OXYCODONE HYDROCHLORIDE 10 MG: 5 TABLET ORAL at 05:07

## 2020-05-20 RX ADMIN — SILVER SULFADIAZINE: 10 CREAM TOPICAL at 09:46

## 2020-05-20 RX ADMIN — FAMOTIDINE 20 MG: 20 TABLET ORAL at 19:52

## 2020-05-20 RX ADMIN — ONDANSETRON 4 MG: 2 INJECTION INTRAMUSCULAR; INTRAVENOUS at 08:23

## 2020-05-20 ASSESSMENT — ACTIVITIES OF DAILY LIVING (ADL)
ADLS_ACUITY_SCORE: 16

## 2020-05-20 ASSESSMENT — MIFFLIN-ST. JEOR: SCORE: 1336.75

## 2020-05-20 NOTE — PLAN OF CARE
"Patient reports manageable pain with PRN oxycodone, tylenol, and creams. Up to bathroom with stand by assist and iv pole for stability. Casi care provided with assist x1. Patient states no hallucinations at this time, but is aware that she was hallucinating earlier in the day. Uses call light appropriately /67 (BP Location: Left arm)   Pulse 100   Temp 97.4  F (36.3  C) (Oral)   Resp 18   Ht 1.638 m (5' 4.49\")   Wt 73.4 kg (161 lb 13.1 oz)   LMP 01/29/2016   SpO2 95%   BMI 27.36 kg/m      "

## 2020-05-20 NOTE — PLAN OF CARE
Casi rectal pain persists, exacerbated by urinating and having loose bowel movements or even moving much in bed. Cannot sit up. Patient is using warm water to rinse after each trip to the bathroom. Patient reports using dry perineal wipes moistened with warm water and yosef lotion is better than baby wipes. Silvadene and aquaphor applied as ordered. Triad paste to vaginal and rectal area. Xeroform strips to groin.  Patient crying this afternoon as pain level in creased to 8/10. IV dilaudid given twice with some relief. Has been using oxycodone 5 mg at a time 2-3 hours apart throughout the day. This nurse suggested patient try 10 mg the next dose to see if she gets better relief. Patient did report having vivid dreams on/off today and at one point saw something moving on the ceiling. On-coming nurse notified to monitor. Using call light appropriately. Eating cottage cheese or yogurt with fruit. IV fluids infusing as ordered.

## 2020-05-20 NOTE — CONSULTS
"CLINICAL NUTRITION SERVICES  -  ASSESSMENT NOTE      RECOMMENDATIONS FOR MD/PROVIDER TO ORDER:   None   Recommendations Ordered by Registered Dietitian (RD):   Magic Cup orange at 2pm daily   Future/Additional Recommendations:   Regular diet as ordered  Continue to monitor and encourage oal intake  Recommend continuing low-fiber diet upon discharge. Once diarrhea subsides, recommend slowly re-introducing fiber into the diet.   Malnutrition: Unable to determine due to NFPA not complete at this time.        REASON FOR ASSESSMENT  Susu Lagos is a 54 year old female seen by Registered Dietitian for RN Consult - \"abebe score 2, poor intake in cancer patient\"      NUTRITION HISTORY  Information obtained from EMR:  -admitted directly from radiation therapy d/t intractable anal pain 2/2 radiation therapy for anal cancer  -ca dx in March 2020. Last chemo administered on 5/7/20 and apparently this is the last of her chemo. 5/18/20 was her last radiation treatment.  -shellfish-related products allergy  -COVID-19 negative  -palliative care consult 5/19 for pain management: gargle w/ saline PRN for mouth ulcerations 2/2 chemotherapy, zofran and ativan PRN for N/V and possible anxiety, and various pain medications   -WOC consult 5/19 to eval and treat perineal radiation induced dermatitis - various techniques described in note. Also discussed diet for stool thickening.  -dislikes food here per palliative care follow-up note 5/20      Information obtained from pt via telephone:  -last week and a half: pretty much only eats plain Greek yogurt, cottage cheese, peaches, and applesauce. Can tolerate mac & cheese. These used to be between meal snacks but are now serving as meals. Would also have saltines and peanut butter.  -typical intake before side effects of radiation/chemo started:  -breakfast: turkey sausage, cauliflower muffins, and scrambled eggs  -lunch: protein shake (mix cream & water and add protein powder w/ " "vanilla orange meal for flavor)  -dinner: chicken tenderloin, cooked veggies (green beans and radishes, maybe some broccoli and cauliflower w/ sour cream or butter)  -did keto diet for 1.5 years to lose wt - was half-way through her wt loss.  -can't eat the following d/t radiation location (lower-pelvic); raw veggies, whole wheat toast/breads, cashews. Meat must be very tender and well-cooked, and only creamy peanut butter not chunky. Also recommend white bread and crackers, maybe pasta, cream of wheat, Special K cereal without seeds and fruits  -2nd chemo started feeling the nausea; diarrhea since third week of radiation - takes immodium BID at home. Pt reports diarrhea should improve 7-10 days after radiation is completed, which was 5/18/20.  -drinks water and gatorade zero      CURRENT NUTRITION ORDERS  Diet Order:     Orders Placed This Encounter      Advance Diet as Tolerated: Regular Diet Adult      Current Intake/Tolerance:  -5/18: 25% of supper  -5/19: applesauce and cottage cheese for breakfast, peaches and yogurt for lunch, cottage cheese and peaches after supper  -5/20: fruit and cottage cheese for breakfast, fruit and yogurt for lunch  -diarrhea per flowsheet entry at ~ 0156 5/21  -apple juice seemed like too much  -mouth sores have gotten better since admit  -willing to try Orange Magic Cup at 2pm. Requesting mac & cheese for lunch and supper today      NUTRITION FOCUSED PHYSICAL ASSESSMENT FOR DIAGNOSING MALNUTRITION)  No:  RDs not performing per department guidelines surrounding COVID-19 pandemic             Obtained from Chart/Interdisciplinary Team:  None    ANTHROPOMETRICS  Height: 5' 4.488\"  Weight: 161 lbs 13.08 oz   -note that pt was not weighed on admit. This wt was obtained from her radiation visit on 5/18/20.  Body mass index is 27.36 kg/m .  Weight Status:  Overweight BMI 25-29.9  IBW: 120 lbs  % IBW: 134%  Weight History:   Wt Readings from Last 15 Encounters:   05/20/20 73.4 kg (161 lb " 13.1 oz)   05/18/20 73.4 kg (161 lb 12.8 oz)   05/13/20 74.8 kg (164 lb 12.8 oz)   05/06/20 76 kg (167 lb 8 oz)   05/06/20 74.8 kg (165 lb)   04/29/20 74.8 kg (165 lb)   04/17/20 77.7 kg (171 lb 6.4 oz)   04/15/20 76.7 kg (169 lb)   03/31/20 78.3 kg (172 lb 9.6 oz)   03/25/20 78.3 kg (172 lb 9.6 oz)   03/24/20 78.3 kg (172 lb 9.6 oz)   03/13/20 76.2 kg (168 lb)   03/11/20 77.7 kg (171 lb 4.8 oz)   03/03/20 75.3 kg (166 lb)   02/05/20 77.7 kg (171 lb 6.4 oz)   -appears pt has lost 11 lbs (6.4%) over the last 3 months, which is not clinically significant. However, it could become significant if pt continues to lose wt.  -pt states she has lost wt 11-12 lbs since April 9th (when treatment started)    Vitals:    05/20/20 1100   Weight: 73.4 kg (161 lb 13.1 oz)   -net I/O since admit: +8733 mL per 24/hrs        LABS  K+ 3.3 (L)    MEDICATIONS  IVF: LR @ 100 mL/hr  Potassium supplementation      ASSESSED NUTRITION NEEDS PER APPROVED PRACTICE GUIDELINES:    Dosing Weight 73 kg (actual wt as recommended by oncology practice guidelines)  Estimated Energy Needs: 3578-1275 kcals (25-30 Kcal/Kg)  Justification: maintenance  Estimated Protein Needs:  grams protein (1.2-1.5 g pro/Kg)  Justification: increased needs with cancer treatment  Estimated Fluid Needs: (1 mL/Kcal)  Justification: maintenance    MALNUTRITION:  % Weight Loss:  Weight loss does not meet criteria for malnutrition 11 lbs (6.4%) over the last 3 months. However, does have potential to meet criteria if wt loss continues.  % Intake:  </= 50% for >/= 5 days (severe malnutrition)  Subcutaneous Fat Loss:  Unable to assess  Muscle Loss: Unable to assess  Fluid Retention:  None noted    Malnutrition Diagnosis: Unable to determine due to NFPA not complete at this time.      NUTRITION DIAGNOSIS:  Inadequate oral intake related to mouth sores, taste changes, and limited food/beverage options 2/2 side effects of cancer treatment as evidenced by oral intake </= 50%  for >/= 5 days and pt report.      NUTRITION INTERVENTIONS  Recommendations / Nutrition Prescription  Regular diet as ordered  Magic Cup orange at 2pm daily  Continue to monitor and encourage oral intake  Recommend continuing low-fiber diet upon discharge. Once diarrhea subsides, recommend slowly re-introducing fiber into the diet.      Implementation  Nutrition education: Provided education on diarrhea nutrition therapy. Encouraged slow re-introduction of fiber into the diet after diarrhea resolves. Informed pt of fiber supplements such as Citrucel and Metamucil to help add bulk to stool - pt states she has these at home.  Composition of Meals and Snacks, General/healthful diet, Medical Food Supplement: see above  Collaboration and Referral of Nutrition care: Pt POC discussed during IDT rounds this morning.      Nutrition Goals  Pt to consume >/= 75% of meals TID      MONITORING AND EVALUATION:  Progress towards goals will be monitored and evaluated per protocol and Practice Guidelines              Rosibel Elliott RDN, LD  Clinical Dietitian  [phone]

## 2020-05-20 NOTE — PROGRESS NOTES
[facility]    Medicine Progress Note - Hospitalist Service       Date of Admission:  5/18/2020  Assessment & Plan   Susu Lagos is a 54 year old female admitted on 5/18/2020. She presents as a direct admission from radiation therapy here at [facility] due to intractable anal pain secondary to radiation therapy for an anal cancer.    Intractable anal pain secondary to radiation of squamous cell anal cancer and radiation induced dermatitis  Patients narcotics adjusted by palliative care RN  Has been seen by WOC RN for topical treatment recommendations  - Had period of increased pain associated with loose stools this morning  - Doubt pepcid will affect rectal pH    Neutropenic Fever  Tachycardia  Admitted w/ tachycardia, given 2 L of LR followed by maintenance fluids. Spiked Temp to 100.7, ANC 0.1,  UA with few RBC and WBC. Started cefepime, given GCSF x2.   Afebrile, ANC >500  - UC pending.    - Blood cultures x2 pending  - Continue cefepime awaiting cultures    Pancytopenia  Presume related to chemo.  - follow     Covid rule out  After I saw the patient I was informed that the patient just received communication from her .  A coworker that he has been exposed to tested positive for COVID today.    - COVID PCR negative          Diet: Advance Diet as Tolerated: Regular Diet Adult    DVT Prophylaxis: Low Risk/Ambulatory with no VTE prophylaxis indicated  Frias Catheter: not present  Code Status: Full Code           Disposition Plan   Expected discharge: 1-2, recommended to prior living arrangement once adequate pain management/ tolerating PO medications and antibiotic plan established.  Entered: Jw Shah MD 05/20/2020, 3:28 PM       The patient's care was discussed with the Patient.    Jw Shah MD  Hospitalist Service  [facility]    ______________________________________________________________________    Interval History   Increased pain  this morning, severe burning with and after stool    Data reviewed today: I reviewed all medications, new labs and imaging results over the last 24 hours. I personally reviewed no images or EKG's today.    Physical Exam   Vital Signs: Temp: 97.4  F (36.3  C) Temp src: Oral BP: 115/67 Pulse: 100   Resp: 18 SpO2: 95 % O2 Device: None (Room air)    Weight: 161 lbs 13.08 oz  Constitutional: awake, alert, cooperative, no apparent distress, and appears stated age    Data     CMP  Recent Labs   Lab 05/20/20 0520 05/19/20  0955 05/19/20  0500 05/18/20 2245 05/18/20  1732     --  137  --  133   POTASSIUM 3.4 3.5 3.1* 3.2* 3.2*   CHLORIDE 107  --  102  --  100   CO2 30  --  28  --  29   ANIONGAP 4  --  7  --  4   GLC 87  --  100*  --  95   BUN 5*  --  8  --  10   CR 0.65  --  0.69  --  0.65   GFRESTIMATED >90  --  >90  --  >90   GFRESTBLACK >90  --  >90  --  >90   BARBIE 7.6*  --  7.7*  --  8.1*   PROTTOTAL 4.6*  --   --   --  5.9*   ALBUMIN 1.9*  --   --   --  2.5*   BILITOTAL 0.3  --   --   --  0.6   ALKPHOS 35*  --   --   --  45   AST 16  --   --   --  15   ALT 26  --   --   --  37     CBC  Recent Labs   Lab 05/20/20 0520 05/19/20 0500 05/18/20 2245 05/18/20  1732   WBC 2.5* 1.3* 0.9* 0.8*   RBC 2.63* 2.94* 2.95* 3.09*   HGB 8.4* 9.2* 9.5* 9.7*   HCT 23.9* 26.2* 26.1* 27.3*   MCV 91 89 89 88   MCH 31.9 31.3 32.2 31.4   MCHC 35.1 35.1 36.4 35.5   RDW 13.0 12.4 12.4 12.3   PLT 35* 43* 49* 57*

## 2020-05-20 NOTE — PROGRESS NOTES
Palliative Care Follow-up Hospital Note  Date of Admission:  5/18/2020   Visit Date:  May 20, 2020        HISTORY of PRESENT ILLNESS:  Susu Lagos is a 54 year old year old female who visited her PCP in early February with complaints of blood in stool; on 3/3/20 she underwent a colonoscopy which revealed a small mass at the dentate line which was biopsied and returned positive for squamous cell carcinoma.  She established oncologic care with Mary Real and Sandrita and has been treated with combined chemoradiation with yesterday being her last day of treatment.  She was admitted to Observation status due to increase in pain.  She was referred to Palliative Care for assistance with pain management.        ASSESSMENT & PLAN:     Squamous cell anal cancer  Radiation enteritis with frequent loose stools  Superficial radiation burns and wounds, possibly aggravated by chemotherapy  Cancer-associated rectal pain (moderate-severe) with abdominal cramping (mild)  - Trial of Pepcid 20 BID for burning sensation associated with stooling  - Monitor opioid usage; MS Contin dose was increased by 33% on admission  - Morphine 0.1% in Solosite gel PRN; patient currently holding this as it was causing a temporary increase in pain when applied  - Continue Sitz/squirt bottle PRN  - 5/20: pain today only 3-4, with significant relief from wound regimen prescribed by WOC RN--many thanks.  Was given the equivalent of 130 mg oral morphine yesterday, so I'll increase MS Contin to 45 mg q12, and increase Oxycodone to 10-15 mg q4h PRN     Mouth ulcerations 2o chemotherapy  - gargle with saline PRN     Nausea and vomiting, possibly related to anxiety  - Ativan 0.25 mg q4h PRN  - continue Zofran PRN     Advance Care Planning:  - Code status:  Full Code  - Health Care Directive: NO  - POLST:  No      Thank you for the opportunity to be of service to this patient and family.      Tapan Miguel (Ann), CNP  Palliative Care  Private cell:   [phone]       Face to face:   0745 - 0815, 30 min, > 50% spent discussing planned changed to medication regimen.   Non face to face:  [id number] and 0815 - 0850 and 5135-4150, 80 min > 50% spent reviewing and writing prescriptions in anticipation of discharge and coordinating care with attending and nursing; writing instructions in anticipation of discharge possibly tomorrow.     ========================    SUBJECTIVE:  >Pain:  VERY much improved due in large part to new wound care regimen recommended by Amada Lagos RN/WOC.  Pain currently 3-4.  Last night's RN bumped up the strength of the Oxycodone dose to 10 mg, and patient has continued with that dose since, also helping pain control.  >Nausea: had been alternating Compazine and Zofran on a regular basis prior to admission, though had been attempting to taper its use.  Used Zofran once this morning.    >Nutrition: Dislikes the food here.    ROS:  As described in HPI and Subjective.  Otherwise, ALL are negative.    MEDICATIONS:  Allergies   Allergen Reactions     Rabies Vaccine Unknown     Scratch test resulted, it was not a good idea to give it to her.     Shellfish-Derived Products Nausea and Vomiting     Sulfa Drugs Rash     Scheduled meds:    ceFEPIme (MAXIPIME) IV  2 g Intravenous Q8H     famotidine  20 mg Oral BID     magnesium oxide  400 mg Oral BID     morphine  15 mg Oral Q12H CLEMENCIA     morphine  45 mg Oral Q12H CLEMENCIA     silver sulfADIAZINE   Topical TID     PRN meds:  acetaminophen, LORazepam, melatonin, mineral oil-hydrophilic petrolatum, morphine 0.1% in solosite, naloxone, [DISCONTINUED] ondansetron **OR** ondansetron, ondansetron, oxyCODONE, potassium chloride, potassium chloride with lidocaine, potassium chloride, potassium chloride, potassium chloride, prochlorperazine      OBJECTIVE:  Patient Vitals for the past 24 hrs:   BP Temp Temp src Pulse Resp SpO2   05/20/20 0720 125/71 96.2  F (35.7  C) Oral 98 18 98 %   05/20/20 0339 113/65 98.4  F  (36.9  C) Oral 94 18 97 %   05/19/20 2245 100/68 98.2  F (36.8  C) Oral 94 18 96 %   05/19/20 1847 112/53 98  F (36.7  C) Oral 98 18 96 %   05/19/20 1607 -- -- -- -- 18 --   05/19/20 1606 100/68 97.2  F (36.2  C) Oral 102 18 94 %   05/19/20 1545 -- -- -- -- 18 --   05/19/20 1334 -- -- -- -- 18 --   05/19/20 1121 102/65 98.3  F (36.8  C) Oral 92 18 96 %   05/19/20 1034 -- -- -- -- 18 --   05/19/20 0902 -- -- -- -- 16 --       Wt Readings from Last 10 Encounters:   05/18/20 73.4 kg (161 lb 12.8 oz)   05/13/20 74.8 kg (164 lb 12.8 oz)   05/06/20 76 kg (167 lb 8 oz)   05/06/20 74.8 kg (165 lb)   04/29/20 74.8 kg (165 lb)   04/17/20 77.7 kg (171 lb 6.4 oz)   04/15/20 76.7 kg (169 lb)   03/31/20 78.3 kg (172 lb 9.6 oz)   03/25/20 78.3 kg (172 lb 9.6 oz)   03/24/20 78.3 kg (172 lb 9.6 oz)   Awake, alert, appears much more comfortable   Vitals reviewed; less tachycardic today   Ambulates freely in her room    Intake/Output Summary (Last 24 hours) at 5/20/2020 0836  Last data filed at 5/19/2020 1409  Gross per 24 hour   Intake 865 ml   Output --   Net 865 ml         ROUTINE ICU LABS (Last four results)  CMP  Recent Labs   Lab 05/20/20  0520 05/19/20  0955 05/19/20  0500 05/18/20  2245 05/18/20  1732     --  137  --  133   POTASSIUM 3.4 3.5 3.1* 3.2* 3.2*   CHLORIDE 107  --  102  --  100   CO2 30  --  28  --  29   ANIONGAP 4  --  7  --  4   GLC 87  --  100*  --  95   BUN 5*  --  8  --  10   CR 0.65  --  0.69  --  0.65   GFRESTIMATED >90  --  >90  --  >90   GFRESTBLACK >90  --  >90  --  >90   BARBIE 7.6*  --  7.7*  --  8.1*   PROTTOTAL 4.6*  --   --   --  5.9*   ALBUMIN 1.9*  --   --   --  2.5*   BILITOTAL 0.3  --   --   --  0.6   ALKPHOS 35*  --   --   --  45   AST 16  --   --   --  15   ALT 26  --   --   --  37     CBC  Recent Labs   Lab 05/20/20  0520 05/19/20  0500 05/18/20  2245 05/18/20  1732   WBC 2.5* 1.3* 0.9* 0.8*   RBC 2.63* 2.94* 2.95* 3.09*   HGB 8.4* 9.2* 9.5* 9.7*   HCT 23.9* 26.2* 26.1* 27.3*   MCV 91 89 89  88   MCH 31.9 31.3 32.2 31.4   MCHC 35.1 35.1 36.4 35.5   RDW 13.0 12.4 12.4 12.3   PLT 35* 43* 49* 57*     INRNo lab results found in last 7 days.  Arterial Blood GasNo lab results found in last 7 days.    Recent Results (from the past 48 hour(s))   XR Chest Port 1 View    Narrative    CHEST ONE VIEW  5/18/2020 7:46 PM     HISTORY: Leukopenia, tachycardia.    COMPARISON: April 9, 2020      Impression    IMPRESSION: No acute disease.    LISA KATHLEEN MD

## 2020-05-20 NOTE — PLAN OF CARE
Pt wounds are clean although bleeding more today near perirectal area and vaginal area.  Wounds in the groin folds are markedly improved.  Wound care nurse was able to leave a consistent order of care which is being followed.  I  have increased pt oxycodone to full dose and she appears more comfortable and relaxed.  Pt states she has vivid dreams from the narcotics but they are helping the pain.  ESTRELLA Fernandez RN

## 2020-05-20 NOTE — PLAN OF CARE
Pain has improved today but still has periods of flare ups. Perirectal area mostly improving except lower vaginal area and around anus where tissue is bright red and bleeding.  Uses tunde bottle to wash after voiding and having loose stools. Patient does tunde care with 1 assist of staff. Staff applies all creams and ointments after each toileting episode. Eating some cottage cheese with peaches for some meals and yogurt with peaches at other times. Pain medications increased today, patient reports continuing to have vivid dreams and some hallucinations. Iv fluid and antibiotics as ordered. Afebrile.

## 2020-05-21 ENCOUNTER — TELEPHONE (OUTPATIENT)
Dept: PALLIATIVE MEDICINE | Facility: CLINIC | Age: 55
End: 2020-05-21

## 2020-05-21 VITALS
HEART RATE: 98 BPM | BODY MASS INDEX: 27.63 KG/M2 | WEIGHT: 161.82 LBS | RESPIRATION RATE: 16 BRPM | DIASTOLIC BLOOD PRESSURE: 69 MMHG | OXYGEN SATURATION: 95 % | SYSTOLIC BLOOD PRESSURE: 114 MMHG | TEMPERATURE: 96.5 F | HEIGHT: 64 IN

## 2020-05-21 LAB
ALBUMIN SERPL-MCNC: 2 G/DL (ref 3.4–5)
ALBUMIN UR-MCNC: NEGATIVE MG/DL
ALP SERPL-CCNC: 36 U/L (ref 40–150)
ALT SERPL W P-5'-P-CCNC: 25 U/L (ref 0–50)
ANION GAP SERPL CALCULATED.3IONS-SCNC: 4 MMOL/L (ref 3–14)
APPEARANCE UR: CLEAR
AST SERPL W P-5'-P-CCNC: 17 U/L (ref 0–45)
BASOPHILS # BLD AUTO: 0 10E9/L (ref 0–0.2)
BASOPHILS NFR BLD AUTO: 0 %
BILIRUB SERPL-MCNC: 0.2 MG/DL (ref 0.2–1.3)
BILIRUB UR QL STRIP: NEGATIVE
BUN SERPL-MCNC: 5 MG/DL (ref 7–30)
CALCIUM SERPL-MCNC: 7.9 MG/DL (ref 8.5–10.1)
CHLORIDE SERPL-SCNC: 107 MMOL/L (ref 94–109)
CO2 SERPL-SCNC: 29 MMOL/L (ref 20–32)
COLOR UR AUTO: ABNORMAL
CREAT SERPL-MCNC: 0.64 MG/DL (ref 0.52–1.04)
DIFFERENTIAL METHOD BLD: ABNORMAL
EOSINOPHIL # BLD AUTO: 0.4 10E9/L (ref 0–0.7)
EOSINOPHIL NFR BLD AUTO: 10 %
ERYTHROCYTE [DISTWIDTH] IN BLOOD BY AUTOMATED COUNT: 13.2 % (ref 10–15)
GFR SERPL CREATININE-BSD FRML MDRD: >90 ML/MIN/{1.73_M2}
GLUCOSE SERPL-MCNC: 84 MG/DL (ref 70–99)
GLUCOSE UR STRIP-MCNC: NEGATIVE MG/DL
HCT VFR BLD AUTO: 25.3 % (ref 35–47)
HGB BLD-MCNC: 8.8 G/DL (ref 11.7–15.7)
HGB UR QL STRIP: NEGATIVE
KETONES UR STRIP-MCNC: NEGATIVE MG/DL
LEUKOCYTE ESTERASE UR QL STRIP: NEGATIVE
LYMPHOCYTES # BLD AUTO: 0.3 10E9/L (ref 0.8–5.3)
LYMPHOCYTES NFR BLD AUTO: 7 %
MCH RBC QN AUTO: 31.9 PG (ref 26.5–33)
MCHC RBC AUTO-ENTMCNC: 34.8 G/DL (ref 31.5–36.5)
MCV RBC AUTO: 92 FL (ref 78–100)
METAMYELOCYTES # BLD: 0.1 10E9/L
METAMYELOCYTES NFR BLD MANUAL: 2 %
MONOCYTES # BLD AUTO: 0.4 10E9/L (ref 0–1.3)
MONOCYTES NFR BLD AUTO: 8 %
MUCOUS THREADS #/AREA URNS LPF: PRESENT /LPF
MYELOCYTES # BLD: 0.2 10E9/L
MYELOCYTES NFR BLD MANUAL: 4 %
NEUTROPHILS # BLD AUTO: 3 10E9/L (ref 1.6–8.3)
NEUTROPHILS NFR BLD AUTO: 69 %
NITRATE UR QL: NEGATIVE
PH UR STRIP: 6 PH (ref 5–7)
PLATELET # BLD AUTO: 40 10E9/L (ref 150–450)
PLATELET # BLD EST: ABNORMAL 10*3/UL
POLYCHROMASIA BLD QL SMEAR: SLIGHT
POTASSIUM SERPL-SCNC: 3.3 MMOL/L (ref 3.4–5.3)
PROT SERPL-MCNC: 4.9 G/DL (ref 6.8–8.8)
RBC # BLD AUTO: 2.76 10E12/L (ref 3.8–5.2)
RBC #/AREA URNS AUTO: <1 /HPF (ref 0–2)
RBC INCLUSIONS BLD: SLIGHT
SODIUM SERPL-SCNC: 140 MMOL/L (ref 133–144)
SOURCE: ABNORMAL
SP GR UR STRIP: 1.01 (ref 1–1.03)
UROBILINOGEN UR STRIP-MCNC: 0 MG/DL (ref 0–2)
WBC # BLD AUTO: 4.4 10E9/L (ref 4–11)
WBC #/AREA URNS AUTO: 1 /HPF (ref 0–5)

## 2020-05-21 PROCEDURE — 80053 COMPREHEN METABOLIC PANEL: CPT | Performed by: FAMILY MEDICINE

## 2020-05-21 PROCEDURE — 99239 HOSP IP/OBS DSCHRG MGMT >30: CPT | Performed by: INTERNAL MEDICINE

## 2020-05-21 PROCEDURE — 85025 COMPLETE CBC W/AUTO DIFF WBC: CPT | Performed by: FAMILY MEDICINE

## 2020-05-21 PROCEDURE — 25800030 ZZH RX IP 258 OP 636: Performed by: FAMILY MEDICINE

## 2020-05-21 PROCEDURE — 25000132 ZZH RX MED GY IP 250 OP 250 PS 637: Performed by: NURSE PRACTITIONER

## 2020-05-21 PROCEDURE — 25000128 H RX IP 250 OP 636: Performed by: FAMILY MEDICINE

## 2020-05-21 PROCEDURE — 81001 URINALYSIS AUTO W/SCOPE: CPT | Performed by: INTERNAL MEDICINE

## 2020-05-21 PROCEDURE — 25000132 ZZH RX MED GY IP 250 OP 250 PS 637: Performed by: FAMILY MEDICINE

## 2020-05-21 PROCEDURE — 99231 SBSQ HOSP IP/OBS SF/LOW 25: CPT | Performed by: NURSE PRACTITIONER

## 2020-05-21 RX ORDER — POTASSIUM CHLORIDE 750 MG/1
10 TABLET, EXTENDED RELEASE ORAL DAILY
Qty: 30 TABLET | Refills: 0 | Status: SHIPPED | OUTPATIENT
Start: 2020-05-21 | End: 2020-07-16

## 2020-05-21 RX ORDER — LOPERAMIDE HCL 2 MG
2 CAPSULE ORAL 4 TIMES DAILY PRN
Status: DISCONTINUED | OUTPATIENT
Start: 2020-05-21 | End: 2020-05-21 | Stop reason: HOSPADM

## 2020-05-21 RX ORDER — LOPERAMIDE HCL 2 MG
2 CAPSULE ORAL 2 TIMES DAILY
Status: DISCONTINUED | OUTPATIENT
Start: 2020-05-21 | End: 2020-05-21 | Stop reason: HOSPADM

## 2020-05-21 RX ADMIN — WHITE PETROLATUM: 1.75 OINTMENT TOPICAL at 10:00

## 2020-05-21 RX ADMIN — SILVER SULFADIAZINE: 10 CREAM TOPICAL at 01:30

## 2020-05-21 RX ADMIN — OXYCODONE HYDROCHLORIDE 10 MG: 5 TABLET ORAL at 09:10

## 2020-05-21 RX ADMIN — OXYCODONE HYDROCHLORIDE 10 MG: 5 TABLET ORAL at 04:11

## 2020-05-21 RX ADMIN — MORPHINE SULFATE 45 MG: 30 TABLET, FILM COATED, EXTENDED RELEASE ORAL at 09:10

## 2020-05-21 RX ADMIN — OXYCODONE HYDROCHLORIDE 10 MG: 5 TABLET ORAL at 14:19

## 2020-05-21 RX ADMIN — SODIUM CHLORIDE, POTASSIUM CHLORIDE, SODIUM LACTATE AND CALCIUM CHLORIDE: 600; 310; 30; 20 INJECTION, SOLUTION INTRAVENOUS at 01:39

## 2020-05-21 RX ADMIN — SILVER SULFADIAZINE: 10 CREAM TOPICAL at 09:13

## 2020-05-21 RX ADMIN — LOPERAMIDE HYDROCHLORIDE 2 MG: 2 CAPSULE ORAL at 09:10

## 2020-05-21 RX ADMIN — ACETAMINOPHEN 650 MG: 325 TABLET, FILM COATED ORAL at 09:09

## 2020-05-21 RX ADMIN — CEFEPIME 2 G: 2 INJECTION, POWDER, FOR SOLUTION INTRAVENOUS at 05:36

## 2020-05-21 RX ADMIN — Medication 400 MG: at 09:10

## 2020-05-21 RX ADMIN — POTASSIUM CHLORIDE 40 MEQ: 20 TABLET, EXTENDED RELEASE ORAL at 12:40

## 2020-05-21 RX ADMIN — ACETAMINOPHEN 650 MG: 325 TABLET, FILM COATED ORAL at 01:56

## 2020-05-21 ASSESSMENT — ACTIVITIES OF DAILY LIVING (ADL)
ADLS_ACUITY_SCORE: 16

## 2020-05-21 NOTE — PROGRESS NOTES
Palliative Care Follow-up Hospital Note  Date of Admission:  5/18/2020   Visit Date:  May 21, 2020        HISTORY of PRESENT ILLNESS:  Susu Lagos is a 54 year old year old female who visited her PCP in early February with complaints of blood in stool; on 3/3/20 she underwent a colonoscopy which revealed a small mass at the dentate line which was biopsied and returned positive for squamous cell carcinoma.  She established oncologic care with Mary Real and Sandrita and has been treated with combined chemoradiation with yesterday being her last day of treatment.  She was admitted to Observation status due to increase in pain.  She was referred to Palliative Care for assistance with pain management.        ASSESSMENT & PLAN:     Squamous cell anal cancer  Radiation enteritis with frequent loose stools  Superficial radiation burns and wounds, possibly aggravated by chemotherapy  Cancer-associated rectal pain (moderate-severe) with abdominal cramping (mild)  - Trial of Pepcid 20 BID for burning sensation associated with stooling  - Monitor opioid usage; MS Contin dose was increased by 33% on admission  - Morphine 0.1% in Solosite gel PRN; patient currently holding this as it was causing a temporary increase in pain when applied  - Continue Sitz/squirt bottle PRN  - 5/20: pain today only 3-4, with significant relief from wound regimen prescribed by C RN--many thanks.  Was given the equivalent of 130 mg oral morphine yesterday, so I'll increase MS Contin to 45 mg q12, and increase Oxycodone to 10-15 mg q4h PRN  - 5/21: satisfied with level of pain control on current opioid regimen; has resumed use of morphine in Solosite gel with good relief.  No visual hallucinations today.  Took a total of 60 mg oral oxycodone yesterday.  Returns to Dr Remy on 5/26.     Frequent diarrhea, onset during radiation and thought 2o radiation   - resume Loperamide 2 mg bid, as she was taking it at home    Mouth ulcerations 2o  chemotherapy  - gargle with saline PRN     Nausea and vomiting, possibly related to anxiety  - Ativan 0.25 mg q4h PRN  - continue Zofran PRN     Advance Care Planning:  - Code status:  Full Code  - Health Care Directive: NO  - POLST:  No      Thank you for the opportunity to be of service to this patient and family.      Tapan Miguel (Ann), CNP  Palliative Care  Private cell:  [phone]       Non face to face:   [id number] and 0850 - 0910, 30 min, > 50% spent coordinating care with  Nursing; updating discharge instructions that were written yesterday  .   Face to face:  0830 - 0850, 20 min, > 50% spent reviewing home-going instructions, advising her to bring her records of OxyIR and MS Contin usage to her 5/26 appointment with Dr Remy  =========================    Discharge Instructions:  From Tapan Miguel, Palliative Care NP      OK to modify diet as suggested by Amada, and to use Loperamide cautiously.  As the radiation side effects keron, however, your stools will firm up and you MUST be certain that your bowels move at least once daily as you're on a significant dose of pain meds, and the higher the dose, the greater the risk of constipation.    I'm discharging you on slow-release morphine 45 mg every 12 hours.    Do NOT automatically take Oxycodone every 4 hours, especially since you're having visual hallucinations and vivid dreams.  Let your pain be your guide on how often to take it and the dose that you take.  I've increased the dose to 10-15 mg every 4 hours IF needed.  Record the date, time and dose each time you take it.  The 15 mg dose should be reserved for especially severe pain around 8 or so on the pain scale.  Most of the time, the 10 mg dose should be sufficient.  And very very soon, you will find you can drop it back to just 5 mg.  I sent home a couple of yellow forms you can use, if you like, specifically for tracking Oxycodone dose and usage.  Since the morphine dose will be reduced fairly  rapidly, please keep track of the day by day doses you're taking on the lavender form I sent home.      If you're able to manage your pain using no more than 10 mg of Oxycodone for breakthrough pain for 3 consecutive days, you can lower your morphine dose from 45mg twice daily to 30 mg twice daily.  Don't be surprised if you find your use of Oxycodone increases by 5-10 mg at that point, but persevere.  It's OK to add Tylenol to the Oxycodone as it WILL potentiate it a smidge.     Please direct any questions on dose adjustments to Dr Remy as he'll be seeing you on an ongoing  basis; their main number is [phone].      If you need more wound care supplies, contact [facility], [phone].  Do NOT discard any empty tubes, packages (even the paper package containing the dry wipes) as you'll need to be able to identify the specific items you need when you call them.  If they aren't helpful, call the hospital at [phone] and ask for the [facility] RN office; you can leave a message.    I did send prescriptions for the slow-release morphine and the Oxycodone to the [facility] at [place].  ========================    SUBJECTIVE:  No visual hallucinations.  Pain currently a 6 immediately following use of bathroom and before fresh topicals could be applied.  Expects her  to be at home to assist her in her tunde cares.  Pain sufficiently relieved that she can now stand up straight and walk; on admission, she could do neither.      ROS:  As described in HPI and Subjective.  Otherwise, ALL are negative.    MEDICATIONS:  Allergies   Allergen Reactions     Rabies Vaccine Unknown     Scratch test resulted, it was not a good idea to give it to her.     Shellfish-Derived Products Nausea and Vomiting     Sulfa Drugs Rash     Scheduled meds:    ceFEPIme (MAXIPIME) IV  2 g Intravenous Q8H     famotidine  20 mg Oral BID     loperamide  2 mg Oral BID     magnesium oxide  400 mg Oral BID     morphine  45 mg Oral Q12H  "CLEMENCIA     silver sulfADIAZINE   Topical TID     PRN meds:  acetaminophen, LORazepam, melatonin, mineral oil-hydrophilic petrolatum, morphine 0.1% in solosite, naloxone, [DISCONTINUED] ondansetron **OR** ondansetron, ondansetron, oxyCODONE, potassium chloride, potassium chloride with lidocaine, potassium chloride, potassium chloride, potassium chloride, prochlorperazine      OBJECTIVE:  Patient Vitals for the past 24 hrs:   BP Temp Temp src Pulse Resp SpO2 Height Weight   05/21/20 0745 103/67 97.6  F (36.4  C) Oral 96 16 92 % -- --   05/21/20 0414 91/51 97.7  F (36.5  C) Oral 89 16 93 % -- --   05/21/20 0245 -- -- -- -- 16 -- -- --   05/20/20 2245 112/70 98  F (36.7  C) Axillary 94 18 95 % -- --   05/20/20 1900 109/69 98.1  F (36.7  C) Oral -- 18 95 % -- --   05/20/20 1812 -- -- -- -- 18 -- -- --   05/20/20 1400 115/67 97.4  F (36.3  C) Oral 100 18 95 % -- --   05/20/20 1100 118/69 97.4  F (36.3  C) Oral 97 20 97 % 1.638 m (5' 4.49\") 73.4 kg (161 lb 13.1 oz)   05/20/20 0927 -- -- -- -- 18 -- -- --       Wt Readings from Last 10 Encounters:   05/20/20 73.4 kg (161 lb 13.1 oz)   05/18/20 73.4 kg (161 lb 12.8 oz)   05/13/20 74.8 kg (164 lb 12.8 oz)   05/06/20 76 kg (167 lb 8 oz)   05/06/20 74.8 kg (165 lb)   04/29/20 74.8 kg (165 lb)   04/17/20 77.7 kg (171 lb 6.4 oz)   04/15/20 76.7 kg (169 lb)   03/31/20 78.3 kg (172 lb 9.6 oz)   03/25/20 78.3 kg (172 lb 9.6 oz)   Awake, alert, in no apparent distress   Affect bright  MSK: ambulates freely w/SBA, nonantalgic gait    Intake/Output Summary (Last 24 hours) at 5/21/2020 0852  Last data filed at 5/20/2020 1335  Gross per 24 hour   Intake 1558 ml   Output --   Net 1558 ml         ROUTINE ICU LABS (Last four results)  CMP  Recent Labs   Lab 05/21/20  0607 05/20/20  0520 05/19/20  0955 05/19/20  0500  05/18/20  1732    141  --  137  --  133   POTASSIUM 3.3* 3.4 3.5 3.1*   < > 3.2*   CHLORIDE 107 107  --  102  --  100   CO2 29 30  --  28  --  29   ANIONGAP 4 4  --  7  --  " 4   GLC 84 87  --  100*  --  95   BUN 5* 5*  --  8  --  10   CR 0.64 0.65  --  0.69  --  0.65   GFRESTIMATED >90 >90  --  >90  --  >90   GFRESTBLACK >90 >90  --  >90  --  >90   BARBIE 7.9* 7.6*  --  7.7*  --  8.1*   PROTTOTAL 4.9* 4.6*  --   --   --  5.9*   ALBUMIN 2.0* 1.9*  --   --   --  2.5*   BILITOTAL 0.2 0.3  --   --   --  0.6   ALKPHOS 36* 35*  --   --   --  45   AST 17 16  --   --   --  15   ALT 25 26  --   --   --  37    < > = values in this interval not displayed.     CBC  Recent Labs   Lab 05/21/20  0607 05/20/20  0520 05/19/20  0500 05/18/20  2245   WBC 4.4 2.5* 1.3* 0.9*   RBC 2.76* 2.63* 2.94* 2.95*   HGB 8.8* 8.4* 9.2* 9.5*   HCT 25.3* 23.9* 26.2* 26.1*   MCV 92 91 89 89   MCH 31.9 31.9 31.3 32.2   MCHC 34.8 35.1 35.1 36.4   RDW 13.2 13.0 12.4 12.4   PLT 40* 35* 43* 49*     INRNo lab results found in last 7 days.  Arterial Blood GasNo lab results found in last 7 days.    No results found for this or any previous visit (from the past 48 hour(s)).

## 2020-05-21 NOTE — DISCHARGE INSTRUCTIONS
Wound Care:  Amada Lagos RN, CWOCN   Recommend the following plan of care:     Perineal/groin wound care - three times daily and as needed following incontinence cares   1.) Rinse with warm water via peribottle   2.) Gently cleanse with Geeta Cleanse and Protect spray and dry wipes that have been wet with with warm water - can use these in place of baby wipes as baby wipes do sting some.   3.) Apply thin layer of silvadene to any open skin in groin folds, (use silvadene sparingly as can add too much moisture) as well as Aquaphor to any surrounding irritated skin)   4.) Cover with strips of xeroform, (cut from larger sheet)   5.) To vaginal/groin area, apply silvadene sparingly then triad paste generously to coat/protect from stooling.      Discussed with pt diet for stool thickening. (Use caution as opioids will cause constipation)     Advised that skin will likely be considerable improved with in a week.     All supplies left in room at bedside.  ++++++++++++++++++++++++++++++++++++++++++++++++++++++++++++++++++++++++++   From Tapan Miguel, Palliative Care NP      OK to modify diet as suggested by Amada, and to use Loperamide cautiously.  As the radiation side effects keron, however, your stools will firm up and you MUST be certain that your bowels move at least once daily as you're on a significant dose of pain meds, and the higher the dose, the greater the risk of constipation.    I'm discharging you on slow-release morphine 45 mg every 12 hours.    Do NOT automatically take Oxycodone every 4 hours, especially since you're having visual hallucinations and vivid dreams.  Let your pain be your guide on how often to take it and the dose that you take.  I've increased the dose to 10-15 mg every 4 hours IF needed.  Record the date, time and dose each time you take it.  The 15 mg dose should be reserved for especially severe pain around 8 or so on the pain scale.  Most of the time, the 10 mg dose should be  sufficient.  And very very soon, you will find you can drop it back to just 5 mg.  I sent home a couple of yellow forms you can use, if you like, specifically for tracking Oxycodone dose and usage.  Since the morphine dose will be reduced fairly rapidly, please keep track of the day by day doses you're taking on the lavender form I sent home.      If you're able to manage your pain using no more than 10 mg of Oxycodone for breakthrough pain for 3 consecutive days, you can lower your morphine dose from 45mg twice daily to 30 mg twice daily.  Don't be surprised if you find your use of Oxycodone increases by 5-10 mg at that point, but persevere.  It's OK to add Tylenol to the Oxycodone as it WILL potentiate it a smidge.     Please direct any questions on dose adjustments to Dr Remy as he'll be seeing you on an ongoing  basis; their main number is [phone].      If you need more wound care supplies, contact [facility], [phone].  Do NOT discard any empty tubes, packages (even the paper package containing the dry wipes) as you'll need to be able to identify the specific items you need when you call them.  If they aren't helpful, call the hospital at [phone] and ask for the [facility] RN office; you can leave a message.    I did send prescriptions for the slow-release morphine and the Oxycodone to the [facility] at [place].

## 2020-05-21 NOTE — PLAN OF CARE
Up to bathroom frequently, loose stools continue.  Casi/wound care per order.  PRN oxycodone ant tylenol given for cofort. Up with SBA, holds IV pole-moves well.  VSS.  IV fluids at 100 ml per hour.  Calls with needs.

## 2020-05-21 NOTE — PLAN OF CARE
"Patient has been up in room with stand by assist. Denies hallucinations today. Eating more. Pain controlled with meds and wound care as ordered. Staff assisted patient with pericare every time she went to the bathroom. Straight cath done for report of low abdominal pressure, \"like I'm getting a bladder infection\" patient stated. UA negative. Patient did not stool more than a very tiny amount mixed with urine prior to discharge so NO clostridium difficile specimen collected. PICC dressing changed today.   [place] [facility] DISCHARGE NOTE    Patient discharged to home at 4:30 PM via wheel chair. Accompanied by  staff. Discharge instructions reviewed with patient, opportunity offered to ask questions. Prescriptions sent to patients preferred pharmacy. All belongings sent with patient including wound care supplies.     Vanessa Butler RN            " [facility]

## 2020-05-21 NOTE — TELEPHONE ENCOUNTER
Prior Authorization Retail Medication Request    Medication/Dose: morphine er 15 mg  ICD code (if different than what is on RX):    Previously Tried and Failed:  Fentanyl; norco; dilaudid; demerol; ms contin; roxicodone  Rationale:      Insurance Name:  "[employer]  Insurance ID:  [id number]  [phone]      Pt ahs enough until Sunday, anyway to rush this for today? She would be very appreciative :o)      Thank You,  April Hollingsworth, [facility]

## 2020-05-22 ENCOUNTER — DOCUMENTATION ONLY (OUTPATIENT)
Dept: RADIATION THERAPY | Facility: OUTPATIENT CENTER | Age: 55
End: 2020-05-22

## 2020-05-22 ENCOUNTER — INFUSION THERAPY VISIT (OUTPATIENT)
Dept: INFUSION THERAPY | Facility: CLINIC | Age: 55
End: 2020-05-22
Attending: INTERNAL MEDICINE
Payer: COMMERCIAL

## 2020-05-22 VITALS — DIASTOLIC BLOOD PRESSURE: 80 MMHG | SYSTOLIC BLOOD PRESSURE: 122 MMHG | HEART RATE: 96 BPM | TEMPERATURE: 96.2 F

## 2020-05-22 DIAGNOSIS — C21.0 ANAL SQUAMOUS CELL CARCINOMA (H): Primary | ICD-10-CM

## 2020-05-22 PROCEDURE — 96360 HYDRATION IV INFUSION INIT: CPT

## 2020-05-22 PROCEDURE — 99207 ZZC NO CHARGE NURSE ONLY: CPT

## 2020-05-22 PROCEDURE — 25800030 ZZH RX IP 258 OP 636: Performed by: INTERNAL MEDICINE

## 2020-05-22 RX ADMIN — SODIUM CHLORIDE 1000 ML: 9 INJECTION, SOLUTION INTRAVENOUS at 14:05

## 2020-05-22 NOTE — PROGRESS NOTES
Department of Radiation Oncology  Radiation Therapy Center  [facility] [place] [address]  [phone]       Radiotherapy Treatment Summary          Treatment dates: 20-20    PATIENT: Susu Lagos  MEDICAL RECORD NO: [id number]   : 1965    DIAGNOSIS: squamous cell carcinoma of the anal canal  PATHOLOGY:  squamous cell carcinoma                              STAGE: clinical T1N0  INTENT OF RADIOTHERAPY: definitve CRT  CONCURRENT SYSTEMIC THERAPY: Yes  Oncologist: Dr. Real  Drug Name/Frequency 1: 5FU  Drug Name/Frequency 1: Mitomycin    ONCOLOGIC HISTORY:   Ms. 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      The patient initially presented with symptoms of hematochezia for several months eventually prompting further evaluation.     On 3/3/2020 the patient underwent colonoscopy which demonstrated a mass located at the dentate line in the left anterior position.  The mass was less than one third circumferentially involved.  Biopsy of the mass obtained demonstrated squamous cell carcinoma.  The patient was evaluated by gynecology on 3/13/2020, Pap smear was negative.  On 3/19/2020 the patient underwent a PET scan.  Imaging demonstrated a focal hypermetabolic activity in the anal canal, max SUV 12.6.  No regional or distant disease was noted.  On 3/19/2020 the patient underwent MRI which demonstrated a 1.2 x 0.9 cm mass located 2.7 cm above the anal verge.  No enlarged regional nodes were noted.  The patient was seen by Dr. Real of medical oncology who discussed treatment with chemoradiation therapy.  Patient subsequently presented to radiation oncology clinic to discuss potential role of radiation therapy as a part of treatment strategy.           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 Morphine 15 mg ER TID. Oxycodone solution PRN (3-4x). Intermittent diarrhea, controlled with Imodium.     Tachycardic, pale appearing.  Moist desquamation in tunde-anal region and tunde-vaginal. No groin desquamation.          PAIN MANAGEMENT:    Oxycodone. Morphine 15 mg ER.                    FOLLOW UP PLAN:  1. RTC 1 week.   2. Cancer related pain. Substantial increase in pain despite outpatient management. Was using Oxycodone PRN. Morphine 15 mg ER TID.  Given increase in pain and tachycardia with pale appearance, I favor admission to the hospital for inpatient management of pain. Discussed with inpatient team who has accepted admission.   3. Skin care. Aquaphor, Squirt bottle for irrigation, baby wipes.  Morphine gel PRN. Silvadene BID.     Radiation Oncologist: Tyree Remy M.D.  Department of Radiation Oncology  [facility]

## 2020-05-22 NOTE — TELEPHONE ENCOUNTER
Central Prior Authorization Team   Phone: [phone]    [place] Initiation    Medication: morphine er 15 mg  Insurance Company: [employer] - Phone [phone] Fax [phone]  Pharmacy Filling the Rx: [place], [place] - [address] [place] [address]  Filling Pharmacy Phone: [phone]  Filling Pharmacy Fax: [phone]  Start Date: 5/22/2020

## 2020-05-22 NOTE — PROGRESS NOTES
Infusion Nursing Note:  Susu Lagos presents today for IVF.    Patient seen by provider today: No   present during visit today: Not Applicable.    Note: N/A.    Intravenous Access:  PICC.    Treatment Conditions:  Not Applicable.      Post Infusion Assessment:  Patient tolerated infusion without incident.  Blood return noted pre and post infusion.  Site patent and intact, free from redness, edema or discomfort.  No evidence of extravasations.       Discharge Plan:   AVS to patient via MYCHART.  Patient will return 5/27/2020 for next appointment.   Patient discharged in stable condition accompanied by: self.  Departure Mode: Ambulatory.    Cindy Edwards RN

## 2020-05-22 NOTE — TELEPHONE ENCOUNTER
Prior Authorization Approval    Authorization Effective Date: 4/22/2020  Authorization Expiration Date: 5/22/2023  Medication: morphine er 15 mg-APPROVED  Approved Dose/Quantity:    Reference #:     Insurance Company: [employer] - Phone [phone] Fax [phone]  Expected CoPay:       CoPay Card Available:      Foundation Assistance Needed:    Which Pharmacy is filling the prescription (Not needed for infusion/clinic administered): [facility] [address]  Pharmacy Notified: Yes  Patient Notified: Yes  **Instructed pharmacy to notify patient when script is ready to /ship.**

## 2020-05-25 LAB
BACTERIA SPEC CULT: NO GROWTH
BACTERIA SPEC CULT: NO GROWTH
SPECIMEN SOURCE: NORMAL
SPECIMEN SOURCE: NORMAL

## 2020-05-26 ENCOUNTER — OFFICE VISIT (OUTPATIENT)
Dept: RADIATION THERAPY | Facility: OUTPATIENT CENTER | Age: 55
End: 2020-05-26
Payer: COMMERCIAL

## 2020-05-26 VITALS
SYSTOLIC BLOOD PRESSURE: 124 MMHG | WEIGHT: 167.6 LBS | RESPIRATION RATE: 18 BRPM | BODY MASS INDEX: 28.33 KG/M2 | OXYGEN SATURATION: 95 % | DIASTOLIC BLOOD PRESSURE: 80 MMHG | HEART RATE: 89 BPM

## 2020-05-26 DIAGNOSIS — C21.0 ANAL SQUAMOUS CELL CARCINOMA (H): Primary | Chronic | ICD-10-CM

## 2020-05-26 NOTE — LETTER
2020         RE: Susu Lagos  [address]  [facility] [address]        Dear Colleague,    Thank you for referring your patient, Susu Lagos, to the RADIATION THERAPY CENTER. Please see a copy of my visit note below.       Department of Radiation Oncology  Radiation Therapy Center  [facility]  [place] [address]  [phone]       Radiation Oncology Follow-up Visit  May 26, 2020      Susu Lagos  MRN: [id number]   : 1965     DIAGNOSIS: squamous cell carcinoma of the anal canal  PATHOLOGY:  squamous cell carcinoma                              STAGE: clinical T1N0  INTENT OF RADIOTHERAPY: definitve CRT  CONCURRENT SYSTEMIC THERAPY: Yes  Oncologist: Dr. Real  Drug Name/Frequency 1: 5FU  Drug Name/Frequency 1: Mitomycin     ONCOLOGIC HISTORY:   Ms. 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       The patient initially presented with symptoms of hematochezia for several months eventually prompting further evaluation.     On 3/3/2020 the patient underwent colonoscopy which demonstrated a mass located at the dentate line in the left anterior position.  The mass was less than one third circumferentially involved.  Biopsy of the mass obtained demonstrated squamous cell carcinoma.  The patient was evaluated by gynecology on 3/13/2020, Pap smear was negative.  On 3/19/2020 the patient underwent a PET scan.  Imaging demonstrated a focal hypermetabolic activity in the anal canal, max SUV 12.6.  No regional or distant disease was noted.  On 3/19/2020 the patient underwent MRI which demonstrated a 1.2 x 0.9 cm mass located 2.7 cm above the anal verge.  No enlarged regional nodes were noted.  The patient was seen by Dr. Real of medical oncology who discussed treatment with chemoradiation therapy.  Patient subsequently presented to radiation oncology clinic to discuss potential role of radiation therapy as  a part of treatment strategy.            SITE OF TREATMENT: pelvis     DATES  OF TREATMENT: 4/9/20-5/18/20     TOTAL DOSE OF TREATMENT: 5040 cGy     DOSE PER FRACTION OF TREATMENT: 180 cGy x 28 fractions    INTERVAL SINCE COMPLETION OF RADIATION THERAPY:   1 week    SUBJECTIVE:   The patient presents for follow up.     She was admitted to the hospital after completion of her last radiation treatment on 5/18/20 for pain control and neutropenia.     Current pain regimen includes morphine 45mg ER BID and oxycodone 10-15 mg q4-6 hours. Pain better controlled. Using baby wipes, silvadene, squirt bottle and morphine gel.     Diarrhea improving, using intermittent imodium. No  trouble.    PHYSICAL EXAM:  Gen: Alert, in NAD  Eyes: PERRL, EOMI, sclera anicteric  Neck: Supple, full ROM, no LAD  Pulm: No wheezing, stridor or respiratory distress  CV: Well-perfused, no cyanosis, no pedal edema  Abdominal: Soft, nontender, nondistended, no hepatomegaly  Back: No step-offs or pain to palpation along the thoracolumbar spine, no CVA tenderness  Musculoskeletal: Normal bulk and tone   Skin:  + Dry desquamation in area of bilateral groin and tunde-anal and tunde-vaginal tissues.   Neurologic: A/Ox3, CN II-XII intact  Psychiatric: Appropriate mood and affect    LABS AND IMAGING:  Reviewed.    IMPRESSION:   Ms. 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  She completed definitive CRT to a total dose of 50.4 Gy in 28 fractions with concurrent 5FU-MMC  (completed on 5/18/20).    PLAN:   1. Acute toxicities lingering, but appropriately improving.     2.Cancer related pain. Current pain regimen includes morphine 45mg ER BID and oxycodone 10-15 mg q4-6 hours. Pain is fairly well controlled, will plan to taper pain regimen slowly has tissues continue to heal. Will slowly transition to Morphine 30mg BID in the next week or so and continue oxycodone 10mg q4-6 hours PRN pain.    3.  Skin care. Aquaphor, Squirt bottle for irrigation, baby wipes.  Morphine gel PRN. Silvadene BID.     4. Continue follow up with medical oncology. Will see Dr. Real on 8/12/20. Defer post treatment imaging to medical oncology.     5. The patient will benefit from oncologic surveillance from our colo-rectal colleagues (Dr. Banuelos). Will reach out to their team to let them know patient has completed definitive treatment.     6. RTC in 2 weeks for acute toxicity check.       Tyree Remy M.D.  Department of Radiation Oncology  [facility]           Again, thank you for allowing me to participate in the care of your patient.        Sincerely,        Tyree Remy MD

## 2020-05-26 NOTE — PROGRESS NOTES
Department of Radiation Oncology  Radiation Therapy Center  [facility] [place] [address]  [phone]       Radiation Oncology Follow-up Visit  May 26, 2020      Susu Lagos  MRN: [id number]   : 1965     DIAGNOSIS: squamous cell carcinoma of the anal canal  PATHOLOGY:  squamous cell carcinoma                              STAGE: clinical T1N0  INTENT OF RADIOTHERAPY: definitve CRT  CONCURRENT SYSTEMIC THERAPY: Yes  Oncologist: Dr. Real  Drug Name/Frequency 1: 5FU  Drug Name/Frequency 1: Mitomycin     ONCOLOGIC HISTORY:   Ms. 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       The patient initially presented with symptoms of hematochezia for several months eventually prompting further evaluation.     On 3/3/2020 the patient underwent colonoscopy which demonstrated a mass located at the dentate line in the left anterior position.  The mass was less than one third circumferentially involved.  Biopsy of the mass obtained demonstrated squamous cell carcinoma.  The patient was evaluated by gynecology on 3/13/2020, Pap smear was negative.  On 3/19/2020 the patient underwent a PET scan.  Imaging demonstrated a focal hypermetabolic activity in the anal canal, max SUV 12.6.  No regional or distant disease was noted.  On 3/19/2020 the patient underwent MRI which demonstrated a 1.2 x 0.9 cm mass located 2.7 cm above the anal verge.  No enlarged regional nodes were noted.  The patient was seen by Dr. Real of medical oncology who discussed treatment with chemoradiation therapy.  Patient subsequently presented to radiation oncology clinic to discuss potential role of radiation therapy as a part of treatment strategy.            SITE OF TREATMENT: pelvis     DATES  OF TREATMENT: 20-20     TOTAL DOSE OF TREATMENT: 5040 cGy     DOSE PER FRACTION OF TREATMENT: 180 cGy x 28 fractions    INTERVAL SINCE COMPLETION OF  RADIATION THERAPY:   1 week    SUBJECTIVE:   The patient presents for follow up.     She was admitted to the hospital after completion of her last radiation treatment on 5/18/20 for pain control and neutropenia.     Current pain regimen includes morphine 45mg ER BID and oxycodone 10-15 mg q4-6 hours. Pain better controlled. Using baby wipes, silvadene, squirt bottle and morphine gel.     Diarrhea improving, using intermittent imodium. No  trouble.    PHYSICAL EXAM:  Gen: Alert, in NAD  Eyes: PERRL, EOMI, sclera anicteric  Neck: Supple, full ROM, no LAD  Pulm: No wheezing, stridor or respiratory distress  CV: Well-perfused, no cyanosis, no pedal edema  Abdominal: Soft, nontender, nondistended, no hepatomegaly  Back: No step-offs or pain to palpation along the thoracolumbar spine, no CVA tenderness  Musculoskeletal: Normal bulk and tone   Skin:  + Dry desquamation in area of bilateral groin and tunde-anal and tunde-vaginal tissues.   Neurologic: A/Ox3, CN II-XII intact  Psychiatric: Appropriate mood and affect    LABS AND IMAGING:  Reviewed.    IMPRESSION:   Ms. 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  She completed definitive CRT to a total dose of 50.4 Gy in 28 fractions with concurrent 5FU-MMC  (completed on 5/18/20).    PLAN:   1. Acute toxicities lingering, but appropriately improving.     2.Cancer related pain. Current pain regimen includes morphine 45mg ER BID and oxycodone 10-15 mg q4-6 hours. Pain is fairly well controlled, will plan to taper pain regimen slowly has tissues continue to heal. Will slowly transition to Morphine 30mg BID in the next week or so and continue oxycodone 10mg q4-6 hours PRN pain.    3. Skin care. Aquaphor, Squirt bottle for irrigation, baby wipes.  Morphine gel PRN. Silvadene BID.     4. Continue follow up with medical oncology. Will see Dr. Real on 8/12/20. Defer post treatment imaging to medical oncology.     5.  The patient will benefit from oncologic surveillance from our colo-rectal colleagues (Dr. Banuelos). Will reach out to their team to let them know patient has completed definitive treatment.     6. RTC in 2 weeks for acute toxicity check.       Tyree Remy M.D.  Department of Radiation Oncology  [facility]

## 2020-05-26 NOTE — NURSING NOTE
FOLLOW-UP VISIT    Patient Name: Susu Lagos      : 1965     Age: 54 year old        ______________________________________________________________________________     Chief Complaint   Patient presents with     Radiation Therapy     follow up     /80   Pulse 89   Resp 18   Wt 76 kg (167 lb 9.6 oz)   LMP 2016   SpO2 95%   BMI 28.33 kg/m       Date Radiation Completed: 20    Pain  Current history of pain associated with this visit:   Intensity: Patient is unable to quantify.  Current: aching, stabbing and burning  Location: tunde area  Treatment: ms contin 45 mg 2x/day. Oxycodone 10 mg every 4-6 hrs prn.  Topical silvadene    Meds  Current Med List Reviewed: Yes  Medication Note: reviewed bowel managment    Imaging  None    On Chemo?: No  Nausea:none  Bowel: had been liquid. now has turned more soft/solid/formed - reviewed need for sennakot with pt as she is on narcotics  Bladder: frequency  Skin: Positive for erythema appears improved from one week ago. dry desquamoation in groin creases. moist desquamtion in buttocks and tunde area.  Energy Level: improved - but still resting/napping to regain strength. Getting IV fluids 3x/week.    Other Appointments:     Date  Oncologist: Dr. Real  2020   Surgeon: Dr. Hood  will update this team that pt has complete chemorads     Other Notes:

## 2020-05-27 ENCOUNTER — INFUSION THERAPY VISIT (OUTPATIENT)
Dept: INFUSION THERAPY | Facility: CLINIC | Age: 55
End: 2020-05-27
Attending: INTERNAL MEDICINE
Payer: COMMERCIAL

## 2020-05-27 ENCOUNTER — VIRTUAL VISIT (OUTPATIENT)
Dept: FAMILY MEDICINE | Facility: CLINIC | Age: 55
End: 2020-05-27
Payer: COMMERCIAL

## 2020-05-27 ENCOUNTER — HOSPITAL ENCOUNTER (OUTPATIENT)
Facility: CLINIC | Age: 55
Discharge: HOME | End: 2020-05-27
Attending: NURSE PRACTITIONER | Admitting: NURSE PRACTITIONER
Payer: COMMERCIAL

## 2020-05-27 VITALS — SYSTOLIC BLOOD PRESSURE: 103 MMHG | DIASTOLIC BLOOD PRESSURE: 60 MMHG | HEART RATE: 91 BPM

## 2020-05-27 DIAGNOSIS — E87.6 HYPOKALEMIA: ICD-10-CM

## 2020-05-27 DIAGNOSIS — D69.6 ANEMIA WITH LOW PLATELET COUNT (H): ICD-10-CM

## 2020-05-27 DIAGNOSIS — C21.0 ANAL SQUAMOUS CELL CARCINOMA (H): Primary | ICD-10-CM

## 2020-05-27 DIAGNOSIS — Z09 HOSPITAL DISCHARGE FOLLOW-UP: Primary | ICD-10-CM

## 2020-05-27 LAB
ANION GAP SERPL CALCULATED.3IONS-SCNC: 4 MMOL/L (ref 3–14)
BUN SERPL-MCNC: 8 MG/DL (ref 7–30)
CALCIUM SERPL-MCNC: 8.1 MG/DL (ref 8.5–10.1)
CHLORIDE SERPL-SCNC: 106 MMOL/L (ref 94–109)
CO2 SERPL-SCNC: 31 MMOL/L (ref 20–32)
CREAT SERPL-MCNC: 0.63 MG/DL (ref 0.52–1.04)
ERYTHROCYTE [DISTWIDTH] IN BLOOD BY AUTOMATED COUNT: 14.5 % (ref 10–15)
GFR SERPL CREATININE-BSD FRML MDRD: >90 ML/MIN/{1.73_M2}
GLUCOSE SERPL-MCNC: 101 MG/DL (ref 70–99)
HCT VFR BLD AUTO: 27.4 % (ref 35–47)
HGB BLD-MCNC: 9.1 G/DL (ref 11.7–15.7)
MCH RBC QN AUTO: 32.3 PG (ref 26.5–33)
MCHC RBC AUTO-ENTMCNC: 33.2 G/DL (ref 31.5–36.5)
MCV RBC AUTO: 97 FL (ref 78–100)
PLATELET # BLD AUTO: 96 10E9/L (ref 150–450)
POTASSIUM SERPL-SCNC: 3.9 MMOL/L (ref 3.4–5.3)
RBC # BLD AUTO: 2.82 10E12/L (ref 3.8–5.2)
SODIUM SERPL-SCNC: 141 MMOL/L (ref 133–144)
WBC # BLD AUTO: 2.3 10E9/L (ref 4–11)

## 2020-05-27 PROCEDURE — 25800030 ZZH RX IP 258 OP 636: Performed by: INTERNAL MEDICINE

## 2020-05-27 PROCEDURE — 99214 OFFICE O/P EST MOD 30 MIN: CPT | Mod: GT | Performed by: NURSE PRACTITIONER

## 2020-05-27 PROCEDURE — 96360 HYDRATION IV INFUSION INIT: CPT

## 2020-05-27 PROCEDURE — 85027 COMPLETE CBC AUTOMATED: CPT | Performed by: NURSE PRACTITIONER

## 2020-05-27 PROCEDURE — 80048 BASIC METABOLIC PNL TOTAL CA: CPT | Performed by: NURSE PRACTITIONER

## 2020-05-27 RX ADMIN — SODIUM CHLORIDE 1000 ML: 9 INJECTION, SOLUTION INTRAVENOUS at 14:22

## 2020-05-27 NOTE — PROGRESS NOTES
"Susu Lagos is a 54 year old female who is being evaluated via a billable video visit.      The patient has been notified of following:     \"This video visit will be conducted via a call between you and your physician/provider. We have found that certain health care needs can be provided without the need for an in-person physical exam.  This service lets us provide the care you need with a video conversation.  If a prescription is necessary we can send it directly to your pharmacy.  If lab work is needed we can place an order for that and you can then stop by our lab to have the test done at a later time.    Video visits are billed at different rates depending on your insurance coverage.  Please reach out to your insurance provider with any questions.    If during the course of the call the physician/provider feels a video visit is not appropriate, you will not be charged for this service.\"    Patient has given verbal consent for Video visit? Yes    How would you like to obtain your AVS? MyChart    Patient would like the video invitation sent by: Text to cell phone: [phone]    Will anyone else be joining your video visit? No    Subjective     Susu Lagos is a 54 year old female who presents today via video visit for the following health issues:    [facility]    Hospital Follow-up Visit:    Hospital/Nursing Home/IP Rehab Facility: [facility]  Date of Admission: 5/18/20  Date of Discharge: 5/21/20  Reason(s) for Admission: anal aquamous cell carcinoma      Was your hospitalization related to COVID-19? No   Problems taking medications regularly:  None  Medication changes since discharge: None  Problems adhering to non-medication therapy:  None    Summary of hospitalization:  [facility] discharge summary reviewed  Diagnostic Tests/Treatments reviewed.  Follow up needed: none  Other Healthcare Providers Involved in Patient s Care:         None  Update since discharge: improved.       Post " Discharge Medication Reconciliation: discharge medications reconciled, continue medications without change.  Plan of care communicated with patient                  Video Start Time: 9:56 AM      Patient Active Problem List   Diagnosis     CARDIOVASCULAR SCREENING; LDL GOAL LESS THAN 160     Anal squamous cell carcinoma (H)     Cervical cancer screening     Intractable pain     Neutropenic fever (H)     Past Surgical History:   Procedure Laterality Date     CHOLECYSTECTOMY  2013    LSC     COLONOSCOPY N/A 11/13/2018    Procedure: colonoscopy;  Surgeon: Sesar Dos Santos MD;  Location: [facility]     COLONOSCOPY N/A 3/3/2020    Procedure: COLONOSCOPY, WITH POLYPECTOMY AND BIOPSY;  Surgeon: Sesar Dos Santos MD;  Location: [facility]     DILATION AND CURETTAGE, OPERATIVE HYSTEROSCOPY, COMBINED N/A 2/29/2016    Procedure: COMBINED DILATION AND CURETTAGE, OPERATIVE HYSTEROSCOPY;  Surgeon: Panda Knight MD;  Location: [facility]     GYN SURGERY  1995    tubes tied     LAPAROSCOPY DIAGNOSTIC (GYN) Right 7/29/2015    Procedure: LAPAROSCOPY DIAGNOSTIC (GYN);  Surgeon: Lian Betancur DO;  Location: [facility]       Social History     Tobacco Use     Smoking status: Never Smoker     Smokeless tobacco: Never Used   Substance Use Topics     Alcohol use: Yes     Alcohol/week: 0.0 standard drinks     Comment: 1-2 per month      Family History   Problem Relation Age of Onset     Hypertension Father      Myocardial Infarction Maternal Grandfather      Melanoma No family hx of          Current Outpatient Medications   Medication Sig Dispense Refill     acetaminophen (TYLENOL) 325 MG tablet Take 2 tablets (650 mg) by mouth every 4 hours as needed for mild pain       famotidine (PEPCID) 20 MG tablet Take 1 tablet (20 mg) by mouth 2 times daily May discontinue if no relief from burning after one week. 60 tablet 0     magic mouthwash (ENTER INGREDIENTS IN COMMENTS) suspension Swish, gargle, and spit one to two teaspoonfuls every six hours  as needed. 240 mL 3     Magnesium Oxide 250 MG TABS Take 2 tablets by mouth 2 times daily       mineral oil-hydrophilic petrolatum (AQUAPHOR) external ointment Apply topically as needed for other (bottom soreness)       morphine (MS CONTIN) 15 MG CR tablet Take 3 tablets (45 mg) by mouth every 12 hours 180 tablet 0     morphine 0.1% in intrasite topical gel Place 1 g onto the skin 4 times daily as needed for pain 100 g 0     Multiple Vitamin (MULTI-VITAMINS PO)        Omega-3 Fatty Acids (OMEGA 3 PO)        ondansetron (ZOFRAN) 8 MG tablet Take 1 tablet (8 mg) by mouth every 8 hours as needed for nausea 30 tablet 1     oxyCODONE (ROXICODONE) 5 MG tablet Take 2-3 tablets (10-15 mg) by mouth every 4 hours as needed for breakthrough pain 120 tablet 0     potassium chloride ER (KLOR-CON M) 10 MEQ CR tablet Take 1 tablet (10 mEq) by mouth daily Take 2 today 5/21/2020 then 1 daily until diarrhea slows or resolves 30 tablet 0     prochlorperazine (COMPAZINE) 10 MG tablet Take 1 tablet (10 mg) by mouth every 6 hours as needed (Nausea/Vomiting) 30 tablet 1     silver sulfADIAZINE (SILVADENE) 1 % external cream Apply topically 3 times daily 50 g 0     Allergies   Allergen Reactions     Rabies Vaccine Unknown     Scratch test resulted, it was not a good idea to give it to her.     Shellfish-Derived Products Nausea and Vomiting     Sulfa Drugs Rash     Recent Labs   Lab Test 05/27/20  1420 05/21/20  0607 05/20/20  0520  05/18/20  1732  05/07/15  1607   ALT  --  25 26  --  37   < >  --    CR 0.63 0.64 0.65   < > 0.65   < >  --    GFRESTIMATED >90 >90 >90   < > >90   < >  --    GFRESTBLACK >90 >90 >90   < > >90   < >  --    POTASSIUM 3.9 3.3* 3.4   < > 3.2*   < >  --    TSH  --   --   --   --  0.36*  --  1.51    < > = values in this interval not displayed.      BP Readings from Last 3 Encounters:   05/27/20 103/60   05/26/20 124/80   05/22/20 122/80    Wt Readings from Last 3 Encounters:   05/26/20 76 kg (167 lb 9.6 oz)  "  05/20/20 73.4 kg (161 lb 13.1 oz)   05/18/20 73.4 kg (161 lb 12.8 oz)                    Reviewed and updated as needed this visit by Provider         Review of Systems   Constitutional, HEENT, cardiovascular, pulmonary, gi and gu systems are negative, except as otherwise noted.      Objective    LMP 01/29/2016   Estimated body mass index is 28.33 kg/m  as calculated from the following:    Height as of 5/20/20: 1.638 m (5' 4.49\").    Weight as of 5/26/20: 76 kg (167 lb 9.6 oz).  Physical Exam     GENERAL: Healthy, alert and no distress  EYES: Eyes grossly normal to inspection.  No discharge or erythema, or obvious scleral/conjunctival abnormalities.  RESP: No audible wheeze, cough, or visible cyanosis.  No visible retractions or increased work of breathing.    SKIN: Visible skin clear. No significant rash, abnormal pigmentation or lesions.  NEURO: Cranial nerves grossly intact.  Mentation and speech appropriate for age.  PSYCH: Mentation appears normal, affect normal/bright, judgement and insight intact, normal speech and appearance well-groomed.      Results for orders placed or performed in visit on 05/27/20   Basic metabolic panel     Status: Abnormal   Result Value Ref Range    Sodium 141 133 - 144 mmol/L    Potassium 3.9 3.4 - 5.3 mmol/L    Chloride 106 94 - 109 mmol/L    Carbon Dioxide 31 20 - 32 mmol/L    Anion Gap 4 3 - 14 mmol/L    Glucose 101 (H) 70 - 99 mg/dL    Urea Nitrogen 8 7 - 30 mg/dL    Creatinine 0.63 0.52 - 1.04 mg/dL    GFR Estimate >90 >60 mL/min/[1.73_m2]    GFR Estimate If Black >90 >60 mL/min/[1.73_m2]    Calcium 8.1 (L) 8.5 - 10.1 mg/dL   CBC with platelets     Status: Abnormal   Result Value Ref Range    WBC 2.3 (L) 4.0 - 11.0 10e9/L    RBC Count 2.82 (L) 3.8 - 5.2 10e12/L    Hemoglobin 9.1 (L) 11.7 - 15.7 g/dL    Hematocrit 27.4 (L) 35.0 - 47.0 %    MCV 97 78 - 100 fl    MCH 32.3 26.5 - 33.0 pg    MCHC 33.2 31.5 - 36.5 g/dL    RDW 14.5 10.0 - 15.0 %    Platelet Count 96 (L) 150 - 450 " "10e9/L             Assessment & Plan   (Z09) Hospital discharge follow-up  (primary encounter diagnosis)  Comment: Stable  Plan: *No change in treatment plan    (E87.6) Hypokalemia  Comment: Patient was noted to have hypokalemia upon discharge repeat labs do show calcium improving  Plan: **Basic metabolic panel FUTURE anytime      (D69.6) Anemia with low platelet count (H)  Comment: Patient has anemia with low platelet counts hemoglobin is trending up platelet counts are trending up.  Recommend repeating labs in 2 weeks  Plan: **CBC with platelets FUTURE anytime       BMI:   Estimated body mass index is 28.33 kg/m  as calculated from the following:    Height as of 5/20/20: 1.638 m (5' 4.49\").    Weight as of 5/26/20: 76 kg (167 lb 9.6 oz).   Weight management plan: Discussed healthy diet and exercise guidelines        See Patient Instructions    No follow-ups on file.    RADHA Griffin CNP  [facility]      Video-Visit Details    Type of service:  Video Visit    Video End Time: 10:15 AM    Originating Location (pt. Location): Home    Distant Location (provider location):  [facility]     Platform used for Video Visit: Doximity    No follow-ups on file.       RADHA Griffin CNP      "

## 2020-05-27 NOTE — PROGRESS NOTES
Infusion Nursing Note:  Susu Lagos presents today for IVF.  Patient seen by provider today: No   present during visit today: Not Applicable.    Note: Dressing change per protocol by Tess WARREN.    Intravenous Access:  PICC.    Treatment Conditions:  Not Applicable.      Post Infusion Assessment:  Patient tolerated infusion without incident.  Blood return noted pre and post infusion.  Site patent and intact, free from redness, edema or discomfort.  No evidence of extravasations.       Discharge Plan:   Patient discharged in stable condition accompanied by: self.  Departure Mode: Ambulatory. Pt to return 5/29/2020.     Joaquim Cherry RN

## 2020-05-27 NOTE — LETTER
June 1, 2020      Susu Lagos  [address]  [facility] [address]        Dear ,    We are writing to inform you of your test results.    Here are your recent results.  Your basic metabolic panel does show your potassium at 3.9 which is normal limits.  Your CBC does show improving hemoglobin at 9.1 and your platelets have increased to 96.  Recommend follow-up visit in 1 to 2 weeks and will recheck the labs at that visit.  Visit can be virtual are in office     Resulted Orders   Basic metabolic panel   Result Value Ref Range    Sodium 141 133 - 144 mmol/L    Potassium 3.9 3.4 - 5.3 mmol/L    Chloride 106 94 - 109 mmol/L    Carbon Dioxide 31 20 - 32 mmol/L    Anion Gap 4 3 - 14 mmol/L    Glucose 101 (H) 70 - 99 mg/dL    Urea Nitrogen 8 7 - 30 mg/dL    Creatinine 0.63 0.52 - 1.04 mg/dL    GFR Estimate >90 >60 mL/min/[1.73_m2]      Comment:      Non  GFR Calc  Starting 12/18/2018, serum creatinine based estimated GFR (eGFR) will be   calculated using the Chronic Kidney Disease Epidemiology Collaboration   (CKD-EPI) equation.      GFR Estimate If Black >90 >60 mL/min/[1.73_m2]      Comment:       GFR Calc  Starting 12/18/2018, serum creatinine based estimated GFR (eGFR) will be   calculated using the Chronic Kidney Disease Epidemiology Collaboration   (CKD-EPI) equation.      Calcium 8.1 (L) 8.5 - 10.1 mg/dL   CBC with platelets   Result Value Ref Range    WBC 2.3 (L) 4.0 - 11.0 10e9/L    RBC Count 2.82 (L) 3.8 - 5.2 10e12/L    Hemoglobin 9.1 (L) 11.7 - 15.7 g/dL    Hematocrit 27.4 (L) 35.0 - 47.0 %    MCV 97 78 - 100 fl    MCH 32.3 26.5 - 33.0 pg    MCHC 33.2 31.5 - 36.5 g/dL    RDW 14.5 10.0 - 15.0 %    Platelet Count 96 (L) 150 - 450 10e9/L       If you have any questions or concerns, please call the clinic at the number listed above.       Sincerely,      DONAVON Armenta CNP/ ss

## 2020-05-29 ENCOUNTER — INFUSION THERAPY VISIT (OUTPATIENT)
Dept: INFUSION THERAPY | Facility: CLINIC | Age: 55
End: 2020-05-29
Attending: INTERNAL MEDICINE
Payer: COMMERCIAL

## 2020-05-29 VITALS
RESPIRATION RATE: 16 BRPM | DIASTOLIC BLOOD PRESSURE: 68 MMHG | TEMPERATURE: 98.1 F | SYSTOLIC BLOOD PRESSURE: 105 MMHG | HEART RATE: 96 BPM

## 2020-05-29 DIAGNOSIS — C21.0 ANAL SQUAMOUS CELL CARCINOMA (H): Primary | ICD-10-CM

## 2020-05-29 PROCEDURE — 25800030 ZZH RX IP 258 OP 636: Performed by: INTERNAL MEDICINE

## 2020-05-29 PROCEDURE — 96360 HYDRATION IV INFUSION INIT: CPT

## 2020-05-29 RX ADMIN — SODIUM CHLORIDE 1000 ML: 9 INJECTION, SOLUTION INTRAVENOUS at 14:35

## 2020-05-29 NOTE — PATIENT INSTRUCTIONS
Patient Education     Hypokalemia  Hypokalemia means a low level of potassium in the blood. This most often occurs in people who take water pills (diuretics). It can also occur because of severe vomiting or diarrhea. You may also have it if you take laxatives for long periods of time. It sometimes happens if you have low magnesium (hypomagnesemia). If you have this, your healthcare provider will treat the low magnesium first.  A mild case of hypokalemia usually causes no symptoms. It is only found with blood testing. More severe potassium loss causes overall weakness, muscle or abdominal cramps, rapid or irregular heartbeats (heart palpitations), low blood pressure, and muscle weakness.   Home care    Take any potassium supplements as prescribed.    Eat foods rich in potassium. The highest amount is found in avocado, baked potatoes, spinach, cantaloupe, cod, halibut, salmon, and scallops. White, red, or jones beans are also very good sources. A modest amount of potassium is found in orange juice, bananas, carrots, and tomato juice.    If you take certain types of diuretics, you will also need to take potassium supplements. If you take a diuretic, discuss potassium supplements with your doctor.    Follow-up care  Follow up with your healthcare provider for a repeat blood test within the next week, or as advised by our staff.  When to seek medical advice  Call your healthcare provider right away if any of the following occur:    Increased weakness, fatigue, or muscle cramps    Dizziness  Call 911  Call 911 if any of the following occur:    Irregular heartbeat, extra beats, or very fast heart rate    Loss of consciousness  Date Last Reviewed: 7/1/2017 2000-2019 [employer] [address] [place] [address]. All rights reserved. This information is not intended as a substitute for professional medical care. Always follow your healthcare professional's instructions.           Patient Education    Coping with Thrombocytopenia   What is thrombocytopenia?  This is a term for a low platelet count. Platelets are blood cells that help your blood to clot. When your platelets are low, you may bruise or bleed more easily.   Signs of low platelets include:     A lot of bruises    Bleeding gums    Nosebleeds    Tiny purple spots on the feet or legs.  How is it treated?  If your platelets are too low or you have active bleeding, you may need a platelet transfusion.   In this case, we would give you donated platelets through an IV line (a small tube in your vein).   This usually takes one to two hours.   You will need blood tests to see how well your treatment is working.  What can I do to prevent bleeding?  Until your platelets are back to normal, you need to reduce your risk for bleeding or bruising.    Do not have surgery or dental work.    It is ok to take acetaminophen (Tylenol). You may also take opioids that your doctor prescribes. Do not take the following medicines unless your care team says it's okay:  ? Aspirin or products that contain aspirin  ? Aspirin-free pain relievers (such as Advil).  Be sure to read the labels on any store-bought medicines.    Do not drink alcohol unless your care team says it's okay.    Avoid foods that can make your mouth bleed, including popcorn, chips and raw vegetables.    To avoid injuries:  ? Make your home as safe as possible.  ? Take care when using knives and other tools.  ? Be careful not to burn yourself when ironing   or cooking.  ? Wear heavy gloves when working in the garden or near thorny plants.  ? Wear shoes when you walk.  ? Wear loose-fitting clothes to prevent bruising.    Do low-impact exercise such as walking or swimming. Avoid contact sports.    Keep your lips moist with lip balm. Keep your skin soft with cream or lotion.    Blow your nose gently. Never use your fingers to clean your nose.    Use an electric razor.    Use a soft toothbrush. Do not  floss.    Use a water-based gel during sex (intercourse), such as K-Y Jelly or Astroglide. Do not have sex if your platelets are below 50,000.    Do not use enemas, suppositories, douches or tampons.    Try not to strain when using the toilet. Ask your care team if you need a stool softener (such as Colace).  When should I call my care team?  Call your care team if:    You bleed from a cut or wound and it doesn't stop within 30 minutes of applying pressure.    You notice blood in your urine or stool after using the toilet.    You notice any black, tarry stools.    You see blood or dark brown spots in your vomit.    You have nosebleeds, bleeding gums or headaches that you cannot explain.    You see tiny, pinpoint-sized red or purple spots on your skin.    You have heavy bleeding from the vagina (you are soaking one pad an hour) or any change in your periods. This includes heavier bleeding or bleeding between cycles.  Comments:  _______________________________________  _______________________________________  _______________________________________  _______________________________________  _______________________________________  _______________________________________  _______________________________________  _______________________________________  _______________________________________  _______________________________________  For informational purposes only. Not to replace the advice of your health care provider. Copyright   2006 [employer]. All rights reserved. Clinically reviewed by [facility]. [employer] 100771 - REV 04/19.

## 2020-06-03 ENCOUNTER — INFUSION THERAPY VISIT (OUTPATIENT)
Dept: INFUSION THERAPY | Facility: CLINIC | Age: 55
End: 2020-06-03
Attending: INTERNAL MEDICINE
Payer: COMMERCIAL

## 2020-06-03 VITALS — SYSTOLIC BLOOD PRESSURE: 108 MMHG | DIASTOLIC BLOOD PRESSURE: 61 MMHG | TEMPERATURE: 96.2 F

## 2020-06-03 DIAGNOSIS — C21.0 ANAL SQUAMOUS CELL CARCINOMA (H): Primary | ICD-10-CM

## 2020-06-03 PROCEDURE — 99207 ZZC NO CHARGE NURSE ONLY: CPT

## 2020-06-03 PROCEDURE — 25800030 ZZH RX IP 258 OP 636: Performed by: INTERNAL MEDICINE

## 2020-06-03 PROCEDURE — 96360 HYDRATION IV INFUSION INIT: CPT

## 2020-06-03 RX ADMIN — SODIUM CHLORIDE 1000 ML: 9 INJECTION, SOLUTION INTRAVENOUS at 14:36

## 2020-06-03 NOTE — PROGRESS NOTES
Infusion Nursing Note:  Susu Lagos presents today for IVF and dressing change.    Patient seen by provider today: No   present during visit today: Not Applicable.    Note: Dressing change completed today.    Intravenous Access:  PICC.    Treatment Conditions:  Not Applicable.      Post Infusion Assessment:  Patient tolerated infusion without incident.  Blood return noted pre and post infusion.  Site patent and intact, free from redness, edema or discomfort.  No evidence of extravasations.       Discharge Plan:   AVS to patient via MYCHART.  Patient will return 6/8/2020 for next appointment.   Patient discharged in stable condition accompanied by: self.  Departure Mode: Ambulatory.    Cindy Edwards RN                         Yes

## 2020-06-05 ENCOUNTER — INFUSION THERAPY VISIT (OUTPATIENT)
Dept: INFUSION THERAPY | Facility: CLINIC | Age: 55
End: 2020-06-05
Attending: INTERNAL MEDICINE
Payer: COMMERCIAL

## 2020-06-05 VITALS — DIASTOLIC BLOOD PRESSURE: 69 MMHG | HEART RATE: 84 BPM | SYSTOLIC BLOOD PRESSURE: 121 MMHG

## 2020-06-05 DIAGNOSIS — C21.0 ANAL SQUAMOUS CELL CARCINOMA (H): Primary | ICD-10-CM

## 2020-06-05 DIAGNOSIS — G89.3 CANCER RELATED PAIN: ICD-10-CM

## 2020-06-05 PROCEDURE — 96360 HYDRATION IV INFUSION INIT: CPT

## 2020-06-05 PROCEDURE — 25800030 ZZH RX IP 258 OP 636: Performed by: INTERNAL MEDICINE

## 2020-06-05 RX ORDER — OXYCODONE HYDROCHLORIDE 5 MG/1
10-15 TABLET ORAL EVERY 4 HOURS PRN
Qty: 60 TABLET | Refills: 0 | Status: SHIPPED | OUTPATIENT
Start: 2020-06-05 | End: 2020-06-15

## 2020-06-05 RX ADMIN — SODIUM CHLORIDE 1000 ML: 9 INJECTION, SOLUTION INTRAVENOUS at 14:45

## 2020-06-05 NOTE — PROGRESS NOTES
Infusion Nursing Note:  Susu Lagos presents today for IV fluid support.    Patient seen by provider today: No   present during visit today: Not Applicable.    Note: N/A.    Intravenous Access:  PICC     Treatment Conditions:  Not Applicable.      Post Infusion Assessment:  Patient tolerated infusion without incident.       Discharge Plan:   Patient discharged in stable condition accompanied by: self.  Departure Mode: Ambulatory.      Frida Mayen RN

## 2020-06-08 ENCOUNTER — OFFICE VISIT (OUTPATIENT)
Dept: RADIATION THERAPY | Facility: OUTPATIENT CENTER | Age: 55
End: 2020-06-08
Payer: COMMERCIAL

## 2020-06-08 VITALS
RESPIRATION RATE: 16 BRPM | SYSTOLIC BLOOD PRESSURE: 119 MMHG | WEIGHT: 162.2 LBS | HEART RATE: 87 BPM | BODY MASS INDEX: 27.42 KG/M2 | DIASTOLIC BLOOD PRESSURE: 74 MMHG

## 2020-06-08 DIAGNOSIS — C21.0 ANAL SQUAMOUS CELL CARCINOMA (H): Primary | Chronic | ICD-10-CM

## 2020-06-08 NOTE — NURSING NOTE
FOLLOW-UP VISIT    Patient Name: Susu Lagos      : 1965     Age: 54 year old        ______________________________________________________________________________     Chief Complaint   Patient presents with     Radiation Therapy     Follow up visit, radiation therapy     /74 (Cuff Size: Adult Large)   Pulse 87   Resp 16   Wt 73.6 kg (162 lb 3.2 oz)   LMP 2016   BMI 27.42 kg/m       Date Radiation Completed: 20    Pain  Improved, now taking oxycontin 30mg bid and oxycodone 10 mg 2-3 x/day    Meds  Current Med List Reviewed: Yes  Medication Note: no refills needed    Imaging  None    On Chemo?: No  Bowel:  Some constipation, taking senokot bid, no bleeding, no diarrhea  Bladder: no pain, occasional discomfort w/voiding. Using water bottle and sitz baths to heal  Skin: improved, using aquaphor, silvadene bid, barrier cream from wound care nurses. No morphine gel x one week   Energy Level: improved     Other Appointments:     Date  Oncologist: Dr. Farhan Jamil in august    Surgeon: Lakisha  F/u july     Other Notes: still getting IVF 2-3 times per week

## 2020-06-08 NOTE — LETTER
2020         RE: Susu Lagos  [address]  [facility] [address]        Dear Colleague,    Thank you for referring your patient, Susu Lagos, to the [facility]. Please see a copy of my visit note below.            Radiation Oncology Follow-up Visit  2020      Susu Lagos  MRN: [id number]   : 1965     DIAGNOSIS: squamous cell carcinoma of the anal canal  PATHOLOGY:  squamous cell carcinoma                              STAGE: clinical T1N0  INTENT OF RADIOTHERAPY: definitve CRT  CONCURRENT SYSTEMIC THERAPY: Yes  Oncologist: Dr. Real  Drug Name/Frequency 1: 5FU  Drug Name/Frequency 1: Mitomycin     ONCOLOGIC HISTORY:   Ms. 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       The patient initially presented with symptoms of hematochezia for several months eventually prompting further evaluation.     On 3/3/2020 the patient underwent colonoscopy which demonstrated a mass located at the dentate line in the left anterior position.  The mass was less than one third circumferentially involved.  Biopsy of the mass obtained demonstrated squamous cell carcinoma.  The patient was evaluated by gynecology on 3/13/2020, Pap smear was negative.  On 3/19/2020 the patient underwent a PET scan.  Imaging demonstrated a focal hypermetabolic activity in the anal canal, max SUV 12.6.  No regional or distant disease was noted.  On 3/19/2020 the patient underwent MRI which demonstrated a 1.2 x 0.9 cm mass located 2.7 cm above the anal verge.  No enlarged regional nodes were noted.  The patient was seen by Dr. Real of medical oncology who discussed treatment with chemoradiation therapy.  Patient subsequently presented to radiation oncology clinic to discuss potential role of radiation therapy as a part of treatment strategy.            SITE OF TREATMENT: pelvis     DATES  OF TREATMENT: 20-20     TOTAL DOSE OF  TREATMENT: 5040 cGy     DOSE PER FRACTION OF TREATMENT: 180 cGy x 28 fractions    INTERVAL SINCE COMPLETION OF RADIATION THERAPY:   3 weeks    SUBJECTIVE:   The patient presents for follow up.     The patient is recovering appropriately. Pain continuing to improve with time. Current pain regimen includes morphine 30mg ER BID and oxycodone 10 mg q4-6 hours. Using baby wipes, silvadene, squirt bottle. No longer using morphine gel.     No longer having diarrhea. Stool well formed, soft. Using stool softeners.  No  trouble.    PHYSICAL EXAM:  Gen: Alert, in NAD  Eyes: PERRL, EOMI, sclera anicteric  Neck: Supple, full ROM, no LAD  Pulm: No wheezing, stridor or respiratory distress  CV: Well-perfused, no cyanosis, no pedal edema  Abdominal: Soft, nontender, nondistended, no hepatomegaly  Back: No step-offs or pain to palpation along the thoracolumbar spine, no CVA tenderness  Musculoskeletal: Normal bulk and tone   Skin: Resolved desquamation in bilateral groins and tunde-anal area. Small area of desquamation persists in tunde-vaginal tissues.   Neurologic: A/Ox3, CN II-XII intact  Psychiatric: Appropriate mood and affect    LABS AND IMAGING:  Reviewed.    IMPRESSION:   Ms. 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  She completed definitive CRT to a total dose of 50.4 Gy in 28 fractions with concurrent 5FU-MMC  (completed on 5/18/20).    PLAN:   1. Acute toxicities appropriately improving.     2.Cancer related pain. Current pain regimen includes morphine 30mg ER BID and oxycodone 10 mg q4-6 hours. Pain is well controlled, will plan to taper pain regimen slowly has tissues continue to heal. Discussed slowly taper regimen of long acting and short acting pain medication in the next 2-4 weeks.     3. Skin care. Aquaphor, Squirt bottle for irrigation, baby wipes.   Silvadene BID.     4. Continue follow up with medical oncology. Will see Dr. Real on 8/12/20.  Defer post treatment imaging to medical oncology.     5. The patient will benefit from oncologic surveillance from our colo-rectal colleagues (Dr. Banuelos). The patient will see Dr. Banuelos on 7/21/20 for scope.    6. RTC in 4 weeks for acute toxicity check.       Tyree Remy M.D.  Department of Radiation Oncology  [facility]

## 2020-06-08 NOTE — PROGRESS NOTES
Department of Radiation Oncology  Radiation Therapy Center  [facility] [place] [address]  [phone]       Radiation Oncology Follow-up Visit  2020      Susu Lagos  MRN: [id number]   : 1965     DIAGNOSIS: squamous cell carcinoma of the anal canal  PATHOLOGY:  squamous cell carcinoma                              STAGE: clinical T1N0  INTENT OF RADIOTHERAPY: definitve CRT  CONCURRENT SYSTEMIC THERAPY: Yes  Oncologist: Dr. Real  Drug Name/Frequency 1: 5FU  Drug Name/Frequency 1: Mitomycin     ONCOLOGIC HISTORY:   Ms. 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       The patient initially presented with symptoms of hematochezia for several months eventually prompting further evaluation.     On 3/3/2020 the patient underwent colonoscopy which demonstrated a mass located at the dentate line in the left anterior position.  The mass was less than one third circumferentially involved.  Biopsy of the mass obtained demonstrated squamous cell carcinoma.  The patient was evaluated by gynecology on 3/13/2020, Pap smear was negative.  On 3/19/2020 the patient underwent a PET scan.  Imaging demonstrated a focal hypermetabolic activity in the anal canal, max SUV 12.6.  No regional or distant disease was noted.  On 3/19/2020 the patient underwent MRI which demonstrated a 1.2 x 0.9 cm mass located 2.7 cm above the anal verge.  No enlarged regional nodes were noted.  The patient was seen by Dr. Real of medical oncology who discussed treatment with chemoradiation therapy.  Patient subsequently presented to radiation oncology clinic to discuss potential role of radiation therapy as a part of treatment strategy.            SITE OF TREATMENT: pelvis     DATES  OF TREATMENT: 20-20     TOTAL DOSE OF TREATMENT: 5040 cGy     DOSE PER FRACTION OF TREATMENT: 180 cGy x 28 fractions    INTERVAL SINCE COMPLETION OF  RADIATION THERAPY:   3 weeks    SUBJECTIVE:   The patient presents for follow up.     The patient is recovering appropriately. Pain continuing to improve with time. Current pain regimen includes morphine 30mg ER BID and oxycodone 10 mg q4-6 hours. Using baby wipes, silvadene, squirt bottle. No longer using morphine gel.     No longer having diarrhea. Stool well formed, soft. Using stool softeners.  No  trouble.    PHYSICAL EXAM:  Gen: Alert, in NAD  Eyes: PERRL, EOMI, sclera anicteric  Neck: Supple, full ROM, no LAD  Pulm: No wheezing, stridor or respiratory distress  CV: Well-perfused, no cyanosis, no pedal edema  Abdominal: Soft, nontender, nondistended, no hepatomegaly  Back: No step-offs or pain to palpation along the thoracolumbar spine, no CVA tenderness  Musculoskeletal: Normal bulk and tone   Skin: Resolved desquamation in bilateral groins and tunde-anal area. Small area of desquamation persists in tunde-vaginal tissues.   Neurologic: A/Ox3, CN II-XII intact  Psychiatric: Appropriate mood and affect    LABS AND IMAGING:  Reviewed.    IMPRESSION:   Ms. 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  She completed definitive CRT to a total dose of 50.4 Gy in 28 fractions with concurrent 5FU-MMC  (completed on 5/18/20).    PLAN:   1. Acute toxicities appropriately improving.     2.Cancer related pain. Current pain regimen includes morphine 30mg ER BID and oxycodone 10 mg q4-6 hours. Pain is well controlled, will plan to taper pain regimen slowly has tissues continue to heal. Discussed slowly taper regimen of long acting and short acting pain medication in the next 2-4 weeks.     3. Skin care. Aquaphor, Squirt bottle for irrigation, baby wipes.   Silvadene BID.     4. Continue follow up with medical oncology. Will see Dr. Real on 8/12/20. Defer post treatment imaging to medical oncology.     5. The patient will benefit from oncologic surveillance from  our colo-rectal colleagues (Dr. Banuelos). The patient will see Dr. Banuelos on 7/21/20 for scope.    6. RTC in 4 weeks for acute toxicity check.       Tyree Remy M.D.  Department of Radiation Oncology  [facility]

## 2020-06-10 ENCOUNTER — INFUSION THERAPY VISIT (OUTPATIENT)
Dept: INFUSION THERAPY | Facility: CLINIC | Age: 55
End: 2020-06-10
Attending: RADIOLOGY
Payer: COMMERCIAL

## 2020-06-10 VITALS
TEMPERATURE: 97.9 F | DIASTOLIC BLOOD PRESSURE: 68 MMHG | RESPIRATION RATE: 18 BRPM | SYSTOLIC BLOOD PRESSURE: 117 MMHG | HEART RATE: 99 BPM

## 2020-06-10 DIAGNOSIS — C21.0 ANAL SQUAMOUS CELL CARCINOMA (H): Primary | ICD-10-CM

## 2020-06-10 NOTE — PROGRESS NOTES
Infusion Nursing Note:  Susu Lagos presents today for IVF and PICC drsg change.    Patient seen by provider today: No   present during visit today: Not Applicable.    Note: N/A.    Intravenous Access:  PICC.    Treatment Conditions:  Not Applicable.      Post Infusion Assessment:  Patient tolerated infusion without incident.  Blood return noted pre and post infusion.  Site patent and intact, free from redness, edema or discomfort.  No evidence of extravasations.  Access discontinued per protocol.       Discharge Plan:   Patient and/or family verbalized understanding of discharge instructions and all questions answered.  Patient discharged in stable condition accompanied by: self.  Departure Mode: Ambulatory.    Malu Davisdon RN

## 2020-06-12 ENCOUNTER — INFUSION THERAPY VISIT (OUTPATIENT)
Dept: INFUSION THERAPY | Facility: CLINIC | Age: 55
End: 2020-06-12
Attending: INTERNAL MEDICINE
Payer: COMMERCIAL

## 2020-06-12 VITALS — SYSTOLIC BLOOD PRESSURE: 103 MMHG | DIASTOLIC BLOOD PRESSURE: 68 MMHG | HEART RATE: 78 BPM

## 2020-06-12 DIAGNOSIS — C21.0 ANAL SQUAMOUS CELL CARCINOMA (H): Primary | ICD-10-CM

## 2020-06-12 PROCEDURE — 96360 HYDRATION IV INFUSION INIT: CPT

## 2020-06-12 PROCEDURE — 25800030 ZZH RX IP 258 OP 636: Performed by: INTERNAL MEDICINE

## 2020-06-12 RX ADMIN — SODIUM CHLORIDE 1000 ML: 9 INJECTION, SOLUTION INTRAVENOUS at 14:12

## 2020-06-12 NOTE — PROGRESS NOTES
Infusion Nursing Note:  Susu Lagos presents today for IVFs.    Patient seen by provider today: No   present during visit today: Not Applicable.    Note: N/A.    Intravenous Access:  PICC.    Treatment Conditions:  Not Applicable.      Post Infusion Assessment:  Patient tolerated infusion without incident.  Blood return noted pre and post infusion.  Site patent and intact, free from redness, edema or discomfort.  No evidence of extravasations.       Discharge Plan:   Patient discharged in stable condition accompanied by: self.  Departure Mode: Ambulatory.    Gurvinder Panda RN

## 2020-06-15 DIAGNOSIS — G89.3 CANCER RELATED PAIN: ICD-10-CM

## 2020-06-15 RX ORDER — OXYCODONE HYDROCHLORIDE 5 MG/1
5-10 TABLET ORAL EVERY 4 HOURS PRN
Qty: 90 TABLET | Refills: 0 | Status: SHIPPED | OUTPATIENT
Start: 2020-06-15 | End: 2020-07-16

## 2020-06-18 ENCOUNTER — INFUSION THERAPY VISIT (OUTPATIENT)
Dept: INFUSION THERAPY | Facility: CLINIC | Age: 55
End: 2020-06-18
Attending: INTERNAL MEDICINE
Payer: COMMERCIAL

## 2020-06-18 VITALS
DIASTOLIC BLOOD PRESSURE: 59 MMHG | TEMPERATURE: 97.3 F | SYSTOLIC BLOOD PRESSURE: 97 MMHG | RESPIRATION RATE: 18 BRPM | HEART RATE: 80 BPM

## 2020-06-18 DIAGNOSIS — C21.0 ANAL SQUAMOUS CELL CARCINOMA (H): Primary | ICD-10-CM

## 2020-06-18 PROCEDURE — 25800030 ZZH RX IP 258 OP 636: Performed by: INTERNAL MEDICINE

## 2020-06-18 PROCEDURE — 96365 THER/PROPH/DIAG IV INF INIT: CPT

## 2020-06-18 RX ADMIN — SODIUM CHLORIDE 1000 ML: 9 INJECTION, SOLUTION INTRAVENOUS at 08:08

## 2020-06-18 ASSESSMENT — PAIN SCALES - GENERAL: PAINLEVEL: NO PAIN (0)

## 2020-06-18 NOTE — PATIENT INSTRUCTIONS
Your PICC line was removed today.  We placed bacitracin ointment at the site with a sterile gauze and a clear tegaderm dressing.  Please leave this dressing on for 48 hours.  If it should come off before then, it is ok to cover the site with a bandaid.  The outer tan coban wrap may be removed in 1 hour.  Please keep the dressing dry for 48 hours.  Report to your doctor any fever, redness, pain, shortness of breath, swelling, or bleeding that will not stop with pressure.

## 2020-06-18 NOTE — PROGRESS NOTES
Infusion Nursing Note:  Susu Lagos presents today for IVF.    Patient seen by provider today: No   present during visit today: Not Applicable.    Note: N/A.    Intravenous Access:  PICC; removed after infusion    Treatment Conditions:  Not Applicable.    Post Infusion Assessment:  Patient tolerated infusion without incident.  Blood return noted pre and post infusion.  Site patent and intact, free from redness, edema or discomfort.  No evidence of extravasations.  Access discontinued per protocol.     Discharge Plan:   Discharge instructions reviewed with: Patient.  Patient and/or family verbalized understanding of discharge instructions and all questions answered.  AVS to patient via [facility].  Patient will return as directed by MD for next appointment.   Patient discharged in stable condition accompanied by: self.  Departure Mode: Ambulatory.    Elaine Shepard RN

## 2020-07-16 ENCOUNTER — OFFICE VISIT (OUTPATIENT)
Dept: RADIATION THERAPY | Facility: OUTPATIENT CENTER | Age: 55
End: 2020-07-16
Payer: COMMERCIAL

## 2020-07-16 VITALS — DIASTOLIC BLOOD PRESSURE: 73 MMHG | SYSTOLIC BLOOD PRESSURE: 103 MMHG

## 2020-07-16 DIAGNOSIS — L58.9 RADIATION DERMATITIS: Primary | ICD-10-CM

## 2020-07-16 RX ORDER — SILVER SULFADIAZINE 10 MG/G
CREAM TOPICAL 2 TIMES DAILY
Qty: 85 G | Refills: 3 | Status: SHIPPED | OUTPATIENT
Start: 2020-07-16 | End: 2020-10-08

## 2020-07-16 NOTE — LETTER
2020         RE: Susu Lagos  [address]  [facility] [address]        Dear Colleague,    Thank you for referring your patient, Susu Lagos, to the [facility]. Please see a copy of my visit note below.       Department of Radiation Oncology  Radiation Therapy Center  [facility]  [place] [address]  [phone]       Radiation Oncology Follow-up Visit  2020      Susu Lagos  MRN: [id number]   : 1965     DIAGNOSIS: squamous cell carcinoma of the anal canal  PATHOLOGY:  squamous cell carcinoma                              STAGE: clinical T1N0  INTENT OF RADIOTHERAPY: definitve CRT  CONCURRENT SYSTEMIC THERAPY: Yes  Oncologist: Dr. Real  Drug Name/Frequency 1: 5FU  Drug Name/Frequency 1: Mitomycin     ONCOLOGIC HISTORY:   Ms. 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       The patient initially presented with symptoms of hematochezia for several months eventually prompting further evaluation.     On 3/3/2020 the patient underwent colonoscopy which demonstrated a mass located at the dentate line in the left anterior position.  The mass was less than one third circumferentially involved.  Biopsy of the mass obtained demonstrated squamous cell carcinoma.  The patient was evaluated by gynecology on 3/13/2020, Pap smear was negative.  On 3/19/2020 the patient underwent a PET scan.  Imaging demonstrated a focal hypermetabolic activity in the anal canal, max SUV 12.6.  No regional or distant disease was noted.  On 3/19/2020 the patient underwent MRI which demonstrated a 1.2 x 0.9 cm mass located 2.7 cm above the anal verge.  No enlarged regional nodes were noted.  The patient was seen by Dr. Real of medical oncology who discussed treatment with chemoradiation therapy.  Patient subsequently presented to radiation oncology clinic to discuss potential role of radiation therapy  as a part of treatment strategy.            SITE OF TREATMENT: pelvis     DATES  OF TREATMENT: 4/9/20-5/18/20     TOTAL DOSE OF TREATMENT: 5040 cGy     DOSE PER FRACTION OF TREATMENT: 180 cGy x 28 fractions    INTERVAL SINCE COMPLETION OF RADIATION THERAPY:   8 weeks    SUBJECTIVE:   The patient presents for follow up.     The patient is recovering appropriately. Pain essentially resolved. No longer needing pain medication. Using silvadene for skin care. No longer using morphine gel.     Intermittent bouts of diarrhea at times. Stool otherwise well formed, soft. U No  trouble. No extremity lymphedema.     PHYSICAL EXAM:  Gen: Alert, in NAD  Eyes: PERRL, EOMI, sclera anicteric  Neck: Supple, full ROM, no LAD  Pulm: No wheezing, stridor or respiratory distress  CV: Well-perfused, no cyanosis, no pedal edema  Abdominal: Soft, nontender, nondistended, no hepatomegaly  Back: No step-offs or pain to palpation along the thoracolumbar spine, no CVA tenderness  Musculoskeletal: Normal bulk and tone   / Skin: Deferred  Neurologic: A/Ox3, CN II-XII intact  Psychiatric: Appropriate mood and affect    LABS AND IMAGING:  Reviewed.    IMPRESSION:   Ms. 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  She completed definitive CRT to a total dose of 50.4 Gy in 28 fractions with concurrent 5FU-MMC  (completed on 5/18/20).    PLAN:   1. Acute toxicities appropriately improving.     2.Cancer related pain. No longer needing oral narcotic.     3. Skin care. Silvadene BID. Can slowly transition to routine skin care.     4. Continue follow up with medical oncology. Will see Dr. Real on 8/12/20. Defer post treatment PET imaging to medical oncology.     5. The patient will benefit from oncologic surveillance from our colo-rectal colleagues (Dr. Banuelos). The patient will see Dr. Banuelos on 7/21/20 for scope.    6. RTC in 6 weeks.      Tyree Remy M.D.  Department of Radiation  Oncology  [facility]           Again, thank you for allowing me to participate in the care of your patient.        Sincerely,        Tyree Remy MD

## 2020-07-16 NOTE — NURSING NOTE
FOLLOW-UP VISIT    Patient Name: Susu Lagos      : 1965     Age: 54 year old        ______________________________________________________________________________     Chief Complaint   Patient presents with     Radiation Therapy     follow up visit, radiation therapy     /73 (BP Location: Left arm, Cuff Size: Adult Regular)   LMP 2016      Date Radiation Completed: 2020    Pain  Denies    Meds  Current Med List Reviewed: Yes  Medication Note: off all pain meds    Imaging  None    On Chemo?: No  Bowel: no diarrhea, soft, occasional urgency  Bladder: negative  Skin: almost healed, still using aquaphor and silvadene  Energy Level: still very fatigued    Other Appointments:     Date  Oncologist: Farhan    Surgeon: Madoff Upcoming visit with scope     Other Notes: no longer getting IVF, has purchased herself a vaginal dilator to use

## 2020-07-16 NOTE — PROGRESS NOTES
Department of Radiation Oncology  Radiation Therapy Center  [facility] [place] [address]  [phone]       Radiation Oncology Follow-up Visit  2020      Susu Lagos  MRN: [id number]   : 1965     DIAGNOSIS: squamous cell carcinoma of the anal canal  PATHOLOGY:  squamous cell carcinoma                              STAGE: clinical T1N0  INTENT OF RADIOTHERAPY: definitve CRT  CONCURRENT SYSTEMIC THERAPY: Yes  Oncologist: Dr. Real  Drug Name/Frequency 1: 5FU  Drug Name/Frequency 1: Mitomycin     ONCOLOGIC HISTORY:   Ms. 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       The patient initially presented with symptoms of hematochezia for several months eventually prompting further evaluation.     On 3/3/2020 the patient underwent colonoscopy which demonstrated a mass located at the dentate line in the left anterior position.  The mass was less than one third circumferentially involved.  Biopsy of the mass obtained demonstrated squamous cell carcinoma.  The patient was evaluated by gynecology on 3/13/2020, Pap smear was negative.  On 3/19/2020 the patient underwent a PET scan.  Imaging demonstrated a focal hypermetabolic activity in the anal canal, max SUV 12.6.  No regional or distant disease was noted.  On 3/19/2020 the patient underwent MRI which demonstrated a 1.2 x 0.9 cm mass located 2.7 cm above the anal verge.  No enlarged regional nodes were noted.  The patient was seen by Dr. Real of medical oncology who discussed treatment with chemoradiation therapy.  Patient subsequently presented to radiation oncology clinic to discuss potential role of radiation therapy as a part of treatment strategy.            SITE OF TREATMENT: pelvis     DATES  OF TREATMENT: 20-20     TOTAL DOSE OF TREATMENT: 5040 cGy     DOSE PER FRACTION OF TREATMENT: 180 cGy x 28 fractions    INTERVAL SINCE COMPLETION OF  RADIATION THERAPY:   8 weeks    SUBJECTIVE:   The patient presents for follow up.     The patient is recovering appropriately. Pain essentially resolved. No longer needing pain medication. Using silvadene for skin care. No longer using morphine gel.     Intermittent bouts of diarrhea at times. Stool otherwise well formed, soft. U No  trouble. No extremity lymphedema.     PHYSICAL EXAM:  Gen: Alert, in NAD  Eyes: PERRL, EOMI, sclera anicteric  Neck: Supple, full ROM, no LAD  Pulm: No wheezing, stridor or respiratory distress  CV: Well-perfused, no cyanosis, no pedal edema  Abdominal: Soft, nontender, nondistended, no hepatomegaly  Back: No step-offs or pain to palpation along the thoracolumbar spine, no CVA tenderness  Musculoskeletal: Normal bulk and tone   / Skin: Deferred  Neurologic: A/Ox3, CN II-XII intact  Psychiatric: Appropriate mood and affect    LABS AND IMAGING:  Reviewed.    IMPRESSION:   Ms. 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  She completed definitive CRT to a total dose of 50.4 Gy in 28 fractions with concurrent 5FU-MMC  (completed on 5/18/20).    PLAN:   1. Acute toxicities appropriately improving.     2.Cancer related pain. No longer needing oral narcotic.     3. Skin care. Silvadene BID. Can slowly transition to routine skin care.     4. Continue follow up with medical oncology. Will see Dr. Real on 8/12/20. Defer post treatment PET imaging to medical oncology.     5. The patient will benefit from oncologic surveillance from our colo-rectal colleagues (Dr. Banuelos). The patient will see Dr. Banuelos on 7/21/20 for scope.    6. RTC in 6 weeks.      Tyree Remy M.D.  Department of Radiation Oncology  [facility]

## 2020-07-20 DIAGNOSIS — C21.0 ANAL SQUAMOUS CELL CARCINOMA (H): Primary | ICD-10-CM

## 2020-07-20 NOTE — PROGRESS NOTES
"Colon and Rectal Surgery Clinic Note      RE: Susu Lagos  : 1965  MALGORZATA: 2020    Susu is a 54 year old female who presents today for follow up of anal cancer.      HPI:  Original staging:  Colonoscopy 3/3/2020 by Dr. Sesar Dos Santos for hematochezia with \"Palpable rectal mass found on digital rectal exam\".    Pathology: Squamous cell carcinoma, focally keratinizing, cannot rule out invasion     She saw Dr. Real who recommended staging imaging and possible chemoradiation    PET 3/19/20:  1. Focal area of abnormal FDG avidity involving the level of the anus  with max SUV 12.6. This corresponds to the patient's recent biopsy  squamous cell carcinoma of the anus.  2. No other areas of abnormal FDG avidity. Specifically no definitive  evidence for regional or nonregional FDG avid lymphadenopathy.  3. Diffuse fatty infiltration of the liver. Low-attenuation 0.7 cm  lesion involving the anterior aspect of the medial segment left  hepatic lobe without evidence for focal abnormal FDG avidity in this  area. This likely represents a benign low-attenuation lesion such as a  cyst in the background of diffuse fatty infiltration.  4. No evidence for abnormal FDG avidity involving the neck, chest or  musculoskeletal structures.    MR Pelvis 3/19/20:  FINDINGS: Approximately 2.7 cm above the anal verge, there is an area  of abnormal enhancement in the anterior rectum that measures  approximately 1.2 x 0.9 cm (series 10, image 33). No involvement of  the sphincters is demonstrated. No definite extension beyond the  muscularis propria. No invasion of the vagina. No surrounding  suspicious lymph nodes.                                                              IMPRESSION: Small area of abnormal enhancement measures up to 1.2 cm  in the mid anus. No evidence of spread beyond the anus or into  regional lymph nodes.    She saw  Gynecology with negative Pap smear    She completed chemoradiation 20-20 with a " "total of 5040 cGy and 5-Follow-up and mitomycin    Physical examination:  Examination was chaperoned by Letitia Arevalo NP   Vitals: /78 (BP Location: Left arm, Patient Position: Sitting, Cuff Size: Adult Regular)   Pulse 87   Temp 98.7  F (37.1  C) (Oral)   Ht 5' 4.5\"   Wt 157 lb 4.8 oz   LMP 01/29/2016   SpO2 99%   BMI 26.58 kg/m    BMI= Body mass index is 26.58 kg/m .    Groin examination shows no evidence of inguinal adenopathy.  Perianal examination shows modest skin tags but no other lesions.  Digital rectal examination with no palpable lesions (lesion was in the anterior position previously).   Anoscopy without any visible lesions.    Laboratory data:    Recent Labs   Lab Test 03/11/20  1101   WBC 5.6   HGB 15.4      CR 0.72   ALBUMIN 4.1   BILITOTAL 0.3   ALKPHOS 43   ALT 70*   AST 36       Assessment/plan: Otherwise healthy 54-year-old woman with a recent diagnosis of squamous cell carcinoma of the anal canal status post chemoradiation.  No evidence of lesion on exam today. Will get PET and MR Pelvis and she is scheduled to see Dr. Real in mid August. I would like to see her back in 4 months for recheck. Patient's questions were answered to her stated satisfaction and she is in agreement with this plan.    Total time 15 minutes.  Greater than half the time was spent counseling.    For details of past medical history, surgical history, family history, medications, allergies, and review of systems, please see details below.    Medical history:  Past Medical History:   Diagnosis Date     Menorrhagia with irregular cycle 2/28/2016     Ovarian cyst 6/4/2015    Right, 6 cm dermoid on MRI-recommend removal.  7/29/2015 Surgery for right salpingoophorectomy.       Perimenopause 6/4/2015       Surgical history:  Past Surgical History:   Procedure Laterality Date     CHOLECYSTECTOMY  2013    LSC     COLONOSCOPY N/A 11/13/2018    Procedure: colonoscopy;  Surgeon: Sesar Dos Santos MD;  " Location: [facility]     COLONOSCOPY N/A 3/3/2020    Procedure: COLONOSCOPY, WITH POLYPECTOMY AND BIOPSY;  Surgeon: Sesar Dos Santos MD;  Location: [facility]     DILATION AND CURETTAGE, OPERATIVE HYSTEROSCOPY, COMBINED N/A 2/29/2016    Procedure: COMBINED DILATION AND CURETTAGE, OPERATIVE HYSTEROSCOPY;  Surgeon: Panda Knight MD;  Location: [facility]     GYN SURGERY  1995    tubes tied     LAPAROSCOPY DIAGNOSTIC (GYN) Right 7/29/2015    Procedure: LAPAROSCOPY DIAGNOSTIC (GYN);  Surgeon: Lian Betancur DO;  Location: [facility]       Family history:  Family History   Problem Relation Age of Onset     Hypertension Father      Myocardial Infarction Maternal Grandfather      Melanoma No family hx of        Medications:  Current Outpatient Medications   Medication Sig Dispense Refill     Magnesium Oxide 250 MG TABS Take 2 tablets by mouth 2 times daily       mineral oil-hydrophilic petrolatum (AQUAPHOR) external ointment Apply topically as needed for other (bottom soreness)       Multiple Vitamin (MULTI-VITAMINS PO)        Omega-3 Fatty Acids (OMEGA 3 PO)        silver sulfADIAZINE (SILVADENE) 1 % external cream Apply topically 2 times daily 85 g 3     acetaminophen (TYLENOL) 325 MG tablet Take 2 tablets (650 mg) by mouth every 4 hours as needed for mild pain         Allergies:  The patientis allergic to rabies vaccine; shellfish-derived products; and sulfa drugs.    Social history:  Social History     Tobacco Use     Smoking status: Never Smoker     Smokeless tobacco: Never Used   Substance Use Topics     Alcohol use: Yes     Alcohol/week: 0.0 standard drinks     Comment: 1-2 per month      Marital status: .    Review of Systems:  Nursing Notes:   Juana White, EMT  7/21/2020  3:13 PM  Signed  Chief Complaint   Patient presents with     Procedure       Vitals:    07/21/20 1455   BP: 130/78   BP Location: Left arm   Patient Position: Sitting   Cuff Size: Adult Regular   Pulse: 87   Temp: 98.7  F (37.1  C)  "  TempSrc: Oral   SpO2: 99%   Weight: 157 lb 4.8 oz   Height: 5' 4.5\"       Body mass index is 26.58 kg/m .      Juana White, EMT                             Stefaon Banuelos MD   Professor and Chief  Division of Colon and Rectal Surgery  [facility]      Referring Provider:  Tyree Remy MD  [address] [place] [address]     Primary Care Provider:  Lakesha Aguirre    This note was created using speech recognition software and may contain unintended word substitutions.  "

## 2020-07-21 ENCOUNTER — OFFICE VISIT (OUTPATIENT)
Dept: SURGERY | Facility: CLINIC | Age: 55
End: 2020-07-21
Payer: COMMERCIAL

## 2020-07-21 VITALS
HEART RATE: 87 BPM | BODY MASS INDEX: 26.21 KG/M2 | HEIGHT: 65 IN | WEIGHT: 157.3 LBS | OXYGEN SATURATION: 99 % | DIASTOLIC BLOOD PRESSURE: 78 MMHG | TEMPERATURE: 98.7 F | SYSTOLIC BLOOD PRESSURE: 130 MMHG

## 2020-07-21 DIAGNOSIS — C21.1 MALIGNANT NEOPLASM OF ANAL CANAL (H): Primary | ICD-10-CM

## 2020-07-21 ASSESSMENT — PAIN SCALES - GENERAL: PAINLEVEL: NO PAIN (0)

## 2020-07-21 ASSESSMENT — MIFFLIN-ST. JEOR: SCORE: 1316.45

## 2020-07-21 NOTE — LETTER
"2020       RE: Susu Lagos  [address]  [facility] [address]     Dear Colleague,    Thank you for referring your patient, Susu Lagos, to the [facility] COLON AND RECTAL SURGERY at [facility]. Please see a copy of my visit note below.    Colon and Rectal Surgery Clinic Note      RE: Susu Lagos  : 1965  MALGORZATA: 2020    Susu is a 54 year old female who presents today for follow up of anal cancer.      HPI:  Original staging:  Colonoscopy 3/3/2020 by Dr. Sesar Dos Santos for hematochezia with \"Palpable rectal mass found on digital rectal exam\".    Pathology: Squamous cell carcinoma, focally keratinizing, cannot rule out invasion     She saw Dr. Real who recommended staging imaging and possible chemoradiation    PET 3/19/20:  1. Focal area of abnormal FDG avidity involving the level of the anus  with max SUV 12.6. This corresponds to the patient's recent biopsy  squamous cell carcinoma of the anus.  2. No other areas of abnormal FDG avidity. Specifically no definitive  evidence for regional or nonregional FDG avid lymphadenopathy.  3. Diffuse fatty infiltration of the liver. Low-attenuation 0.7 cm  lesion involving the anterior aspect of the medial segment left  hepatic lobe without evidence for focal abnormal FDG avidity in this  area. This likely represents a benign low-attenuation lesion such as a  cyst in the background of diffuse fatty infiltration.  4. No evidence for abnormal FDG avidity involving the neck, chest or  musculoskeletal structures.    MR Pelvis 3/19/20:  FINDINGS: Approximately 2.7 cm above the anal verge, there is an area  of abnormal enhancement in the anterior rectum that measures  approximately 1.2 x 0.9 cm (series 10, image 33). No involvement of  the sphincters is demonstrated. No definite extension beyond the  muscularis propria. No invasion of the vagina. No surrounding  suspicious lymph nodes.                             " "                                 IMPRESSION: Small area of abnormal enhancement measures up to 1.2 cm  in the mid anus. No evidence of spread beyond the anus or into  regional lymph nodes.    She saw  Gynecology with negative Pap smear    She completed chemoradiation 4/9/20-5/18/20 with a total of 5040 cGy and 5-Follow-up and mitomycin    Physical examination:  Examination was chaperoned by Letitia Arevalo NP   Vitals: /78 (BP Location: Left arm, Patient Position: Sitting, Cuff Size: Adult Regular)   Pulse 87   Temp 98.7  F (37.1  C) (Oral)   Ht 5' 4.5\"   Wt 157 lb 4.8 oz   LMP 01/29/2016   SpO2 99%   BMI 26.58 kg/m    BMI= Body mass index is 26.58 kg/m .    Groin examination shows no evidence of inguinal adenopathy.  Perianal examination shows modest skin tags but no other lesions.  Digital rectal examination with no palpable lesions (lesion was in the anterior position previously).   Anoscopy without any visible lesions.    Laboratory data:    Recent Labs   Lab Test 03/11/20  1101   WBC 5.6   HGB 15.4      CR 0.72   ALBUMIN 4.1   BILITOTAL 0.3   ALKPHOS 43   ALT 70*   AST 36       Assessment/plan: Otherwise healthy 54-year-old woman with a recent diagnosis of squamous cell carcinoma of the anal canal status post chemoradiation.  No evidence of lesion on exam today. Will get PET and MR Pelvis and she is scheduled to see Dr. Real in mid August. I would like to see her back in 4 months for recheck. Patient's questions were answered to her stated satisfaction and she is in agreement with this plan.    Total time 15 minutes.  Greater than half the time was spent counseling.    For details of past medical history, surgical history, family history, medications, allergies, and review of systems, please see details below.    Medical history:  Past Medical History:   Diagnosis Date     Menorrhagia with irregular cycle 2/28/2016     Ovarian cyst 6/4/2015    Right, 6 cm dermoid on " MRI-recommend removal.  7/29/2015 Surgery for right salpingoophorectomy.       Perimenopause 6/4/2015       Surgical history:  Past Surgical History:   Procedure Laterality Date     CHOLECYSTECTOMY  2013    LSC     COLONOSCOPY N/A 11/13/2018    Procedure: colonoscopy;  Surgeon: Sesar Dos Santos MD;  Location: [facility]     COLONOSCOPY N/A 3/3/2020    Procedure: COLONOSCOPY, WITH POLYPECTOMY AND BIOPSY;  Surgeon: Sesar Dos Santos MD;  Location: [facility]     DILATION AND CURETTAGE, OPERATIVE HYSTEROSCOPY, COMBINED N/A 2/29/2016    Procedure: COMBINED DILATION AND CURETTAGE, OPERATIVE HYSTEROSCOPY;  Surgeon: Panda Knight MD;  Location: [facility]     GYN SURGERY  1995    tubes tied     LAPAROSCOPY DIAGNOSTIC (GYN) Right 7/29/2015    Procedure: LAPAROSCOPY DIAGNOSTIC (GYN);  Surgeon: Lian Betancur DO;  Location: [facility]       Family history:  Family History   Problem Relation Age of Onset     Hypertension Father      Myocardial Infarction Maternal Grandfather      Melanoma No family hx of        Medications:  Current Outpatient Medications   Medication Sig Dispense Refill     Magnesium Oxide 250 MG TABS Take 2 tablets by mouth 2 times daily       mineral oil-hydrophilic petrolatum (AQUAPHOR) external ointment Apply topically as needed for other (bottom soreness)       Multiple Vitamin (MULTI-VITAMINS PO)        Omega-3 Fatty Acids (OMEGA 3 PO)        silver sulfADIAZINE (SILVADENE) 1 % external cream Apply topically 2 times daily 85 g 3     acetaminophen (TYLENOL) 325 MG tablet Take 2 tablets (650 mg) by mouth every 4 hours as needed for mild pain         Allergies:  The patientis allergic to rabies vaccine; shellfish-derived products; and sulfa drugs.    Social history:  Social History     Tobacco Use     Smoking status: Never Smoker     Smokeless tobacco: Never Used   Substance Use Topics     Alcohol use: Yes     Alcohol/week: 0.0 standard drinks     Comment: 1-2 per month      Marital status:  ".    Review of Systems:  Nursing Notes:   Juana White, EMT  7/21/2020  3:13 PM  Signed  Chief Complaint   Patient presents with     Procedure       Vitals:    07/21/20 1455   BP: 130/78   BP Location: Left arm   Patient Position: Sitting   Cuff Size: Adult Regular   Pulse: 87   Temp: 98.7  F (37.1  C)   TempSrc: Oral   SpO2: 99%   Weight: 157 lb 4.8 oz   Height: 5' 4.5\"       Body mass index is 26.58 kg/m .      Juana White, EMT                             Stefano Banuelos MD   Professor and Chief  Division of Colon and Rectal Surgery  [facility]      Referring Provider:  Tyree Remy MD  [address] [place] [address]     Primary Care Provider:  Lakesha Aguirre    This note was created using speech recognition software and may contain unintended word substitutions.      "

## 2020-07-21 NOTE — PATIENT INSTRUCTIONS
Follow up:    We will see you back in clinic in 4 months     Please call with any questions or concerns regarding your clinic visit today.    It is a pleasure being involved in your health care.    Contacts post-consultation depending on your need:    Radiology Appointments [phone]    Schedule Clinic Appointments [phone] # 1   M-F 7:30 - 5 pm    KELVIN Velazquez [phone]    Clinic Fax Number [phone]    Surgery Scheduling [phone]    My Chart is available 24 hours a day and is a secure way to access your records and communicate with your care team.  I strongly recommend signing up if you haven't already done so, if you are comfortable with computers.  If you would like to inquire about this or are having problems with My Chart access, you may call [phone] or go online at [email or web address].  Please allow at least 24 hours for a response and extra time on weekends and Holidays.

## 2020-07-21 NOTE — NURSING NOTE
"Chief Complaint   Patient presents with     Procedure       Vitals:    07/21/20 1455   BP: 130/78   BP Location: Left arm   Patient Position: Sitting   Cuff Size: Adult Regular   Pulse: 87   Temp: 98.7  F (37.1  C)   TempSrc: Oral   SpO2: 99%   Weight: 157 lb 4.8 oz   Height: 5' 4.5\"       Body mass index is 26.58 kg/m .      Juana White, EMT                      "

## 2020-07-28 ENCOUNTER — HOSPITAL ENCOUNTER (OUTPATIENT)
Dept: PET IMAGING | Facility: CLINIC | Age: 55
Discharge: HOME | End: 2020-07-28
Attending: COLON & RECTAL SURGERY | Admitting: COLON & RECTAL SURGERY
Payer: COMMERCIAL

## 2020-07-28 DIAGNOSIS — C21.0 ANAL SQUAMOUS CELL CARCINOMA (H): ICD-10-CM

## 2020-07-28 PROCEDURE — A9552 F18 FDG: HCPCS | Performed by: COLON & RECTAL SURGERY

## 2020-07-28 PROCEDURE — 78816 PET IMAGE W/CT FULL BODY: CPT | Mod: PS

## 2020-07-28 PROCEDURE — 34300033 ZZH RX 343: Performed by: COLON & RECTAL SURGERY

## 2020-07-28 RX ADMIN — FLUDEOXYGLUCOSE F-18 10.2 MCI.: 500 INJECTION, SOLUTION INTRAVENOUS at 11:51

## 2020-07-29 ENCOUNTER — TELEPHONE (OUTPATIENT)
Dept: SURGERY | Facility: CLINIC | Age: 55
End: 2020-07-29

## 2020-08-06 ENCOUNTER — HOSPITAL ENCOUNTER (OUTPATIENT)
Dept: MRI IMAGING | Facility: CLINIC | Age: 55
Discharge: HOME | End: 2020-08-06
Attending: COLON & RECTAL SURGERY | Admitting: COLON & RECTAL SURGERY
Payer: COMMERCIAL

## 2020-08-06 DIAGNOSIS — C21.0 ANAL SQUAMOUS CELL CARCINOMA (H): ICD-10-CM

## 2020-08-06 PROCEDURE — 72197 MRI PELVIS W/O & W/DYE: CPT

## 2020-08-06 PROCEDURE — A9585 GADOBUTROL INJECTION: HCPCS | Performed by: COLON & RECTAL SURGERY

## 2020-08-06 PROCEDURE — 25500064 ZZH RX 255 OP 636: Performed by: COLON & RECTAL SURGERY

## 2020-08-06 PROCEDURE — 25000128 H RX IP 250 OP 636: Performed by: COLON & RECTAL SURGERY

## 2020-08-06 RX ORDER — GADOBUTROL 604.72 MG/ML
7 INJECTION INTRAVENOUS ONCE
Status: COMPLETED | OUTPATIENT
Start: 2020-08-06 | End: 2020-08-06

## 2020-08-06 RX ADMIN — GADOBUTROL 7 ML: 604.72 INJECTION INTRAVENOUS at 14:52

## 2020-08-06 RX ADMIN — GLUCAGON HYDROCHLORIDE 1 MG: 1 INJECTION, POWDER, FOR SOLUTION INTRAMUSCULAR; INTRAVENOUS; SUBCUTANEOUS at 14:12

## 2020-08-10 ENCOUNTER — HOSPITAL ENCOUNTER (OUTPATIENT)
Dept: LAB | Facility: CLINIC | Age: 55
Discharge: HOME | End: 2020-08-10
Attending: INTERNAL MEDICINE | Admitting: INTERNAL MEDICINE
Payer: COMMERCIAL

## 2020-08-10 DIAGNOSIS — C21.0 ANAL CANCER (H): Primary | ICD-10-CM

## 2020-08-10 LAB
ALBUMIN SERPL-MCNC: 3.6 G/DL (ref 3.4–5)
ALP SERPL-CCNC: 52 U/L (ref 40–150)
ALT SERPL W P-5'-P-CCNC: 28 U/L (ref 0–50)
ANION GAP SERPL CALCULATED.3IONS-SCNC: 3 MMOL/L (ref 3–14)
AST SERPL W P-5'-P-CCNC: 19 U/L (ref 0–45)
BASOPHILS # BLD AUTO: 0 10E9/L (ref 0–0.2)
BASOPHILS NFR BLD AUTO: 0.4 %
BILIRUB SERPL-MCNC: 0.2 MG/DL (ref 0.2–1.3)
BUN SERPL-MCNC: 16 MG/DL (ref 7–30)
CALCIUM SERPL-MCNC: 8.9 MG/DL (ref 8.5–10.1)
CHLORIDE SERPL-SCNC: 109 MMOL/L (ref 94–109)
CO2 SERPL-SCNC: 29 MMOL/L (ref 20–32)
CREAT SERPL-MCNC: 0.73 MG/DL (ref 0.52–1.04)
DIFFERENTIAL METHOD BLD: NORMAL
EOSINOPHIL # BLD AUTO: 0.2 10E9/L (ref 0–0.7)
EOSINOPHIL NFR BLD AUTO: 3.2 %
ERYTHROCYTE [DISTWIDTH] IN BLOOD BY AUTOMATED COUNT: 12.1 % (ref 10–15)
GFR SERPL CREATININE-BSD FRML MDRD: >90 ML/MIN/{1.73_M2}
GLUCOSE SERPL-MCNC: 90 MG/DL (ref 70–99)
HCT VFR BLD AUTO: 38.5 % (ref 35–47)
HGB BLD-MCNC: 12.7 G/DL (ref 11.7–15.7)
IMM GRANULOCYTES # BLD: 0 10E9/L (ref 0–0.4)
IMM GRANULOCYTES NFR BLD: 0.4 %
LYMPHOCYTES # BLD AUTO: 0.8 10E9/L (ref 0.8–5.3)
LYMPHOCYTES NFR BLD AUTO: 17.3 %
MCH RBC QN AUTO: 32.5 PG (ref 26.5–33)
MCHC RBC AUTO-ENTMCNC: 33 G/DL (ref 31.5–36.5)
MCV RBC AUTO: 99 FL (ref 78–100)
MONOCYTES # BLD AUTO: 0.6 10E9/L (ref 0–1.3)
MONOCYTES NFR BLD AUTO: 11.9 %
NEUTROPHILS # BLD AUTO: 3.1 10E9/L (ref 1.6–8.3)
NEUTROPHILS NFR BLD AUTO: 66.8 %
NRBC # BLD AUTO: 0 10*3/UL
NRBC BLD AUTO-RTO: 0 /100
PLATELET # BLD AUTO: 201 10E9/L (ref 150–450)
POTASSIUM SERPL-SCNC: 3.9 MMOL/L (ref 3.4–5.3)
PROT SERPL-MCNC: 7.1 G/DL (ref 6.8–8.8)
RBC # BLD AUTO: 3.91 10E12/L (ref 3.8–5.2)
SODIUM SERPL-SCNC: 141 MMOL/L (ref 133–144)
WBC # BLD AUTO: 4.6 10E9/L (ref 4–11)

## 2020-08-10 PROCEDURE — 80053 COMPREHEN METABOLIC PANEL: CPT | Performed by: INTERNAL MEDICINE

## 2020-08-10 PROCEDURE — 36415 COLL VENOUS BLD VENIPUNCTURE: CPT | Performed by: INTERNAL MEDICINE

## 2020-08-10 PROCEDURE — 85025 COMPLETE CBC W/AUTO DIFF WBC: CPT | Performed by: INTERNAL MEDICINE

## 2020-08-12 ENCOUNTER — ONCOLOGY VISIT (OUTPATIENT)
Dept: ONCOLOGY | Facility: CLINIC | Age: 55
End: 2020-08-12
Attending: INTERNAL MEDICINE
Payer: COMMERCIAL

## 2020-08-12 VITALS
HEART RATE: 92 BPM | SYSTOLIC BLOOD PRESSURE: 132 MMHG | RESPIRATION RATE: 18 BRPM | OXYGEN SATURATION: 97 % | TEMPERATURE: 98.7 F | BODY MASS INDEX: 28.13 KG/M2 | DIASTOLIC BLOOD PRESSURE: 90 MMHG | WEIGHT: 164.8 LBS | HEIGHT: 64 IN

## 2020-08-12 DIAGNOSIS — C21.0 ANAL CANCER (H): Primary | ICD-10-CM

## 2020-08-12 DIAGNOSIS — C21.0 ANAL SQUAMOUS CELL CARCINOMA (H): Chronic | ICD-10-CM

## 2020-08-12 PROCEDURE — G0463 HOSPITAL OUTPT CLINIC VISIT: HCPCS

## 2020-08-12 PROCEDURE — 99214 OFFICE O/P EST MOD 30 MIN: CPT | Performed by: INTERNAL MEDICINE

## 2020-08-12 ASSESSMENT — MIFFLIN-ST. JEOR: SCORE: 1350.28

## 2020-08-12 ASSESSMENT — PAIN SCALES - GENERAL: PAINLEVEL: NO PAIN (0)

## 2020-08-12 NOTE — PROGRESS NOTES
"Oncology Rooming Note    August 12, 2020 2:24 PM   Susu Lagos is a 54 year old female who presents for:    Chief Complaint   Patient presents with     Oncology Clinic Visit     3 month recheck Anal cancer, review PET & MRI     Initial Vitals: BP (!) 132/90 (BP Location: Left arm, Patient Position: Sitting, Cuff Size: Adult Regular)   Pulse 92   Temp 98.7  F (37.1  C) (Tympanic)   Resp 18   Ht 1.638 m (5' 4.49\")   Wt 74.8 kg (164 lb 12.8 oz)   LMP 01/29/2016   SpO2 97%   BMI 27.86 kg/m   Estimated body mass index is 27.86 kg/m  as calculated from the following:    Height as of this encounter: 1.638 m (5' 4.49\").    Weight as of this encounter: 74.8 kg (164 lb 12.8 oz). Body surface area is 1.84 meters squared.  No Pain (0) Comment: Data Unavailable   Patient's last menstrual period was 01/29/2016.  Allergies reviewed: Yes  Medications reviewed: Yes    Medications: Medication refills not needed today.  Pharmacy name entered into [facility]:    [facility] [place], [place] - [address] [place] [address]      Clinical concerns: 3 month recheck Anal cancer, review PET & MRI.       Elizabeth Boland CMA              "

## 2020-08-12 NOTE — LETTER
"    8/12/2020         RE: Susu Lagos  [id number]  [facility] [address]        Dear Colleague,    Thank you for referring your patient, Susu Lagos, to the [facility] [place] [facility]. Please see a copy of my visit note below.    Oncology Rooming Note    August 12, 2020 2:24 PM   Susu Lagos is a 54 year old female who presents for:    Chief Complaint   Patient presents with     Oncology Clinic Visit     3 month recheck Anal cancer, review PET & MRI     Initial Vitals: BP (!) 132/90 (BP Location: Left arm, Patient Position: Sitting, Cuff Size: Adult Regular)   Pulse 92   Temp 98.7  F (37.1  C) (Tympanic)   Resp 18   Ht 1.638 m (5' 4.49\")   Wt 74.8 kg (164 lb 12.8 oz)   LMP 01/29/2016   SpO2 97%   BMI 27.86 kg/m   Estimated body mass index is 27.86 kg/m  as calculated from the following:    Height as of this encounter: 1.638 m (5' 4.49\").    Weight as of this encounter: 74.8 kg (164 lb 12.8 oz). Body surface area is 1.84 meters squared.  No Pain (0) Comment: Data Unavailable   Patient's last menstrual period was 01/29/2016.  Allergies reviewed: Yes  Medications reviewed: Yes    Medications: Medication refills not needed today.  Pharmacy name entered into [employer]:    [facility], [place] - [address] [place] [address]      Clinical concerns: 3 month recheck Anal cancer, review PET & MRI.       Elizabeth Boland [facility]                Hematology/ Oncology Follow-up Visit:  Aug 12, 2020    Reason for Visit:   Chief Complaint   Patient presents with     Oncology Clinic Visit     3 month recheck Anal cancer, review PET & MRI       Oncologic History:    Anal squamous cell carcinoma (H)  Susu Lagos presented to the primary care physician with 3 to 4 months history of blood in the stool.  Subsequently the patient was referred for colonoscopy on March 3, 2020.  The palpable rectal mass on digital examination.  Biopsy came back positive for squamous cell carcinoma, focally keratinizing.  " The patient has a previous colonoscopy on November 2018 which shows no abnormalities.  The patient was started on chemoradiation regimen with 5-FU and mitomycin.      Interval History:  Patient is here today for follow-up.  She also seen surgery lately and had a sigmoidoscopy which shows no evidence of disease.  She had imaging studies including MRI of the pelvis as well as PET scan which shows no disease recurrence.  Otherwise the patient remains asymptomatic without any significant pain or nausea or vomiting or diarrhea.  She denies any weight loss.  She denies any shortness of breath or cough or wheezing.    Review Of Systems:  Constitutional: Negative for fever, chills, and night sweats.  Skin: negative.  Eyes: negative.  Ears/Nose/Throat: negative.  Respiratory: No shortness of breath, dyspnea on exertion, cough, or hemoptysis.  Cardiovascular: negative.  Gastrointestinal: negative.  Genitourinary: negative.  Musculoskeletal: negative.  Neurologic: negative.  Psychiatric: negative.  Hematologic/Lymphatic/Immunologic: negative.  Endocrine: negative.    All other ROS negative unless mentioned in interval history.    Past medical, social, surgical, and family histories reviewed.    Allergies:  Allergies as of 08/12/2020 - Reviewed 08/12/2020   Allergen Reaction Noted     Rabies vaccine Unknown 02/20/2015     Shellfish-derived products Nausea and Vomiting 12/29/2016     Sulfa drugs Rash 02/20/2015       Current Medications:  Current Outpatient Medications   Medication Sig Dispense Refill     Magnesium Oxide 250 MG TABS Take 2 tablets by mouth 2 times daily       mineral oil-hydrophilic petrolatum (AQUAPHOR) external ointment Apply topically as needed for other (bottom soreness)       Multiple Vitamin (MULTI-VITAMINS PO)        Omega-3 Fatty Acids (OMEGA 3 PO)        silver sulfADIAZINE (SILVADENE) 1 % external cream Apply topically 2 times daily 85 g 3     acetaminophen (TYLENOL) 325 MG tablet Take 2 tablets (650  "mg) by mouth every 4 hours as needed for mild pain          Physical Exam:  BP (!) 132/90 (BP Location: Left arm, Patient Position: Sitting, Cuff Size: Adult Regular)   Pulse 92   Temp 98.7  F (37.1  C) (Tympanic)   Resp 18   Ht 1.638 m (5' 4.49\")   Wt 74.8 kg (164 lb 12.8 oz)   LMP 01/29/2016   SpO2 97%   BMI 27.86 kg/m    Wt Readings from Last 12 Encounters:   08/12/20 74.8 kg (164 lb 12.8 oz)   07/21/20 71.4 kg (157 lb 4.8 oz)   06/08/20 73.6 kg (162 lb 3.2 oz)   05/26/20 76 kg (167 lb 9.6 oz)   05/20/20 73.4 kg (161 lb 13.1 oz)   05/18/20 73.4 kg (161 lb 12.8 oz)   05/13/20 74.8 kg (164 lb 12.8 oz)   05/06/20 76 kg (167 lb 8 oz)   05/06/20 74.8 kg (165 lb)   04/29/20 74.8 kg (165 lb)   04/17/20 77.7 kg (171 lb 6.4 oz)   04/15/20 76.7 kg (169 lb)     GENERAL APPEARANCE: Healthy, alert and in no acute distress.  HEENT: Sclerae anicteric. PERRLA. Oropharynx without ulcers, lesions, or thrush.  NECK: Supple. No asymmetry or masses.  LYMPHATICS: No palpable cervical, supraclavicular, axillary, or inguinal lymphadenopathy.  RESP: Lungs clear to auscultation bilaterally without rales, rhonchi or wheezes.  CARDIOVASCULAR: Regular rate and rhythm. Normal S1, S2; no S3 or S4. No murmur, gallop, or rub.  ABDOMEN: Soft, nontender. Bowel sounds normal. No palpable organomegaly or masses.  MUSCULOSKELETAL: Extremities without gross deformities noted. No edema of bilateral lower extremities.  SKIN: No suspicious lesions or rashes.  NEURO: Alert and oriented x 3. Cranial nerves II-XII grossly intact.  PSYCHIATRIC: Mentation and affect appear normal.    Laboratory/Imaging Studies:  Orders Only on 08/10/2020   Component Date Value Ref Range Status     WBC 08/10/2020 4.6  4.0 - 11.0 10e9/L Final     RBC Count 08/10/2020 3.91  3.8 - 5.2 10e12/L Final     Hemoglobin 08/10/2020 12.7  11.7 - 15.7 g/dL Final     Hematocrit 08/10/2020 38.5  35.0 - 47.0 % Final     MCV 08/10/2020 99  78 - 100 fl Final     MCH 08/10/2020 32.5  " 26.5 - 33.0 pg Final     MCHC 08/10/2020 33.0  31.5 - 36.5 g/dL Final     RDW 08/10/2020 12.1  10.0 - 15.0 % Final     Platelet Count 08/10/2020 201  150 - 450 10e9/L Final     Diff Method 08/10/2020 Automated Method   Final     % Neutrophils 08/10/2020 66.8  % Final     % Lymphocytes 08/10/2020 17.3  % Final     % Monocytes 08/10/2020 11.9  % Final     % Eosinophils 08/10/2020 3.2  % Final     % Basophils 08/10/2020 0.4  % Final     % Immature Granulocytes 08/10/2020 0.4  % Final     Nucleated RBCs 08/10/2020 0  0 /100 Final     Absolute Neutrophil 08/10/2020 3.1  1.6 - 8.3 10e9/L Final     Absolute Lymphocytes 08/10/2020 0.8  0.8 - 5.3 10e9/L Final     Absolute Monocytes 08/10/2020 0.6  0.0 - 1.3 10e9/L Final     Absolute Eosinophils 08/10/2020 0.2  0.0 - 0.7 10e9/L Final     Absolute Basophils 08/10/2020 0.0  0.0 - 0.2 10e9/L Final     Abs Immature Granulocytes 08/10/2020 0.0  0 - 0.4 10e9/L Final     Absolute Nucleated RBC 08/10/2020 0.0   Final     Sodium 08/10/2020 141  133 - 144 mmol/L Final     Potassium 08/10/2020 3.9  3.4 - 5.3 mmol/L Final     Chloride 08/10/2020 109  94 - 109 mmol/L Final     Carbon Dioxide 08/10/2020 29  20 - 32 mmol/L Final     Anion Gap 08/10/2020 3  3 - 14 mmol/L Final     Glucose 08/10/2020 90  70 - 99 mg/dL Final     Urea Nitrogen 08/10/2020 16  7 - 30 mg/dL Final     Creatinine 08/10/2020 0.73  0.52 - 1.04 mg/dL Final     GFR Estimate 08/10/2020 >90  >60 mL/min/[1.73_m2] Final    Comment: Non  GFR Calc  Starting 12/18/2018, serum creatinine based estimated GFR (eGFR) will be   calculated using the Chronic Kidney Disease Epidemiology Collaboration   (CKD-EPI) equation.       GFR Estimate If Black 08/10/2020 >90  >60 mL/min/[1.73_m2] Final    Comment:  GFR Calc  Starting 12/18/2018, serum creatinine based estimated GFR (eGFR) will be   calculated using the Chronic Kidney Disease Epidemiology Collaboration   (CKD-EPI) equation.       Calcium 08/10/2020  8.9  8.5 - 10.1 mg/dL Final     Bilirubin Total 08/10/2020 0.2  0.2 - 1.3 mg/dL Final     Albumin 08/10/2020 3.6  3.4 - 5.0 g/dL Final     Protein Total 08/10/2020 7.1  6.8 - 8.8 g/dL Final     Alkaline Phosphatase 08/10/2020 52  40 - 150 U/L Final     ALT 08/10/2020 28  0 - 50 U/L Final     AST 08/10/2020 19  0 - 45 U/L Final        Recent Results (from the past 744 hour(s))   PET Oncology Whole Body    Narrative    Combined Report of:    PET and CT on  7/28/2020 1:09 PM :    1. PET of the neck, chest, abdomen, and pelvis.  2. PET CT Fusion for Attenuation Correction and Anatomical  Localization:    3. 3D MIP and PET-CT fused images were processed on an independent  workstation and archived to PACS and reviewed by a radiologist.    Technique:    1. PET: The patient received 10.2 mCi of F-18-FDG; the serum glucose  was 95 prior to administration, body weight was 71.4 kg. Images were  evaluated in the axial, sagittal, and coronal planes as well as the  rotational whole body MIP. Images were acquired from the Vertex to the  Feet.    UPTAKE WAS MEASURED AT 60 MINUTES.     BACKGROUND:  Liver SUV max= 4.5,   Aorta Blood SUV Max: 3.2.     2. CT: Obtained for the purposes of anatomic localization and  attenuation correction.    3. 3D MIP and PET-CT fused images were processed on an independent  workstation and archived to PACS and reviewed by a radiologist.    INDICATION: Anal Ca s/p treatment; Anal squamous cell carcinoma (H)    ADDITIONAL INFORMATION OBTAINED FROM EMR: 54-year-old female with a  history of squamous cell carcinoma, colonoscopy on 3/3/2020 identified  a mass located at the dentate line in the left anterior position. MRI  on 3/19/2020 demonstrated a 1.2 x 0.9 cm mass, located 2.7 cm above  the anal verge. Patient completed chemoradiation from 4/9/2020 to  5/18/2020, with a total of 5040 cGy, with concurrent 5-FU and  Mitomycin.     COMPARISON: PET CT 3/19/2020, MR pelvis 3/19/2020    FINDINGS:      HEAD/NECK:  There is no  suspicious FDG uptake in the neck.     The paranasal sinuses are clear. The mastoid air cells are clear.     The mucosal pharyngeal space, the , prevertebral and carotid  spaces are within normal limits.     No masses, mass effect or pathologically enlarged lymph nodes are  evident. The thyroid gland is unremarkable.    CHEST:  There is no suspicious FDG uptake in the chest.     The heart is normal in size. No pericardial effusion. The ascending  aorta and the main pulmonary artery are normal in caliber. There are  no no pathologically enlarged or hypermetabolic mediastinal, hilar or  axillary lymph nodes.     There are no suspicious lung nodules or evidence for infection.       There is no significant pericardial or plural effusions.    ABDOMEN AND PELVIS:  Persistent focus of hypermetabolism in the rectum with a max SUV of  5.6, previous max SUV of 12.6 on 3/19/2020. No enlarged or  hypermetabolic inguinal, pelvic or intra-abdominal lymph nodes. No  other suspicious foci of FDG uptake in the abdomen or pelvis.    There are no suspicious hepatic lesions. Unchanged subcentimeter  hypodense subcapsular lesion in hepatic segment 4A, too small to  characterize. The pancreas, spleen and adrenal glands are  unremarkable. Both kidneys are unremarkable. No hydronephrosis or  hydroureter. No renal or ureteral stones. The urinary bladder is  unremarkable. No pelvic masses. No abnormally dilated loops of small  or large bowel. Sigmoid colonic diverticulosis, without evidence of  acute diverticulitis. Unremarkable appendix. No free fluid or free  intraperitoneal air. Abdominal aorta is normal in caliber. Diffuse FDG  uptake throughout the stomach.    LOWER EXTREMITIES:   No abnormal masses or hypermetabolic lesions.    BONES:   There are no suspicious lytic or blastic osseous lesions.  There is no  abnormal FDG uptake in the skeleton.        Impression    IMPRESSION: In this patient with  a history of squamous cell carcinoma  of the anus, status post chemotherapy and radiation:  1. Persistent hypermetabolic uptake in the anus, slightly decreased  since the previous PET/CT on 3/19/2020 but believed to represent  residual disease.   2. No evidence of metastatic disease.   3. Diffuse FDG uptake in the stomach, which can be seen with  gastritis.    I have personally reviewed the examination and initial interpretation  and I agree with the findings.    KYLE ANNE MD   MR Pelvis (Intrapelvic Organs) wo&w Contrast    Narrative    EXAMINATION: MR PELVIS (INTRAPELVIC ORGANS) WO&W CONTRAST,  8/6/2020 3:00 PM    COMPARISON: MRI 3/19/2020    HISTORY: Anal CA s/p treatment; Anal squamous cell carcinoma (H)    TECHNIQUE: Multiplanar, multisequence imaging was obtained of the  pelvis without and with intravenous contrast. Contrast dose: 7mL  Gadavist    FINDINGS: There is mild thickening of the distal rectum and anus, with  some T2 dark signal along the anterior portion of the anus at the site  of a previously diagnosed cancer, likely representing fibrosis and  radiation related changes. There is no evidence for recurrent mass. No  pelvic lymphadenopathy.    Bone marrow and soft tissues. The bone marrow and soft tissues are  within normal limits. The urinary bladder is normal in appearance.  Trace pelvic free fluid.      Impression    IMPRESSION: Posttreatment changes of the anus without clear evidence  for local recurrence or pelvic metastatic disease.    I have personally reviewed the examination and initial interpretation  and I agree with the findings.    JEANIE DUMONT MD       Assessment and plan:    (C21.0) Anal squamous cell carcinoma (H)  I reviewed with patient today the imaging studies in details.  There is no clear evidence of disease progression.  We will also repeat laboratory tests.  I will see the patient again in 6 months time or sooner if there are new developments or concerns.    The  patient is ready to learn, no apparent learning barriers were identified.  Diagnosis and treatment plans were explained to the patient. The patient expressed understanding of the content. The patient asked appropriate questions. The patient questions were answered to her satisfaction.    Time spent 25 minutes more than 50% of the time in counseling and coronation of care including discussion of management of anal cancer, reviewing imaging studies and laboratory tests follow-up and prognosis.    Chart documentation with Dragon Voice recognition Software. Although reviewed after completion, some words and grammatical errors may remain.      Again, thank you for allowing me to participate in the care of your patient.        Sincerely,        Sophia Real MD

## 2020-08-13 NOTE — PROGRESS NOTES
Hematology/ Oncology Follow-up Visit:  Aug 12, 2020    Reason for Visit:   Chief Complaint   Patient presents with     Oncology Clinic Visit     3 month recheck Anal cancer, review PET & MRI       Oncologic History:    Anal squamous cell carcinoma (H)  Susu Lagos presented to the primary care physician with 3 to 4 months history of blood in the stool.  Subsequently the patient was referred for colonoscopy on March 3, 2020.  The palpable rectal mass on digital examination.  Biopsy came back positive for squamous cell carcinoma, focally keratinizing.  The patient has a previous colonoscopy on November 2018 which shows no abnormalities.  The patient was started on chemoradiation regimen with 5-FU and mitomycin.      Interval History:  Patient is here today for follow-up.  She also seen surgery lately and had a sigmoidoscopy which shows no evidence of disease.  She had imaging studies including MRI of the pelvis as well as PET scan which shows no disease recurrence.  Otherwise the patient remains asymptomatic without any significant pain or nausea or vomiting or diarrhea.  She denies any weight loss.  She denies any shortness of breath or cough or wheezing.    Review Of Systems:  Constitutional: Negative for fever, chills, and night sweats.  Skin: negative.  Eyes: negative.  Ears/Nose/Throat: negative.  Respiratory: No shortness of breath, dyspnea on exertion, cough, or hemoptysis.  Cardiovascular: negative.  Gastrointestinal: negative.  Genitourinary: negative.  Musculoskeletal: negative.  Neurologic: negative.  Psychiatric: negative.  Hematologic/Lymphatic/Immunologic: negative.  Endocrine: negative.    All other ROS negative unless mentioned in interval history.    Past medical, social, surgical, and family histories reviewed.    Allergies:  Allergies as of 08/12/2020 - Reviewed 08/12/2020   Allergen Reaction Noted     Rabies vaccine Unknown 02/20/2015     Shellfish-derived products Nausea and Vomiting  "12/29/2016     Sulfa drugs Rash 02/20/2015       Current Medications:  Current Outpatient Medications   Medication Sig Dispense Refill     Magnesium Oxide 250 MG TABS Take 2 tablets by mouth 2 times daily       mineral oil-hydrophilic petrolatum (AQUAPHOR) external ointment Apply topically as needed for other (bottom soreness)       Multiple Vitamin (MULTI-VITAMINS PO)        Omega-3 Fatty Acids (OMEGA 3 PO)        silver sulfADIAZINE (SILVADENE) 1 % external cream Apply topically 2 times daily 85 g 3     acetaminophen (TYLENOL) 325 MG tablet Take 2 tablets (650 mg) by mouth every 4 hours as needed for mild pain          Physical Exam:  BP (!) 132/90 (BP Location: Left arm, Patient Position: Sitting, Cuff Size: Adult Regular)   Pulse 92   Temp 98.7  F (37.1  C) (Tympanic)   Resp 18   Ht 1.638 m (5' 4.49\")   Wt 74.8 kg (164 lb 12.8 oz)   LMP 01/29/2016   SpO2 97%   BMI 27.86 kg/m    Wt Readings from Last 12 Encounters:   08/12/20 74.8 kg (164 lb 12.8 oz)   07/21/20 71.4 kg (157 lb 4.8 oz)   06/08/20 73.6 kg (162 lb 3.2 oz)   05/26/20 76 kg (167 lb 9.6 oz)   05/20/20 73.4 kg (161 lb 13.1 oz)   05/18/20 73.4 kg (161 lb 12.8 oz)   05/13/20 74.8 kg (164 lb 12.8 oz)   05/06/20 76 kg (167 lb 8 oz)   05/06/20 74.8 kg (165 lb)   04/29/20 74.8 kg (165 lb)   04/17/20 77.7 kg (171 lb 6.4 oz)   04/15/20 76.7 kg (169 lb)     GENERAL APPEARANCE: Healthy, alert and in no acute distress.  HEENT: Sclerae anicteric. PERRLA. Oropharynx without ulcers, lesions, or thrush.  NECK: Supple. No asymmetry or masses.  LYMPHATICS: No palpable cervical, supraclavicular, axillary, or inguinal lymphadenopathy.  RESP: Lungs clear to auscultation bilaterally without rales, rhonchi or wheezes.  CARDIOVASCULAR: Regular rate and rhythm. Normal S1, S2; no S3 or S4. No murmur, gallop, or rub.  ABDOMEN: Soft, nontender. Bowel sounds normal. No palpable organomegaly or masses.  MUSCULOSKELETAL: Extremities without gross deformities noted. No edema " of bilateral lower extremities.  SKIN: No suspicious lesions or rashes.  NEURO: Alert and oriented x 3. Cranial nerves II-XII grossly intact.  PSYCHIATRIC: Mentation and affect appear normal.    Laboratory/Imaging Studies:  Orders Only on 08/10/2020   Component Date Value Ref Range Status     WBC 08/10/2020 4.6  4.0 - 11.0 10e9/L Final     RBC Count 08/10/2020 3.91  3.8 - 5.2 10e12/L Final     Hemoglobin 08/10/2020 12.7  11.7 - 15.7 g/dL Final     Hematocrit 08/10/2020 38.5  35.0 - 47.0 % Final     MCV 08/10/2020 99  78 - 100 fl Final     MCH 08/10/2020 32.5  26.5 - 33.0 pg Final     MCHC 08/10/2020 33.0  31.5 - 36.5 g/dL Final     RDW 08/10/2020 12.1  10.0 - 15.0 % Final     Platelet Count 08/10/2020 201  150 - 450 10e9/L Final     Diff Method 08/10/2020 Automated Method   Final     % Neutrophils 08/10/2020 66.8  % Final     % Lymphocytes 08/10/2020 17.3  % Final     % Monocytes 08/10/2020 11.9  % Final     % Eosinophils 08/10/2020 3.2  % Final     % Basophils 08/10/2020 0.4  % Final     % Immature Granulocytes 08/10/2020 0.4  % Final     Nucleated RBCs 08/10/2020 0  0 /100 Final     Absolute Neutrophil 08/10/2020 3.1  1.6 - 8.3 10e9/L Final     Absolute Lymphocytes 08/10/2020 0.8  0.8 - 5.3 10e9/L Final     Absolute Monocytes 08/10/2020 0.6  0.0 - 1.3 10e9/L Final     Absolute Eosinophils 08/10/2020 0.2  0.0 - 0.7 10e9/L Final     Absolute Basophils 08/10/2020 0.0  0.0 - 0.2 10e9/L Final     Abs Immature Granulocytes 08/10/2020 0.0  0 - 0.4 10e9/L Final     Absolute Nucleated RBC 08/10/2020 0.0   Final     Sodium 08/10/2020 141  133 - 144 mmol/L Final     Potassium 08/10/2020 3.9  3.4 - 5.3 mmol/L Final     Chloride 08/10/2020 109  94 - 109 mmol/L Final     Carbon Dioxide 08/10/2020 29  20 - 32 mmol/L Final     Anion Gap 08/10/2020 3  3 - 14 mmol/L Final     Glucose 08/10/2020 90  70 - 99 mg/dL Final     Urea Nitrogen 08/10/2020 16  7 - 30 mg/dL Final     Creatinine 08/10/2020 0.73  0.52 - 1.04 mg/dL Final     GFR  Estimate 08/10/2020 >90  >60 mL/min/[1.73_m2] Final    Comment: Non  GFR Calc  Starting 12/18/2018, serum creatinine based estimated GFR (eGFR) will be   calculated using the Chronic Kidney Disease Epidemiology Collaboration   (CKD-EPI) equation.       GFR Estimate If Black 08/10/2020 >90  >60 mL/min/[1.73_m2] Final    Comment:  GFR Calc  Starting 12/18/2018, serum creatinine based estimated GFR (eGFR) will be   calculated using the Chronic Kidney Disease Epidemiology Collaboration   (CKD-EPI) equation.       Calcium 08/10/2020 8.9  8.5 - 10.1 mg/dL Final     Bilirubin Total 08/10/2020 0.2  0.2 - 1.3 mg/dL Final     Albumin 08/10/2020 3.6  3.4 - 5.0 g/dL Final     Protein Total 08/10/2020 7.1  6.8 - 8.8 g/dL Final     Alkaline Phosphatase 08/10/2020 52  40 - 150 U/L Final     ALT 08/10/2020 28  0 - 50 U/L Final     AST 08/10/2020 19  0 - 45 U/L Final        Recent Results (from the past 744 hour(s))   PET Oncology Whole Body    Narrative    Combined Report of:    PET and CT on  7/28/2020 1:09 PM :    1. PET of the neck, chest, abdomen, and pelvis.  2. PET CT Fusion for Attenuation Correction and Anatomical  Localization:    3. 3D MIP and PET-CT fused images were processed on an independent  workstation and archived to PACS and reviewed by a radiologist.    Technique:    1. PET: The patient received 10.2 mCi of F-18-FDG; the serum glucose  was 95 prior to administration, body weight was 71.4 kg. Images were  evaluated in the axial, sagittal, and coronal planes as well as the  rotational whole body MIP. Images were acquired from the Vertex to the  Feet.    UPTAKE WAS MEASURED AT 60 MINUTES.     BACKGROUND:  Liver SUV max= 4.5,   Aorta Blood SUV Max: 3.2.     2. CT: Obtained for the purposes of anatomic localization and  attenuation correction.    3. 3D MIP and PET-CT fused images were processed on an independent  workstation and archived to PACS and reviewed by a  radiologist.    INDICATION: Anal Ca s/p treatment; Anal squamous cell carcinoma (H)    ADDITIONAL INFORMATION OBTAINED FROM EMR: 54-year-old female with a  history of squamous cell carcinoma, colonoscopy on 3/3/2020 identified  a mass located at the dentate line in the left anterior position. MRI  on 3/19/2020 demonstrated a 1.2 x 0.9 cm mass, located 2.7 cm above  the anal verge. Patient completed chemoradiation from 4/9/2020 to  5/18/2020, with a total of 5040 cGy, with concurrent 5-FU and  Mitomycin.     COMPARISON: PET CT 3/19/2020, MR pelvis 3/19/2020    FINDINGS:     HEAD/NECK:  There is no  suspicious FDG uptake in the neck.     The paranasal sinuses are clear. The mastoid air cells are clear.     The mucosal pharyngeal space, the , prevertebral and carotid  spaces are within normal limits.     No masses, mass effect or pathologically enlarged lymph nodes are  evident. The thyroid gland is unremarkable.    CHEST:  There is no suspicious FDG uptake in the chest.     The heart is normal in size. No pericardial effusion. The ascending  aorta and the main pulmonary artery are normal in caliber. There are  no no pathologically enlarged or hypermetabolic mediastinal, hilar or  axillary lymph nodes.     There are no suspicious lung nodules or evidence for infection.       There is no significant pericardial or plural effusions.    ABDOMEN AND PELVIS:  Persistent focus of hypermetabolism in the rectum with a max SUV of  5.6, previous max SUV of 12.6 on 3/19/2020. No enlarged or  hypermetabolic inguinal, pelvic or intra-abdominal lymph nodes. No  other suspicious foci of FDG uptake in the abdomen or pelvis.    There are no suspicious hepatic lesions. Unchanged subcentimeter  hypodense subcapsular lesion in hepatic segment 4A, too small to  characterize. The pancreas, spleen and adrenal glands are  unremarkable. Both kidneys are unremarkable. No hydronephrosis or  hydroureter. No renal or ureteral stones. The  urinary bladder is  unremarkable. No pelvic masses. No abnormally dilated loops of small  or large bowel. Sigmoid colonic diverticulosis, without evidence of  acute diverticulitis. Unremarkable appendix. No free fluid or free  intraperitoneal air. Abdominal aorta is normal in caliber. Diffuse FDG  uptake throughout the stomach.    LOWER EXTREMITIES:   No abnormal masses or hypermetabolic lesions.    BONES:   There are no suspicious lytic or blastic osseous lesions.  There is no  abnormal FDG uptake in the skeleton.        Impression    IMPRESSION: In this patient with a history of squamous cell carcinoma  of the anus, status post chemotherapy and radiation:  1. Persistent hypermetabolic uptake in the anus, slightly decreased  since the previous PET/CT on 3/19/2020 but believed to represent  residual disease.   2. No evidence of metastatic disease.   3. Diffuse FDG uptake in the stomach, which can be seen with  gastritis.    I have personally reviewed the examination and initial interpretation  and I agree with the findings.    KYLE ANNE MD   MR Pelvis (Intrapelvic Organs) wo&w Contrast    Narrative    EXAMINATION: MR PELVIS (INTRAPELVIC ORGANS) WO&W CONTRAST,  8/6/2020 3:00 PM    COMPARISON: MRI 3/19/2020    HISTORY: Anal CA s/p treatment; Anal squamous cell carcinoma (H)    TECHNIQUE: Multiplanar, multisequence imaging was obtained of the  pelvis without and with intravenous contrast. Contrast dose: 7mL  Gadavist    FINDINGS: There is mild thickening of the distal rectum and anus, with  some T2 dark signal along the anterior portion of the anus at the site  of a previously diagnosed cancer, likely representing fibrosis and  radiation related changes. There is no evidence for recurrent mass. No  pelvic lymphadenopathy.    Bone marrow and soft tissues. The bone marrow and soft tissues are  within normal limits. The urinary bladder is normal in appearance.  Trace pelvic free fluid.      Impression    IMPRESSION:  Posttreatment changes of the anus without clear evidence  for local recurrence or pelvic metastatic disease.    I have personally reviewed the examination and initial interpretation  and I agree with the findings.    JEANIE DUMONT MD       Assessment and plan:    (C21.0) Anal squamous cell carcinoma (H)  I reviewed with patient today the imaging studies in details.  There is no clear evidence of disease progression.  We will also repeat laboratory tests.  I will see the patient again in 6 months time or sooner if there are new developments or concerns.    The patient is ready to learn, no apparent learning barriers were identified.  Diagnosis and treatment plans were explained to the patient. The patient expressed understanding of the content. The patient asked appropriate questions. The patient questions were answered to her satisfaction.    Time spent 25 minutes more than 50% of the time in counseling and coronation of care including discussion of management of anal cancer, reviewing imaging studies and laboratory tests follow-up and prognosis.    Chart documentation with Dragon Voice recognition Software. Although reviewed after completion, some words and grammatical errors may remain.

## 2020-08-18 ENCOUNTER — PATIENT OUTREACH (OUTPATIENT)
Dept: ONCOLOGY | Facility: CLINIC | Age: 55
End: 2020-08-18

## 2020-08-18 ENCOUNTER — TELEPHONE (OUTPATIENT)
Dept: ONCOLOGY | Facility: CLINIC | Age: 55
End: 2020-08-18

## 2020-08-18 DIAGNOSIS — C21.0 ANAL CANCER (H): Primary | ICD-10-CM

## 2020-08-18 NOTE — TELEPHONE ENCOUNTER
----- Message from Sophia Real MD sent at 8/18/2020 10:44 AM CDT -----  Regarding: RE: stomach symptoms  Yes please arrange for EGD.  ----- Message -----  From: Claudette Pablo RN  Sent: 8/17/2020   9:35 AM CDT  To: Sophia Real MD  Subject: stomach symptoms                                 You saw this patient last Friday in follow up and she states that you said there was an abnormal area in her stomach on her PET scan and you told her to call if she has any stomach symptoms. She states over the weekend she was having intermittent nausea and heartburn. She would like to go ahead with getting a scope. Do you want an EGD?   Alayna

## 2020-08-18 NOTE — PROGRESS NOTES
EGD scope ordered today per Dr Real. Will have schedulers call patient to schedule. Claudette Pablo RN on 8/18/2020 at 11:01 AM

## 2020-08-24 ENCOUNTER — PATIENT OUTREACH (OUTPATIENT)
Dept: ONCOLOGY | Facility: CLINIC | Age: 55
End: 2020-08-24

## 2020-08-24 DIAGNOSIS — C21.0 ANAL CANCER (H): Primary | ICD-10-CM

## 2020-08-24 DIAGNOSIS — Z11.59 ENCOUNTER FOR SCREENING FOR OTHER VIRAL DISEASES: Primary | ICD-10-CM

## 2020-08-26 ENCOUNTER — OFFICE VISIT (OUTPATIENT)
Dept: FAMILY MEDICINE | Facility: CLINIC | Age: 55
End: 2020-08-26
Payer: COMMERCIAL

## 2020-08-26 VITALS
TEMPERATURE: 98.1 F | DIASTOLIC BLOOD PRESSURE: 84 MMHG | WEIGHT: 166 LBS | BODY MASS INDEX: 28.06 KG/M2 | HEART RATE: 80 BPM | RESPIRATION RATE: 12 BRPM | SYSTOLIC BLOOD PRESSURE: 104 MMHG

## 2020-08-26 DIAGNOSIS — B07.0 PLANTAR WARTS: Primary | ICD-10-CM

## 2020-08-26 PROCEDURE — 17110 DESTRUCTION B9 LES UP TO 14: CPT | Performed by: FAMILY MEDICINE

## 2020-08-26 RX ORDER — IMIQUIMOD 12.5 MG/.25G
CREAM TOPICAL
Qty: 12 PACKET | Refills: 3 | Status: SHIPPED | OUTPATIENT
Start: 2020-08-26 | End: 2021-06-18

## 2020-08-26 ASSESSMENT — PAIN SCALES - GENERAL: PAINLEVEL: NO PAIN (0)

## 2020-08-26 NOTE — PATIENT INSTRUCTIONS
Patient Education     Plantar Warts  Warts are common skin growths that can appear anywhere on the body. Warts on the soles of the feet are called plantar warts. These warts are not a serious health problem. They usually go away without treatment. But plantar warts can be painful when you stand or walk. If this is the case, special cushions can help relieve pressure and pain. Drugstores carry these cushions and you can buy them without a prescription. If cushions don't work and the pain interferes with walking, the wart can be removed.  General care    Your healthcare provider may remove the plantar wart:  ? With prescription medicines. These may be placed directly on the wart at each office visit. Or you may be sent home with the medicine.  ? With a blade, or by freezing (cryotherapy), burning (electrocautery), or laser treatment.    You may be instructed to treat the wart yourself at home using an over-the-counter wart-removal medicine (such as 40% salicylic acid). Apply the medicine to the wart every day as directed. Don't apply the medicine to the healthy skin around the wart. In between applications, remove the dead wart tissue using the type of file suggested by your healthcare provider. You will likely need to repeat this process for several weeks to remove the entire wart.    Warts can spread from your foot to other parts of your body and to other people. Don't scratch or pick at the wart. Wash your hands well before and after touching your warts. If your child has warts, be certain to teach him or her proper hand washing techniques as well.    While many home remedies are suggested for warts, it's best to check with your healthcare provider before trying them.    Warts often come back, even after successful treatment. Return promptly for treatment of any new warts.  Follow-up care  Follow up with your healthcare provider, or as advised.  When to seek medical advice    Signs of infection (red streaks, pus,  smelly or colored discharge, or fever) appear    You have heavy bleeding or bleeding that won t stop with light pressure    The wart doesn t go away after several weeks of self-care    New warts appear on feet, hands, or face  Date Last Reviewed: 5/1/2018 2000-2019 [employer]. [address] [place] [address]. All rights reserved. This information is not intended as a substitute for professional medical care. Always follow your healthcare professional's instructions.

## 2020-08-26 NOTE — PROGRESS NOTES
SUBJECTIVE   Susu Lagos is a 55 year old female who presents with     Warts  Onset/Duration: has had for a while  Description (location/number): right and left foot  Accompanying signs and symptoms (pain, redness): YES- painful  History: prior warts: YES- years ago  Therapies tried and outcome: none      PCP   Lakesha Aguirre PA-C [phone]    Health Maintenance        Health Maintenance Due   Topic Date Due     PREVENTIVE CARE VISIT  1965     PNEUMOCOCCAL IMMUNIZATION 19-64 HIGHEST RISK (1 of 3 - PCV13) 08/23/1986     LIPID  08/23/2012     ZOSTER IMMUNIZATION (1 of 2) 08/23/2017     MAMMO SCREENING  04/16/2020     INFLUENZA VACCINE (1) 09/01/2020       HPI        Patient Active Problem List   Diagnosis     CARDIOVASCULAR SCREENING; LDL GOAL LESS THAN 160     Anal squamous cell carcinoma (H)     Cervical cancer screening     Intractable pain     Neutropenic fever (H)     Current Outpatient Medications   Medication     Magnesium Oxide 250 MG TABS     mineral oil-hydrophilic petrolatum (AQUAPHOR) external ointment     Multiple Vitamin (MULTI-VITAMINS PO)     Omega-3 Fatty Acids (OMEGA 3 PO)     silver sulfADIAZINE (SILVADENE) 1 % external cream     No current facility-administered medications for this visit.        Patient Active Problem List   Diagnosis     CARDIOVASCULAR SCREENING; LDL GOAL LESS THAN 160     Anal squamous cell carcinoma (H)     Cervical cancer screening     Intractable pain     Neutropenic fever (H)     Past Surgical History:   Procedure Laterality Date     CHOLECYSTECTOMY  2013    [facility]     COLONOSCOPY N/A 11/13/2018    Procedure: colonoscopy;  Surgeon: Sesar Dos Santos MD;  Location: [facility]     COLONOSCOPY N/A 3/3/2020    Procedure: COLONOSCOPY, WITH POLYPECTOMY AND BIOPSY;  Surgeon: Sesar Dos Santos MD;  Location: [facility]     DILATION AND CURETTAGE, OPERATIVE HYSTEROSCOPY, COMBINED N/A 2/29/2016    Procedure: COMBINED DILATION AND CURETTAGE, OPERATIVE HYSTEROSCOPY;  Surgeon:  Panda Knight MD;  Location: [facility]     GYN SURGERY  1995    tubes tied     LAPAROSCOPY DIAGNOSTIC (GYN) Right 7/29/2015    Procedure: LAPAROSCOPY DIAGNOSTIC (GYN);  Surgeon: Lian Betancur DO;  Location: [facility]       Social History     Tobacco Use     Smoking status: Never Smoker     Smokeless tobacco: Never Used   Substance Use Topics     Alcohol use: Yes     Alcohol/week: 0.0 standard drinks     Comment: 1-2 per month      Family History   Problem Relation Age of Onset     Hypertension Father      Myocardial Infarction Maternal Grandfather      Melanoma No family hx of          Current Outpatient Medications   Medication Sig Dispense Refill     Magnesium Oxide 250 MG TABS Take 2 tablets by mouth 2 times daily       mineral oil-hydrophilic petrolatum (AQUAPHOR) external ointment Apply topically as needed for other (bottom soreness)       Multiple Vitamin (MULTI-VITAMINS PO)        Omega-3 Fatty Acids (OMEGA 3 PO)        silver sulfADIAZINE (SILVADENE) 1 % external cream Apply topically 2 times daily 85 g 3     Allergies   Allergen Reactions     Rabies Vaccine Unknown     Scratch test resulted, it was not a good idea to give it to her.     Shellfish-Derived Products Nausea and Vomiting     Sulfa Drugs Rash     Recent Labs   Lab Test 08/10/20  1359 05/27/20  1420 05/21/20  0607 05/20/20  0520  05/18/20  1732  05/07/15  1607   ALT 28  --  25 26  --  37   < >  --    CR 0.73 0.63 0.64 0.65   < > 0.65   < >  --    GFRESTIMATED >90 >90 >90 >90   < > >90   < >  --    GFRESTBLACK >90 >90 >90 >90   < > >90   < >  --    POTASSIUM 3.9 3.9 3.3* 3.4   < > 3.2*   < >  --    TSH  --   --   --   --   --  0.36*  --  1.51    < > = values in this interval not displayed.      BP Readings from Last 3 Encounters:   08/26/20 104/84   08/12/20 (!) 132/90   07/21/20 130/78    Wt Readings from Last 3 Encounters:   08/26/20 75.3 kg (166 lb)   08/12/20 74.8 kg (164 lb 12.8 oz)   07/21/20 71.4 kg (157 lb 4.8 oz)                     Reviewed and updated:  Tobacco  Allergies  Meds  Med Hx  Surg Hx  Fam Hx  Soc Hx     ROS:  Constitutional, HEENT, cardiovascular, pulmonary, gi and gu systems are negative, except as otherwise noted.    PHYSICAL EXAM   /84   Pulse 80   Temp 98.1  F (36.7  C) (Tympanic)   Resp 12   Wt 75.3 kg (166 lb)   LMP 01/29/2016   BMI 28.06 kg/m    Body mass index is 28.06 kg/m .  EYES: Eyes grossly normal to inspection, PERRL and conjunctivae and sclerae normal  NECK: no adenopathy, no asymmetry, masses, or scars and thyroid normal to palpation  SKIN: plantar warts x 2 involving right and left foot   NEURO: Normal strength and tone, mentation intact and speech normal            Assessment & Plan     Plantar warts  --Treatment options discussed.  Cryotherapy performed in office today.  Suggested to use Aldara if lesions persist.  Follow-up as needed.  All questions answered.  - imiquimod (ALDARA) 5 % external cream; Apply once daily at bedtime until warts completely disappear up to a maximum of 12 weeks       Procedure Details   History of allergy to iodine: No.        Patient informed of risks (permanent scarring, infection, light or dark discoloration, bleeding, infection, weakness, numbness and recurrence of the lesion) and benefits of the procedure and verbal informed consent obtained.      The areas are treated with liquid nitrogen therapy, frozen until ice ball extended 3 mm beyond lesion, allowed to thaw, and treated again. The patient tolerated procedure well. The patient was instructed on post-op care, warned that there may be blister formation, redness and pain. Recommend OTC analgesia as needed for pain.      Condition:  Stable      Complications:  none.      Plan:  1. Instructed to keep the area dry and covered for 24-48h and clean thereafter.  2. Warning signs of infection were reviewed.    3. Recommended that the patient use OTC acetaminophen, OTC ibuprofen as needed for pain.        Patient Instructions     Patient Education     Plantar Warts  Warts are common skin growths that can appear anywhere on the body. Warts on the soles of the feet are called plantar warts. These warts are not a serious health problem. They usually go away without treatment. But plantar warts can be painful when you stand or walk. If this is the case, special cushions can help relieve pressure and pain. Drugstores carry these cushions and you can buy them without a prescription. If cushions don't work and the pain interferes with walking, the wart can be removed.  General care    Your healthcare provider may remove the plantar wart:  ? With prescription medicines. These may be placed directly on the wart at each office visit. Or you may be sent home with the medicine.  ? With a blade, or by freezing (cryotherapy), burning (electrocautery), or laser treatment.    You may be instructed to treat the wart yourself at home using an over-the-counter wart-removal medicine (such as 40% salicylic acid). Apply the medicine to the wart every day as directed. Don't apply the medicine to the healthy skin around the wart. In between applications, remove the dead wart tissue using the type of file suggested by your healthcare provider. You will likely need to repeat this process for several weeks to remove the entire wart.    Warts can spread from your foot to other parts of your body and to other people. Don't scratch or pick at the wart. Wash your hands well before and after touching your warts. If your child has warts, be certain to teach him or her proper hand washing techniques as well.    While many home remedies are suggested for warts, it's best to check with your healthcare provider before trying them.    Warts often come back, even after successful treatment. Return promptly for treatment of any new warts.  Follow-up care  Follow up with your healthcare provider, or as advised.  When to seek medical advice    Signs of  infection (red streaks, pus, smelly or colored discharge, or fever) appear    You have heavy bleeding or bleeding that won t stop with light pressure    The wart doesn t go away after several weeks of self-care    New warts appear on feet, hands, or face  Date Last Reviewed: 5/1/2018 2000-2019 [employer]. [address] [place] [address]. All rights reserved. This information is not intended as a substitute for professional medical care. Always follow your healthcare professional's instructions.               No follow-ups on file.    Deuce Barriga MD  [facility]      Risks, benefits, side effects and rationale for treatment plan fully discussed with the patient and understanding expressed.

## 2020-08-26 NOTE — NURSING NOTE
"Chief Complaint   Patient presents with     Wart     /84   Pulse 80   Temp 98.1  F (36.7  C) (Tympanic)   Resp 12   Wt 75.3 kg (166 lb)   LMP 01/29/2016   BMI 28.06 kg/m   Estimated body mass index is 28.06 kg/m  as calculated from the following:    Height as of 8/12/20: 1.638 m (5' 4.49\").    Weight as of this encounter: 75.3 kg (166 lb).  Patient presents to the clinic using No DME      Health Maintenance that is potentially due pending provider review:    Health Maintenance Due   Topic Date Due     PREVENTIVE CARE VISIT  1965     PNEUMOCOCCAL IMMUNIZATION 19-64 HIGHEST RISK (1 of 3 - PCV13) 08/23/1986     LIPID  08/23/2012     ZOSTER IMMUNIZATION (1 of 2) 08/23/2017     MAMMO SCREENING  04/16/2020     INFLUENZA VACCINE (1) 09/01/2020        n/a        "

## 2020-08-27 ENCOUNTER — OFFICE VISIT (OUTPATIENT)
Dept: RADIATION THERAPY | Facility: OUTPATIENT CENTER | Age: 55
End: 2020-08-27
Payer: COMMERCIAL

## 2020-08-27 VITALS
SYSTOLIC BLOOD PRESSURE: 114 MMHG | RESPIRATION RATE: 18 BRPM | WEIGHT: 167.2 LBS | OXYGEN SATURATION: 96 % | BODY MASS INDEX: 28.27 KG/M2 | HEART RATE: 90 BPM | DIASTOLIC BLOOD PRESSURE: 75 MMHG

## 2020-08-27 DIAGNOSIS — C21.0 ANAL SQUAMOUS CELL CARCINOMA (H): Primary | Chronic | ICD-10-CM

## 2020-08-27 NOTE — NURSING NOTE
FOLLOW-UP VISIT    Patient Name: Susu Lagos      : 1965     Age: 55 year old        ______________________________________________________________________________     Chief Complaint   Patient presents with     Radiation Therapy     follow up     /75   Pulse 90   Resp 18   Wt 75.8 kg (167 lb 3.2 oz)   LMP 2016   SpO2 96%   BMI 28.27 kg/m       Date Radiation Completed: 20    Pain  Denies    Meds  Current Med List Reviewed: Yes  Medication Note:     Imaging  MRI: 20    On Chemo?: No  Nausea:no  Bowel: diahrrea/loose stool  Bladder: negative  Skin: Warm  Dry  Intact  Energy Level: improving, occasionally still needing nap. Some ongoing fatigue  Dilator: RN talked about how to use and working towards intercourse    Other Appointments:     Date  Oncologist: Dr. Real   21   Surgeon: Dr. Hood  20     Other Notes:

## 2020-08-27 NOTE — PROGRESS NOTES
Department of Radiation Oncology  [facility]  [facility] [place] [address]  [phone]       Radiation Oncology Follow-up Visit  2020    Susu Lagos  MRN: [id number]   : 1965     DIAGNOSIS: squamous cell carcinoma of the anal canal  PATHOLOGY:  squamous cell carcinoma                              STAGE: clinical T1N0  INTENT OF RADIOTHERAPY: definitve CRT  CONCURRENT SYSTEMIC THERAPY: Yes  Oncologist: Dr. Real  Drug Name/Frequency 1: 5FU  Drug Name/Frequency 1: Mitomycin     ONCOLOGIC HISTORY:   Ms. 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       The patient initially presented with symptoms of hematochezia for several months eventually prompting further evaluation.     On 3/3/2020 the patient underwent colonoscopy which demonstrated a mass located at the dentate line in the left anterior position.  The mass was less than one third circumferentially involved.  Biopsy of the mass obtained demonstrated squamous cell carcinoma.  The patient was evaluated by gynecology on 3/13/2020, Pap smear was negative.  On 3/19/2020 the patient underwent a PET scan.  Imaging demonstrated a focal hypermetabolic activity in the anal canal, max SUV 12.6.  No regional or distant disease was noted.  On 3/19/2020 the patient underwent MRI which demonstrated a 1.2 x 0.9 cm mass located 2.7 cm above the anal verge.  No enlarged regional nodes were noted.  The patient was seen by Dr. Real of medical oncology who discussed treatment with chemoradiation therapy.  Patient subsequently presented to radiation oncology clinic to discuss potential role of radiation therapy as a part of treatment strategy.            SITE OF TREATMENT: pelvis     DATES  OF TREATMENT: 20-20     TOTAL DOSE OF TREATMENT: 5040 cGy     DOSE PER FRACTION OF TREATMENT: 180 cGy x 28 fractions    INTERVAL SINCE COMPLETION OF  RADIATION THERAPY:   3.5 months    SUBJECTIVE:   The patient presents for follow up.     Post treatment MRI (20) was JOSE LUIS. PET on 20 demonstrated excellent response with indeterminate but mild activity in anal canal region without clear evidence of disease. Scope by Dr. Banuelos on 20 was JOSE LUIS.    The patient is recovering appropriately. Pain essentially resolved. No longer needing pain medication.     Intermittent bouts of diarrhea at times. Stool otherwise well formed, soft. No  trouble. No extremity lymphedema.     PHYSICAL EXAM:  Gen: Alert, in NAD  Eyes: PERRL, EOMI, sclera anicteric  Neck: Supple, full ROM, no LAD  Pulm: No wheezing, stridor or respiratory distress  CV: Well-perfused, no cyanosis, no pedal edema  Abdominal: Soft, nontender, nondistended, no hepatomegaly  Back: No step-offs or pain to palpation along the thoracolumbar spine, no CVA tenderness  Musculoskeletal: Normal bulk and tone   / Skin: Deferred  Neurologic: A/Ox3, CN II-XII intact  Psychiatric: Appropriate mood and affect    LABS AND IMAGIN20  PET    FINDINGS:      HEAD/NECK:  There is no  suspicious FDG uptake in the neck.      The paranasal sinuses are clear. The mastoid air cells are clear.      The mucosal pharyngeal space, the , prevertebral and carotid  spaces are within normal limits.      No masses, mass effect or pathologically enlarged lymph nodes are  evident. The thyroid gland is unremarkable.     CHEST:  There is no suspicious FDG uptake in the chest.      The heart is normal in size. No pericardial effusion. The ascending  aorta and the main pulmonary artery are normal in caliber. There are  no no pathologically enlarged or hypermetabolic mediastinal, hilar or  axillary lymph nodes.      There are no suspicious lung nodules or evidence for infection.        There is no significant pericardial or plural effusions.     ABDOMEN AND PELVIS:  Persistent focus of hypermetabolism in the rectum with a  max SUV of  5.6, previous max SUV of 12.6 on 3/19/2020. No enlarged or  hypermetabolic inguinal, pelvic or intra-abdominal lymph nodes. No  other suspicious foci of FDG uptake in the abdomen or pelvis.     There are no suspicious hepatic lesions. Unchanged subcentimeter  hypodense subcapsular lesion in hepatic segment 4A, too small to  characterize. The pancreas, spleen and adrenal glands are  unremarkable. Both kidneys are unremarkable. No hydronephrosis or  hydroureter. No renal or ureteral stones. The urinary bladder is  unremarkable. No pelvic masses. No abnormally dilated loops of small  or large bowel. Sigmoid colonic diverticulosis, without evidence of  acute diverticulitis. Unremarkable appendix. No free fluid or free  intraperitoneal air. Abdominal aorta is normal in caliber. Diffuse FDG  uptake throughout the stomach.     LOWER EXTREMITIES:   No abnormal masses or hypermetabolic lesions.     BONES:   There are no suspicious lytic or blastic osseous lesions.  There is no  abnormal FDG uptake in the skeleton.                                                                         IMPRESSION: In this patient with a history of squamous cell carcinoma  of the anus, status post chemotherapy and radiation:  1. Persistent hypermetabolic uptake in the anus, slightly decreased  since the previous PET/CT on 3/19/2020 but believed to represent  residual disease.   2. No evidence of metastatic disease.   3. Diffuse FDG uptake in the stomach, which can be seen with  Gastritis.      8/6/20  MRI pelvis  FINDINGS: There is mild thickening of the distal rectum and anus, with  some T2 dark signal along the anterior portion of the anus at the site  of a previously diagnosed cancer, likely representing fibrosis and  radiation related changes. There is no evidence for recurrent mass. No  pelvic lymphadenopathy.     Bone marrow and soft tissues. The bone marrow and soft tissues are  within normal limits. The urinary bladder is  normal in appearance.  Trace pelvic free fluid.                                                                      IMPRESSION: Posttreatment changes of the anus without clear evidence  for local recurrence or pelvic metastatic disease.       IMPRESSION:   Ms. 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  She completed definitive CRT to a total dose of 50.4 Gy in 28 fractions with concurrent 5FU-MMC  (completed on 5/18/20).    PLAN:   1. Post treatment MRI (8/6/20) was JOSE LUIS. PET on 7/28/20 demonstrated excellent response with indeterminate but mild activity in anal canal region without clear evidence of disease. Scope by Dr. Banuelos on 7/21/20 was JOSE LUIS.    2. Acute toxicities appropriately improving.     3. Mild GI bother. Discussed imodium as needed and probiotic consideration.     4.Cancer related pain. No longer needing oral narcotic.     5. Skin care. Can slowly transition to routine skin care.     6. Continue follow up with medical oncology. Will see Dr. Real on 2/12/21.    7. The patient will benefit from continued oncologic surveillance from our colo-rectal colleagues (Dr. Banuelos). The patient will see Dr. Banuelos on 12/8/20.     8. RTC in 4 months.    Tyree Remy M.D.  Department of Radiation Oncology  [facility]

## 2020-08-27 NOTE — LETTER
2020         RE: Susu Lagos  [address]  [facility] [address]        Dear Colleague,    Thank you for referring your patient, Susu Lagos, to the [facility]. Please see a copy of my visit note below.       Department of Radiation Oncology  [facility]  [facility]  [place] [address]  [phone]       Radiation Oncology Follow-up Visit  2020    Susu Lagos  MRN: [id number]   : 1965     DIAGNOSIS: squamous cell carcinoma of the anal canal  PATHOLOGY:  squamous cell carcinoma                              STAGE: clinical T1N0  INTENT OF RADIOTHERAPY: definitve CRT  CONCURRENT SYSTEMIC THERAPY: Yes  Oncologist: Dr. Real  Drug Name/Frequency 1: 5FU  Drug Name/Frequency 1: Mitomycin     ONCOLOGIC HISTORY:   Ms. 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       The patient initially presented with symptoms of hematochezia for several months eventually prompting further evaluation.     On 3/3/2020 the patient underwent colonoscopy which demonstrated a mass located at the dentate line in the left anterior position.  The mass was less than one third circumferentially involved.  Biopsy of the mass obtained demonstrated squamous cell carcinoma.  The patient was evaluated by gynecology on 3/13/2020, Pap smear was negative.  On 3/19/2020 the patient underwent a PET scan.  Imaging demonstrated a focal hypermetabolic activity in the anal canal, max SUV 12.6.  No regional or distant disease was noted.  On 3/19/2020 the patient underwent MRI which demonstrated a 1.2 x 0.9 cm mass located 2.7 cm above the anal verge.  No enlarged regional nodes were noted.  The patient was seen by Dr. Real of medical oncology who discussed treatment with chemoradiation therapy.  Patient subsequently presented to radiation oncology clinic to discuss potential role of radiation therapy  as a part of treatment strategy.            SITE OF TREATMENT: pelvis     DATES  OF TREATMENT: 20-20     TOTAL DOSE OF TREATMENT: 5040 cGy     DOSE PER FRACTION OF TREATMENT: 180 cGy x 28 fractions    INTERVAL SINCE COMPLETION OF RADIATION THERAPY:   3.5 months    SUBJECTIVE:   The patient presents for follow up.     Post treatment MRI (20) was JOSE LUIS. PET on 20 demonstrated excellent response with indeterminate but mild activity in anal canal region without clear evidence of disease. Scope by Dr. Banuelos on 20 was JOSE LUIS.    The patient is recovering appropriately. Pain essentially resolved. No longer needing pain medication.     Intermittent bouts of diarrhea at times. Stool otherwise well formed, soft. No  trouble. No extremity lymphedema.     PHYSICAL EXAM:  Gen: Alert, in NAD  Eyes: PERRL, EOMI, sclera anicteric  Neck: Supple, full ROM, no LAD  Pulm: No wheezing, stridor or respiratory distress  CV: Well-perfused, no cyanosis, no pedal edema  Abdominal: Soft, nontender, nondistended, no hepatomegaly  Back: No step-offs or pain to palpation along the thoracolumbar spine, no CVA tenderness  Musculoskeletal: Normal bulk and tone   / Skin: Deferred  Neurologic: A/Ox3, CN II-XII intact  Psychiatric: Appropriate mood and affect    LABS AND IMAGIN20  PET    FINDINGS:      HEAD/NECK:  There is no  suspicious FDG uptake in the neck.      The paranasal sinuses are clear. The mastoid air cells are clear.      The mucosal pharyngeal space, the , prevertebral and carotid  spaces are within normal limits.      No masses, mass effect or pathologically enlarged lymph nodes are  evident. The thyroid gland is unremarkable.     CHEST:  There is no suspicious FDG uptake in the chest.      The heart is normal in size. No pericardial effusion. The ascending  aorta and the main pulmonary artery are normal in caliber. There are  no no pathologically enlarged or hypermetabolic mediastinal, hilar  or  axillary lymph nodes.      There are no suspicious lung nodules or evidence for infection.        There is no significant pericardial or plural effusions.     ABDOMEN AND PELVIS:  Persistent focus of hypermetabolism in the rectum with a max SUV of  5.6, previous max SUV of 12.6 on 3/19/2020. No enlarged or  hypermetabolic inguinal, pelvic or intra-abdominal lymph nodes. No  other suspicious foci of FDG uptake in the abdomen or pelvis.     There are no suspicious hepatic lesions. Unchanged subcentimeter  hypodense subcapsular lesion in hepatic segment 4A, too small to  characterize. The pancreas, spleen and adrenal glands are  unremarkable. Both kidneys are unremarkable. No hydronephrosis or  hydroureter. No renal or ureteral stones. The urinary bladder is  unremarkable. No pelvic masses. No abnormally dilated loops of small  or large bowel. Sigmoid colonic diverticulosis, without evidence of  acute diverticulitis. Unremarkable appendix. No free fluid or free  intraperitoneal air. Abdominal aorta is normal in caliber. Diffuse FDG  uptake throughout the stomach.     LOWER EXTREMITIES:   No abnormal masses or hypermetabolic lesions.     BONES:   There are no suspicious lytic or blastic osseous lesions.  There is no  abnormal FDG uptake in the skeleton.                                                                         IMPRESSION: In this patient with a history of squamous cell carcinoma  of the anus, status post chemotherapy and radiation:  1. Persistent hypermetabolic uptake in the anus, slightly decreased  since the previous PET/CT on 3/19/2020 but believed to represent  residual disease.   2. No evidence of metastatic disease.   3. Diffuse FDG uptake in the stomach, which can be seen with  Gastritis.      8/6/20  MRI pelvis  FINDINGS: There is mild thickening of the distal rectum and anus, with  some T2 dark signal along the anterior portion of the anus at the site  of a previously diagnosed cancer, likely  representing fibrosis and  radiation related changes. There is no evidence for recurrent mass. No  pelvic lymphadenopathy.     Bone marrow and soft tissues. The bone marrow and soft tissues are  within normal limits. The urinary bladder is normal in appearance.  Trace pelvic free fluid.                                                                      IMPRESSION: Posttreatment changes of the anus without clear evidence  for local recurrence or pelvic metastatic disease.       IMPRESSION:   Ms. 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  She completed definitive CRT to a total dose of 50.4 Gy in 28 fractions with concurrent 5FU-MMC  (completed on 5/18/20).    PLAN:   1. Post treatment MRI (8/6/20) was JOSE LUIS. PET on 7/28/20 demonstrated excellent response with indeterminate but mild activity in anal canal region without clear evidence of disease. Scope by Dr. Banuelos on 7/21/20 was JOSE LUIS.    2. Acute toxicities appropriately improving.     3. Mild GI bother. Discussed imodium as needed and probiotic consideration.     4.Cancer related pain. No longer needing oral narcotic.     5. Skin care. Can slowly transition to routine skin care.     6. Continue follow up with medical oncology. Will see Dr. Real on 2/12/21.    7. The patient will benefit from continued oncologic surveillance from our colo-rectal colleagues (Dr. Banuelos). The patient will see Dr. Banuelos on 12/8/20.     8. RTC in 4 months.    Tyree Remy M.D.  Department of Radiation Oncology  [facility]

## 2020-08-30 ENCOUNTER — ANESTHESIA EVENT (OUTPATIENT)
Dept: GASTROENTEROLOGY | Facility: CLINIC | Age: 55
End: 2020-08-30
Payer: COMMERCIAL

## 2020-08-30 DIAGNOSIS — Z11.59 ENCOUNTER FOR SCREENING FOR OTHER VIRAL DISEASES: ICD-10-CM

## 2020-08-30 PROCEDURE — U0003 INFECTIOUS AGENT DETECTION BY NUCLEIC ACID (DNA OR RNA); SEVERE ACUTE RESPIRATORY SYNDROME CORONAVIRUS 2 (SARS-COV-2) (CORONAVIRUS DISEASE [COVID-19]), AMPLIFIED PROBE TECHNIQUE, MAKING USE OF HIGH THROUGHPUT TECHNOLOGIES AS DESCRIBED BY CMS-2020-01-R: HCPCS | Performed by: SURGERY

## 2020-08-30 NOTE — ANESTHESIA PREPROCEDURE EVALUATION
Anesthesia Pre-Procedure Evaluation    Patient: Susu Lagos   MRN: [id number] : 1965          Preoperative Diagnosis: Anal cancer (H) [C21.0]    Procedure(s):  ESOPHAGOGASTRODUODENOSCOPY (EGD)    Past Medical History:   Diagnosis Date     Menorrhagia with irregular cycle 2016     Ovarian cyst 2015    Right, 6 cm dermoid on MRI-recommend removal.  2015 Surgery for right salpingoophorectomy.       Perimenopause 2015     Past Surgical History:   Procedure Laterality Date     CHOLECYSTECTOMY      LSC     COLONOSCOPY N/A 2018    Procedure: colonoscopy;  Surgeon: Sesar Dos Santos MD;  Location: [facility]     COLONOSCOPY N/A 3/3/2020    Procedure: COLONOSCOPY, WITH POLYPECTOMY AND BIOPSY;  Surgeon: Sesar Dos Santos MD;  Location: [facility]     DILATION AND CURETTAGE, OPERATIVE HYSTEROSCOPY, COMBINED N/A 2016    Procedure: COMBINED DILATION AND CURETTAGE, OPERATIVE HYSTEROSCOPY;  Surgeon: Panda Knight MD;  Location: [facility]     GYN SURGERY  1995    tubes tied     LAPAROSCOPY DIAGNOSTIC (GYN) Right 2015    Procedure: LAPAROSCOPY DIAGNOSTIC (GYN);  Surgeon: Lian Betancur DO;  Location: [facility]       Anesthesia Evaluation     . Pt has had prior anesthetic. Type: General and MAC           ROS/MED HX    ENT/Pulmonary:  - neg pulmonary ROS     Neurologic:  - neg neurologic ROS     Cardiovascular:     (+) Dyslipidemia, ----. : . . . :. .       METS/Exercise Tolerance:     Hematologic:  - neg hematologic  ROS       Musculoskeletal:  - neg musculoskeletal ROS       GI/Hepatic:  - neg GI/hepatic ROS       Renal/Genitourinary:  - ROS Renal section negative       Endo:  - neg endo ROS       Psychiatric:  - neg psychiatric ROS       Infectious Disease:  - neg infectious disease ROS       Malignancy:   (+) Malignancy History of Other  Other CA Anal and Cervical cancers Active status post         Other:    (+) H/O Chronic Pain,                        Physical Exam  Normal  "systems: cardiovascular, pulmonary and dental    Airway   Mallampati: I  TM distance: >3 FB  Neck ROM: full    Dental     Cardiovascular       Pulmonary             Lab Results   Component Value Date    WBC 4.6 08/10/2020    HGB 12.7 08/10/2020    HCT 38.5 08/10/2020     08/10/2020     08/10/2020    POTASSIUM 3.9 08/10/2020    CHLORIDE 109 08/10/2020    CO2 29 08/10/2020    BUN 16 08/10/2020    CR 0.73 08/10/2020    GLC 90 08/10/2020    BARBIE 8.9 08/10/2020    ALBUMIN 3.6 08/10/2020    PROTTOTAL 7.1 08/10/2020    ALT 28 08/10/2020    AST 19 08/10/2020    ALKPHOS 52 08/10/2020    BILITOTAL 0.2 08/10/2020    TSH 0.36 (L) 05/18/2020    T4 1.18 05/18/2020    HCG Negative 02/29/2016       Preop Vitals  BP Readings from Last 3 Encounters:   08/27/20 114/75   08/26/20 104/84   08/12/20 (!) 132/90    Pulse Readings from Last 3 Encounters:   08/27/20 90   08/26/20 80   08/12/20 92      Resp Readings from Last 3 Encounters:   08/27/20 18   08/26/20 12   08/12/20 18    SpO2 Readings from Last 3 Encounters:   08/27/20 96%   08/12/20 97%   07/21/20 99%      Temp Readings from Last 1 Encounters:   08/26/20 36.7  C (98.1  F) (Tympanic)    Ht Readings from Last 1 Encounters:   08/12/20 1.638 m (5' 4.49\")      Wt Readings from Last 1 Encounters:   08/27/20 75.8 kg (167 lb 3.2 oz)    Estimated body mass index is 28.27 kg/m  as calculated from the following:    Height as of 8/12/20: 1.638 m (5' 4.49\").    Weight as of 8/27/20: 75.8 kg (167 lb 3.2 oz).       Anesthesia Plan      History & Physical Review  History and physical reviewed and following examination; no interval change.    ASA Status:  2 .    NPO Status:  > 6 hours    Plan for General and MAC with Propofol and Intravenous induction. Maintenance will be TIVA.  Reason for MAC:  Deep or markedly invasive procedure (G8)  PONV prophylaxis:  Ondansetron (or other 5HT-3) and Dexamethasone or Solumedrol         Postoperative Care  Postoperative pain management:  IV " analgesics, Oral pain medications and Multi-modal analgesia.      Consents  Anesthetic plan, risks, benefits and alternatives discussed with:  Patient..                 RADHA Craven CRNA

## 2020-08-31 LAB
SARS-COV-2 RNA SPEC QL NAA+PROBE: NOT DETECTED
SPECIMEN SOURCE: NORMAL

## 2020-09-03 ENCOUNTER — HOSPITAL ENCOUNTER (OUTPATIENT)
Facility: CLINIC | Age: 55
Discharge: HOME | End: 2020-09-03
Attending: SURGERY | Admitting: SURGERY
Payer: COMMERCIAL

## 2020-09-03 ENCOUNTER — ANESTHESIA (OUTPATIENT)
Dept: GASTROENTEROLOGY | Facility: CLINIC | Age: 55
End: 2020-09-03
Payer: COMMERCIAL

## 2020-09-03 VITALS
SYSTOLIC BLOOD PRESSURE: 112 MMHG | HEART RATE: 75 BPM | HEIGHT: 65 IN | WEIGHT: 167 LBS | OXYGEN SATURATION: 96 % | DIASTOLIC BLOOD PRESSURE: 86 MMHG | TEMPERATURE: 98.6 F | RESPIRATION RATE: 14 BRPM | BODY MASS INDEX: 27.82 KG/M2

## 2020-09-03 DIAGNOSIS — K29.01 ACUTE GASTRITIS WITH HEMORRHAGE, UNSPECIFIED GASTRITIS TYPE: Primary | ICD-10-CM

## 2020-09-03 LAB — UPPER GI ENDOSCOPY: NORMAL

## 2020-09-03 PROCEDURE — 25000125 ZZHC RX 250: Performed by: SURGERY

## 2020-09-03 PROCEDURE — 43235 EGD DIAGNOSTIC BRUSH WASH: CPT | Performed by: SURGERY

## 2020-09-03 PROCEDURE — 25000128 H RX IP 250 OP 636: Performed by: NURSE ANESTHETIST, CERTIFIED REGISTERED

## 2020-09-03 PROCEDURE — 37000008 ZZH ANESTHESIA TECHNICAL FEE, 1ST 30 MIN: Performed by: SURGERY

## 2020-09-03 PROCEDURE — 25000125 ZZHC RX 250: Performed by: NURSE ANESTHETIST, CERTIFIED REGISTERED

## 2020-09-03 PROCEDURE — 25800030 ZZH RX IP 258 OP 636: Performed by: SURGERY

## 2020-09-03 RX ORDER — GLYCOPYRROLATE 0.2 MG/ML
INJECTION, SOLUTION INTRAMUSCULAR; INTRAVENOUS PRN
Status: DISCONTINUED | OUTPATIENT
Start: 2020-09-03 | End: 2020-09-03

## 2020-09-03 RX ORDER — LIDOCAINE 40 MG/G
CREAM TOPICAL
Status: DISCONTINUED | OUTPATIENT
Start: 2020-09-03 | End: 2020-09-03 | Stop reason: HOSPADM

## 2020-09-03 RX ORDER — SODIUM CHLORIDE, SODIUM LACTATE, POTASSIUM CHLORIDE, CALCIUM CHLORIDE 600; 310; 30; 20 MG/100ML; MG/100ML; MG/100ML; MG/100ML
INJECTION, SOLUTION INTRAVENOUS CONTINUOUS
Status: DISCONTINUED | OUTPATIENT
Start: 2020-09-03 | End: 2020-09-03 | Stop reason: HOSPADM

## 2020-09-03 RX ORDER — ONDANSETRON 2 MG/ML
4 INJECTION INTRAMUSCULAR; INTRAVENOUS
Status: DISCONTINUED | OUTPATIENT
Start: 2020-09-03 | End: 2020-09-03 | Stop reason: HOSPADM

## 2020-09-03 RX ORDER — LIDOCAINE HYDROCHLORIDE 10 MG/ML
INJECTION, SOLUTION INFILTRATION; PERINEURAL PRN
Status: DISCONTINUED | OUTPATIENT
Start: 2020-09-03 | End: 2020-09-03

## 2020-09-03 RX ORDER — OMEPRAZOLE 40 MG/1
40 CAPSULE, DELAYED RELEASE ORAL DAILY
Qty: 30 CAPSULE | Refills: 0 | Status: SHIPPED | OUTPATIENT
Start: 2020-09-03 | End: 2021-06-18

## 2020-09-03 RX ORDER — PROPOFOL 10 MG/ML
INJECTION, EMULSION INTRAVENOUS PRN
Status: DISCONTINUED | OUTPATIENT
Start: 2020-09-03 | End: 2020-09-03

## 2020-09-03 RX ADMIN — LIDOCAINE HYDROCHLORIDE 1 ML: 10 INJECTION, SOLUTION EPIDURAL; INFILTRATION; INTRACAUDAL; PERINEURAL at 09:31

## 2020-09-03 RX ADMIN — TOPICAL ANESTHETIC 1 EACH: 200 SPRAY DENTAL; PERIODONTAL at 09:42

## 2020-09-03 RX ADMIN — GLYCOPYRROLATE 0.1 MG: 0.2 INJECTION, SOLUTION INTRAMUSCULAR; INTRAVENOUS at 09:42

## 2020-09-03 RX ADMIN — PROPOFOL 150 MG: 10 INJECTION, EMULSION INTRAVENOUS at 09:42

## 2020-09-03 RX ADMIN — SODIUM CHLORIDE, POTASSIUM CHLORIDE, SODIUM LACTATE AND CALCIUM CHLORIDE 30 ML: 600; 310; 30; 20 INJECTION, SOLUTION INTRAVENOUS at 09:31

## 2020-09-03 RX ADMIN — LIDOCAINE HYDROCHLORIDE 100 MG: 10 INJECTION, SOLUTION INFILTRATION; PERINEURAL at 09:42

## 2020-09-03 ASSESSMENT — MIFFLIN-ST. JEOR: SCORE: 1355.45

## 2020-09-03 NOTE — ANESTHESIA POSTPROCEDURE EVALUATION
Patient: Susu Lagos    Procedure(s):  ESOPHAGOGASTRODUODENOSCOPY (EGD)    Diagnosis:Anal cancer (H) [C21.0]  Diagnosis Additional Information: No value filed.    Anesthesia Type:  General, MAC    Note:  Anesthesia Post Evaluation    Patient location during evaluation: Phase 2 and Bedside  Patient participation: Able to fully participate in evaluation  Level of consciousness: awake and alert  Pain management: adequate  Airway patency: patent  Cardiovascular status: acceptable and hemodynamically stable  Respiratory status: acceptable and room air  Hydration status: acceptable  PONV: none     Anesthetic complications: None          Last vitals:  Vitals:    09/03/20 0904 09/03/20 0952   BP: (!) 118/91 108/71   Pulse: 85 93   Resp: 16 16   Temp: 37  C (98.6  F)    SpO2: 95%          Electronically Signed By: RADHA Carrillo CRNA  September 3, 2020  10:04 AM

## 2020-09-03 NOTE — ANESTHESIA CARE TRANSFER NOTE
Patient: Susu Lagos    Procedure(s):  ESOPHAGOGASTRODUODENOSCOPY (EGD)    Diagnosis: Anal cancer (H) [C21.0]  Diagnosis Additional Information: No value filed.    Anesthesia Type:   General, MAC     Note:  Airway :Nasal Cannula  Patient transferred to:Phase II  Handoff Report: Identifed the Patient, Identified the Reponsible Provider, Reviewed the pertinent medical history, Discussed the surgical course, Reviewed Intra-OP anesthesia mangement and issues during anesthesia, Set expectations for post-procedure period and Allowed opportunity for questions and acknowledgement of understanding      Vitals: (Last set prior to Anesthesia Care Transfer)    CRNA VITALS  9/3/2020 0917 - 9/3/2020 0949      9/3/2020             Pulse:  82    Ht Rate:  81    SpO2:  99 %                Electronically Signed By: RADHA Carrillo CRNA  September 3, 2020  9:49 AM

## 2020-09-03 NOTE — H&P
55 year old year old female here for upper endoscopy for history of anal cancer.        Patient Active Problem List   Diagnosis     CARDIOVASCULAR SCREENING; LDL GOAL LESS THAN 160     Anal squamous cell carcinoma (H)     Cervical cancer screening     Intractable pain     Neutropenic fever (H)       Past Medical History:   Diagnosis Date     Menorrhagia with irregular cycle 2/28/2016     Ovarian cyst 6/4/2015    Right, 6 cm dermoid on MRI-recommend removal.  7/29/2015 Surgery for right salpingoophorectomy.       Perimenopause 6/4/2015     Rectal cancer (H)     had radiation and chemo 2020       Past Surgical History:   Procedure Laterality Date     CHOLECYSTECTOMY  2013    LSC     COLONOSCOPY N/A 11/13/2018    Procedure: colonoscopy;  Surgeon: Sesar Dos Santos MD;  Location: [facility]     COLONOSCOPY N/A 3/3/2020    Procedure: COLONOSCOPY, WITH POLYPECTOMY AND BIOPSY;  Surgeon: Sesar Dos Santos MD;  Location: [facility]     DILATION AND CURETTAGE, OPERATIVE HYSTEROSCOPY, COMBINED N/A 2/29/2016    Procedure: COMBINED DILATION AND CURETTAGE, OPERATIVE HYSTEROSCOPY;  Surgeon: Panda Knight MD;  Location: [facility]     GYN SURGERY  1995    tubes tied     LAPAROSCOPY DIAGNOSTIC (GYN) Right 7/29/2015    Procedure: LAPAROSCOPY DIAGNOSTIC (GYN);  Surgeon: Lian Betancur DO;  Location: [facility]       Family History   Problem Relation Age of Onset     Hypertension Father      Myocardial Infarction Maternal Grandfather      Melanoma No family hx of        No current outpatient medications on file.       Allergies   Allergen Reactions     Rabies Vaccine Unknown     Scratch test resulted, it was not a good idea to give it to her.     Shellfish-Derived Products Nausea and Vomiting     Sulfa Drugs Rash       Pt reports that she has never smoked. She has never used smokeless tobacco. She reports current alcohol use. She reports that she does not use drugs.    Exam:    Awake, Alert OX3  Lungs - CTA bilaterally  CV - RRR, no  murmurs, distal pulses intact  Abd - soft, non-distended, non-tender, +BS  Extr - No cyanosis or edema    A/P 55 year old year old female in need of upper endoscopy for history of anal cancer. Risks, benefits, alternatives, and complications were discussed including the possibility of perforation and the patient agreed to proceed.    Karlos Burnett MD

## 2020-09-04 ENCOUNTER — PATIENT OUTREACH (OUTPATIENT)
Dept: ONCOLOGY | Facility: CLINIC | Age: 55
End: 2020-09-04

## 2020-10-08 ENCOUNTER — TELEPHONE (OUTPATIENT)
Dept: RADIATION THERAPY | Facility: OUTPATIENT CENTER | Age: 55
End: 2020-10-08

## 2020-10-08 DIAGNOSIS — N89.8 VAGINAL DRYNESS: ICD-10-CM

## 2020-10-08 DIAGNOSIS — C21.0 ANAL SQUAMOUS CELL CARCINOMA (H): Primary | Chronic | ICD-10-CM

## 2020-10-08 DIAGNOSIS — L58.9 RADIATION DERMATITIS: ICD-10-CM

## 2020-10-08 RX ORDER — SILVER SULFADIAZINE 10 MG/G
CREAM TOPICAL 2 TIMES DAILY
Qty: 85 G | Refills: 3 | Status: SHIPPED | OUTPATIENT
Start: 2020-10-08 | End: 2020-12-14

## 2020-10-08 NOTE — TELEPHONE ENCOUNTER
Call from Susu requesting refill of silvadene. She notes a small area near the vagina that is still open and bleeds at times.  SHe also requested referral to ob/gyn for vaginal irriation/dryness s/p radiation/chemo.    RN reviewed with MD - rx sent  referr to Dr. Tabby Srivastava.    Pt agreeable to the above plan

## 2020-10-09 ENCOUNTER — VIRTUAL VISIT (OUTPATIENT)
Dept: FAMILY MEDICINE | Facility: OTHER | Age: 55
End: 2020-10-09
Payer: COMMERCIAL

## 2020-10-09 PROCEDURE — 99421 OL DIG E/M SVC 5-10 MIN: CPT | Performed by: PHYSICIAN ASSISTANT

## 2020-10-09 NOTE — PROGRESS NOTES
"Date: 10/09/2020 10:04:15  Clinician: Susan Elmore  Clinician NPI: [id number]  Patient: Susu Lagos  Patient : 1965  Patient Address: [place] [id number]  Patient Phone: [phone]  Visit Protocol: URI  Patient Summary:  Susu is a 55 year old ( : 1965 ) female who initiated a [facility] Visit for COVID-19 (Coronavirus) evaluation and screening. When asked the question \"Please sign me up to receive news, health information and promotions from [facility].\", Susu responded \"No\".    Susu states her symptoms started suddenly 3-4 days ago.   Her symptoms consist of ear pain, a headache, a cough, rhinitis, myalgia, malaise, a sore throat, and diarrhea. She is experiencing mild difficulty breathing with activities but can speak normally in full sentences.   Symptom details     Nasal secretions: The color of her mucus is clear.    Cough: Susu coughs a few times an hour and her cough is more bothersome at night. Phlegm does not come into her throat when she coughs. She does not believe her cough is caused by post-nasal drip.     Sore throat: Susu reports having mild throat pain (1-3 on a 10 point pain scale), does not have exudate on her tonsils, and can swallow liquids. She is not sure if the lymph nodes in her neck are enlarged. A rash has not appeared on the skin since the sore throat started.     Headache: She states the headache is moderate (4-6 on a 10 point pain scale).      Susu denies having wheezing, fever, nasal congestion, anosmia, vomiting, nausea, facial pain or pressure, chills, teeth pain, and ageusia. She also denies double sickening (worsening symptoms after initial improvement), taking antibiotic medication in the past month, and having recent facial or sinus surgery in the past 60 days.   Precipitating events  Susu is not sure if she has been exposed to someone with strep throat. She has not recently been exposed to someone with influenza. Susu has " been in close contact with the following high risk individuals: immunocompromised people.   Pertinent COVID-19 (Coronavirus) information  In the past 14 days, Susu has not worked in a congregate living setting.   She does not work or volunteer as healthcare worker or a  and does not work or volunteer in a healthcare facility.   Susu also has not lived in a congregate living setting in the past 14 days. She does not live with a healthcare worker.   Susu has not had a close contact with a laboratory-confirmed COVID-19 patient within 14 days of symptom onset.   Since December 2019, Susu and has not had upper respiratory infection or influenza-like illness. Has not been diagnosed with lab-confirmed COVID-19 test   Pertinent medical history  Susu does not get yeast infections when she takes antibiotics.   Susu does not need a return to work/school note.   Weight: 175 lbs   Susu does not smoke or use smokeless tobacco.   Additional information as reported by the patient (free text): I finished chemo and radiation treatments in May.   Weight: 175 lbs    MEDICATIONS: Aquaphor topical, Preparation H rectal, silver sulfadiazine topical, ALLERGIES: Sulfa (Sulfonamide Antibiotics)  Clinician Response:  Dear Susu,   Your symptoms show that you may have coronavirus (COVID-19). This illness can cause fever, cough and trouble breathing. Many people get a mild case and get better on their own. Some people can get very sick.  What should I do?  We would like to test you for this virus.   1. Please call [phone] to schedule your visit. Explain that you were referred by [facility] to have a COVID-19 test. Be ready to share your [facility] visit ID number.  The following will serve as your written order for this COVID Test, ordered by me, for the indication of suspected COVID [Z20.828]: The test will be ordered in [employer], our electronic health record, after you are scheduled. It will show as ordered and  "authorized by Dharmesh Dhillon MD.  Order: COVID-19 (Coronavirus) PCR for SYMPTOMATIC testing from [facility].      2. When it's time for your COVID test:  Stay at least 6 feet away from others. (If someone will drive you to your test, stay in the backseat, as far away from the  as you can.)   Cover your mouth and nose with a mask, tissue or washcloth.  Go straight to the testing site. Don't make any stops on the way there or back.      3.Starting now: Stay home and away from others (self-isolate) until:   You've had no fever---and no medicine that reduces fever---for one full day (24 hours). And...   Your other symptoms have gotten better. For example, your cough or breathing has improved. And...   At least 10 days have passed since your symptoms started.       During this time, don't leave the house except for testing or medical care.   Stay in your own room, even for meals. Use your own bathroom if you can.   Stay away from others in your home. No hugging, kissing or shaking hands. No visitors.  Don't go to work, school or anywhere else.    Clean \"high touch\" surfaces often (doorknobs, counters, handles, etc.). Use a household cleaning spray or wipes. You'll find a full list of  on the EPA website: www.epa.gov/pesticide-registration/list-n-disinfectants-use-against-sars-cov-2.   Cover your mouth and nose with a mask, tissue or washcloth to avoid spreading germs.  Wash your hands and face often. Use soap and water.  Caregivers in these groups are at risk for severe illness due to COVID-19:  o People 65 years and older  o People who live in a nursing home or long-term care facility  o People with chronic disease (lung, heart, cancer, diabetes, kidney, liver, immunologic)  o People who have a weakened immune system, including those who:   Are in cancer treatment  Take medicine that weakens the immune system, such as corticosteroids  Had a bone marrow or organ transplant  Have an immune deficiency  Have poorly " controlled HIV or AIDS  Are obese (body mass index of 40 or higher)  Smoke regularly   o Caregivers should wear gloves while washing dishes, handling laundry and cleaning bedrooms and bathrooms.  o Use caution when washing and drying laundry: Don't shake dirty laundry, and use the warmest water setting that you can.  o For more tips, go to www.cdc.gov/coronavirus/2019-ncov/downloads/10Things.pdf.    4.Sign up for [employer]. We know it's scary to hear that you might have COVID-19. We want to track your symptoms to make sure you're okay over the next 2 weeks. Please look for an email from [employer]---this is a free, online program that we'll use to keep in touch. To sign up, follow the link in the email. Learn more at [email or web address]  How can I take care of myself?   Get lots of rest. Drink extra fluids (unless a doctor has told you not to).   Take Tylenol (acetaminophen) for fever or pain. If you have liver or kidney problems, ask your family doctor if it's okay to take Tylenol.   Adults can take either:    650 mg (two 325 mg pills) every 4 to 6 hours, or...   1,000 mg (two 500 mg pills) every 8 hours as needed.    Note: Don't take more than 3,000 mg in one day. Acetaminophen is found in many medicines (both prescribed and over-the-counter medicines). Read all labels to be sure you don't take too much.   For children, check the Tylenol bottle for the right dose. The dose is based on the child's age or weight.    If you have other health problems (like cancer, heart failure, an organ transplant or severe kidney disease): Call your specialty clinic if you don't feel better in the next 2 days.       Know when to call 911. Emergency warning signs include:    Trouble breathing or shortness of breath Pain or pressure in the chest that doesn't go away Feeling confused like you haven't felt before, or not being able to wake up Bluish-colored lips or face.  Where can I get more information?   [facility]  [place] -- About COVID-19: [email or web address]   CDC -- What to Do If You're Sick: www.cdc.gov/coronavirus/2019-ncov/about/steps-when-sick.html   CDC -- Ending Home Isolation: www.cdc.gov/coronavirus/2019-ncov/hcp/disposition-in-home-patients.html   [facility] -- Caring for Someone: www.cdc.gov/coronavirus/2019-ncov/if-you-are-sick/care-for-someone.html   [facility] -- Interim Guidance for Hospital Discharge to Home: www.[facility].mn./diseases/coronavirus/hcp/hospdischarge.pdf   [facility] clinical trials (COVID-19 research studies): clinicalaffairs.[facility]-clinical-trials    Below are the COVID-19 hotlines at the [place] Department of Health ([facility]). Interpreters are available.    For health questions: Call 100-944-6241 or 1-937.404.2425 (7 a.m. to 7 p.m.) For questions about schools and childcare: Call 199-711-5239 or 1-889.291.6699 (7 a.m. to 7 p.m.)    Diagnosis: Cough  Diagnosis ICD: R05

## 2020-10-11 DIAGNOSIS — Z20.822 ENCOUNTER FOR LABORATORY TESTING FOR COVID-19 VIRUS: Primary | ICD-10-CM

## 2020-10-11 PROCEDURE — U0003 INFECTIOUS AGENT DETECTION BY NUCLEIC ACID (DNA OR RNA); SEVERE ACUTE RESPIRATORY SYNDROME CORONAVIRUS 2 (SARS-COV-2) (CORONAVIRUS DISEASE [COVID-19]), AMPLIFIED PROBE TECHNIQUE, MAKING USE OF HIGH THROUGHPUT TECHNOLOGIES AS DESCRIBED BY CMS-2020-01-R: HCPCS | Performed by: FAMILY MEDICINE

## 2020-10-12 LAB
SARS-COV-2 RNA SPEC QL NAA+PROBE: NOT DETECTED
SPECIMEN SOURCE: NORMAL

## 2020-10-15 ENCOUNTER — ANCILLARY PROCEDURE (OUTPATIENT)
Dept: GENERAL RADIOLOGY | Facility: CLINIC | Age: 55
End: 2020-10-15
Attending: FAMILY MEDICINE
Payer: COMMERCIAL

## 2020-10-15 ENCOUNTER — OFFICE VISIT (OUTPATIENT)
Dept: FAMILY MEDICINE | Facility: CLINIC | Age: 55
End: 2020-10-15
Payer: COMMERCIAL

## 2020-10-15 VITALS
SYSTOLIC BLOOD PRESSURE: 124 MMHG | WEIGHT: 175 LBS | HEART RATE: 82 BPM | TEMPERATURE: 97.5 F | HEIGHT: 65 IN | BODY MASS INDEX: 29.16 KG/M2 | OXYGEN SATURATION: 98 % | DIASTOLIC BLOOD PRESSURE: 84 MMHG

## 2020-10-15 DIAGNOSIS — R09.89 CHEST CONGESTION: ICD-10-CM

## 2020-10-15 DIAGNOSIS — R09.89 CHEST CONGESTION: Primary | ICD-10-CM

## 2020-10-15 DIAGNOSIS — R53.83 FATIGUE, UNSPECIFIED TYPE: ICD-10-CM

## 2020-10-15 LAB
ALBUMIN SERPL-MCNC: 3.8 G/DL (ref 3.4–5)
ALP SERPL-CCNC: 52 U/L (ref 40–150)
ALT SERPL W P-5'-P-CCNC: 42 U/L (ref 0–50)
ANION GAP SERPL CALCULATED.3IONS-SCNC: 2 MMOL/L (ref 3–14)
AST SERPL W P-5'-P-CCNC: 21 U/L (ref 0–45)
BILIRUB SERPL-MCNC: 0.4 MG/DL (ref 0.2–1.3)
BUN SERPL-MCNC: 21 MG/DL (ref 7–30)
CALCIUM SERPL-MCNC: 9.2 MG/DL (ref 8.5–10.1)
CHLORIDE SERPL-SCNC: 107 MMOL/L (ref 94–109)
CO2 SERPL-SCNC: 30 MMOL/L (ref 20–32)
CREAT SERPL-MCNC: 0.79 MG/DL (ref 0.52–1.04)
ERYTHROCYTE [DISTWIDTH] IN BLOOD BY AUTOMATED COUNT: 12.1 % (ref 10–15)
GFR SERPL CREATININE-BSD FRML MDRD: 86 ML/MIN/{1.73_M2}
GLUCOSE SERPL-MCNC: 89 MG/DL (ref 70–99)
HCT VFR BLD AUTO: 40.2 % (ref 35–47)
HGB BLD-MCNC: 13.5 G/DL (ref 11.7–15.7)
MCH RBC QN AUTO: 31.6 PG (ref 26.5–33)
MCHC RBC AUTO-ENTMCNC: 33.6 G/DL (ref 31.5–36.5)
MCV RBC AUTO: 94 FL (ref 78–100)
PLATELET # BLD AUTO: 202 10E9/L (ref 150–450)
POTASSIUM SERPL-SCNC: 3.9 MMOL/L (ref 3.4–5.3)
PROT SERPL-MCNC: 7.5 G/DL (ref 6.8–8.8)
RBC # BLD AUTO: 4.27 10E12/L (ref 3.8–5.2)
SODIUM SERPL-SCNC: 139 MMOL/L (ref 133–144)
TSH SERPL DL<=0.005 MIU/L-ACNC: 2.09 MU/L (ref 0.4–4)
WBC # BLD AUTO: 4.7 10E9/L (ref 4–11)

## 2020-10-15 PROCEDURE — 36415 COLL VENOUS BLD VENIPUNCTURE: CPT | Performed by: FAMILY MEDICINE

## 2020-10-15 PROCEDURE — 99214 OFFICE O/P EST MOD 30 MIN: CPT | Performed by: FAMILY MEDICINE

## 2020-10-15 PROCEDURE — 80053 COMPREHEN METABOLIC PANEL: CPT | Performed by: FAMILY MEDICINE

## 2020-10-15 PROCEDURE — 84443 ASSAY THYROID STIM HORMONE: CPT | Performed by: FAMILY MEDICINE

## 2020-10-15 PROCEDURE — 85027 COMPLETE CBC AUTOMATED: CPT | Performed by: FAMILY MEDICINE

## 2020-10-15 PROCEDURE — 71046 X-RAY EXAM CHEST 2 VIEWS: CPT | Performed by: RADIOLOGY

## 2020-10-15 PROCEDURE — 93000 ELECTROCARDIOGRAM COMPLETE: CPT | Performed by: FAMILY MEDICINE

## 2020-10-15 ASSESSMENT — MIFFLIN-ST. JEOR: SCORE: 1391.73

## 2020-10-15 NOTE — PROGRESS NOTES
"Subjective     Susu Lagos is a 55 year old female who presents to clinic today for the following health issues:    HPI         Acute Illness  Acute illness concerns: cold symptoms  Onset/Duration: x Thursday am  Symptoms:  Fever: no  Chills/Sweats: no  Headache (location?): YES  Sinus Pressure: no  Conjunctivitis:  no  Ear Pain: YES: left - off and on  Rhinorrhea: YES  Congestion: YES  Sore Throat: No  Cough: YES-non-productive, productive of clear sputum  Wheeze: no  Decreased Appetite: no  Nausea: YES  Vomiting: no  Diarrhea: YES - due to radiation treatments  Dysuria/Freq.: no  Dysuria or Hematuria: no  Fatigue/Achiness: YES - fatigue  Sick/Strep Exposure: no  Therapies tried and outcome: None      Review of Systems   Constitutional, HEENT, cardiovascular, pulmonary, gi and gu systems are negative, except as otherwise noted.      Objective    /84 (BP Location: Right arm, Patient Position: Sitting, Cuff Size: Adult Large)   Pulse 82   Temp 97.5  F (36.4  C) (Tympanic)   Ht 1.638 m (5' 4.5\")   Wt 79.4 kg (175 lb)   LMP 01/29/2016   SpO2 98%   Breastfeeding No   BMI 29.57 kg/m    Body mass index is 29.57 kg/m .  Physical Exam   GENERAL: alert, no distress and over weight  EYES: Eyes grossly normal to inspection, PERRL and conjunctivae and sclerae normal  HENT: normal cephalic/atraumatic, ear canals and TM's normal, rhinorrhea clear, oropharynx clear and oral mucous membranes moist  NECK: no adenopathy, no asymmetry, masses, or scars and thyroid normal to palpation  RESP: lungs clear to auscultation - no rales, rhonchi or wheezes  CV: regular rate and rhythm, normal S1 S2, no S3 or S4, no murmur, click or rub, no peripheral edema and peripheral pulses strong  ABDOMEN: soft, nontender, no hepatosplenomegaly, no masses and bowel sounds normal  MS: no gross musculoskeletal defects noted, no edema  NEURO: Normal strength and tone, mentation intact and speech normal    Results for orders placed or " performed in visit on 10/15/20   XR Chest 2 Views     Status: None    Narrative    CHEST TWO VIEWS  10/15/2020 9:44 AM     HISTORY: 55-year-old woman with chest congestion.       Impression    IMPRESSION: Since May 18, 2020, heart size remains normal. No pleural  effusion, pneumothorax, or abnormal area of consolidation.    ALISON JAMES MD   Results for orders placed or performed in visit on 10/15/20   CBC with platelets     Status: None   Result Value Ref Range    WBC 4.7 4.0 - 11.0 10e9/L    RBC Count 4.27 3.8 - 5.2 10e12/L    Hemoglobin 13.5 11.7 - 15.7 g/dL    Hematocrit 40.2 35.0 - 47.0 %    MCV 94 78 - 100 fl    MCH 31.6 26.5 - 33.0 pg    MCHC 33.6 31.5 - 36.5 g/dL    RDW 12.1 10.0 - 15.0 %    Platelet Count 202 150 - 450 10e9/L         Assessment & Plan     Chest congestion  --55-year-old female presents with rhinorrhea, chest congestion, headache, fatigue, symptoms ongoing for about 1 week, COVID-19 test was -4 days ago.  History of anal cancer, status post chemo/radiation.  Differentials discussed in detail including viral, bacterial infection versus seasonal allergies.  Patient deferred COVID-19 test.  Recommended to continue well hydration, warm fluids, over-the-counter antitussive and home quarantine.  Return criteria discussed in detail.  All questions answered.  - XR Chest 2 Views; Future    Fatigue, unspecified type  --Differentials are broad including infectious, metabolic etiology.  CBC, chest x-ray came back negative and EKG showed normal sinus rhythm.  CMP and TSH ordered for further evaluation.  Suggested to continue well hydration, balanced diet.  - Comprehensive metabolic panel (BMP + Alb, Alk Phos, ALT, AST, Total. Bili, TP)  - TSH with free T4 reflex  - EKG 12-lead complete w/read - Clinics          Deuce Barriga MD  [facility]

## 2020-10-15 NOTE — LETTER
October 19, 2020      Susu Lagos  [address]  [facility] [place] [address]        Dear ,    The results of your recent lab tests were within normal limits. Enclosed is a copy of these results.  If you have any further questions or problems, please contact our office.   CBC with platelets   Result Value Ref Range    WBC 4.7 4.0 - 11.0 10e9/L    RBC Count 4.27 3.8 - 5.2 10e12/L    Hemoglobin 13.5 11.7 - 15.7 g/dL    Hematocrit 40.2 35.0 - 47.0 %    MCV 94 78 - 100 fl    MCH 31.6 26.5 - 33.0 pg    MCHC 33.6 31.5 - 36.5 g/dL    RDW 12.1 10.0 - 15.0 %    Platelet Count 202 150 - 450 10e9/L   Comprehensive metabolic panel (BMP + Alb, Alk Phos, ALT, AST, Total. Bili, TP)    Sodium 139 133 - 144 mmol/L    Potassium 3.9 3.4 - 5.3 mmol/L    Chloride 107 94 - 109 mmol/L    Carbon Dioxide 30 20 - 32 mmol/L    Anion Gap 2 (L) 3 - 14 mmol/L    Glucose 89 70 - 99 mg/dL    Urea Nitrogen 21 7 - 30 mg/dL    Creatinine 0.79 0.52 - 1.04 mg/dL    GFR Estimate 86 >60 mL/min/[1.73_m2]    Calcium 9.2 8.5 - 10.1 mg/dL    Bilirubin Total 0.4 0.2 - 1.3 mg/dL    Albumin 3.8 3.4 - 5.0 g/dL    Protein Total 7.5 6.8 - 8.8 g/dL    Alkaline Phosphatase 52 40 - 150 U/L    ALT 42 0 - 50 U/L    AST 21 0 - 45 U/L   TSH with free T4 reflex    TSH 2.09 0.40 - 4.00 mU/L   Sincerely,   Deuce Barriga MD

## 2020-11-03 ENCOUNTER — TRANSFERRED RECORDS (OUTPATIENT)
Dept: HEALTH INFORMATION MANAGEMENT | Facility: CLINIC | Age: 55
End: 2020-11-03

## 2020-11-07 ENCOUNTER — HEALTH MAINTENANCE LETTER (OUTPATIENT)
Age: 55
End: 2020-11-07

## 2020-12-07 NOTE — PROGRESS NOTES
"Colon and Rectal Surgery Clinic Note      RE: Susu Lagos  : 1965  MALGORZATA: 2020    Susu is a 55 year old female who presents today for follow up of anal cancer.       HPI:  Original staging:  Colonoscopy 3/3/2020 by Dr. Sesar Dos Santos for hematochezia with \"Palpable rectal mass found on digital rectal exam\".    Pathology: Squamous cell carcinoma, focally keratinizing, cannot rule out invasion     She saw Dr. Real who recommended staging imaging and possible chemoradiation    PET 3/19/20:  1. Focal area of abnormal FDG avidity involving the level of the anus  with max SUV 12.6. This corresponds to the patient's recent biopsy  squamous cell carcinoma of the anus.  2. No other areas of abnormal FDG avidity. Specifically no definitive  evidence for regional or nonregional FDG avid lymphadenopathy.  3. Diffuse fatty infiltration of the liver. Low-attenuation 0.7 cm  lesion involving the anterior aspect of the medial segment left  hepatic lobe without evidence for focal abnormal FDG avidity in this  area. This likely represents a benign low-attenuation lesion such as a  cyst in the background of diffuse fatty infiltration.  4. No evidence for abnormal FDG avidity involving the neck, chest or  musculoskeletal structures.    MR Pelvis 3/19/20:  FINDINGS: Approximately 2.7 cm above the anal verge, there is an area  of abnormal enhancement in the anterior rectum that measures  approximately 1.2 x 0.9 cm (series 10, image 33). No involvement of  the sphincters is demonstrated. No definite extension beyond the  muscularis propria. No invasion of the vagina. No surrounding  suspicious lymph nodes.                                                                     IMPRESSION: Small area of abnormal enhancement measures up to 1.2 cm  in the mid anus. No evidence of spread beyond the anus or into  regional lymph nodes.    She saw  Gynecology with negative Pap smear    She completed chemoradiation 20-20 " "with a total of 5040 cGy and 5-Follow-up and mitomycin    I saw her on 7/21/20 with no evidence of recurrence    PET 7/28/20:  1. Persistent hypermetabolic uptake in the anus, slightly decreased  since the previous PET/CT on 3/19/2020 but believed to represent  residual disease.   2. No evidence of metastatic disease.   3. Diffuse FDG uptake in the stomach, which can be seen with  gastritis.    MR Pelvis 8/6/20:  IMPRESSION: Posttreatment changes of the anus without clear evidence  for local recurrence or pelvic metastatic disease.    Normal EGD 9/3/20     Physical examination:  Examination was chaperoned by Abdirahman Smith MD.   Groin examination shows no evidence of inguinal adenopathy.  Perianal examination shows modest skin tags but no other lesions.  Digital rectal examination with no palpable lesions (lesion was in the anterior position previously).   Anoscopy without any visible lesions.    Vitals: BP (!) 130/90 (BP Location: Left arm, Patient Position: Sitting, Cuff Size: Adult Regular)   Pulse 96   Temp 97.8  F (36.6  C) (Oral)   Ht 5' 4.5\"   Wt 187 lb 11.2 oz   LMP 01/29/2016   SpO2 96%   BMI 31.72 kg/m    BMI= Body mass index is 31.72 kg/m .      Laboratory data:    Recent Labs   Lab Test 10/15/20  0941   WBC 4.7   HGB 13.5      CR 0.79   ALBUMIN 3.8   BILITOTAL 0.4   ALKPHOS 52   ALT 42   AST 21       Assessment/plan:  54-year-old woman otherwise healthy woman with a diagnosis of squamous cell carcinoma of the anal canal status post chemoradiation completed in May.  No evidence of lesion on exam or anoscopy today. We will see her back in 4 months for recheck. Patient's questions were answered to her stated satisfaction and she is in agreement with this plan.    Thank you very much,  Abdirahman Smith MD  CRS fellow      For details of past medical history, surgical history, family history, medications, allergies, and review of systems, please see details below.    Medical history:  Past " Medical History:   Diagnosis Date     Menorrhagia with irregular cycle 2/28/2016     Ovarian cyst 6/4/2015    Right, 6 cm dermoid on MRI-recommend removal.  7/29/2015 Surgery for right salpingoophorectomy.       Perimenopause 6/4/2015     Rectal cancer (H)     had radiation and chemo 2020       Surgical history:  Past Surgical History:   Procedure Laterality Date     CHOLECYSTECTOMY  2013    [facility]     COLONOSCOPY N/A 11/13/2018    Procedure: colonoscopy;  Surgeon: Sesar Dos Santos MD;  Location: [facility]     COLONOSCOPY N/A 3/3/2020    Procedure: COLONOSCOPY, WITH POLYPECTOMY AND BIOPSY;  Surgeon: Sesar Dos Santos MD;  Location: [facility]     DILATION AND CURETTAGE, OPERATIVE HYSTEROSCOPY, COMBINED N/A 2/29/2016    Procedure: COMBINED DILATION AND CURETTAGE, OPERATIVE HYSTEROSCOPY;  Surgeon: Panda Knight MD;  Location: [facility]     ESOPHAGOSCOPY, GASTROSCOPY, DUODENOSCOPY (EGD), COMBINED N/A 9/3/2020    Procedure: ESOPHAGOGASTRODUODENOSCOPY (EGD);  Surgeon: Karlos Burnett MD;  Location: [facility]     GYN SURGERY  1995    tubes tied     LAPAROSCOPY DIAGNOSTIC (GYN) Right 7/29/2015    Procedure: LAPAROSCOPY DIAGNOSTIC (GYN);  Surgeon: Lian Betancur DO;  Location: [facility]       Family history:  Family History   Problem Relation Age of Onset     Hypertension Father      Myocardial Infarction Maternal Grandfather      Melanoma No family hx of        Medications:  Current Outpatient Medications   Medication Sig Dispense Refill     estradiol (ESTRACE) 0.1 MG/GM vaginal cream        imiquimod (ALDARA) 5 % external cream Apply once daily at bedtime until warts completely disappear up to a maximum of 12 weeks 12 packet 3     Magnesium Oxide 250 MG TABS Take 2 tablets by mouth 2 times daily       Multiple Vitamin (MULTI-VITAMINS PO)        Omega-3 Fatty Acids (OMEGA 3 PO)        mineral oil-hydrophilic petrolatum (AQUAPHOR) external ointment Apply topically as needed for other (bottom soreness)       omeprazole (PRILOSEC)  "40 MG DR capsule Take 1 capsule (40 mg) by mouth daily (Patient not taking: Reported on 12/8/2020) 30 capsule 0     silver sulfADIAZINE (SILVADENE) 1 % external cream Apply topically 2 times daily (Patient not taking: Reported on 12/8/2020) 85 g 3       Allergies:  The patientis allergic to rabies vaccine; shellfish-derived products; and sulfa drugs.    Social history:  Social History     Tobacco Use     Smoking status: Never Smoker     Smokeless tobacco: Never Used   Substance Use Topics     Alcohol use: Yes     Alcohol/week: 0.0 standard drinks     Comment: 1-2 per month      Marital status: .    Review of Systems:  Nursing Notes:   Conchita Chavez CMA  12/8/2020  3:25 PM  Signed  Chief Complaint   Patient presents with     RECHECK     4 month follow up for anal cancer.       Vitals:    12/08/20 1505   BP: (!) 130/90   BP Location: Left arm   Patient Position: Sitting   Cuff Size: Adult Regular   Pulse: 96   Temp: 97.8  F (36.6  C)   TempSrc: Oral   SpO2: 96%   Weight: 187 lb 11.2 oz   Height: 5' 4.5\"       Body mass index is 31.72 kg/m .      Conchita Ojeda CMA           I saw and examined the patient, led the discussion and edited the resident note.  I agree with the assessment and plan as outlined.  The patient is doing well and has no evidence of tumor persistence or recurrence on today's examination.  Routine follow-up protocol discussed.  We will continue to see her every 4 months for 2 years then every 6 months for 2 years and then annually for 2 to 3 years.  She will continue her medical oncology follow-up with Dr. Daniel.    I answered all of Susu's questions to her stated satisfaction.  She expressed her understanding and agreement.    Total time 15 minutes.  Greater than half time was spent counseling.      Stefano Banuelos MD   Professor and Chief  Division of Colon and Rectal Surgery  [facility]      Referring Provider:  Referred Self, " MD  No address on file     Primary Care Provider:  Lakesha Aguirre    This note was created using speech recognition software and may contain unintended word substitutions.

## 2020-12-08 ENCOUNTER — OFFICE VISIT (OUTPATIENT)
Dept: SURGERY | Facility: CLINIC | Age: 55
End: 2020-12-08
Payer: COMMERCIAL

## 2020-12-08 VITALS
TEMPERATURE: 97.8 F | OXYGEN SATURATION: 96 % | HEART RATE: 96 BPM | SYSTOLIC BLOOD PRESSURE: 130 MMHG | BODY MASS INDEX: 31.27 KG/M2 | HEIGHT: 65 IN | WEIGHT: 187.7 LBS | DIASTOLIC BLOOD PRESSURE: 90 MMHG

## 2020-12-08 DIAGNOSIS — C21.1 MALIGNANT NEOPLASM OF ANAL CANAL (H): Primary | ICD-10-CM

## 2020-12-08 PROCEDURE — 99202 OFFICE O/P NEW SF 15 MIN: CPT | Performed by: COLON & RECTAL SURGERY

## 2020-12-08 RX ORDER — ESTRADIOL 0.1 MG/G
CREAM VAGINAL
COMMUNITY
Start: 2020-11-03

## 2020-12-08 ASSESSMENT — PAIN SCALES - GENERAL: PAINLEVEL: NO PAIN (0)

## 2020-12-08 ASSESSMENT — MIFFLIN-ST. JEOR: SCORE: 1449.34

## 2020-12-08 NOTE — NURSING NOTE
"Chief Complaint   Patient presents with     RECHECK     4 month follow up for anal cancer.       Vitals:    12/08/20 1505   BP: (!) 130/90   BP Location: Left arm   Patient Position: Sitting   Cuff Size: Adult Regular   Pulse: 96   Temp: 97.8  F (36.6  C)   TempSrc: Oral   SpO2: 96%   Weight: 187 lb 11.2 oz   Height: 5' 4.5\"       Body mass index is 31.72 kg/m .      Conchita Ojeda CMA    "

## 2020-12-08 NOTE — LETTER
"2020       RE: Susu Lagos  [address]  [facility] [address]     Dear Colleague,    Thank you for referring your patient, Susu Lagos, to the [facility] COLON AND RECTAL SURGERY [facility]IN[facility] [place] [facility]. Please see a copy of my visit note below.    Colon and Rectal Surgery Clinic Note    RE: Susu Lagos  : 1965  MALGORZATA: 2020    Susu is a 55 year old female who presents today for follow up of anal cancer.       HPI:  Original staging:  Colonoscopy 3/3/2020 by Dr. Sesar Dos Santos for hematochezia with \"Palpable rectal mass found on digital rectal exam\".    Pathology: Squamous cell carcinoma, focally keratinizing, cannot rule out invasion     She saw Dr. Real who recommended staging imaging and possible chemoradiation    PET 3/19/20:  1. Focal area of abnormal FDG avidity involving the level of the anus  with max SUV 12.6. This corresponds to the patient's recent biopsy  squamous cell carcinoma of the anus.  2. No other areas of abnormal FDG avidity. Specifically no definitive  evidence for regional or nonregional FDG avid lymphadenopathy.  3. Diffuse fatty infiltration of the liver. Low-attenuation 0.7 cm  lesion involving the anterior aspect of the medial segment left  hepatic lobe without evidence for focal abnormal FDG avidity in this  area. This likely represents a benign low-attenuation lesion such as a  cyst in the background of diffuse fatty infiltration.  4. No evidence for abnormal FDG avidity involving the neck, chest or  musculoskeletal structures.    MR Pelvis 3/19/20:  FINDINGS: Approximately 2.7 cm above the anal verge, there is an area  of abnormal enhancement in the anterior rectum that measures  approximately 1.2 x 0.9 cm (series 10, image 33). No involvement of  the sphincters is demonstrated. No definite extension beyond the  muscularis propria. No invasion of the vagina. No surrounding  suspicious lymph " "nodes.                                                                     IMPRESSION: Small area of abnormal enhancement measures up to 1.2 cm  in the mid anus. No evidence of spread beyond the anus or into  regional lymph nodes.    She saw  Gynecology with negative Pap smear    She completed chemoradiation 4/9/20-5/18/20 with a total of 5040 cGy and 5-Follow-up and mitomycin    I saw her on 7/21/20 with no evidence of recurrence    PET 7/28/20:  1. Persistent hypermetabolic uptake in the anus, slightly decreased  since the previous PET/CT on 3/19/2020 but believed to represent  residual disease.   2. No evidence of metastatic disease.   3. Diffuse FDG uptake in the stomach, which can be seen with  gastritis.    MR Pelvis 8/6/20:  IMPRESSION: Posttreatment changes of the anus without clear evidence  for local recurrence or pelvic metastatic disease.    Normal EGD 9/3/20     Physical examination:  Examination was chaperoned by Abdirahman Smith MD.   Groin examination shows no evidence of inguinal adenopathy.  Perianal examination shows modest skin tags but no other lesions.  Digital rectal examination with no palpable lesions (lesion was in the anterior position previously).   Anoscopy without any visible lesions.    Vitals: BP (!) 130/90 (BP Location: Left arm, Patient Position: Sitting, Cuff Size: Adult Regular)   Pulse 96   Temp 97.8  F (36.6  C) (Oral)   Ht 5' 4.5\"   Wt 187 lb 11.2 oz   LMP 01/29/2016   SpO2 96%   BMI 31.72 kg/m    BMI= Body mass index is 31.72 kg/m .      Laboratory data:    Recent Labs   Lab Test 10/15/20  0941   WBC 4.7   HGB 13.5      CR 0.79   ALBUMIN 3.8   BILITOTAL 0.4   ALKPHOS 52   ALT 42   AST 21       Assessment/plan:  54-year-old woman otherwise healthy woman with a diagnosis of squamous cell carcinoma of the anal canal status post chemoradiation completed in May.  No evidence of lesion on exam or anoscopy today. We will see her back in 4 months for recheck. Patient's " questions were answered to her stated satisfaction and she is in agreement with this plan.    Thank you very much,  Abdirahman Smith MD  CRS fellow      For details of past medical history, surgical history, family history, medications, allergies, and review of systems, please see details below.    Medical history:  Past Medical History:   Diagnosis Date     Menorrhagia with irregular cycle 2/28/2016     Ovarian cyst 6/4/2015    Right, 6 cm dermoid on MRI-recommend removal.  7/29/2015 Surgery for right salpingoophorectomy.       Perimenopause 6/4/2015     Rectal cancer (H)     had radiation and chemo 2020       Surgical history:  Past Surgical History:   Procedure Laterality Date     CHOLECYSTECTOMY  2013    LSC     COLONOSCOPY N/A 11/13/2018    Procedure: colonoscopy;  Surgeon: Sesar Dos Santos MD;  Location: [facility]     COLONOSCOPY N/A 3/3/2020    Procedure: COLONOSCOPY, WITH POLYPECTOMY AND BIOPSY;  Surgeon: Sesar Dos Santos MD;  Location: [facility]     DILATION AND CURETTAGE, OPERATIVE HYSTEROSCOPY, COMBINED N/A 2/29/2016    Procedure: COMBINED DILATION AND CURETTAGE, OPERATIVE HYSTEROSCOPY;  Surgeon: Panda Knight MD;  Location: [facility]     ESOPHAGOSCOPY, GASTROSCOPY, DUODENOSCOPY (EGD), COMBINED N/A 9/3/2020    Procedure: ESOPHAGOGASTRODUODENOSCOPY (EGD);  Surgeon: Karlos Burnett MD;  Location: [facility]     GYN SURGERY  1995    tubes tied     LAPAROSCOPY DIAGNOSTIC (GYN) Right 7/29/2015    Procedure: LAPAROSCOPY DIAGNOSTIC (GYN);  Surgeon: Lian Betancur DO;  Location: [facility]       Family history:  Family History   Problem Relation Age of Onset     Hypertension Father      Myocardial Infarction Maternal Grandfather      Melanoma No family hx of        Medications:  Current Outpatient Medications   Medication Sig Dispense Refill     estradiol (ESTRACE) 0.1 MG/GM vaginal cream        imiquimod (ALDARA) 5 % external cream Apply once daily at bedtime until warts completely disappear up to a maximum of 12  "weeks 12 packet 3     Magnesium Oxide 250 MG TABS Take 2 tablets by mouth 2 times daily       Multiple Vitamin (MULTI-VITAMINS PO)        Omega-3 Fatty Acids (OMEGA 3 PO)        mineral oil-hydrophilic petrolatum (AQUAPHOR) external ointment Apply topically as needed for other (bottom soreness)       omeprazole (PRILOSEC) 40 MG DR capsule Take 1 capsule (40 mg) by mouth daily (Patient not taking: Reported on 12/8/2020) 30 capsule 0     silver sulfADIAZINE (SILVADENE) 1 % external cream Apply topically 2 times daily (Patient not taking: Reported on 12/8/2020) 85 g 3       Allergies:  The patientis allergic to rabies vaccine; shellfish-derived products; and sulfa drugs.    Social history:  Social History     Tobacco Use     Smoking status: Never Smoker     Smokeless tobacco: Never Used   Substance Use Topics     Alcohol use: Yes     Alcohol/week: 0.0 standard drinks     Comment: 1-2 per month      Marital status: .    Review of Systems:  Nursing Notes:   Conchita Chavez CMA  12/8/2020  3:25 PM  Signed  Chief Complaint   Patient presents with     RECHECK     4 month follow up for anal cancer.       Vitals:    12/08/20 1505   BP: (!) 130/90   BP Location: Left arm   Patient Position: Sitting   Cuff Size: Adult Regular   Pulse: 96   Temp: 97.8  F (36.6  C)   TempSrc: Oral   SpO2: 96%   Weight: 187 lb 11.2 oz   Height: 5' 4.5\"       Body mass index is 31.72 kg/m .      Conchita Ojeda CMA           I saw and examined the patient, led the discussion and edited the resident note.  I agree with the assessment and plan as outlined.  The patient is doing well and has no evidence of tumor persistence or recurrence on today's examination.  Routine follow-up protocol discussed.  We will continue to see her every 4 months for 2 years then every 6 months for 2 years and then annually for 2 to 3 years.  She will continue her medical oncology follow-up with Dr. Daniel.    I answered all of Susu's " questions to her stated satisfaction.  She expressed her understanding and agreement.    Total time 15 minutes.  Greater than half time was spent counseling.    Stefano Banuelos MD   Professor and Chief  Division of Colon and Rectal Surgery  [facility]    Referring Provider:  Referred Self, MD  No address on file     Primary Care Provider:  Lakesha Aguirre    This note was created using speech recognition software and may contain unintended word substitutions.

## 2020-12-08 NOTE — PATIENT INSTRUCTIONS
Follow up:    Please call with any questions or concerns regarding your clinic visit today.    It is a pleasure being involved in your health care.    Contacts post-consultation depending on your need:    Radiology Appointments [phone]    Schedule Clinic Appointments [phone] # 1   M-F 7:30 - 5 pm    KELVIN Velazquez [phone]    Clinic Fax Number [phone]    Surgery Scheduling [phone]    My Chart is available 24 hours a day and is a secure way to access your records and communicate with your care team.  I strongly recommend signing up if you haven't already done so, if you are comfortable with computers.  If you would like to inquire about this or are having problems with My Chart access, you may call [phone] or go online at [email or web address].  Please allow at least 24 hours for a response and extra time on weekends and Holidays.

## 2020-12-14 ENCOUNTER — OFFICE VISIT (OUTPATIENT)
Dept: RADIATION THERAPY | Facility: OUTPATIENT CENTER | Age: 55
End: 2020-12-14
Payer: COMMERCIAL

## 2020-12-14 VITALS
DIASTOLIC BLOOD PRESSURE: 80 MMHG | SYSTOLIC BLOOD PRESSURE: 128 MMHG | BODY MASS INDEX: 32.14 KG/M2 | HEART RATE: 87 BPM | RESPIRATION RATE: 18 BRPM | WEIGHT: 190.2 LBS | OXYGEN SATURATION: 98 %

## 2020-12-14 DIAGNOSIS — C21.0 ANAL SQUAMOUS CELL CARCINOMA (H): Primary | ICD-10-CM

## 2020-12-14 ASSESSMENT — PAIN SCALES - GENERAL: PAINLEVEL: NO PAIN (0)

## 2020-12-14 NOTE — PROGRESS NOTES
Department of Radiation Oncology  Radiation Therapy Center  [facility] [place] [address]  [phone]       Radiation Oncology Follow-up Visit  2020    Susu Lagos  MRN: [id number]   : 1965     DIAGNOSIS: squamous cell carcinoma of the anal canal  PATHOLOGY:  squamous cell carcinoma                              STAGE: clinical T1N0  INTENT OF RADIOTHERAPY: definitve CRT  CONCURRENT SYSTEMIC THERAPY: Yes  Oncologist: Dr. Real  Drug Name/Frequency 1: 5FU  Drug Name/Frequency 1: Mitomycin     ONCOLOGIC HISTORY:   Ms. 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       The patient initially presented with symptoms of hematochezia for several months eventually prompting further evaluation.     On 3/3/2020 the patient underwent colonoscopy which demonstrated a mass located at the dentate line in the left anterior position.  The mass was less than one third circumferentially involved.  Biopsy of the mass obtained demonstrated squamous cell carcinoma.  The patient was evaluated by gynecology on 3/13/2020, Pap smear was negative.  On 3/19/2020 the patient underwent a PET scan.  Imaging demonstrated a focal hypermetabolic activity in the anal canal, max SUV 12.6.  No regional or distant disease was noted.  On 3/19/2020 the patient underwent MRI which demonstrated a 1.2 x 0.9 cm mass located 2.7 cm above the anal verge.  No enlarged regional nodes were noted.  The patient was seen by Dr. Real of medical oncology who discussed treatment with chemoradiation therapy.  Patient subsequently presented to radiation oncology clinic to discuss potential role of radiation therapy as a part of treatment strategy.            SITE OF TREATMENT: pelvis     DATES  OF TREATMENT: 20-20     TOTAL DOSE OF TREATMENT: 5040 cGy     DOSE PER FRACTION OF TREATMENT: 180 cGy x 28 fractions    INTERVAL SINCE COMPLETION OF  RADIATION THERAPY:   7 months    SUBJECTIVE:   The patient presents for follow up.     Post treatment MRI (20) was JOSE LUIS. PET on 20 demonstrated excellent response with indeterminate but mild activity in anal canal region without clear evidence of disease. Scope by Dr. Banuelos on 20 was JOSE LUIS.    The patient is recovering appropriately. Pain essentially resolved. No longer needing pain medication.     Intermittent bouts of bowel urgency at times. Stool otherwise well formed, soft. No  trouble. No lower extremity lymphedema.     PHYSICAL EXAM:  /80   Pulse 87   Resp 18   Wt 86.3 kg (190 lb 3.2 oz)   LMP 2016   SpO2 98%   BMI 32.14 kg/m    Gen: Alert, in NAD  Eyes: PERRL, EOMI, sclera anicteric  Neck: Supple, full ROM, no LAD  Pulm: No wheezing, stridor or respiratory distress  CV: Well-perfused, no cyanosis, no pedal edema  Abdominal: Soft, nontender, nondistended, no hepatomegaly  Back: No step-offs or pain to palpation along the thoracolumbar spine, no CVA tenderness  Musculoskeletal: Normal bulk and tone   / Skin: Deferred  Neurologic: A/Ox3, CN II-XII intact  Psychiatric: Appropriate mood and affect    LABS AND IMAGIN20  PET    FINDINGS:      HEAD/NECK:  There is no  suspicious FDG uptake in the neck.      The paranasal sinuses are clear. The mastoid air cells are clear.      The mucosal pharyngeal space, the , prevertebral and carotid  spaces are within normal limits.      No masses, mass effect or pathologically enlarged lymph nodes are  evident. The thyroid gland is unremarkable.     CHEST:  There is no suspicious FDG uptake in the chest.      The heart is normal in size. No pericardial effusion. The ascending  aorta and the main pulmonary artery are normal in caliber. There are  no no pathologically enlarged or hypermetabolic mediastinal, hilar or  axillary lymph nodes.      There are no suspicious lung nodules or evidence for infection.        There is no  significant pericardial or plural effusions.     ABDOMEN AND PELVIS:  Persistent focus of hypermetabolism in the rectum with a max SUV of  5.6, previous max SUV of 12.6 on 3/19/2020. No enlarged or  hypermetabolic inguinal, pelvic or intra-abdominal lymph nodes. No  other suspicious foci of FDG uptake in the abdomen or pelvis.     There are no suspicious hepatic lesions. Unchanged subcentimeter  hypodense subcapsular lesion in hepatic segment 4A, too small to  characterize. The pancreas, spleen and adrenal glands are  unremarkable. Both kidneys are unremarkable. No hydronephrosis or  hydroureter. No renal or ureteral stones. The urinary bladder is  unremarkable. No pelvic masses. No abnormally dilated loops of small  or large bowel. Sigmoid colonic diverticulosis, without evidence of  acute diverticulitis. Unremarkable appendix. No free fluid or free  intraperitoneal air. Abdominal aorta is normal in caliber. Diffuse FDG  uptake throughout the stomach.     LOWER EXTREMITIES:   No abnormal masses or hypermetabolic lesions.     BONES:   There are no suspicious lytic or blastic osseous lesions.  There is no  abnormal FDG uptake in the skeleton.                                                                         IMPRESSION: In this patient with a history of squamous cell carcinoma  of the anus, status post chemotherapy and radiation:  1. Persistent hypermetabolic uptake in the anus, slightly decreased  since the previous PET/CT on 3/19/2020 but believed to represent  residual disease.   2. No evidence of metastatic disease.   3. Diffuse FDG uptake in the stomach, which can be seen with  Gastritis.      8/6/20  MRI pelvis  FINDINGS: There is mild thickening of the distal rectum and anus, with  some T2 dark signal along the anterior portion of the anus at the site  of a previously diagnosed cancer, likely representing fibrosis and  radiation related changes. There is no evidence for recurrent mass. No  pelvic  lymphadenopathy.     Bone marrow and soft tissues. The bone marrow and soft tissues are  within normal limits. The urinary bladder is normal in appearance.  Trace pelvic free fluid.                                                                      IMPRESSION: Posttreatment changes of the anus without clear evidence  for local recurrence or pelvic metastatic disease.       IMPRESSION:   Ms. 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  She completed definitive CRT to a total dose of 50.4 Gy in 28 fractions with concurrent 5FU-[facility]  (completed on 5/18/20).    PLAN:   1. Post treatment MRI (8/6/20) was JOSE LUIS. PET on 7/28/20 demonstrated excellent response with indeterminate but mild activity in anal canal region without clear evidence of disease. Scope by Dr. Banuelos on 12/8/20 was JOSE LUIS.    2. Acute toxicities appropriately improving.     3. Mild GI bother. Discussed imodium as needed and probiotic consideration.     4.Cancer related pain. No longer needing oral narcotic.     5. Continue follow up with medical oncology. Will see Dr. Real on 2/12/21.    6. The patient will benefit from continued oncologic surveillance from our colo-rectal colleagues (Dr. Banuelos). The patient will see Dr. Banuelos in about 4 months time.    7. RTC in 6 months.    Tyree Remy M.D.  Department of Radiation Oncology  [facility]

## 2020-12-14 NOTE — NURSING NOTE
FOLLOW-UP VISIT    Patient Name: Susu Lagos      : 1965     Age: 55 year old        ______________________________________________________________________________     Chief Complaint   Patient presents with     Radiation Therapy     follow up     /80   Pulse 87   Resp 18   Wt 86.3 kg (190 lb 3.2 oz)   LMP 2016   SpO2 98%   BMI 32.14 kg/m       Date Radiation Completed: 20    Pain  Denies    Meds  Current Med List Reviewed: Yes  Medication Note: started on topical estradiol per referral to Dr. Srivastava.     Imaging  MRI: 20    On Chemo?: No  Nausea:none  Bowel: Soft and am urgency the most noticeable. daytime urgency. denies leakage. stools soft. pt wears depends as she does not want to have an accident.  Bladder: am uses water bottle with urination - some skin irritation if urine is concentrated  Skin: Warm  Dry  Intact  pt denies need to use aquphar, silvadene, or topical morphine. occasioannly she will use a rash cream due to needing to wear depends which holds in moisture. overall skin is improved .  Energy Level: normal  Dilator use: pt met with gyn onc - started on estradiol. Reviewed education for dilator use. Pt reports she is able to have sexual intercourse now - seeing improvement with the above interventions    Other Appointments:     Date  Oncologist: Dr. Real  20   Surgeon: Dr. Hood  20 - again in 4 months     Other Notes:

## 2020-12-14 NOTE — LETTER
2020         RE: Susu Lagos  [address]  [facility] [address]        Dear Colleague,    Thank you for referring your patient, Susu Lagos, to the [facility]. Please see a copy of my visit note below.       Department of Radiation Oncology  Rad[facility] Therapy Center  [facility]  [place] [address]  [phone]       Radiation Oncology Follow-up Visit  2020    Susu Lagos  MRN: [id number]   : 1965     DIAGNOSIS: squamous cell carcinoma of the anal canal  PATHOLOGY:  squamous cell carcinoma                              STAGE: clinical T1N0  INTENT OF RADIOTHERAPY: definitve CRT  CONCURRENT SYSTEMIC THERAPY: Yes  Oncologist: Dr. Real  Drug Name/Frequency 1: 5FU  Drug Name/Frequency 1: Mitomycin     ONCOLOGIC HISTORY:   Ms. 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       The patient initially presented with symptoms of hematochezia for several months eventually prompting further evaluation.     On 3/3/2020 the patient underwent colonoscopy which demonstrated a mass located at the dentate line in the left anterior position.  The mass was less than one third circumferentially involved.  Biopsy of the mass obtained demonstrated squamous cell carcinoma.  The patient was evaluated by gynecology on 3/13/2020, Pap smear was negative.  On 3/19/2020 the patient underwent a PET scan.  Imaging demonstrated a focal hypermetabolic activity in the anal canal, max SUV 12.6.  No regional or distant disease was noted.  On 3/19/2020 the patient underwent MRI which demonstrated a 1.2 x 0.9 cm mass located 2.7 cm above the anal verge.  No enlarged regional nodes were noted.  The patient was seen by Dr. Real of medical oncology who discussed treatment with chemoradiation therapy.  Patient subsequently presented to radiation oncology clinic to discuss potential role of radiation  therapy as a part of treatment strategy.            SITE OF TREATMENT: pelvis     DATES  OF TREATMENT: 20-20     TOTAL DOSE OF TREATMENT: 5040 cGy     DOSE PER FRACTION OF TREATMENT: 180 cGy x 28 fractions    INTERVAL SINCE COMPLETION OF RADIATION THERAPY:   7 months    SUBJECTIVE:   The patient presents for follow up.     Post treatment MRI (20) was JOSE LUIS. PET on 20 demonstrated excellent response with indeterminate but mild activity in anal canal region without clear evidence of disease. Scope by Dr. Banuelos on 20 was JOSE LUIS.    The patient is recovering appropriately. Pain essentially resolved. No longer needing pain medication.     Intermittent bouts of bowel urgency at times. Stool otherwise well formed, soft. No  trouble. No lower extremity lymphedema.     PHYSICAL EXAM:  /80   Pulse 87   Resp 18   Wt 86.3 kg (190 lb 3.2 oz)   LMP 2016   SpO2 98%   BMI 32.14 kg/m    Gen: Alert, in NAD  Eyes: PERRL, EOMI, sclera anicteric  Neck: Supple, full ROM, no LAD  Pulm: No wheezing, stridor or respiratory distress  CV: Well-perfused, no cyanosis, no pedal edema  Abdominal: Soft, nontender, nondistended, no hepatomegaly  Back: No step-offs or pain to palpation along the thoracolumbar spine, no CVA tenderness  Musculoskeletal: Normal bulk and tone   / Skin: Deferred  Neurologic: A/Ox3, CN II-XII intact  Psychiatric: Appropriate mood and affect    LABS AND IMAGIN20  PET    FINDINGS:      HEAD/NECK:  There is no  suspicious FDG uptake in the neck.      The paranasal sinuses are clear. The mastoid air cells are clear.      The mucosal pharyngeal space, the , prevertebral and carotid  spaces are within normal limits.      No masses, mass effect or pathologically enlarged lymph nodes are  evident. The thyroid gland is unremarkable.     CHEST:  There is no suspicious FDG uptake in the chest.      The heart is normal in size. No pericardial effusion. The ascending  aorta  and the main pulmonary artery are normal in caliber. There are  no no pathologically enlarged or hypermetabolic mediastinal, hilar or  axillary lymph nodes.      There are no suspicious lung nodules or evidence for infection.        There is no significant pericardial or plural effusions.     ABDOMEN AND PELVIS:  Persistent focus of hypermetabolism in the rectum with a max SUV of  5.6, previous max SUV of 12.6 on 3/19/2020. No enlarged or  hypermetabolic inguinal, pelvic or intra-abdominal lymph nodes. No  other suspicious foci of FDG uptake in the abdomen or pelvis.     There are no suspicious hepatic lesions. Unchanged subcentimeter  hypodense subcapsular lesion in hepatic segment 4A, too small to  characterize. The pancreas, spleen and adrenal glands are  unremarkable. Both kidneys are unremarkable. No hydronephrosis or  hydroureter. No renal or ureteral stones. The urinary bladder is  unremarkable. No pelvic masses. No abnormally dilated loops of small  or large bowel. Sigmoid colonic diverticulosis, without evidence of  acute diverticulitis. Unremarkable appendix. No free fluid or free  intraperitoneal air. Abdominal aorta is normal in caliber. Diffuse FDG  uptake throughout the stomach.     LOWER EXTREMITIES:   No abnormal masses or hypermetabolic lesions.     BONES:   There are no suspicious lytic or blastic osseous lesions.  There is no  abnormal FDG uptake in the skeleton.                                                                         IMPRESSION: In this patient with a history of squamous cell carcinoma  of the anus, status post chemotherapy and radiation:  1. Persistent hypermetabolic uptake in the anus, slightly decreased  since the previous PET/CT on 3/19/2020 but believed to represent  residual disease.   2. No evidence of metastatic disease.   3. Diffuse FDG uptake in the stomach, which can be seen with  Gastritis.      8/6/20  MRI pelvis  FINDINGS: There is mild thickening of the distal  rectum and anus, with  some T2 dark signal along the anterior portion of the anus at the site  of a previously diagnosed cancer, likely representing fibrosis and  radiation related changes. There is no evidence for recurrent mass. No  pelvic lymphadenopathy.     Bone marrow and soft tissues. The bone marrow and soft tissues are  within normal limits. The urinary bladder is normal in appearance.  Trace pelvic free fluid.                                                                      IMPRESSION: Posttreatment changes of the anus without clear evidence  for local recurrence or pelvic metastatic disease.       IMPRESSION:   Ms. 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  She completed definitive CRT to a total dose of 50.4 Gy in 28 fractions with concurrent 5FU-MMC  (completed on 5/18/20).    PLAN:   1. Post treatment MRI (8/6/20) was JOSE LUIS. PET on 7/28/20 demonstrated excellent response with indeterminate but mild activity in anal canal region without clear evidence of disease. Scope by Dr. Banuelos on 12/8/20 was JOSE LUIS.    2. Acute toxicities appropriately improving.     3. Mild GI bother. Discussed imodium as needed and probiotic consideration.     4.Cancer related pain. No longer needing oral narcotic.     5. Continue follow up with medical oncology. Will see Dr. Real on 2/12/21.    6. The patient will benefit from continued oncologic surveillance from our colo-rectal colleagues (Dr. Banuelos). The patient will see Dr. Banuelos in about 4 months time.    7. RTC in 6 months.    Tyree eRmy M.D.  Department of Radiation Oncology  [facility]

## 2021-02-02 ENCOUNTER — HOSPITAL ENCOUNTER (OUTPATIENT)
Dept: LAB | Facility: CLINIC | Age: 56
Discharge: HOME | End: 2021-02-02
Attending: INTERNAL MEDICINE | Admitting: INTERNAL MEDICINE
Payer: COMMERCIAL

## 2021-02-02 DIAGNOSIS — C21.0 ANAL CANCER (H): ICD-10-CM

## 2021-02-02 LAB
ALBUMIN SERPL-MCNC: 3.6 G/DL (ref 3.4–5)
ALP SERPL-CCNC: 53 U/L (ref 40–150)
ALT SERPL W P-5'-P-CCNC: 81 U/L (ref 0–50)
ANION GAP SERPL CALCULATED.3IONS-SCNC: <1 MMOL/L (ref 3–14)
AST SERPL W P-5'-P-CCNC: 34 U/L (ref 0–45)
BASOPHILS # BLD AUTO: 0 10E9/L (ref 0–0.2)
BASOPHILS NFR BLD AUTO: 0.7 %
BILIRUB SERPL-MCNC: 0.3 MG/DL (ref 0.2–1.3)
BUN SERPL-MCNC: 17 MG/DL (ref 7–30)
CALCIUM SERPL-MCNC: 8.9 MG/DL (ref 8.5–10.1)
CHLORIDE SERPL-SCNC: 110 MMOL/L (ref 94–109)
CO2 SERPL-SCNC: 32 MMOL/L (ref 20–32)
CREAT SERPL-MCNC: 0.83 MG/DL (ref 0.52–1.04)
DIFFERENTIAL METHOD BLD: NORMAL
EOSINOPHIL # BLD AUTO: 0.1 10E9/L (ref 0–0.7)
EOSINOPHIL NFR BLD AUTO: 2.9 %
ERYTHROCYTE [DISTWIDTH] IN BLOOD BY AUTOMATED COUNT: 11.5 % (ref 10–15)
GFR SERPL CREATININE-BSD FRML MDRD: 80 ML/MIN/{1.73_M2}
GLUCOSE SERPL-MCNC: 90 MG/DL (ref 70–99)
HCT VFR BLD AUTO: 40.6 % (ref 35–47)
HGB BLD-MCNC: 14 G/DL (ref 11.7–15.7)
IMM GRANULOCYTES # BLD: 0 10E9/L (ref 0–0.4)
IMM GRANULOCYTES NFR BLD: 0.2 %
LYMPHOCYTES # BLD AUTO: 1.2 10E9/L (ref 0.8–5.3)
LYMPHOCYTES NFR BLD AUTO: 25.8 %
MCH RBC QN AUTO: 32.2 PG (ref 26.5–33)
MCHC RBC AUTO-ENTMCNC: 34.5 G/DL (ref 31.5–36.5)
MCV RBC AUTO: 93 FL (ref 78–100)
MONOCYTES # BLD AUTO: 0.5 10E9/L (ref 0–1.3)
MONOCYTES NFR BLD AUTO: 11 %
NEUTROPHILS # BLD AUTO: 2.7 10E9/L (ref 1.6–8.3)
NEUTROPHILS NFR BLD AUTO: 59.4 %
NRBC # BLD AUTO: 0 10*3/UL
NRBC BLD AUTO-RTO: 0 /100
PLATELET # BLD AUTO: 193 10E9/L (ref 150–450)
POTASSIUM SERPL-SCNC: 4.2 MMOL/L (ref 3.4–5.3)
PROT SERPL-MCNC: 7.2 G/DL (ref 6.8–8.8)
RBC # BLD AUTO: 4.35 10E12/L (ref 3.8–5.2)
SODIUM SERPL-SCNC: 142 MMOL/L (ref 133–144)
WBC # BLD AUTO: 4.5 10E9/L (ref 4–11)

## 2021-02-02 PROCEDURE — 85025 COMPLETE CBC W/AUTO DIFF WBC: CPT | Performed by: INTERNAL MEDICINE

## 2021-02-02 PROCEDURE — 36415 COLL VENOUS BLD VENIPUNCTURE: CPT | Performed by: INTERNAL MEDICINE

## 2021-02-02 PROCEDURE — 80053 COMPREHEN METABOLIC PANEL: CPT | Performed by: INTERNAL MEDICINE

## 2021-02-10 ENCOUNTER — VIRTUAL VISIT (OUTPATIENT)
Dept: ONCOLOGY | Facility: CLINIC | Age: 56
End: 2021-02-10
Attending: INTERNAL MEDICINE
Payer: COMMERCIAL

## 2021-02-10 VITALS — HEIGHT: 65 IN | TEMPERATURE: 98.2 F | WEIGHT: 180 LBS | BODY MASS INDEX: 29.99 KG/M2

## 2021-02-10 DIAGNOSIS — C21.0 ANAL SQUAMOUS CELL CARCINOMA (H): Primary | Chronic | ICD-10-CM

## 2021-02-10 PROBLEM — D70.9 NEUTROPENIC FEVER (H): Status: RESOLVED | Noted: 2020-05-20 | Resolved: 2021-02-10

## 2021-02-10 PROBLEM — R50.81 NEUTROPENIC FEVER (H): Status: RESOLVED | Noted: 2020-05-20 | Resolved: 2021-02-10

## 2021-02-10 PROCEDURE — 99214 OFFICE O/P EST MOD 30 MIN: CPT | Mod: GT | Performed by: INTERNAL MEDICINE

## 2021-02-10 ASSESSMENT — MIFFLIN-ST. JEOR: SCORE: 1414.41

## 2021-02-10 ASSESSMENT — PAIN SCALES - GENERAL: PAINLEVEL: NO PAIN (0)

## 2021-02-10 NOTE — LETTER
2/10/2021         RE: Susu Lagos  [address]  [facility] [address]        Dear Colleague,    Thank you for referring your patient, Susu Lagos, to the [facility]. Please see a copy of my visit note below.      Hematology/ Oncology virtual video visit:  Feb 10, 2021    Due to the concerns around COVID-19 and adhering to social distancing we conducted this visit via video visit.      Reason for Visit:   Chief Complaint   Patient presents with     Oncology Clinic Visit     6 month follow up anal cancer. Review lab results.        Oncologic History:    Anal squamous cell carcinoma (H)  Susu Lagos presented to the primary care physician with 3 to 4 months history of blood in the stool.  Subsequently the patient was referred for colonoscopy on March 3, 2020.  The palpable rectal mass on digital examination.  Biopsy came back positive for squamous cell carcinoma, focally keratinizing.  The patient has a previous colonoscopy on November 2018 which shows no abnormalities.  The patient was started on chemoradiation regimen with 5-FU and mitomycin.      Interval History:  Patient has been feeling well without any recent pain or nausea or vomiting or diarrhea.  She denies any weight loss.  She had endoscopy done back in December without any evidence of disease.  She continues to follow-up with surgery.    Review of systems:  Pertinent positives have been included in HPI; remainder of detailed complete 20-point ROS was negative.    Past medical, social, surgical, and family histories reviewed.    Allergies:  Allergies as of 02/10/2021 - Reviewed 02/10/2021   Allergen Reaction Noted     Rabies vaccine Unknown 02/20/2015     Shellfish-derived products Nausea and Vomiting 12/29/2016     Sulfa drugs Rash 02/20/2015       Current Medications:  Current Outpatient Medications   Medication Sig Dispense Refill     estradiol (ESTRACE) 0.1 MG/GM vaginal cream Topically 3x/week        Magnesium Oxide 250 MG TABS Take 2 tablets by mouth 2 times daily       Multiple Vitamin (MULTI-VITAMINS PO)        Omega-3 Fatty Acids (OMEGA 3 PO)        imiquimod (ALDARA) 5 % external cream Apply once daily at bedtime until warts completely disappear up to a maximum of 12 weeks (Patient not taking: Reported on 2/10/2021) 12 packet 3     mineral oil-hydrophilic petrolatum (AQUAPHOR) external ointment Apply topically as needed for other (bottom soreness)       omeprazole (PRILOSEC) 40 MG DR capsule Take 1 capsule (40 mg) by mouth daily (Patient not taking: Reported on 12/8/2020) 30 capsule 0        Physical examination:  Physical Exam as observed via telehealth:         Constitutional - General appearance, and body habitus are within normal range         Eyes -there is no redness, or discharge seen.         Respiratory -there is no cough, or labored breathing observed         Musculoskeletal -full range of motion is observed         Skin -there is no visible discoloration, or visible lesions         Neurological -there is no tremors observed         Psychiatric -the patient is alert & oriented.    The rest of a comprehensive physical examination is deferred due to PHE (public health emergency) video visit restrictions.    Laboratory/Imaging Studies:  No visits with results within 2 Week(s) from this visit.   Latest known visit with results is:   Office Visit on 10/15/2020   Component Date Value Ref Range Status     WBC 10/15/2020 4.7  4.0 - 11.0 10e9/L Final     RBC Count 10/15/2020 4.27  3.8 - 5.2 10e12/L Final     Hemoglobin 10/15/2020 13.5  11.7 - 15.7 g/dL Final     Hematocrit 10/15/2020 40.2  35.0 - 47.0 % Final     MCV 10/15/2020 94  78 - 100 fl Final     MCH 10/15/2020 31.6  26.5 - 33.0 pg Final     MCHC 10/15/2020 33.6  31.5 - 36.5 g/dL Final     RDW 10/15/2020 12.1  10.0 - 15.0 % Final     Platelet Count 10/15/2020 202  150 - 450 10e9/L Final     Sodium 10/15/2020 139  133 - 144 mmol/L Final     Potassium  10/15/2020 3.9  3.4 - 5.3 mmol/L Final     Chloride 10/15/2020 107  94 - 109 mmol/L Final     Carbon Dioxide 10/15/2020 30  20 - 32 mmol/L Final     Anion Gap 10/15/2020 2* 3 - 14 mmol/L Final     Glucose 10/15/2020 89  70 - 99 mg/dL Final    Non Fasting     Urea Nitrogen 10/15/2020 21  7 - 30 mg/dL Final     Creatinine 10/15/2020 0.79  0.52 - 1.04 mg/dL Final     GFR Estimate 10/15/2020 86  >60 mL/min/[1.73_m2] Final    Comment: Non  GFR Calc  Starting 12/18/2018, serum creatinine based estimated GFR (eGFR) will be   calculated using the Chronic Kidney Disease Epidemiology Collaboration   (CKD-EPI) equation.       GFR Estimate If Black 10/15/2020 >90  >60 mL/min/[1.73_m2] Final    Comment:  GFR Calc  Starting 12/18/2018, serum creatinine based estimated GFR (eGFR) will be   calculated using the Chronic Kidney Disease Epidemiology Collaboration   (CKD-EPI) equation.       Calcium 10/15/2020 9.2  8.5 - 10.1 mg/dL Final     Bilirubin Total 10/15/2020 0.4  0.2 - 1.3 mg/dL Final     Albumin 10/15/2020 3.8  3.4 - 5.0 g/dL Final     Protein Total 10/15/2020 7.5  6.8 - 8.8 g/dL Final     Alkaline Phosphatase 10/15/2020 52  40 - 150 U/L Final     ALT 10/15/2020 42  0 - 50 U/L Final     AST 10/15/2020 21  0 - 45 U/L Final     TSH 10/15/2020 2.09  0.40 - 4.00 mU/L Final          Assessment and plan:    (C21.0) Anal squamous cell carcinoma (H)  (primary encounter diagnosis)  I reviewed with the patient today most recent laboratory tests.  There is no clinical evidence of anal cancer.  The patient will continue to follow with surgery.  I will see her in future if there are new developments or concerns.    The patient is ready to learn, no apparent learning barriers were identified.  Diagnosis and treatment plans were explained to the patient. The patient expressed understanding of the content. The patient asked appropriate questions. The patient questions were answered to her  "satisfaction.    This is started 10 AM  Visit ended 10:30 AM    Sophia Real MD    Chart documentation with Dragon Voice recognition Software. Although reviewed after completion, some words and grammatical errors may remain.    Oncology Rooming Note-Video Visit Via contact #[phone].     February 10, 2021 9:48 AM   Susu Lagos is a 55 year old female who presents for:    Chief Complaint   Patient presents with     Oncology Clinic Visit     6 month follow up anal cancer. Review lab results.      Initial Vitals: LMP 01/29/2016  Estimated body mass index is 32.14 kg/m  as calculated from the following:    Height as of 12/8/20: 1.638 m (5' 4.5\").    Weight as of 12/14/20: 86.3 kg (190 lb 3.2 oz). There is no height or weight on file to calculate BSA.  Data Unavailable Comment: Data Unavailable   Patient's last menstrual period was 01/29/2016.  Allergies reviewed: Yes  Medications reviewed: Yes    Medications: Medication refills not needed today.  Pharmacy name entered into [facility]:    [facility] [place], [place] - [address] [place] [address] PHARMACY [address][id number] [place], [place] - [address] [facility]    Clinical concerns: 6 month follow up anal cancer. Review lab results.       Dionna Singh [facility]                Again, thank you for allowing me to participate in the care of your patient.        Sincerely,        Sophia Real MD    "

## 2021-02-10 NOTE — LETTER
2/10/2021         RE: Susu Lagos  [address]  [facility] [address]        Dear Colleague,    Thank you for referring your patient, Susu Lagos, to the [facility]. Please see a copy of my visit note below.      Hematology/ Oncology virtual video visit:  Feb 10, 2021    Due to the concerns around COVID-19 and adhering to social distancing we conducted this visit via video visit.      Reason for Visit:   Chief Complaint   Patient presents with     Oncology Clinic Visit     6 month follow up anal cancer. Review lab results.        Oncologic History:    Anal squamous cell carcinoma (H)  Susu Lagos presented to the primary care physician with 3 to 4 months history of blood in the stool.  Subsequently the patient was referred for colonoscopy on March 3, 2020.  The palpable rectal mass on digital examination.  Biopsy came back positive for squamous cell carcinoma, focally keratinizing.  The patient has a previous colonoscopy on November 2018 which shows no abnormalities.  The patient was started on chemoradiation regimen with 5-FU and mitomycin.      Interval History:  Patient has been feeling well without any recent pain or nausea or vomiting or diarrhea.  She denies any weight loss.  She had endoscopy done back in December without any evidence of disease.  She continues to follow-up with surgery.    Review of systems:  Pertinent positives have been included in HPI; remainder of detailed complete 20-point ROS was negative.    Past medical, social, surgical, and family histories reviewed.    Allergies:  Allergies as of 02/10/2021 - Reviewed 02/10/2021   Allergen Reaction Noted     Rabies vaccine Unknown 02/20/2015     Shellfish-derived products Nausea and Vomiting 12/29/2016     Sulfa drugs Rash 02/20/2015       Current Medications:  Current Outpatient Medications   Medication Sig Dispense Refill     estradiol (ESTRACE) 0.1 MG/GM vaginal cream Topically 3x/week        Magnesium Oxide 250 MG TABS Take 2 tablets by mouth 2 times daily       Multiple Vitamin (MULTI-VITAMINS PO)        Omega-3 Fatty Acids (OMEGA 3 PO)        imiquimod (ALDARA) 5 % external cream Apply once daily at bedtime until warts completely disappear up to a maximum of 12 weeks (Patient not taking: Reported on 2/10/2021) 12 packet 3     mineral oil-hydrophilic petrolatum (AQUAPHOR) external ointment Apply topically as needed for other (bottom soreness)       omeprazole (PRILOSEC) 40 MG DR capsule Take 1 capsule (40 mg) by mouth daily (Patient not taking: Reported on 12/8/2020) 30 capsule 0        Physical examination:  Physical Exam as observed via telehealth:         Constitutional - General appearance, and body habitus are within normal range         Eyes -there is no redness, or discharge seen.         Respiratory -there is no cough, or labored breathing observed         Musculoskeletal -full range of motion is observed         Skin -there is no visible discoloration, or visible lesions         Neurological -there is no tremors observed         Psychiatric -the patient is alert & oriented.    The rest of a comprehensive physical examination is deferred due to PHE (public health emergency) video visit restrictions.    Laboratory/Imaging Studies:  No visits with results within 2 Week(s) from this visit.   Latest known visit with results is:   Office Visit on 10/15/2020   Component Date Value Ref Range Status     WBC 10/15/2020 4.7  4.0 - 11.0 10e9/L Final     RBC Count 10/15/2020 4.27  3.8 - 5.2 10e12/L Final     Hemoglobin 10/15/2020 13.5  11.7 - 15.7 g/dL Final     Hematocrit 10/15/2020 40.2  35.0 - 47.0 % Final     MCV 10/15/2020 94  78 - 100 fl Final     MCH 10/15/2020 31.6  26.5 - 33.0 pg Final     MCHC 10/15/2020 33.6  31.5 - 36.5 g/dL Final     RDW 10/15/2020 12.1  10.0 - 15.0 % Final     Platelet Count 10/15/2020 202  150 - 450 10e9/L Final     Sodium 10/15/2020 139  133 - 144 mmol/L Final     Potassium  10/15/2020 3.9  3.4 - 5.3 mmol/L Final     Chloride 10/15/2020 107  94 - 109 mmol/L Final     Carbon Dioxide 10/15/2020 30  20 - 32 mmol/L Final     Anion Gap 10/15/2020 2* 3 - 14 mmol/L Final     Glucose 10/15/2020 89  70 - 99 mg/dL Final    Non Fasting     Urea Nitrogen 10/15/2020 21  7 - 30 mg/dL Final     Creatinine 10/15/2020 0.79  0.52 - 1.04 mg/dL Final     GFR Estimate 10/15/2020 86  >60 mL/min/[1.73_m2] Final    Comment: Non  GFR Calc  Starting 12/18/2018, serum creatinine based estimated GFR (eGFR) will be   calculated using the Chronic Kidney Disease Epidemiology Collaboration   (CKD-EPI) equation.       GFR Estimate If Black 10/15/2020 >90  >60 mL/min/[1.73_m2] Final    Comment:  GFR Calc  Starting 12/18/2018, serum creatinine based estimated GFR (eGFR) will be   calculated using the Chronic Kidney Disease Epidemiology Collaboration   (CKD-EPI) equation.       Calcium 10/15/2020 9.2  8.5 - 10.1 mg/dL Final     Bilirubin Total 10/15/2020 0.4  0.2 - 1.3 mg/dL Final     Albumin 10/15/2020 3.8  3.4 - 5.0 g/dL Final     Protein Total 10/15/2020 7.5  6.8 - 8.8 g/dL Final     Alkaline Phosphatase 10/15/2020 52  40 - 150 U/L Final     ALT 10/15/2020 42  0 - 50 U/L Final     AST 10/15/2020 21  0 - 45 U/L Final     TSH 10/15/2020 2.09  0.40 - 4.00 mU/L Final          Assessment and plan:    (C21.0) Anal squamous cell carcinoma (H)  (primary encounter diagnosis)  I reviewed with the patient today most recent laboratory tests.  There is no clinical evidence of anal cancer.  The patient will continue to follow with surgery.  I will see her in future if there are new developments or concerns.    The patient is ready to learn, no apparent learning barriers were identified.  Diagnosis and treatment plans were explained to the patient. The patient expressed understanding of the content. The patient asked appropriate questions. The patient questions were answered to her  "satisfaction.    This is started 10 AM  Visit ended 10:30 AM    Sophia Real MD    Chart documentation with Dragon Voice recognition Software. Although reviewed after completion, some words and grammatical errors may remain.    Oncology Rooming Note-Video Visit Via contact #[phone].     February 10, 2021 9:48 AM   Susu Lagos is a 55 year old female who presents for:    Chief Complaint   Patient presents with     Oncology Clinic Visit     6 month follow up anal cancer. Review lab results.      Initial Vitals: LMP 01/29/2016  Estimated body mass index is 32.14 kg/m  as calculated from the following:    Height as of 12/8/20: 1.638 m (5' 4.5\").    Weight as of 12/14/20: 86.3 kg (190 lb 3.2 oz). There is no height or weight on file to calculate BSA.  Data Unavailable Comment: Data Unavailable   Patient's last menstrual period was 01/29/2016.  Allergies reviewed: Yes  Medications reviewed: Yes    Medications: Medication refills not needed today.  Pharmacy name entered into [facility]:    [facility], [place] - [address] [place] [address] PHARMACY [address][id number] [place], [place] - [address] [facility]    Clinical concerns: 6 month follow up anal cancer. Review lab results.       Dionna Singh [facility]                Again, thank you for allowing me to participate in the care of your patient.        Sincerely,        Sophia Real MD    "

## 2021-02-10 NOTE — PROGRESS NOTES
Hematology/ Oncology virtual video visit:  Feb 10, 2021    Due to the concerns around COVID-19 and adhering to social distancing we conducted this visit via video visit.      Reason for Visit:   Chief Complaint   Patient presents with     Oncology Clinic Visit     6 month follow up anal cancer. Review lab results.        Oncologic History:    Anal squamous cell carcinoma (H)  Susu Lagos presented to the primary care physician with 3 to 4 months history of blood in the stool.  Subsequently the patient was referred for colonoscopy on March 3, 2020.  The palpable rectal mass on digital examination.  Biopsy came back positive for squamous cell carcinoma, focally keratinizing.  The patient has a previous colonoscopy on November 2018 which shows no abnormalities.  The patient was started on chemoradiation regimen with 5-FU and mitomycin.      Interval History:  Patient has been feeling well without any recent pain or nausea or vomiting or diarrhea.  She denies any weight loss.  She had endoscopy done back in December without any evidence of disease.  She continues to follow-up with surgery.    Review of systems:  Pertinent positives have been included in HPI; remainder of detailed complete 20-point ROS was negative.    Past medical, social, surgical, and family histories reviewed.    Allergies:  Allergies as of 02/10/2021 - Reviewed 02/10/2021   Allergen Reaction Noted     Rabies vaccine Unknown 02/20/2015     Shellfish-derived products Nausea and Vomiting 12/29/2016     Sulfa drugs Rash 02/20/2015       Current Medications:  Current Outpatient Medications   Medication Sig Dispense Refill     estradiol (ESTRACE) 0.1 MG/GM vaginal cream Topically 3x/week       Magnesium Oxide 250 MG TABS Take 2 tablets by mouth 2 times daily       Multiple Vitamin (MULTI-VITAMINS PO)        Omega-3 Fatty Acids (OMEGA 3 PO)        imiquimod (ALDARA) 5 % external cream Apply once daily at bedtime until warts completely disappear up  to a maximum of 12 weeks (Patient not taking: Reported on 2/10/2021) 12 packet 3     mineral oil-hydrophilic petrolatum (AQUAPHOR) external ointment Apply topically as needed for other (bottom soreness)       omeprazole (PRILOSEC) 40 MG DR capsule Take 1 capsule (40 mg) by mouth daily (Patient not taking: Reported on 12/8/2020) 30 capsule 0        Physical examination:  Physical Exam as observed via telehealth:         Constitutional - General appearance, and body habitus are within normal range         Eyes -there is no redness, or discharge seen.         Respiratory -there is no cough, or labored breathing observed         Musculoskeletal -full range of motion is observed         Skin -there is no visible discoloration, or visible lesions         Neurological -there is no tremors observed         Psychiatric -the patient is alert & oriented.    The rest of a comprehensive physical examination is deferred due to PHE (public health emergency) video visit restrictions.    Laboratory/Imaging Studies:  No visits with results within 2 Week(s) from this visit.   Latest known visit with results is:   Office Visit on 10/15/2020   Component Date Value Ref Range Status     WBC 10/15/2020 4.7  4.0 - 11.0 10e9/L Final     RBC Count 10/15/2020 4.27  3.8 - 5.2 10e12/L Final     Hemoglobin 10/15/2020 13.5  11.7 - 15.7 g/dL Final     Hematocrit 10/15/2020 40.2  35.0 - 47.0 % Final     MCV 10/15/2020 94  78 - 100 fl Final     MCH 10/15/2020 31.6  26.5 - 33.0 pg Final     MCHC 10/15/2020 33.6  31.5 - 36.5 g/dL Final     RDW 10/15/2020 12.1  10.0 - 15.0 % Final     Platelet Count 10/15/2020 202  150 - 450 10e9/L Final     Sodium 10/15/2020 139  133 - 144 mmol/L Final     Potassium 10/15/2020 3.9  3.4 - 5.3 mmol/L Final     Chloride 10/15/2020 107  94 - 109 mmol/L Final     Carbon Dioxide 10/15/2020 30  20 - 32 mmol/L Final     Anion Gap 10/15/2020 2* 3 - 14 mmol/L Final     Glucose 10/15/2020 89  70 - 99 mg/dL Final    Non Fasting      Urea Nitrogen 10/15/2020 21  7 - 30 mg/dL Final     Creatinine 10/15/2020 0.79  0.52 - 1.04 mg/dL Final     GFR Estimate 10/15/2020 86  >60 mL/min/[1.73_m2] Final    Comment: Non  GFR Calc  Starting 12/18/2018, serum creatinine based estimated GFR (eGFR) will be   calculated using the Chronic Kidney Disease Epidemiology Collaboration   (CKD-EPI) equation.       GFR Estimate If Black 10/15/2020 >90  >60 mL/min/[1.73_m2] Final    Comment:  GFR Calc  Starting 12/18/2018, serum creatinine based estimated GFR (eGFR) will be   calculated using the Chronic Kidney Disease Epidemiology Collaboration   (CKD-EPI) equation.       Calcium 10/15/2020 9.2  8.5 - 10.1 mg/dL Final     Bilirubin Total 10/15/2020 0.4  0.2 - 1.3 mg/dL Final     Albumin 10/15/2020 3.8  3.4 - 5.0 g/dL Final     Protein Total 10/15/2020 7.5  6.8 - 8.8 g/dL Final     Alkaline Phosphatase 10/15/2020 52  40 - 150 U/L Final     ALT 10/15/2020 42  0 - 50 U/L Final     AST 10/15/2020 21  0 - 45 U/L Final     TSH 10/15/2020 2.09  0.40 - 4.00 mU/L Final          Assessment and plan:    (C21.0) Anal squamous cell carcinoma (H)  (primary encounter diagnosis)  I reviewed with the patient today most recent laboratory tests.  There is no clinical evidence of anal cancer.  The patient will continue to follow with surgery.  I will see her in future if there are new developments or concerns.    The patient is ready to learn, no apparent learning barriers were identified.  Diagnosis and treatment plans were explained to the patient. The patient expressed understanding of the content. The patient asked appropriate questions. The patient questions were answered to her satisfaction.    This is started 10 AM  Visit ended 10:30 AM    Sophia Real MD    Chart documentation with Dragon Voice recognition Software. Although reviewed after completion, some words and grammatical errors may remain.

## 2021-02-10 NOTE — PROGRESS NOTES
"Oncology Rooming Note-Video Visit Via contact #[phone].     February 10, 2021 9:48 AM   Susu Lagos is a 55 year old female who presents for:    Chief Complaint   Patient presents with     Oncology Clinic Visit     6 month follow up anal cancer. Review lab results.      Initial Vitals: LMP 01/29/2016  Estimated body mass index is 32.14 kg/m  as calculated from the following:    Height as of 12/8/20: 1.638 m (5' 4.5\").    Weight as of 12/14/20: 86.3 kg (190 lb 3.2 oz). There is no height or weight on file to calculate BSA.  Data Unavailable Comment: Data Unavailable   Patient's last menstrual period was 01/29/2016.  Allergies reviewed: Yes  Medications reviewed: Yes    Medications: Medication refills not needed today.  Pharmacy name entered into [facility]:    [facility], [place] - [address] [place] [address] [facility]ACY [id number] [place], [place] - [address]    Clinical concerns: 6 month follow up anal cancer. Review lab results.       Dionna Singh, [facility]            "

## 2021-05-03 NOTE — PROGRESS NOTES
"Colon and Rectal Surgery Clinic Note      RE: Susu Lagos  : 1965  MALGORZATA: 2021    Susu is a 55 year old woman who presents today for follow up of anal cancer.       HPI:  Original staging:  Colonoscopy 3/3/2020 by Dr. Sesar Dos Santos for hematochezia with \"Palpable rectal mass found on digital rectal exam\".    Pathology: Squamous cell carcinoma, focally keratinizing, cannot rule out invasion     She saw Dr. Real who recommended staging imaging and possible chemoradiation    PET 3/19/20:  1. Focal area of abnormal FDG avidity involving the level of the anus  with max SUV 12.6. This corresponds to the patient's recent biopsy  squamous cell carcinoma of the anus.  2. No other areas of abnormal FDG avidity. Specifically no definitive  evidence for regional or nonregional FDG avid lymphadenopathy.  3. Diffuse fatty infiltration of the liver. Low-attenuation 0.7 cm  lesion involving the anterior aspect of the medial segment left  hepatic lobe without evidence for focal abnormal FDG avidity in this  area. This likely represents a benign low-attenuation lesion such as a  cyst in the background of diffuse fatty infiltration.  4. No evidence for abnormal FDG avidity involving the neck, chest or  musculoskeletal structures.    MR Pelvis 3/19/20:  FINDINGS: Approximately 2.7 cm above the anal verge, there is an area  of abnormal enhancement in the anterior rectum that measures  approximately 1.2 x 0.9 cm (series 10, image 33). No involvement of  the sphincters is demonstrated. No definite extension beyond the  muscularis propria. No invasion of the vagina. No surrounding  suspicious lymph nodes.                                                                     IMPRESSION: Small area of abnormal enhancement measures up to 1.2 cm  in the mid anus. No evidence of spread beyond the anus or into  regional lymph nodes.    She saw  Gynecology with negative Pap smear    She completed chemoradiation " "4/9/20-5/18/20 with a total of 5040 cGy and 5-Follow-up and mitomycin    PET 7/28/20:  1. Persistent hypermetabolic uptake in the anus, slightly decreased  since the previous PET/CT on 3/19/2020 but believed to represent  residual disease.   2. No evidence of metastatic disease.   3. Diffuse FDG uptake in the stomach, which can be seen with  gastritis.    MR Pelvis 8/6/20:  IMPRESSION: Posttreatment changes of the anus without clear evidence  for local recurrence or pelvic metastatic disease.    Normal EGD 9/3/20    I saw her last on 12/8/20 with no evidence of recurrence    She follows with Dr. Real of medical oncology and had a visit with him last on 2/10/21 with no planned follow up    Physical examination:  Examination was chaperoned by Letitia Arevalo NP   Vitals: /88 (BP Location: Left arm, Patient Position: Sitting, Cuff Size: Adult Large)   Pulse 79   Ht 5' 4.5\"   Wt 205 lb 6.4 oz   LMP 01/29/2016   SpO2 100%   BMI 34.71 kg/m    BMI= Body mass index is 34.71 kg/m .  No palpable inguinal adenopathy.  Perianal examination shows skin tags but no other lesions.  Digital rectal examination with no palpable lesions. Anoscopy without any visible lesions but with superficial anal fissure in the posterior midline with active bleeding and some internal hemorrhoids.    Laboratory data:    Recent Labs   Lab Test 02/02/21  0925   WBC 4.5   HGB 14.0      CR 0.83   ALBUMIN 3.6   BILITOTAL 0.3   ALKPHOS 53   ALT 81*   AST 34     Assessment/plan:  55-year-old woman with a diagnosis of squamous cell carcinoma of the anal canal status post chemoradiation completed in 5/18/2020.  No evidence of lesion on exam or anoscopy today. She has been having some pain and bleeding with bowel movements and does have a fissure on exam. Recommended a daily fiber supplement and will treat with topical diltiazem. Will have her follow up with Letitia Arevalo NP in about 6 weeks and with me in 4 months " for recheck. Patient's questions were answered to her stated satisfaction and she is in agreement with this plan.      For details of past medical history, surgical history, family history, medications, allergies, and review of systems, please see details below.    Medical history:  Past Medical History:   Diagnosis Date     Menorrhagia with irregular cycle 2/28/2016     Ovarian cyst 6/4/2015    Right, 6 cm dermoid on MRI-recommend removal.  7/29/2015 Surgery for right salpingoophorectomy.       Perimenopause 6/4/2015     Rectal cancer (H)     had radiation and chemo 2020       Surgical history:  Past Surgical History:   Procedure Laterality Date     CHOLECYSTECTOMY  2013    LSC     COLONOSCOPY N/A 11/13/2018    Procedure: colonoscopy;  Surgeon: Sesar Dos Santos MD;  Location: [facility]     COLONOSCOPY N/A 3/3/2020    Procedure: COLONOSCOPY, WITH POLYPECTOMY AND BIOPSY;  Surgeon: Sesar Dos Santos MD;  Location: [facility]     DILATION AND CURETTAGE, OPERATIVE HYSTEROSCOPY, COMBINED N/A 2/29/2016    Procedure: COMBINED DILATION AND CURETTAGE, OPERATIVE HYSTEROSCOPY;  Surgeon: Panda Knight MD;  Location: [facility]     ESOPHAGOSCOPY, GASTROSCOPY, DUODENOSCOPY (EGD), COMBINED N/A 9/3/2020    Procedure: ESOPHAGOGASTRODUODENOSCOPY (EGD);  Surgeon: Karlos Burnett MD;  Location: [facility]     GYN SURGERY  1995    tubes tied     LAPAROSCOPY DIAGNOSTIC (GYN) Right 7/29/2015    Procedure: LAPAROSCOPY DIAGNOSTIC (GYN);  Surgeon: Lian Betancur DO;  Location: [facility]       Family history:  Family History   Problem Relation Age of Onset     Hypertension Father      Myocardial Infarction Maternal Grandfather      Melanoma No family hx of        Medications:  Current Outpatient Medications   Medication Sig Dispense Refill     diltiazem 2% in PLO gel Apply small amount to anal opening three times daily for 8 weeks. 60 g 0     estradiol (ESTRACE) 0.1 MG/GM vaginal cream Topically 3x/week       Magnesium Oxide 250 MG TABS Take 2  "tablets by mouth 2 times daily       mineral oil-hydrophilic petrolatum (AQUAPHOR) external ointment Apply topically as needed for other (bottom soreness)       Multiple Vitamin (MULTI-VITAMINS PO)        Omega-3 Fatty Acids (OMEGA 3 PO)        imiquimod (ALDARA) 5 % external cream Apply once daily at bedtime until warts completely disappear up to a maximum of 12 weeks (Patient not taking: Reported on 2/10/2021) 12 packet 3     omeprazole (PRILOSEC) 40 MG DR capsule Take 1 capsule (40 mg) by mouth daily (Patient not taking: Reported on 12/8/2020) 30 capsule 0       Allergies:  The patientis allergic to rabies vaccine; shellfish-derived products; and sulfa drugs.    Social history:  Social History     Tobacco Use     Smoking status: Never Smoker     Smokeless tobacco: Never Used   Substance Use Topics     Alcohol use: Yes     Alcohol/week: 0.0 standard drinks     Comment: 1-2 per month      Marital status: .    Review of Systems:  Nursing Notes:   Rosalva Croft  5/4/2021 10:59 AM  Signed  Chief Complaint   Patient presents with     RECHECK     4 month follow up anoscopy for history of anal cancer       Vitals:    05/04/21 1056   BP: 128/88   BP Location: Left arm   Patient Position: Sitting   Cuff Size: Adult Large   Pulse: 79   SpO2: 100%   Weight: 205 lb 6.4 oz   Height: 5' 4.5\"       Body mass index is 34.71 kg/m .    Rosalva Croft MA         20 minutes spent on the date of the encounter doing chart review, history and exam, documentation, review of imaging, and further activities as noted above.     Stefano Banuelos MD   Professor and Chief  Division of Colon and Rectal Surgery  [facility]      Referring Provider:  Referred Self, MD  No address on file     Primary Care Provider:  Lakesha Aguirre    This note was created using speech recognition software and may contain unintended word substitutions.  "

## 2021-05-04 ENCOUNTER — OFFICE VISIT (OUTPATIENT)
Dept: SURGERY | Facility: CLINIC | Age: 56
End: 2021-05-04
Payer: COMMERCIAL

## 2021-05-04 VITALS
OXYGEN SATURATION: 100 % | SYSTOLIC BLOOD PRESSURE: 128 MMHG | DIASTOLIC BLOOD PRESSURE: 88 MMHG | HEART RATE: 79 BPM | WEIGHT: 205.4 LBS | BODY MASS INDEX: 34.22 KG/M2 | HEIGHT: 65 IN

## 2021-05-04 DIAGNOSIS — C21.1 MALIGNANT NEOPLASM OF ANAL CANAL (H): Primary | ICD-10-CM

## 2021-05-04 DIAGNOSIS — K60.2 ANAL FISSURE: ICD-10-CM

## 2021-05-04 PROCEDURE — 99213 OFFICE O/P EST LOW 20 MIN: CPT | Performed by: COLON & RECTAL SURGERY

## 2021-05-04 ASSESSMENT — MIFFLIN-ST. JEOR: SCORE: 1529.63

## 2021-05-04 ASSESSMENT — PAIN SCALES - GENERAL: PAINLEVEL: NO PAIN (0)

## 2021-05-04 NOTE — NURSING NOTE
"Chief Complaint   Patient presents with     RECHECK     4 month follow up anoscopy for history of anal cancer       Vitals:    05/04/21 1056   BP: 128/88   BP Location: Left arm   Patient Position: Sitting   Cuff Size: Adult Large   Pulse: 79   SpO2: 100%   Weight: 205 lb 6.4 oz   Height: 5' 4.5\"       Body mass index is 34.71 kg/m .    Rosalva Croft MA    "

## 2021-05-04 NOTE — LETTER
"2021       RE: Susu Lagos  [id number]  [facility] [address]     Dear Colleague,    Thank you for referring your patient, Susu Lagos, to the [facility] [place] at [facility]. Please see a copy of my visit note below.    Colon and Rectal Surgery Clinic Note    RE: Susu Lagos  : 1965  MALGORZATA: 2021    Susu is a 55 year old woman who presents today for follow up of anal cancer.       HPI:  Original staging:  Colonoscopy 3/3/2020 by Dr. Sesar Dos Santos for hematochezia with \"Palpable rectal mass found on digital rectal exam\".    Pathology: Squamous cell carcinoma, focally keratinizing, cannot rule out invasion     She saw Dr. Real who recommended staging imaging and possible chemoradiation    PET 3/19/20:  1. Focal area of abnormal FDG avidity involving the level of the anus  with max SUV 12.6. This corresponds to the patient's recent biopsy  squamous cell carcinoma of the anus.  2. No other areas of abnormal FDG avidity. Specifically no definitive  evidence for regional or nonregional FDG avid lymphadenopathy.  3. Diffuse fatty infiltration of the liver. Low-attenuation 0.7 cm  lesion involving the anterior aspect of the medial segment left  hepatic lobe without evidence for focal abnormal FDG avidity in this  area. This likely represents a benign low-attenuation lesion such as a  cyst in the background of diffuse fatty infiltration.  4. No evidence for abnormal FDG avidity involving the neck, chest or  musculoskeletal structures.    MR Pelvis 3/19/20:  FINDINGS: Approximately 2.7 cm above the anal verge, there is an area  of abnormal enhancement in the anterior rectum that measures  approximately 1.2 x 0.9 cm (series 10, image 33). No involvement of  the sphincters is demonstrated. No definite extension beyond the  muscularis propria. No invasion of the vagina. No surrounding  suspicious lymph " "nodes.                                                                     IMPRESSION: Small area of abnormal enhancement measures up to 1.2 cm  in the mid anus. No evidence of spread beyond the anus or into  regional lymph nodes.    She saw  Gynecology with negative Pap smear    She completed chemoradiation 4/9/20-5/18/20 with a total of 5040 cGy and 5-Follow-up and mitomycin    PET 7/28/20:  1. Persistent hypermetabolic uptake in the anus, slightly decreased  since the previous PET/CT on 3/19/2020 but believed to represent  residual disease.   2. No evidence of metastatic disease.   3. Diffuse FDG uptake in the stomach, which can be seen with  gastritis.    MR Pelvis 8/6/20:  IMPRESSION: Posttreatment changes of the anus without clear evidence  for local recurrence or pelvic metastatic disease.    Normal EGD 9/3/20    I saw her last on 12/8/20 with no evidence of recurrence    She follows with Dr. Real of medical oncology and had a visit with him last on 2/10/21 with no planned follow up    Physical examination:  Examination was chaperoned by Letitia Arevalo NP   Vitals: /88 (BP Location: Left arm, Patient Position: Sitting, Cuff Size: Adult Large)   Pulse 79   Ht 5' 4.5\"   Wt 205 lb 6.4 oz   LMP 01/29/2016   SpO2 100%   BMI 34.71 kg/m    BMI= Body mass index is 34.71 kg/m .  No palpable inguinal adenopathy.  Perianal examination shows skin tags but no other lesions.  Digital rectal examination with no palpable lesions. Anoscopy without any visible lesions but with superficial anal fissure in the posterior midline with active bleeding and some internal hemorrhoids.    Laboratory data:    Recent Labs   Lab Test 02/02/21  0925   WBC 4.5   HGB 14.0      CR 0.83   ALBUMIN 3.6   BILITOTAL 0.3   ALKPHOS 53   ALT 81*   AST 34     Assessment/plan:  55-year-old woman with a diagnosis of squamous cell carcinoma of the anal canal status post chemoradiation completed in 5/18/2020.  No evidence " of lesion on exam or anoscopy today. She has been having some pain and bleeding with bowel movements and does have a fissure on exam. Recommended a daily fiber supplement and will treat with topical diltiazem. Will have her follow up with Letitia Arevalo NP in about 6 weeks and with me in 4 months for recheck. Patient's questions were answered to her stated satisfaction and she is in agreement with this plan.      For details of past medical history, surgical history, family history, medications, allergies, and review of systems, please see details below.    Medical history:  Past Medical History:   Diagnosis Date     Menorrhagia with irregular cycle 2/28/2016     Ovarian cyst 6/4/2015    Right, 6 cm dermoid on MRI-recommend removal.  7/29/2015 Surgery for right salpingoophorectomy.       Perimenopause 6/4/2015     Rectal cancer (H)     had radiation and chemo 2020       Surgical history:  Past Surgical History:   Procedure Laterality Date     CHOLECYSTECTOMY  2013    LSC     COLONOSCOPY N/A 11/13/2018    Procedure: colonoscopy;  Surgeon: Sesar Dos Santos MD;  Location: [facility]     COLONOSCOPY N/A 3/3/2020    Procedure: COLONOSCOPY, WITH POLYPECTOMY AND BIOPSY;  Surgeon: Sesar Dos Santos MD;  Location: [facility]     DILATION AND CURETTAGE, OPERATIVE HYSTEROSCOPY, COMBINED N/A 2/29/2016    Procedure: COMBINED DILATION AND CURETTAGE, OPERATIVE HYSTEROSCOPY;  Surgeon: Panda Knight MD;  Location: [facility]     ESOPHAGOSCOPY, GASTROSCOPY, DUODENOSCOPY (EGD), COMBINED N/A 9/3/2020    Procedure: ESOPHAGOGASTRODUODENOSCOPY (EGD);  Surgeon: Karlos Burnett MD;  Location: [facility]     GYN SURGERY  1995    tubes tied     LAPAROSCOPY DIAGNOSTIC (GYN) Right 7/29/2015    Procedure: LAPAROSCOPY DIAGNOSTIC (GYN);  Surgeon: Lian Betancur DO;  Location: [facility]       Family history:  Family History   Problem Relation Age of Onset     Hypertension Father      Myocardial Infarction Maternal Grandfather      Melanoma No  "family hx of        Medications:  Current Outpatient Medications   Medication Sig Dispense Refill     diltiazem 2% in PLO gel Apply small amount to anal opening three times daily for 8 weeks. 60 g 0     estradiol (ESTRACE) 0.1 MG/GM vaginal cream Topically 3x/week       Magnesium Oxide 250 MG TABS Take 2 tablets by mouth 2 times daily       mineral oil-hydrophilic petrolatum (AQUAPHOR) external ointment Apply topically as needed for other (bottom soreness)       Multiple Vitamin (MULTI-VITAMINS PO)        Omega-3 Fatty Acids (OMEGA 3 PO)        imiquimod (ALDARA) 5 % external cream Apply once daily at bedtime until warts completely disappear up to a maximum of 12 weeks (Patient not taking: Reported on 2/10/2021) 12 packet 3     omeprazole (PRILOSEC) 40 MG DR capsule Take 1 capsule (40 mg) by mouth daily (Patient not taking: Reported on 12/8/2020) 30 capsule 0       Allergies:  The patientis allergic to rabies vaccine; shellfish-derived products; and sulfa drugs.    Social history:  Social History     Tobacco Use     Smoking status: Never Smoker     Smokeless tobacco: Never Used   Substance Use Topics     Alcohol use: Yes     Alcohol/week: 0.0 standard drinks     Comment: 1-2 per month      Marital status: .    Review of Systems:  Nursing Notes:   Rosalva Croft  5/4/2021 10:59 AM  Signed  Chief Complaint   Patient presents with     RECHECK     4 month follow up anoscopy for history of anal cancer       Vitals:    05/04/21 1056   BP: 128/88   BP Location: Left arm   Patient Position: Sitting   Cuff Size: Adult Large   Pulse: 79   SpO2: 100%   Weight: 205 lb 6.4 oz   Height: 5' 4.5\"       Body mass index is 34.71 kg/m .    Rosalva Croft MA       20 minutes spent on the date of the encounter doing chart review, history and exam, documentation, review of imaging, and further activities as noted above.     Stefano Banuelos MD   Professor and Chief  Division of Colon and Rectal Surgery  [facility] " [facility]    Referring Provider:  Referred Self, MD  No address on file     Primary Care Provider:  Lakesha Aguirre    This note was created using speech recognition software and may contain unintended word substitutions.

## 2021-05-04 NOTE — PATIENT INSTRUCTIONS
1. Diltiazem ointment 2% to be applied 3 times daily for 8 weeks  2. Fiber supplement such as Metamucil, Citrucel, or Benefiber once to twice a day  3. MiraLax or Milk of Magnesia if constipated  4. Tylenol, ibuprofen, and warm tub baths/sitz baths for pain  5. Follow up in 8 weeks. Follow up sooner if symptoms do not improve after 4 weeks.  7. Follow up with Letitia Arevalo NP in 6 weeks  8. Follow up with Dr. Banuelos in 4 months

## 2021-05-18 PROBLEM — C21.0 ANAL SQUAMOUS CELL CARCINOMA (H): Chronic | Status: ACTIVE | Noted: 2020-03-03

## 2021-06-02 ENCOUNTER — OFFICE VISIT (OUTPATIENT)
Dept: FAMILY MEDICINE | Facility: CLINIC | Age: 56
End: 2021-06-02
Payer: COMMERCIAL

## 2021-06-02 VITALS
SYSTOLIC BLOOD PRESSURE: 122 MMHG | OXYGEN SATURATION: 97 % | HEIGHT: 65 IN | DIASTOLIC BLOOD PRESSURE: 88 MMHG | RESPIRATION RATE: 18 BRPM | BODY MASS INDEX: 34.82 KG/M2 | HEART RATE: 96 BPM | TEMPERATURE: 97 F | WEIGHT: 209 LBS

## 2021-06-02 DIAGNOSIS — R10.11 ABDOMINAL PAIN, RIGHT UPPER QUADRANT: Primary | ICD-10-CM

## 2021-06-02 DIAGNOSIS — Z13.220 SCREENING FOR LIPOID DISORDERS: ICD-10-CM

## 2021-06-02 DIAGNOSIS — C21.0 ANAL SQUAMOUS CELL CARCINOMA (H): Chronic | ICD-10-CM

## 2021-06-02 DIAGNOSIS — K76.9 LIVER LESION: ICD-10-CM

## 2021-06-02 DIAGNOSIS — D69.6 ANEMIA WITH LOW PLATELET COUNT (H): ICD-10-CM

## 2021-06-02 LAB
ALBUMIN SERPL-MCNC: 3.7 G/DL (ref 3.4–5)
ALP SERPL-CCNC: 51 U/L (ref 40–150)
ALT SERPL W P-5'-P-CCNC: 110 U/L (ref 0–50)
ANION GAP SERPL CALCULATED.3IONS-SCNC: 7 MMOL/L (ref 3–14)
AST SERPL W P-5'-P-CCNC: 41 U/L (ref 0–45)
BASOPHILS # BLD AUTO: 0 10E9/L (ref 0–0.2)
BASOPHILS NFR BLD AUTO: 0.4 %
BILIRUB SERPL-MCNC: 0.3 MG/DL (ref 0.2–1.3)
BUN SERPL-MCNC: 18 MG/DL (ref 7–30)
CALCIUM SERPL-MCNC: 9 MG/DL (ref 8.5–10.1)
CHLORIDE SERPL-SCNC: 105 MMOL/L (ref 94–109)
CO2 SERPL-SCNC: 29 MMOL/L (ref 20–32)
CREAT SERPL-MCNC: 0.86 MG/DL (ref 0.52–1.04)
DIFFERENTIAL METHOD BLD: NORMAL
EOSINOPHIL # BLD AUTO: 0.2 10E9/L (ref 0–0.7)
EOSINOPHIL NFR BLD AUTO: 3.8 %
ERYTHROCYTE [DISTWIDTH] IN BLOOD BY AUTOMATED COUNT: 12.1 % (ref 10–15)
GFR SERPL CREATININE-BSD FRML MDRD: 77 ML/MIN/{1.73_M2}
GLUCOSE SERPL-MCNC: 89 MG/DL (ref 70–99)
HCT VFR BLD AUTO: 41.5 % (ref 35–47)
HGB BLD-MCNC: 14.2 G/DL (ref 11.7–15.7)
LYMPHOCYTES # BLD AUTO: 1.5 10E9/L (ref 0.8–5.3)
LYMPHOCYTES NFR BLD AUTO: 27 %
MCH RBC QN AUTO: 32.6 PG (ref 26.5–33)
MCHC RBC AUTO-ENTMCNC: 34.2 G/DL (ref 31.5–36.5)
MCV RBC AUTO: 95 FL (ref 78–100)
MONOCYTES # BLD AUTO: 0.6 10E9/L (ref 0–1.3)
MONOCYTES NFR BLD AUTO: 10.2 %
NEUTROPHILS # BLD AUTO: 3.2 10E9/L (ref 1.6–8.3)
NEUTROPHILS NFR BLD AUTO: 58.6 %
PLATELET # BLD AUTO: 205 10E9/L (ref 150–450)
POTASSIUM SERPL-SCNC: 3.9 MMOL/L (ref 3.4–5.3)
PROT SERPL-MCNC: 7.5 G/DL (ref 6.8–8.8)
RBC # BLD AUTO: 4.36 10E12/L (ref 3.8–5.2)
SODIUM SERPL-SCNC: 141 MMOL/L (ref 133–144)
WBC # BLD AUTO: 5.5 10E9/L (ref 4–11)

## 2021-06-02 PROCEDURE — 99214 OFFICE O/P EST MOD 30 MIN: CPT | Performed by: NURSE PRACTITIONER

## 2021-06-02 PROCEDURE — 36415 COLL VENOUS BLD VENIPUNCTURE: CPT | Performed by: NURSE PRACTITIONER

## 2021-06-02 PROCEDURE — 80053 COMPREHEN METABOLIC PANEL: CPT | Performed by: NURSE PRACTITIONER

## 2021-06-02 PROCEDURE — 85025 COMPLETE CBC W/AUTO DIFF WBC: CPT | Performed by: NURSE PRACTITIONER

## 2021-06-02 RX ORDER — OINTMENT BASE NO.104
OINTMENT (GRAM) TOPICAL
COMMUNITY

## 2021-06-02 ASSESSMENT — MIFFLIN-ST. JEOR: SCORE: 1545.96

## 2021-06-02 NOTE — PATIENT INSTRUCTIONS
Will be notified of pending labs.  Call to schedule U/S [phone]. If normal and pain persists, will consider CT.  Also touch base with oncologist.  Seek emergent care if worsening        Our Clinic hours are:  Mondays    7:20 am - 7 pm  Tues -  Fri  7:20 am - 5 pm    Clinic Phone: [phone]    The clinic lab opens at 7:30 am Mon - Fri and appointments are required.    [facility] [place]  Ph. [phone]  Monday  8 am - 7pm  Tues - Fri 8 am - 5:30 pm

## 2021-06-02 NOTE — PROGRESS NOTES
"    Assessment & Plan     Abdominal pain, right upper quadrant    - US Abdomen Complete; Future  - Comprehensive metabolic panel (BMP + Alb, Alk Phos, ALT, AST, Total. Bili, TP)  See below for plan of care.    Anemia with low platelet count (H)    - CBC with platelets and differential  Last CBC was 2/21, will recheck for any acute changes due to abdominal pain.    Anal squamous cell carcinoma (H)    Continue with care under oncology.             BMI:   Estimated body mass index is 35.32 kg/m  as calculated from the following:    Height as of this encounter: 1.638 m (5' 4.5\").    Weight as of this encounter: 94.8 kg (209 lb).       See Patient Instructions  Patient Instructions   Will be notified of pending labs.  Call to schedule U/S [phone]. If normal and pain persists, will consider CT.  Also touch base with oncologist.  Seek emergent care if worsening        Our Clinic hours are:  Mondays    7:20 am - 7 pm  Tues -  Fri  7:20 am - 5 pm    Clinic Phone: [phone]    The clinic lab opens at 7:30 am Mon - Fri and appointments are required.    [facility]  Ph. [phone]  Monday  8 am - 7pm  Tues - Fri 8 am - 5:30 pm             Return in about 1 week (around 6/9/2021) for or sooner if symptoms persist or worsen.    RADHA Martinez CNP  [facility]    Franklyn Cristobal is a 55 year old who presents for the following health issues  accompanied by her self:    HPI     Concern - hernia   Onset: 2 month but increased 2 days ago   Description: Patient has had a pain in RUQ for about 2 months but increased 2 days ago it does feel better with pressure applied to the pain and movement increases the pain   Intensity: moderate  Progression of Symptoms:  same  Accompanying Signs & Symptoms: patient had sneeze   Previous history of similar problem: patient may of had it in the past   Precipitating factors:        Worsened by: movement   Alleviating factors:        " "Improved by: with pressure   Therapies tried and outcome: None    She has had anal cancer and is presently being treated for that. Denies concerns that this present issue is related.  She has history of gallbladder removal. Has had pain in right side for about 2 months. History of normal EGD within past few years.  Denies changes in appetite.     Review of Systems   Constitutional, HEENT, cardiovascular, pulmonary, gi and gu systems are negative, except as otherwise noted.      Objective    /88   Pulse 96   Temp 97  F (36.1  C)   Resp 18   Ht 1.638 m (5' 4.5\")   Wt 94.8 kg (209 lb)   LMP 01/29/2016   SpO2 97%   BMI 35.32 kg/m    Body mass index is 35.32 kg/m .  Physical Exam   GENERAL: healthy, alert and no distress  NECK: no adenopathy, no asymmetry  RESP: lungs clear to auscultation - no rales, rhonchi or wheezes  CV: regular rate and rhythm, normal S1 S2, no S3 or S4, no murmur  ABDOMEN: soft, mild tenderness right upper/mid quadrant, no rigidity or rebound, no evidence of hernias, no masses and bowel sounds normal  MS: no gross musculoskeletal defects noted      No results found for this or any previous visit (from the past 24 hour(s)).            "

## 2021-06-03 ENCOUNTER — ANCILLARY PROCEDURE (OUTPATIENT)
Dept: ULTRASOUND IMAGING | Facility: CLINIC | Age: 56
End: 2021-06-03
Attending: NURSE PRACTITIONER
Payer: COMMERCIAL

## 2021-06-03 DIAGNOSIS — R10.11 ABDOMINAL PAIN, RIGHT UPPER QUADRANT: ICD-10-CM

## 2021-06-03 PROCEDURE — 76700 US EXAM ABDOM COMPLETE: CPT | Performed by: RADIOLOGY

## 2021-06-11 DIAGNOSIS — Z13.220 SCREENING FOR LIPOID DISORDERS: ICD-10-CM

## 2021-06-11 LAB
CHOLEST SERPL-MCNC: 240 MG/DL
HDLC SERPL-MCNC: 69 MG/DL
LDLC SERPL CALC-MCNC: 154 MG/DL
NONHDLC SERPL-MCNC: 171 MG/DL
TRIGL SERPL-MCNC: 83 MG/DL

## 2021-06-11 PROCEDURE — 36415 COLL VENOUS BLD VENIPUNCTURE: CPT | Performed by: NURSE PRACTITIONER

## 2021-06-11 PROCEDURE — 80061 LIPID PANEL: CPT | Performed by: NURSE PRACTITIONER

## 2021-06-14 ENCOUNTER — OFFICE VISIT (OUTPATIENT)
Dept: SURGERY | Facility: CLINIC | Age: 56
End: 2021-06-14
Payer: COMMERCIAL

## 2021-06-14 VITALS
WEIGHT: 206.6 LBS | HEART RATE: 95 BPM | OXYGEN SATURATION: 96 % | BODY MASS INDEX: 34.42 KG/M2 | HEIGHT: 65 IN | DIASTOLIC BLOOD PRESSURE: 93 MMHG | SYSTOLIC BLOOD PRESSURE: 129 MMHG

## 2021-06-14 DIAGNOSIS — K60.2 ANAL FISSURE: Primary | ICD-10-CM

## 2021-06-14 PROCEDURE — 99212 OFFICE O/P EST SF 10 MIN: CPT | Performed by: NURSE PRACTITIONER

## 2021-06-14 ASSESSMENT — PAIN SCALES - GENERAL: PAINLEVEL: NO PAIN (0)

## 2021-06-14 ASSESSMENT — MIFFLIN-ST. JEOR: SCORE: 1535.07

## 2021-06-14 NOTE — LETTER
"2021       RE: Susu Lagos  [id number]  [facility] [address]     Dear Colleague,    Thank you for referring your patient, Susu Lagos, to the [facility] [place] [facility]. Please see a copy of my visit note below.    Colon and Rectal Surgery Follow-Up Clinic Note    RE: Susu Lagos  : 1965  MALGORZATA: 2021    Susu Lagos is a very pleasant 55 year old female with history of anal cancer s/p chemoradiation who presents today for follow up of anal fissure.    Interval history: Susu saw Dr. Banuelos on 21 with anal fissure at that time. She has been using topical diltiazem three times a day. She no longer has any pain and no recent bleeding. Bowel movements have been soft.    Physical Examination: Exam was chaperoned by Rosalva Croft MA   BP (!) 129/93 (BP Location: Left arm, Patient Position: Sitting, Cuff Size: Adult Regular)   Pulse 95   Ht 5' 4.5\"   Wt 206 lb 9.6 oz   LMP 2016   SpO2 96%   BMI 34.92 kg/m    General: alert, oriented, in no acute distress, sitting comfortably  HEENT:mucous membranes moist  Perianal external examination:  Perianal skin: Intact with no excoriation or lichenification.  Lesions: No evidence of an external lesion, nodularity, or induration in the perianal region.  Eversion of buttocks: There was not evidence of an anal fissure. Details: N/A.  Skin tags or external hemorrhoids: two small external skin tags present.    Digital rectal examination: Was deferred.    Anoscopy: Was deferred.    Assessment/Plan: 55 year old female with history of anal cancer s/p chemoradiation and healed anal fissure. Complete two more weeks of diltiazem and continue to keep stools soft. Follow up in 3 months for anal cancer recheck with Dr. Banuelos. Contact the clinic in the meantime with any change in symptoms, questions, or concerns. Patient's questions were " answered to her stated satisfaction and she is in agreement with this plan.    Medical history:  Past Medical History:   Diagnosis Date     Menorrhagia with irregular cycle 2/28/2016     Ovarian cyst 6/4/2015    Right, 6 cm dermoid on MRI-recommend removal.  7/29/2015 Surgery for right salpingoophorectomy.       Perimenopause 6/4/2015     Rectal cancer (H)     had radiation and chemo 2020       Surgical history:  Past Surgical History:   Procedure Laterality Date     CHOLECYSTECTOMY  2013    LSC     COLONOSCOPY N/A 11/13/2018    Procedure: colonoscopy;  Surgeon: Sesar Dos Santos MD;  Location: [facility]     COLONOSCOPY N/A 3/3/2020    Procedure: COLONOSCOPY, WITH POLYPECTOMY AND BIOPSY;  Surgeon: Sesar Dos Santos MD;  Location: [facility]     DILATION AND CURETTAGE, OPERATIVE HYSTEROSCOPY, COMBINED N/A 2/29/2016    Procedure: COMBINED DILATION AND CURETTAGE, OPERATIVE HYSTEROSCOPY;  Surgeon: Panda Knight MD;  Location: [facility]     ESOPHAGOSCOPY, GASTROSCOPY, DUODENOSCOPY (EGD), COMBINED N/A 9/3/2020    Procedure: ESOPHAGOGASTRODUODENOSCOPY (EGD);  Surgeon: Karlos Burnett MD;  Location: [facility]     GYN SURGERY  1995    tubes tied     LAPAROSCOPY DIAGNOSTIC (GYN) Right 7/29/2015    Procedure: LAPAROSCOPY DIAGNOSTIC (GYN);  Surgeon: Lian Betancur DO;  Location: [facility]       Problem list:  Patient Active Problem List    Diagnosis Date Noted     Anemia with low platelet count (H) 06/02/2021     Priority: Medium     Intractable pain 05/18/2020     Priority: Medium     Cervical cancer screening 03/20/2020     Priority: Medium     2008, 2010, 2011, 2013 All NIL paps.   4/22/15 NIL Pap, Neg HPV. Plan: Cotest in 5 yr       **  3/2020 Dx rectal cancer **  3/13/20 NIL Pap, Neg HPV. TZ absent. Plan: cotest in 3 years per provider and Oncologist.        Anal squamous cell carcinoma (H) 03/03/2020     Priority: Medium     Check 7.21 Farhan for f/u YARIEL Lagos presented to the primary care physician with 3 to 4  months history of blood in the stool.  Subsequently the patient was referred for colonoscopy on March 3, 2020.  The palpable rectal mass on digital examination.  Biopsy came back positive for squamous cell carcinoma, focally keratinizing.  The patient has a previous colonoscopy on November 2018 which shows no abnormalities.  The patient was started on chemoradiation regimen with 5-FU and mitomycin.       CARDIOVASCULAR SCREENING; LDL GOAL LESS THAN 160 02/25/2015     Priority: Medium       Medications:  Current Outpatient Medications   Medication Sig Dispense Refill     diltiazem 2% in PLO gel Apply small amount to anal opening three times daily for 8 weeks. 60 g 0     estradiol (ESTRACE) 0.1 MG/GM vaginal cream Topically 3x/week       Magnesium Oxide 250 MG TABS Take 2 tablets by mouth 2 times daily       Multiple Vitamin (MULTI-VITAMINS PO)        Omega-3 Fatty Acids (OMEGA 3 PO)        Hydrophilic ointment        imiquimod (ALDARA) 5 % external cream Apply once daily at bedtime until warts completely disappear up to a maximum of 12 weeks (Patient not taking: Reported on 2/10/2021) 12 packet 3     mineral oil-hydrophilic petrolatum (AQUAPHOR) external ointment Apply topically as needed for other (bottom soreness)       omeprazole (PRILOSEC) 40 MG DR capsule Take 1 capsule (40 mg) by mouth daily (Patient not taking: Reported on 12/8/2020) 30 capsule 0       Allergies:  Allergies   Allergen Reactions     Rabies Vaccine Unknown     Scratch test resulted, it was not a good idea to give it to her.     Shellfish-Derived Products Nausea and Vomiting     Sulfa Drugs Rash       Family history:  Family History   Problem Relation Age of Onset     Hypertension Father      Myocardial Infarction Maternal Grandfather      Melanoma No family hx of        Social history:  Social History     Tobacco Use     Smoking status: Never Smoker     Smokeless tobacco: Never Used   Substance Use Topics     Alcohol use: Yes     Alcohol/week:  "0.0 standard drinks     Comment: 1-2 per month      Marital status: .    Nursing Notes:   Rosalva Croft  6/14/2021 11:18 AM  Signed  Chief Complaint   Patient presents with     RECHECK     anoscopy follow up       Vitals:    06/14/21 1112   BP: (!) 129/93   BP Location: Left arm   Patient Position: Sitting   Cuff Size: Adult Regular   Pulse: 95   SpO2: 96%   Weight: 206 lb 9.6 oz   Height: 5' 4.5\"       Body mass index is 34.92 kg/m .    Rosalva Croft CMA        15 minutes spent on the date of the encounter doing chart review, history and exam, documentation and further activities as noted above.     Letitia Arevalo, NP-C  Colon and Rectal Surgery  [facility]        Again, thank you for allowing me to participate in the care of your patient.      Sincerely,    RADHA Higginbotham CNP      "

## 2021-06-14 NOTE — PROGRESS NOTES
"Colon and Rectal Surgery Follow-Up Clinic Note    RE: Susu Lagos  : 1965  MALGORZATA: 2021    Susu Lagos is a very pleasant 55 year old female with history of anal cancer s/p chemoradiation who presents today for follow up of anal fissure.    Interval history: Susu saw Dr. Banuelos on 21 with anal fissure at that time. She has been using topical diltiazem three times a day. She no longer has any pain and no recent bleeding. Bowel movements have been soft.    Physical Examination: Exam was chaperoned by Rosalva Croft MA   BP (!) 129/93 (BP Location: Left arm, Patient Position: Sitting, Cuff Size: Adult Regular)   Pulse 95   Ht 5' 4.5\"   Wt 206 lb 9.6 oz   LMP 2016   SpO2 96%   BMI 34.92 kg/m    General: alert, oriented, in no acute distress, sitting comfortably  HEENT:mucous membranes moist  Perianal external examination:  Perianal skin: Intact with no excoriation or lichenification.  Lesions: No evidence of an external lesion, nodularity, or induration in the perianal region.  Eversion of buttocks: There was not evidence of an anal fissure. Details: N/A.  Skin tags or external hemorrhoids: two small external skin tags present.    Digital rectal examination: Was deferred.    Anoscopy: Was deferred.    Assessment/Plan: 55 year old female with history of anal cancer s/p chemoradiation and healed anal fissure. Complete two more weeks of diltiazem and continue to keep stools soft. Follow up in 3 months for anal cancer recheck with Dr. Banuelos. Contact the clinic in the meantime with any change in symptoms, questions, or concerns. Patient's questions were answered to her stated satisfaction and she is in agreement with this plan.    Medical history:  Past Medical History:   Diagnosis Date     Menorrhagia with irregular cycle 2016     Ovarian cyst 2015    Right, 6 cm dermoid on MRI-recommend removal.  2015 Surgery for right salpingoophorectomy.       Perimenopause " 6/4/2015     Rectal cancer (H)     had radiation and chemo 2020       Surgical history:  Past Surgical History:   Procedure Laterality Date     CHOLECYSTECTOMY  2013    LSC     COLONOSCOPY N/A 11/13/2018    Procedure: colonoscopy;  Surgeon: Sesar Dos Santos MD;  Location: [facility]     COLONOSCOPY N/A 3/3/2020    Procedure: COLONOSCOPY, WITH POLYPECTOMY AND BIOPSY;  Surgeon: Sesar Dos Santos MD;  Location: [facility]     DILATION AND CURETTAGE, OPERATIVE HYSTEROSCOPY, COMBINED N/A 2/29/2016    Procedure: COMBINED DILATION AND CURETTAGE, OPERATIVE HYSTEROSCOPY;  Surgeon: Panda Knight MD;  Location: [facility]     ESOPHAGOSCOPY, GASTROSCOPY, DUODENOSCOPY (EGD), COMBINED N/A 9/3/2020    Procedure: ESOPHAGOGASTRODUODENOSCOPY (EGD);  Surgeon: Karlos Burnett MD;  Location: [facility]     GYN SURGERY  1995    tubes tied     LAPAROSCOPY DIAGNOSTIC (GYN) Right 7/29/2015    Procedure: LAPAROSCOPY DIAGNOSTIC (GYN);  Surgeon: Lian Betancur DO;  Location: [facility]       Problem list:  Patient Active Problem List    Diagnosis Date Noted     Anemia with low platelet count (H) 06/02/2021     Priority: Medium     Intractable pain 05/18/2020     Priority: Medium     Cervical cancer screening 03/20/2020     Priority: Medium     2008, 2010, 2011, 2013 All NIL paps.   4/22/15 NIL Pap, Neg HPV. Plan: Cotest in 5 yr       **  3/2020 Dx rectal cancer **  3/13/20 NIL Pap, Neg HPV. TZ absent. Plan: cotest in 3 years per provider and Oncologist.        Anal squamous cell carcinoma (H) 03/03/2020     Priority: Medium     Check 7.21 Farhan for f/u [place]  Susu Lagos presented to the primary care physician with 3 to 4 months history of blood in the stool.  Subsequently the patient was referred for colonoscopy on March 3, 2020.  The palpable rectal mass on digital examination.  Biopsy came back positive for squamous cell carcinoma, focally keratinizing.  The patient has a previous colonoscopy on November 2018 which shows no abnormalities.  The  patient was started on chemoradiation regimen with 5-FU and mitomycin.       CARDIOVASCULAR SCREENING; LDL GOAL LESS THAN 160 02/25/2015     Priority: Medium       Medications:  Current Outpatient Medications   Medication Sig Dispense Refill     diltiazem 2% in PLO gel Apply small amount to anal opening three times daily for 8 weeks. 60 g 0     estradiol (ESTRACE) 0.1 MG/GM vaginal cream Topically 3x/week       Magnesium Oxide 250 MG TABS Take 2 tablets by mouth 2 times daily       Multiple Vitamin (MULTI-VITAMINS PO)        Omega-3 Fatty Acids (OMEGA 3 PO)        Hydrophilic ointment        imiquimod (ALDARA) 5 % external cream Apply once daily at bedtime until warts completely disappear up to a maximum of 12 weeks (Patient not taking: Reported on 2/10/2021) 12 packet 3     mineral oil-hydrophilic petrolatum (AQUAPHOR) external ointment Apply topically as needed for other (bottom soreness)       omeprazole (PRILOSEC) 40 MG DR capsule Take 1 capsule (40 mg) by mouth daily (Patient not taking: Reported on 12/8/2020) 30 capsule 0       Allergies:  Allergies   Allergen Reactions     Rabies Vaccine Unknown     Scratch test resulted, it was not a good idea to give it to her.     Shellfish-Derived Products Nausea and Vomiting     Sulfa Drugs Rash       Family history:  Family History   Problem Relation Age of Onset     Hypertension Father      Myocardial Infarction Maternal Grandfather      Melanoma No family hx of        Social history:  Social History     Tobacco Use     Smoking status: Never Smoker     Smokeless tobacco: Never Used   Substance Use Topics     Alcohol use: Yes     Alcohol/week: 0.0 standard drinks     Comment: 1-2 per month      Marital status: .    Nursing Notes:   Rosalva Croft  6/14/2021 11:18 AM  Signed  Chief Complaint   Patient presents with     RECHECK     anoscopy follow up       Vitals:    06/14/21 1112   BP: (!) 129/93   BP Location: Left arm   Patient Position: Sitting   Cuff Size:  "Adult Regular   Pulse: 95   SpO2: 96%   Weight: 206 lb 9.6 oz   Height: 5' 4.5\"       Body mass index is 34.92 kg/m .    Rosalva Croft CMA        15 minutes spent on the date of the encounter doing chart review, history and exam, documentation and further activities as noted above.     JEN HairC  Colon and Rectal Surgery  [facility]    "

## 2021-06-14 NOTE — NURSING NOTE
"Chief Complaint   Patient presents with     RECHECK     anoscopy follow up       Vitals:    06/14/21 1112   BP: (!) 129/93   BP Location: Left arm   Patient Position: Sitting   Cuff Size: Adult Regular   Pulse: 95   SpO2: 96%   Weight: 206 lb 9.6 oz   Height: 5' 4.5\"       Body mass index is 34.92 kg/m .    Rosalva Croft CMA    "

## 2021-06-15 ENCOUNTER — HOSPITAL ENCOUNTER (OUTPATIENT)
Dept: MRI IMAGING | Facility: CLINIC | Age: 56
Discharge: HOME | End: 2021-06-15
Attending: NURSE PRACTITIONER | Admitting: NURSE PRACTITIONER
Payer: COMMERCIAL

## 2021-06-15 ENCOUNTER — OFFICE VISIT (OUTPATIENT)
Dept: RADIATION THERAPY | Facility: OUTPATIENT CENTER | Age: 56
End: 2021-06-15
Payer: COMMERCIAL

## 2021-06-15 VITALS
DIASTOLIC BLOOD PRESSURE: 87 MMHG | WEIGHT: 208 LBS | BODY MASS INDEX: 35.15 KG/M2 | SYSTOLIC BLOOD PRESSURE: 125 MMHG | RESPIRATION RATE: 16 BRPM

## 2021-06-15 DIAGNOSIS — K76.9 LIVER LESION: ICD-10-CM

## 2021-06-15 DIAGNOSIS — C21.0 ANAL SQUAMOUS CELL CARCINOMA (H): Primary | ICD-10-CM

## 2021-06-15 PROCEDURE — 255N000002 HC RX 255 OP 636: Performed by: NURSE PRACTITIONER

## 2021-06-15 PROCEDURE — A9585 GADOBUTROL INJECTION: HCPCS | Performed by: NURSE PRACTITIONER

## 2021-06-15 PROCEDURE — 74183 MRI ABD W/O CNTR FLWD CNTR: CPT

## 2021-06-15 RX ORDER — GADOBUTROL 604.72 MG/ML
9 INJECTION INTRAVENOUS ONCE
Status: COMPLETED | OUTPATIENT
Start: 2021-06-15 | End: 2021-06-15

## 2021-06-15 RX ADMIN — GADOBUTROL 9 ML: 604.72 INJECTION INTRAVENOUS at 15:19

## 2021-06-15 NOTE — NURSING NOTE
FOLLOW-UP VISIT    Patient Name: Susu Lagos      : 1965     Age: 55 year old        ______________________________________________________________________________     Chief Complaint   Patient presents with     Radiation Therapy     Return visit, anal cancer     /87   Resp 16   Wt 94.3 kg (208 lb)   LMP 2016   BMI 35.15 kg/m       Date Radiation Completed: 2020    Pain  Denies    Meds  Current Med List Reviewed: Yes       On Chemo?: No  No bladder or bowel issues, taking psyllium husk oral. No edema noted.      Other Appointments:     Date  Oncologist: Farhan Has been discharged   Surgeon: Lakisha      Other Notes: MRI today for evaluation of liver/abdominal pain

## 2021-06-15 NOTE — LETTER
6/15/2021         RE: Susu Lagos  [id number]  [facility] [address]        Dear Colleague,    Thank you for referring your patient, Susu Lagos, to the [facility]. Please see a copy of my visit note below.       Department of Radiation Oncology  Radiation Therapy Center  [facility]  [place] [address]  [phone]       Radiation Oncology Follow-up Visit  Gisselle 15, 2021    Susu Lagos  MRN: [id number]   : 1965     DIAGNOSIS: squamous cell carcinoma of the anal canal  PATHOLOGY:  squamous cell carcinoma                              STAGE: clinical T1N0  INTENT OF RADIOTHERAPY: definitve CRT  CONCURRENT SYSTEMIC THERAPY: Yes  Oncologist: Dr. Real  Drug Name/Frequency 1: 5FU  Drug Name/Frequency 1: Mitomycin     ONCOLOGIC HISTORY:   Ms. 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       The patient initially presented with symptoms of hematochezia for several months eventually prompting further evaluation.     On 3/3/2020 the patient underwent colonoscopy which demonstrated a mass located at the dentate line in the left anterior position.  The mass was less than one third circumferentially involved.  Biopsy of the mass obtained demonstrated squamous cell carcinoma.  The patient was evaluated by gynecology on 3/13/2020, Pap smear was negative.  On 3/19/2020 the patient underwent a PET scan.  Imaging demonstrated a focal hypermetabolic activity in the anal canal, max SUV 12.6.  No regional or distant disease was noted.  On 3/19/2020 the patient underwent MRI which demonstrated a 1.2 x 0.9 cm mass located 2.7 cm above the anal verge.  No enlarged regional nodes were noted.  The patient was seen by Dr. Real of medical oncology who discussed treatment with chemoradiation therapy.  Patient subsequently presented to radiation oncology clinic to discuss potential role of radiation therapy as  a part of treatment strategy.            SITE OF TREATMENT: pelvis     DATES  OF TREATMENT: 20-20     TOTAL DOSE OF TREATMENT: 5040 cGy     DOSE PER FRACTION OF TREATMENT: 180 cGy x 28 fractions    INTERVAL SINCE COMPLETION OF RADIATION THERAPY:   13 months    SUBJECTIVE:   The patient presents for follow up.     Post treatment MRI (20) was JOSE LUIS. PET on 20 demonstrated excellent response with indeterminate but mild activity in anal canal region without clear evidence of disease. Scope and exam by Dr. Banuelos on 21 was JOSE LUIS.    Patient is overall doing well. She is currently being treated for anal fissure with daily fiber and topical diltiazem with improvement. Intermittent bouts of bowel urgency at times. Stool otherwise well formed, soft. Using probiotic. No  trouble. No lower extremity lymphedema. Using estradiol cream for vaginal dryness.    Undergoing MRI liver today for likely liver cysts seen on recent abdominal ultrasound.     PHYSICAL EXAM:  /87   Resp 16   Wt 94.3 kg (208 lb)   LMP 2016   BMI 35.15 kg/m    Gen: Alert, in NAD  Eyes: PERRL, EOMI, sclera anicteric  Neck: Supple, full ROM, no LAD  Pulm: No wheezing, stridor or respiratory distress  CV: Well-perfused, no cyanosis, no pedal edema  Abdominal: Soft, nontender, nondistended, no hepatomegaly  Back: No step-offs or pain to palpation along the thoracolumbar spine, no CVA tenderness  Musculoskeletal: Normal bulk and tone   / Skin: Deferred  Neurologic: A/Ox3, CN II-XII intact  Psychiatric: Appropriate mood and affect    LABS AND IMAGIN20  PET    FINDINGS:      HEAD/NECK:  There is no  suspicious FDG uptake in the neck.      The paranasal sinuses are clear. The mastoid air cells are clear.      The mucosal pharyngeal space, the , prevertebral and carotid  spaces are within normal limits.      No masses, mass effect or pathologically enlarged lymph nodes are  evident. The thyroid gland is  unremarkable.     CHEST:  There is no suspicious FDG uptake in the chest.      The heart is normal in size. No pericardial effusion. The ascending  aorta and the main pulmonary artery are normal in caliber. There are  no no pathologically enlarged or hypermetabolic mediastinal, hilar or  axillary lymph nodes.      There are no suspicious lung nodules or evidence for infection.        There is no significant pericardial or plural effusions.     ABDOMEN AND PELVIS:  Persistent focus of hypermetabolism in the rectum with a max SUV of  5.6, previous max SUV of 12.6 on 3/19/2020. No enlarged or  hypermetabolic inguinal, pelvic or intra-abdominal lymph nodes. No  other suspicious foci of FDG uptake in the abdomen or pelvis.     There are no suspicious hepatic lesions. Unchanged subcentimeter  hypodense subcapsular lesion in hepatic segment 4A, too small to  characterize. The pancreas, spleen and adrenal glands are  unremarkable. Both kidneys are unremarkable. No hydronephrosis or  hydroureter. No renal or ureteral stones. The urinary bladder is  unremarkable. No pelvic masses. No abnormally dilated loops of small  or large bowel. Sigmoid colonic diverticulosis, without evidence of  acute diverticulitis. Unremarkable appendix. No free fluid or free  intraperitoneal air. Abdominal aorta is normal in caliber. Diffuse FDG  uptake throughout the stomach.     LOWER EXTREMITIES:   No abnormal masses or hypermetabolic lesions.     BONES:   There are no suspicious lytic or blastic osseous lesions.  There is no  abnormal FDG uptake in the skeleton.                                                                         IMPRESSION: In this patient with a history of squamous cell carcinoma  of the anus, status post chemotherapy and radiation:  1. Persistent hypermetabolic uptake in the anus, slightly decreased  since the previous PET/CT on 3/19/2020 but believed to represent  residual disease.   2. No evidence of metastatic disease.    3. Diffuse FDG uptake in the stomach, which can be seen with  Gastritis.      8/6/20  MRI pelvis  FINDINGS: There is mild thickening of the distal rectum and anus, with  some T2 dark signal along the anterior portion of the anus at the site  of a previously diagnosed cancer, likely representing fibrosis and  radiation related changes. There is no evidence for recurrent mass. No  pelvic lymphadenopathy.     Bone marrow and soft tissues. The bone marrow and soft tissues are  within normal limits. The urinary bladder is normal in appearance.  Trace pelvic free fluid.                                                                      IMPRESSION: Posttreatment changes of the anus without clear evidence  for local recurrence or pelvic metastatic disease.       IMPRESSION:   Ms. 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  She completed definitive CRT to a total dose of 50.4 Gy in 28 fractions with concurrent 5FU-MMC  (completed on 5/18/20).    PLAN:   1. Clinically and radiographically JOSE LUIS. Post treatment MRI (8/6/20) was JOSE LUIS. PET on 7/28/20 demonstrated excellent response with indeterminate but mild activity in anal canal region without clear evidence of disease. Scope and exam by Dr. Banuelos on 5/4/21 was JOSE LUIS.    2. Mild GI bother. Discussed imodium as needed and continuing probiotic.    3.Cancer related pain. No longer needing oral narcotic.     4. Anal fissure. Managed by Dr. Banuelos's team, improving.     5. The patient will benefit from continued oncologic surveillance from our colo-rectal colleagues (Dr. Banuelos). The patient will see Dr. Banuelos on 9/14/21.    6. RTC in 6 months.    Tyree Remy M.D.  Department of Radiation Oncology  [facility]

## 2021-06-15 NOTE — PROGRESS NOTES
Department of Radiation Oncology  Radiation Therapy Center  [facility] [place] [address]  [phone]       Radiation Oncology Follow-up Visit  Gisselle 15, 2021    Susu Lagos  MRN: [id number]   : 1965     DIAGNOSIS: squamous cell carcinoma of the anal canal  PATHOLOGY:  squamous cell carcinoma                              STAGE: clinical T1N0  INTENT OF RADIOTHERAPY: definitve CRT  CONCURRENT SYSTEMIC THERAPY: Yes  Oncologist: Dr. Real  Drug Name/Frequency 1: 5FU  Drug Name/Frequency 1: Mitomycin     ONCOLOGIC HISTORY:   Ms. 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       The patient initially presented with symptoms of hematochezia for several months eventually prompting further evaluation.     On 3/3/2020 the patient underwent colonoscopy which demonstrated a mass located at the dentate line in the left anterior position.  The mass was less than one third circumferentially involved.  Biopsy of the mass obtained demonstrated squamous cell carcinoma.  The patient was evaluated by gynecology on 3/13/2020, Pap smear was negative.  On 3/19/2020 the patient underwent a PET scan.  Imaging demonstrated a focal hypermetabolic activity in the anal canal, max SUV 12.6.  No regional or distant disease was noted.  On 3/19/2020 the patient underwent MRI which demonstrated a 1.2 x 0.9 cm mass located 2.7 cm above the anal verge.  No enlarged regional nodes were noted.  The patient was seen by Dr. Real of medical oncology who discussed treatment with chemoradiation therapy.  Patient subsequently presented to radiation oncology clinic to discuss potential role of radiation therapy as a part of treatment strategy.            SITE OF TREATMENT: pelvis     DATES  OF TREATMENT: 20-20     TOTAL DOSE OF TREATMENT: 5040 cGy     DOSE PER FRACTION OF TREATMENT: 180 cGy x 28 fractions    INTERVAL SINCE COMPLETION OF  RADIATION THERAPY:   13 months    SUBJECTIVE:   The patient presents for follow up.     Post treatment MRI (20) was JOSE LUIS. PET on 20 demonstrated excellent response with indeterminate but mild activity in anal canal region without clear evidence of disease. Scope and exam by Dr. Banuelos on 21 was JOSE LUIS.    Patient is overall doing well. She is currently being treated for anal fissure with daily fiber and topical diltiazem with improvement. Intermittent bouts of bowel urgency at times. Stool otherwise well formed, soft. Using probiotic. No  trouble. No lower extremity lymphedema. Using estradiol cream for vaginal dryness.    Undergoing MRI liver today for likely liver cysts seen on recent abdominal ultrasound.     PHYSICAL EXAM:  /87   Resp 16   Wt 94.3 kg (208 lb)   LMP 2016   BMI 35.15 kg/m    Gen: Alert, in NAD  Eyes: PERRL, EOMI, sclera anicteric  Neck: Supple, full ROM, no LAD  Pulm: No wheezing, stridor or respiratory distress  CV: Well-perfused, no cyanosis, no pedal edema  Abdominal: Soft, nontender, nondistended, no hepatomegaly  Back: No step-offs or pain to palpation along the thoracolumbar spine, no CVA tenderness  Musculoskeletal: Normal bulk and tone   / Skin: Deferred  Neurologic: A/Ox3, CN II-XII intact  Psychiatric: Appropriate mood and affect    LABS AND IMAGIN20  PET    FINDINGS:      HEAD/NECK:  There is no  suspicious FDG uptake in the neck.      The paranasal sinuses are clear. The mastoid air cells are clear.      The mucosal pharyngeal space, the , prevertebral and carotid  spaces are within normal limits.      No masses, mass effect or pathologically enlarged lymph nodes are  evident. The thyroid gland is unremarkable.     CHEST:  There is no suspicious FDG uptake in the chest.      The heart is normal in size. No pericardial effusion. The ascending  aorta and the main pulmonary artery are normal in caliber. There are  no no pathologically  enlarged or hypermetabolic mediastinal, hilar or  axillary lymph nodes.      There are no suspicious lung nodules or evidence for infection.        There is no significant pericardial or plural effusions.     ABDOMEN AND PELVIS:  Persistent focus of hypermetabolism in the rectum with a max SUV of  5.6, previous max SUV of 12.6 on 3/19/2020. No enlarged or  hypermetabolic inguinal, pelvic or intra-abdominal lymph nodes. No  other suspicious foci of FDG uptake in the abdomen or pelvis.     There are no suspicious hepatic lesions. Unchanged subcentimeter  hypodense subcapsular lesion in hepatic segment 4A, too small to  characterize. The pancreas, spleen and adrenal glands are  unremarkable. Both kidneys are unremarkable. No hydronephrosis or  hydroureter. No renal or ureteral stones. The urinary bladder is  unremarkable. No pelvic masses. No abnormally dilated loops of small  or large bowel. Sigmoid colonic diverticulosis, without evidence of  acute diverticulitis. Unremarkable appendix. No free fluid or free  intraperitoneal air. Abdominal aorta is normal in caliber. Diffuse FDG  uptake throughout the stomach.     LOWER EXTREMITIES:   No abnormal masses or hypermetabolic lesions.     BONES:   There are no suspicious lytic or blastic osseous lesions.  There is no  abnormal FDG uptake in the skeleton.                                                                         IMPRESSION: In this patient with a history of squamous cell carcinoma  of the anus, status post chemotherapy and radiation:  1. Persistent hypermetabolic uptake in the anus, slightly decreased  since the previous PET/CT on 3/19/2020 but believed to represent  residual disease.   2. No evidence of metastatic disease.   3. Diffuse FDG uptake in the stomach, which can be seen with  Gastritis.      8/6/20  MRI pelvis  FINDINGS: There is mild thickening of the distal rectum and anus, with  some T2 dark signal along the anterior portion of the anus at the  site  of a previously diagnosed cancer, likely representing fibrosis and  radiation related changes. There is no evidence for recurrent mass. No  pelvic lymphadenopathy.     Bone marrow and soft tissues. The bone marrow and soft tissues are  within normal limits. The urinary bladder is normal in appearance.  Trace pelvic free fluid.                                                                      IMPRESSION: Posttreatment changes of the anus without clear evidence  for local recurrence or pelvic metastatic disease.       IMPRESSION:   Ms. 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  She completed definitive CRT to a total dose of 50.4 Gy in 28 fractions with concurrent 5FU-MMC  (completed on 5/18/20).    PLAN:   1. Clinically and radiographically JOSE LUIS. Post treatment MRI (8/6/20) was JOSE LUIS. PET on 7/28/20 demonstrated excellent response with indeterminate but mild activity in anal canal region without clear evidence of disease. Scope and exam by Dr. Banuelos on 5/4/21 was JOSE LUIS.    2. Mild GI bother. Discussed imodium as needed and continuing probiotic.    3.Cancer related pain. No longer needing oral narcotic.     4. Anal fissure. Managed by Dr. Banuelos's team, improving.     5. The patient will benefit from continued oncologic surveillance from our colo-rectal colleagues (Dr. Banuelos). The patient will see Dr. Banuelos on 9/14/21.    6. RTC in 6 months.    Tyree Remy M.D.  Department of Radiation Oncology  [facility]

## 2021-06-17 NOTE — RESULT ENCOUNTER NOTE
Called and spoke with patient with below information. Patient has an appointment this tomorrow to discuss recent labs and MRI.    Yanet Melendrez CMA

## 2021-06-18 ENCOUNTER — OFFICE VISIT (OUTPATIENT)
Dept: FAMILY MEDICINE | Facility: CLINIC | Age: 56
End: 2021-06-18
Payer: COMMERCIAL

## 2021-06-18 VITALS
TEMPERATURE: 98.8 F | SYSTOLIC BLOOD PRESSURE: 114 MMHG | HEART RATE: 114 BPM | RESPIRATION RATE: 18 BRPM | OXYGEN SATURATION: 98 % | DIASTOLIC BLOOD PRESSURE: 88 MMHG

## 2021-06-18 DIAGNOSIS — R10.11 RUQ ABDOMINAL PAIN: Primary | ICD-10-CM

## 2021-06-18 DIAGNOSIS — K76.89 LIVER CYST: ICD-10-CM

## 2021-06-18 DIAGNOSIS — K76.0 FATTY LIVER: ICD-10-CM

## 2021-06-18 PROCEDURE — 99213 OFFICE O/P EST LOW 20 MIN: CPT | Performed by: NURSE PRACTITIONER

## 2021-06-18 NOTE — PROGRESS NOTES
"    Assessment & Plan     RUQ abdominal pain    - GASTROENTEROLOGY ADULT REF CONSULT ONLY; Future  U/S appeared benign, no reasons found for pain, will send to GI.  Discussed healthy diet, exercise, intermittent fasting and avoidance of known liver toxins.    Liver cyst    - GASTROENTEROLOGY ADULT REF CONSULT ONLY; Future    Fatty liver    - GASTROENTEROLOGY ADULT REF CONSULT ONLY; Future             BMI:   Estimated body mass index is 35.15 kg/m  as calculated from the following:    Height as of 6/14/21: 1.638 m (5' 4.5\").    Weight as of 6/15/21: 94.3 kg (208 lb).       See Patient Instructions  Patient Instructions   Continue with healthy diet, exercise, limiting alcohol and tylenol.  Follow up with GI specialist for further evaluation.      Our Clinic hours are:  Mondays    7:20 am - 7 pm  Tues -  Fri  7:20 am - 5 pm    Clinic Phone: [phone]    The clinic lab opens at 7:30 am Mon - Fri and appointments are required.    [facility]. [phone]  Monday  8 am - 7pm  Tues - Fri 8 am - 5:30 pm             Return in about 6 months (around 12/18/2021) for or sooner if symptoms persist or worsen, Physical Exam, Lab Work, Med Check.    RADHA Martinez CNP  [facility]    Franklyn Cristobal is a 55 year old who presents for the following health issues     HPI     Chief Complaint   Patient presents with     Results     Here to go over MRI and recent lab results                                                                    IMPRESSION:  1. 1 cm mildly complex T2 hyperintense nonenhancing cystic focus in  the left hepatic lobe, likely representing the indeterminate lesions  seen on the 6/3/2021 exam. This lesion likely represents minimally  complex liver cyst. Multiple other smaller T2 hyperintense cystic foci  throughout the liver are too small to characterize, could also  represent liver cysts. No suspicious focal hepatic lesion.  2. Significant hepatic " steatosis.     MORRIS UPTON MD    ALT was 110    She states she still has vague complaints of RUQ abdominal pain.  She would like to see a specialist.  She did not mention these recent findings to her oncologist as of yet.        Review of Systems   Constitutional, HEENT, cardiovascular, pulmonary, gi and gu systems are negative, except as otherwise noted.      Objective    /88   Pulse 114   Temp 98.8  F (37.1  C) (Tympanic)   Resp 18   LMP 01/29/2016   SpO2 98%   There is no height or weight on file to calculate BMI.  Physical Exam   GENERAL: healthy, alert and no distress  NECK: no adenopathy, no asymmetry  RESP: lungs clear   CV: regular rate and rhythm  ABDOMEN: soft, nontender  MS: no gross musculoskeletal defects noted'

## 2021-06-18 NOTE — PATIENT INSTRUCTIONS
Continue with healthy diet, exercise, limiting alcohol and tylenol.  Follow up with GI specialist for further evaluation.      Our Clinic hours are:  Mondays    7:20 am - 7 pm  Tues -  Fri  7:20 am - 5 pm    Clinic Phone: [phone]    The clinic lab opens at 7:30 am Mon - Fri and appointments are required.    [facility]  Ph. [phone]  Monday  8 am - 7pm  Tues - Fri 8 am - 5:30 pm          Cyclophosphamide Pregnancy And Lactation Text: This medication is Pregnancy Category D and it isn't considered safe during pregnancy. This medication is excreted in breast milk. Itraconazole Pregnancy And Lactation Text: This medication is Pregnancy Category C and it isn't know if it is safe during pregnancy. It is also excreted in breast milk. Azithromycin Pregnancy And Lactation Text: This medication is considered safe during pregnancy and is also secreted in breast milk. Tremfya Counseling: I discussed with the patient the risks of guselkumab including but not limited to immunosuppression, serious infections, worsening of inflammatory bowel disease and drug reactions.  The patient understands that monitoring is required including a PPD at baseline and must alert us or the primary physician if symptoms of infection or other concerning signs are noted. Spironolactone Pregnancy And Lactation Text: This medication can cause feminization of the male fetus and should be avoided during pregnancy. The active metabolite is also found in breast milk. Benzoyl Peroxide Counseling: Patient counseled that medicine may cause skin irritation and bleach clothing.  In the event of skin irritation, the patient was advised to reduce the amount of the drug applied or use it less frequently.   The patient verbalized understanding of the proper use and possible adverse effects of benzoyl peroxide.  All of the patient's questions and concerns were addressed. Clindamycin Counseling: I counseled the patient regarding use of clindamycin as an antibiotic for prophylactic and/or therapeutic purposes. Clindamycin is active against numerous classes of bacteria, including skin bacteria. Side effects may include nausea, diarrhea, gastrointestinal upset, rash, hives, yeast infections, and in rare cases, colitis. Picato Counseling:  I discussed with the patient the risks of Picato including but not limited to erythema, scaling, itching, weeping, crusting, and pain. Hydroxychloroquine Pregnancy And Lactation Text: This medication has been shown to cause fetal harm but it isn't assigned a Pregnancy Risk Category. There are small amounts excreted in breast milk. Carac Counseling:  I discussed with the patient the risks of Carac including but not limited to erythema, scaling, itching, weeping, crusting, and pain. Erivedge Counseling- I discussed with the patient the risks of Erivedge including but not limited to nausea, vomiting, diarrhea, constipation, weight loss, changes in the sense of taste, decreased appetite, muscle spasms, and hair loss.  The patient verbalized understanding of the proper use and possible adverse effects of Erivedge.  All of the patient's questions and concerns were addressed. High Dose Vitamin A Pregnancy And Lactation Text: High dose vitamin A therapy is contraindicated during pregnancy and breast feeding. Prednisone Pregnancy And Lactation Text: This medication is Pregnancy Category C and it isn't know if it is safe during pregnancy. This medication is excreted in breast milk. Xolair Pregnancy And Lactation Text: This medication is Pregnancy Category B and is considered safe during pregnancy. This medication is excreted in breast milk. Cimetidine Pregnancy And Lactation Text: This medication is Pregnancy Category B and is considered safe during pregnancy. It is also excreted in breast milk and breast feeding isn't recommended. Cimetidine Counseling:  I discussed with the patient the risks of Cimetidine including but not limited to gynecomastia, headache, diarrhea, nausea, drowsiness, arrhythmias, pancreatitis, skin rashes, psychosis, bone marrow suppression and kidney toxicity. Solaraze Pregnancy And Lactation Text: This medication is Pregnancy Category B and is considered safe. There is some data to suggest avoiding during the third trimester. It is unknown if this medication is excreted in breast milk. Sarecycline Pregnancy And Lactation Text: This medication is Pregnancy Category D and not consider safe during pregnancy. It is also excreted in breast milk. Dapsone Pregnancy And Lactation Text: This medication is Pregnancy Category C and is not considered safe during pregnancy or breast feeding. Cephalexin Counseling: I counseled the patient regarding use of cephalexin as an antibiotic for prophylactic and/or therapeutic purposes. Cephalexin (commonly prescribed under brand name Keflex) is a cephalosporin antibiotic which is active against numerous classes of bacteria, including most skin bacteria. Side effects may include nausea, diarrhea, gastrointestinal upset, rash, hives, yeast infections, and in rare cases, hepatitis, kidney disease, seizures, fever, confusion, neurologic symptoms, and others. Patients with severe allergies to penicillin medications are cautioned that there is about a 10% incidence of cross-reactivity with cephalosporins. When possible, patients with penicillin allergies should use alternatives to cephalosporins for antibiotic therapy. Xeljanz Counseling: I discussed with the patient the risks of Xeljanz therapy including increased risk of infection, liver issues, headache, diarrhea, or cold symptoms. Live vaccines should be avoided. They were instructed to call if they have any problems. Nsaids Counseling: NSAID Counseling: I discussed with the patient that NSAIDs should be taken with food. Prolonged use of NSAIDs can result in the development of stomach ulcers.  Patient advised to stop taking NSAIDs if abdominal pain occurs.  The patient verbalized understanding of the proper use and possible adverse effects of NSAIDs.  All of the patient's questions and concerns were addressed. Erythromycin Counseling:  I discussed with the patient the risks of erythromycin including but not limited to GI upset, allergic reaction, drug rash, diarrhea, increase in liver enzymes, and yeast infections. Colchicine Pregnancy And Lactation Text: This medication is Pregnancy Category C and isn't considered safe during pregnancy. It is excreted in breast milk. Cellcept Pregnancy And Lactation Text: This medication is Pregnancy Category D and isn't considered safe during pregnancy. It is unknown if this medication is excreted in breast milk. Metronidazole Pregnancy And Lactation Text: This medication is Pregnancy Category B and considered safe during pregnancy.  It is also excreted in breast milk. Arava Pregnancy And Lactation Text: This medication is Pregnancy Category X and is absolutely contraindicated during pregnancy. It is unknown if it is excreted in breast milk. Bactrim Counseling:  I discussed with the patient the risks of sulfa antibiotics including but not limited to GI upset, allergic reaction, drug rash, diarrhea, dizziness, photosensitivity, and yeast infections.  Rarely, more serious reactions can occur including but not limited to aplastic anemia, agranulocytosis, methemoglobinemia, blood dyscrasias, liver or kidney failure, lung infiltrates or desquamative/blistering drug rashes. Minocycline Counseling: Patient advised regarding possible photosensitivity and discoloration of the teeth, skin, lips, tongue and gums.  Patient instructed to avoid sunlight, if possible.  When exposed to sunlight, patients should wear protective clothing, sunglasses, and sunscreen.  The patient was instructed to call the office immediately if the following severe adverse effects occur:  hearing changes, easy bruising/bleeding, severe headache, or vision changes.  The patient verbalized understanding of the proper use and possible adverse effects of minocycline.  All of the patient's questions and concerns were addressed. Rituxan Counseling:  I discussed with the patient the risks of Rituxan infusions. Side effects can include infusion reactions, severe drug rashes including mucocutaneous reactions, reactivation of latent hepatitis and other infections and rarely progressive multifocal leukoencephalopathy.  All of the patient's questions and concerns were addressed. Humira Counseling:  I discussed with the patient the risks of adalimumab including but not limited to myelosuppression, immunosuppression, autoimmune hepatitis, demyelinating diseases, lymphoma, and serious infections.  The patient understands that monitoring is required including a PPD at baseline and must alert us or the primary physician if symptoms of infection or other concerning signs are noted. Fluconazole Counseling:  Patient counseled regarding adverse effects of fluconazole including but not limited to headache, diarrhea, nausea, upset stomach, liver function test abnormalities, taste disturbance, and stomach pain.  There is a rare possibility of liver failure that can occur when taking fluconazole.  The patient understands that monitoring of LFTs and kidney function test may be required, especially at baseline. The patient verbalized understanding of the proper use and possible adverse effects of fluconazole.  All of the patient's questions and concerns were addressed. Valtrex Pregnancy And Lactation Text: this medication is Pregnancy Category B and is considered safe during pregnancy. This medication is not directly found in breast milk but it's metabolite acyclovir is present. Isotretinoin Pregnancy And Lactation Text: This medication is Pregnancy Category X and is considered extremely dangerous during pregnancy. It is unknown if it is excreted in breast milk. Ketoconazole Counseling:   Patient counseled regarding improving absorption with orange juice.  Adverse effects include but are not limited to breast enlargement, headache, diarrhea, nausea, upset stomach, liver function test abnormalities, taste disturbance, and stomach pain.  There is a rare possibility of liver failure that can occur when taking ketoconazole. The patient understands that monitoring of LFTs may be required, especially at baseline. The patient verbalized understanding of the proper use and possible adverse effects of ketoconazole.  All of the patient's questions and concerns were addressed. Glycopyrrolate Pregnancy And Lactation Text: This medication is Pregnancy Category B and is considered safe during pregnancy. It is unknown if it is excreted breast milk. Protopic Counseling: Patient may experience a mild burning sensation during topical application. Protopic is not approved in children less than 2 years of age. There have been case reports of hematologic and skin malignancies in patients using topical calcineurin inhibitors although causality is questionable. Sarecycline Counseling: Patient advised regarding possible photosensitivity and discoloration of the teeth, skin, lips, tongue and gums.  Patient instructed to avoid sunlight, if possible.  When exposed to sunlight, patients should wear protective clothing, sunglasses, and sunscreen.  The patient was instructed to call the office immediately if the following severe adverse effects occur:  hearing changes, easy bruising/bleeding, severe headache, or vision changes.  The patient verbalized understanding of the proper use and possible adverse effects of sarecycline.  All of the patient's questions and concerns were addressed. High Dose Vitamin A Counseling: Side effects reviewed, pt to contact office should one occur. Taltz Counseling: I discussed with the patient the risks of ixekizumab including but not limited to immunosuppression, serious infections, worsening of inflammatory bowel disease and drug reactions.  The patient understands that monitoring is required including a PPD at baseline and must alert us or the primary physician if symptoms of infection or other concerning signs are noted. Include Pregnancy/Lactation Warning?: No Ivermectin Pregnancy And Lactation Text: This medication is Pregnancy Category C and it isn't known if it is safe during pregnancy. It is also excreted in breast milk. Oxybutynin Pregnancy And Lactation Text: This medication is Pregnancy Category B and is considered safe during pregnancy. It is unknown if it is excreted in breast milk. Spironolactone Counseling: Patient advised regarding risks of diarrhea, abdominal pain, hyperkalemia, birth defects (for female patients), liver toxicity and renal toxicity. The patient may need blood work to monitor liver and kidney function and potassium levels while on therapy. The patient verbalized understanding of the proper use and possible adverse effects of spironolactone.  All of the patient's questions and concerns were addressed. Doxepin Pregnancy And Lactation Text: This medication is Pregnancy Category C and it isn't known if it is safe during pregnancy. It is also excreted in breast milk and breast feeding isn't recommended. Glycopyrrolate Counseling:  I discussed with the patient the risks of glycopyrrolate including but not limited to skin rash, drowsiness, dry mouth, difficulty urinating, and blurred vision. Rifampin Counseling: I discussed with the patient the risks of rifampin including but not limited to liver damage, kidney damage, red-orange body fluids, nausea/vomiting and severe allergy. Siliq Counseling:  I discussed with the patient the risks of Siliq including but not limited to new or worsening depression, suicidal thoughts and behavior, immunosuppression, malignancy, posterior leukoencephalopathy syndrome, and serious infections.  The patient understands that monitoring is required including a PPD at baseline and must alert us or the primary physician if symptoms of infection or other concerning signs are noted. There is also a special program designed to monitor depression which is required with Siliq. Griseofulvin Pregnancy And Lactation Text: This medication is Pregnancy Category X and is known to cause serious birth defects. It is unknown if this medication is excreted in breast milk but breast feeding should be avoided. Azithromycin Counseling:  I discussed with the patient the risks of azithromycin including but not limited to GI upset, allergic reaction, drug rash, diarrhea, and yeast infections. Bactrim Pregnancy And Lactation Text: This medication is Pregnancy Category D and is known to cause fetal risk.  It is also excreted in breast milk. Zyclara Counseling:  I discussed with the patient the risks of imiquimod including but not limited to erythema, scaling, itching, weeping, crusting, and pain.  Patient understands that the inflammatory response to imiquimod is variable from person to person and was educated regarded proper titration schedule.  If flu-like symptoms develop, patient knows to discontinue the medication and contact us. Hydroxyzine Counseling: Patient advised that the medication is sedating and not to drive a car after taking this medication.  Patient informed of potential adverse effects including but not limited to dry mouth, urinary retention, and blurry vision.  The patient verbalized understanding of the proper use and possible adverse effects of hydroxyzine.  All of the patient's questions and concerns were addressed. Imiquimod Counseling:  I discussed with the patient the risks of imiquimod including but not limited to erythema, scaling, itching, weeping, crusting, and pain.  Patient understands that the inflammatory response to imiquimod is variable from person to person and was educated regarded proper titration schedule.  If flu-like symptoms develop, patient knows to discontinue the medication and contact us. Skyrizi Pregnancy And Lactation Text: The risk during pregnancy and breastfeeding is uncertain with this medication. Prednisone Counseling:  I discussed with the patient the risks of prolonged use of prednisone including but not limited to weight gain, insomnia, osteoporosis, mood changes, diabetes, susceptibility to infection, glaucoma and high blood pressure.  In cases where prednisone use is prolonged, patients should be monitored with blood pressure checks, serum glucose levels and an eye exam.  Additionally, the patient may need to be placed on GI prophylaxis, PCP prophylaxis, and calcium and vitamin D supplementation and/or a bisphosphonate.  The patient verbalized understanding of the proper use and the possible adverse effects of prednisone.  All of the patient's questions and concerns were addressed. Birth Control Pills Pregnancy And Lactation Text: This medication should be avoided if pregnant and for the first 30 days post-partum. Methotrexate Pregnancy And Lactation Text: This medication is Pregnancy Category X and is known to cause fetal harm. This medication is excreted in breast milk. Methotrexate Counseling:  Patient counseled regarding adverse effects of methotrexate including but not limited to nausea, vomiting, abnormalities in liver function tests. Patients may develop mouth sores, rash, diarrhea, and abnormalities in blood counts. The patient understands that monitoring is required including LFT's and blood counts.  There is a rare possibility of scarring of the liver and lung problems that can occur when taking methotrexate. Persistent nausea, loss of appetite, pale stools, dark urine, cough, and shortness of breath should be reported immediately. Patient advised to discontinue methotrexate treatment at least three months before attempting to become pregnant.  I discussed the need for folate supplements while taking methotrexate.  These supplements can decrease side effects during methotrexate treatment. The patient verbalized understanding of the proper use and possible adverse effects of methotrexate.  All of the patient's questions and concerns were addressed. SSKI Counseling:  I discussed with the patient the risks of SSKI including but not limited to thyroid abnormalities, metallic taste, GI upset, fever, headache, acne, arthralgias, paraesthesias, lymphadenopathy, easy bleeding, arrhythmias, and allergic reaction. Doxycycline Counseling:  Patient counseled regarding possible photosensitivity and increased risk for sunburn.  Patient instructed to avoid sunlight, if possible.  When exposed to sunlight, patients should wear protective clothing, sunglasses, and sunscreen.  The patient was instructed to call the office immediately if the following severe adverse effects occur:  hearing changes, easy bruising/bleeding, severe headache, or vision changes.  The patient verbalized understanding of the proper use and possible adverse effects of doxycycline.  All of the patient's questions and concerns were addressed. Ketoconazole Pregnancy And Lactation Text: This medication is Pregnancy Category C and it isn't know if it is safe during pregnancy. It is also excreted in breast milk and breast feeding isn't recommended. Enbrel Pregnancy And Lactation Text: This medication is Pregnancy Category B and is considered safe during pregnancy. It is unknown if this medication is excreted in breast milk. Doxycycline Pregnancy And Lactation Text: This medication is Pregnancy Category D and not consider safe during pregnancy. It is also excreted in breast milk but is considered safe for shorter treatment courses. Rituxan Pregnancy And Lactation Text: This medication is Pregnancy Category C and it isn't know if it is safe during pregnancy. It is unknown if this medication is excreted in breast milk but similar antibodies are known to be excreted. Griseofulvin Counseling:  I discussed with the patient the risks of griseofulvin including but not limited to photosensitivity, cytopenia, liver damage, nausea/vomiting and severe allergy.  The patient understands that this medication is best absorbed when taken with a fatty meal (e.g., ice cream or french fries). Cosentyx Counseling:  I discussed with the patient the risks of Cosentyx including but not limited to worsening of Crohn's disease, immunosuppression, allergic reactions and infections.  The patient understands that monitoring is required including a PPD at baseline and must alert us or the primary physician if symptoms of infection or other concerning signs are noted. Cellcept Counseling:  I discussed with the patient the risks of mycophenolate mofetil including but not limited to infection/immunosuppression, GI upset, hypokalemia, hypercholesterolemia, bone marrow suppression, lymphoproliferative disorders, malignancy, GI ulceration/bleed/perforation, colitis, interstitial lung disease, kidney failure, progressive multifocal leukoencephalopathy, and birth defects.  The patient understands that monitoring is required including a baseline creatinine and regular CBC testing. In addition, patient must alert us immediately if symptoms of infection or other concerning signs are noted. Erythromycin Pregnancy And Lactation Text: This medication is Pregnancy Category B and is considered safe during pregnancy. It is also excreted in breast milk. Ivermectin Counseling:  Patient instructed to take medication on an empty stomach with a full glass of water.  Patient informed of potential adverse effects including but not limited to nausea, diarrhea, dizziness, itching, and swelling of the extremities or lymph nodes.  The patient verbalized understanding of the proper use and possible adverse effects of ivermectin.  All of the patient's questions and concerns were addressed. Sski Pregnancy And Lactation Text: This medication is Pregnancy Category D and isn't considered safe during pregnancy. It is excreted in breast milk. Odomzo Counseling- I discussed with the patient the risks of Odomzo including but not limited to nausea, vomiting, diarrhea, constipation, weight loss, changes in the sense of taste, decreased appetite, muscle spasms, and hair loss.  The patient verbalized understanding of the proper use and possible adverse effects of Odomzo.  All of the patient's questions and concerns were addressed. Oxybutynin Counseling:  I discussed with the patient the risks of oxybutynin including but not limited to skin rash, drowsiness, dry mouth, difficulty urinating, and blurred vision. Itraconazole Counseling:  I discussed with the patient the risks of itraconazole including but not limited to liver damage, nausea/vomiting, neuropathy, and severe allergy.  The patient understands that this medication is best absorbed when taken with acidic beverages such as non-diet cola or ginger ale.  The patient understands that monitoring is required including baseline LFTs and repeat LFTs at intervals.  The patient understands that they are to contact us or the primary physician if concerning signs are noted. Quinolones Counseling:  I discussed with the patient the risks of fluoroquinolones including but not limited to GI upset, allergic reaction, drug rash, diarrhea, dizziness, photosensitivity, yeast infections, liver function test abnormalities, tendonitis/tendon rupture. Skyrizi Counseling: I discussed with the patient the risks of risankizumab-rzaa including but not limited to immunosuppression, and serious infections.  The patient understands that monitoring is required including a PPD at baseline and must alert us or the primary physician if symptoms of infection or other concerning signs are noted. Dapsone Counseling: I discussed with the patient the risks of dapsone including but not limited to hemolytic anemia, agranulocytosis, rashes, methemoglobinemia, kidney failure, peripheral neuropathy, headaches, GI upset, and liver toxicity.  Patients who start dapsone require monitoring including baseline LFTs and weekly CBCs for the first month, then every month thereafter.  The patient verbalized understanding of the proper use and possible adverse effects of dapsone.  All of the patient's questions and concerns were addressed. Imiquimod Pregnancy And Lactation Text: This medication is Pregnancy Category C. It is unknown if this medication is excreted in breast milk. Xolair Counseling:  Patient informed of potential adverse effects including but not limited to fever, muscle aches, rash and allergic reactions.  The patient verbalized understanding of the proper use and possible adverse effects of Xolair.  All of the patient's questions and concerns were addressed. Protopic Pregnancy And Lactation Text: This medication is Pregnancy Category C. It is unknown if this medication is excreted in breast milk when applied topically. Acitretin Counseling:  I discussed with the patient the risks of acitretin including but not limited to hair loss, dry lips/skin/eyes, liver damage, hyperlipidemia, depression/suicidal ideation, photosensitivity.  Serious rare side effects can include but are not limited to pancreatitis, pseudotumor cerebri, bony changes, clot formation/stroke/heart attack.  Patient understands that alcohol is contraindicated since it can result in liver toxicity and significantly prolong the elimination of the drug by many years. Topical Sulfur Applications Pregnancy And Lactation Text: This medication is Pregnancy Category C and has an unknown safety profile during pregnancy. It is unknown if this topical medication is excreted in breast milk. Cyclosporine Counseling:  I discussed with the patient the risks of cyclosporine including but not limited to hypertension, gingival hyperplasia,myelosuppression, immunosuppression, liver damage, kidney damage, neurotoxicity, lymphoma, and serious infections. The patient understands that monitoring is required including baseline blood pressure, CBC, CMP, lipid panel and uric acid, and then 1-2 times monthly CMP and blood pressure. Hydroquinone Counseling:  Patient advised that medication may result in skin irritation, lightening (hypopigmentation), dryness, and burning.  In the event of skin irritation, the patient was advised to reduce the amount of the drug applied or use it less frequently.  Rarely, spots that are treated with hydroquinone can become darker (pseudoochronosis).  Should this occur, patient instructed to stop medication and call the office. The patient verbalized understanding of the proper use and possible adverse effects of hydroquinone.  All of the patient's questions and concerns were addressed. Enbrel Counseling:  I discussed with the patient the risks of etanercept including but not limited to myelosuppression, immunosuppression, autoimmune hepatitis, demyelinating diseases, lymphoma, and infections.  The patient understands that monitoring is required including a PPD at baseline and must alert us or the primary physician if symptoms of infection or other concerning signs are noted. Tazorac Pregnancy And Lactation Text: This medication is not safe during pregnancy. It is unknown if this medication is excreted in breast milk. Otezla Pregnancy And Lactation Text: This medication is Pregnancy Category C and it isn't known if it is safe during pregnancy. It is unknown if it is excreted in breast milk. Birth Control Pills Counseling: Birth Control Pill Counseling: I discussed with the patient the potential side effects of OCPs including but not limited to increased risk of stroke, heart attack, thrombophlebitis, deep venous thrombosis, hepatic adenomas, breast changes, GI upset, headaches, and depression.  The patient verbalized understanding of the proper use and possible adverse effects of OCPs. All of the patient's questions and concerns were addressed. Benzoyl Peroxide Pregnancy And Lactation Text: This medication is Pregnancy Category C. It is unknown if benzoyl peroxide is excreted in breast milk. Hydroxychloroquine Counseling:  I discussed with the patient that a baseline ophthalmologic exam is needed at the start of therapy and every year thereafter while on therapy. A CBC may also be warranted for monitoring.  The side effects of this medication were discussed with the patient, including but not limited to agranulocytosis, aplastic anemia, seizures, rashes, retinopathy, and liver toxicity. Patient instructed to call the office should any adverse effect occur.  The patient verbalized understanding of the proper use and possible adverse effects of Plaquenil.  All the patient's questions and concerns were addressed. Otezla Counseling: The side effects of Otezla were discussed with the patient, including but not limited to worsening or new depression, weight loss, diarrhea, nausea, upper respiratory tract infection, and headache. Patient instructed to call the office should any adverse effect occur.  The patient verbalized understanding of the proper use and possible adverse effects of Otezla.  All the patient's questions and concerns were addressed. Detail Level: Zone Nsaids Pregnancy And Lactation Text: These medications are considered safe up to 30 weeks gestation. It is excreted in breast milk. Elidel Counseling: Patient may experience a mild burning sensation during topical application. Elidel is not approved in children less than 2 years of age. There have been case reports of hematologic and skin malignancies in patients using topical calcineurin inhibitors although causality is questionable. Cyclophosphamide Counseling:  I discussed with the patient the risks of cyclophosphamide including but not limited to hair loss, hormonal abnormalities, decreased fertility, abdominal pain, diarrhea, nausea and vomiting, bone marrow suppression and infection. The patient understands that monitoring is required while taking this medication. Acitretin Pregnancy And Lactation Text: This medication is Pregnancy Category X and should not be given to women who are pregnant or may become pregnant in the future. This medication is excreted in breast milk. Ilumya Counseling: I discussed with the patient the risks of tildrakizumab including but not limited to immunosuppression, malignancy, posterior leukoencephalopathy syndrome, and serious infections.  The patient understands that monitoring is required including a PPD at baseline and must alert us or the primary physician if symptoms of infection or other concerning signs are noted. Simponi Counseling:  I discussed with the patient the risks of golimumab including but not limited to myelosuppression, immunosuppression, autoimmune hepatitis, demyelinating diseases, lymphoma, and serious infections.  The patient understands that monitoring is required including a PPD at baseline and must alert us or the primary physician if symptoms of infection or other concerning signs are noted. Isotretinoin Counseling: Patient should get monthly blood tests, not donate blood, not drive at night if vision affected, not share medication, and not undergo elective surgery for 6 months after tx completed. Side effects reviewed, pt to contact office should one occur. Minoxidil Counseling: Minoxidil is a topical medication which can increase blood flow where it is applied. It is uncertain how this medication increases hair growth. Side effects are uncommon and include stinging and allergic reactions. Topical Retinoid counseling:  Patient advised to apply a pea-sized amount only at bedtime and wait 30 minutes after washing their face before applying.  If too drying, patient may add a non-comedogenic moisturizer. The patient verbalized understanding of the proper use and possible adverse effects of retinoids.  All of the patient's questions and concerns were addressed. Terbinafine Counseling: Patient counseling regarding adverse effects of terbinafine including but not limited to headache, diarrhea, rash, upset stomach, liver function test abnormalities, itching, taste/smell disturbance, nausea, abdominal pain, and flatulence.  There is a rare possibility of liver failure that can occur when taking terbinafine.  The patient understands that a baseline LFT and kidney function test may be required. The patient verbalized understanding of the proper use and possible adverse effects of terbinafine.  All of the patient's questions and concerns were addressed. Solaraze Counseling:  I discussed with the patient the risks of Solaraze including but not limited to erythema, scaling, itching, weeping, crusting, and pain. 5-Fu Pregnancy And Lactation Text: This medication is Pregnancy Category X and contraindicated in pregnancy and in women who may become pregnant. It is unknown if this medication is excreted in breast milk. Clofazimine Counseling:  I discussed with the patient the risks of clofazimine including but not limited to skin and eye pigmentation, liver damage, nausea/vomiting, gastrointestinal bleeding and allergy. Eucrisa Pregnancy And Lactation Text: This medication has not been assigned a Pregnancy Risk Category but animal studies failed to show danger with the topical medication. It is unknown if the medication is excreted in breast milk. Tetracycline Counseling: Patient counseled regarding possible photosensitivity and increased risk for sunburn.  Patient instructed to avoid sunlight, if possible.  When exposed to sunlight, patients should wear protective clothing, sunglasses, and sunscreen.  The patient was instructed to call the office immediately if the following severe adverse effects occur:  hearing changes, easy bruising/bleeding, severe headache, or vision changes.  The patient verbalized understanding of the proper use and possible adverse effects of tetracycline.  All of the patient's questions and concerns were addressed. Patient understands to avoid pregnancy while on therapy due to potential birth defects. Doxepin Counseling:  Patient advised that the medication is sedating and not to drive a car after taking this medication. Patient informed of potential adverse effects including but not limited to dry mouth, urinary retention, and blurry vision.  The patient verbalized understanding of the proper use and possible adverse effects of doxepin.  All of the patient's questions and concerns were addressed. Drysol Counseling:  I discussed with the patient the risks of drysol/aluminum chloride including but not limited to skin rash, itching, irritation, burning. Xelmarlenez Pregnancy And Lactation Text: This medication is Pregnancy Category D and is not considered safe during pregnancy.  The risk during breast feeding is also uncertain. Drysol Pregnancy And Lactation Text: This medication is considered safe during pregnancy and breast feeding. Metronidazole Counseling:  I discussed with the patient the risks of metronidazole including but not limited to seizures, nausea/vomiting, a metallic taste in the mouth, nausea/vomiting and severe allergy. Cimzia Counseling:  I discussed with the patient the risks of Cimzia including but not limited to immunosuppression, allergic reactions and infections.  The patient understands that monitoring is required including a PPD at baseline and must alert us or the primary physician if symptoms of infection or other concerning signs are noted. Rifampin Pregnancy And Lactation Text: This medication is Pregnancy Category C and it isn't know if it is safe during pregnancy. It is also excreted in breast milk and should not be used if you are breast feeding. Hydroxyzine Pregnancy And Lactation Text: This medication is not safe during pregnancy and should not be taken. It is also excreted in breast milk and breast feeding isn't recommended. Tazorac Counseling:  Patient advised that medication is irritating and drying.  Patient may need to apply sparingly and wash off after an hour before eventually leaving it on overnight.  The patient verbalized understanding of the proper use and possible adverse effects of tazorac.  All of the patient's questions and concerns were addressed. Dupixent Counseling: I discussed with the patient the risks of dupilumab including but not limited to eye infection and irritation, cold sores, injection site reactions, worsening of asthma, allergic reactions and increased risk of parasitic infection.  Live vaccines should be avoided while taking dupilumab. Dupilumab will also interact with certain medications such as warfarin and cyclosporine. The patient understands that monitoring is required and they must alert us or the primary physician if symptoms of infection or other concerning signs are noted. 5-Fu Counseling: 5-Fluorouracil Counseling:  I discussed with the patient the risks of 5-fluorouracil including but not limited to erythema, scaling, itching, weeping, crusting, and pain. Bexarotene Counseling:  I discussed with the patient the risks of bexarotene including but not limited to hair loss, dry lips/skin/eyes, liver abnormalities, hyperlipidemia, pancreatitis, depression/suicidal ideation, photosensitivity, drug rash/allergic reactions, hypothyroidism, anemia, leukopenia, infection, cataracts, and teratogenicity.  Patient understands that they will need regular blood tests to check lipid profile, liver function tests, white blood cell count, thyroid function tests and pregnancy test if applicable. Arava Counseling:  Patient counseled regarding adverse effects of Arava including but not limited to nausea, vomiting, abnormalities in liver function tests. Patients may develop mouth sores, rash, diarrhea, and abnormalities in blood counts. The patient understands that monitoring is required including LFTs and blood counts.  There is a rare possibility of scarring of the liver and lung problems that can occur when taking methotrexate. Persistent nausea, loss of appetite, pale stools, dark urine, cough, and shortness of breath should be reported immediately. Patient advised to discontinue Arava treatment and consult with a physician prior to attempting conception. The patient will have to undergo a treatment to eliminate Arava from the body prior to conception. Clindamycin Pregnancy And Lactation Text: This medication can be used in pregnancy if certain situations. Clindamycin is also present in breast milk. Valtrex Counseling: I discussed with the patient the risks of valacyclovir including but not limited to kidney damage, nausea, vomiting and severe allergy.  The patient understands that if the infection seems to be worsening or is not improving, they are to call. Stelara Counseling:  I discussed with the patient the risks of ustekinumab including but not limited to immunosuppression, malignancy, posterior leukoencephalopathy syndrome, and serious infections.  The patient understands that monitoring is required including a PPD at baseline and must alert us or the primary physician if symptoms of infection or other concerning signs are noted. Topical Sulfur Applications Counseling: Topical Sulfur Counseling: Patient counseled that this medication may cause skin irritation or allergic reactions.  In the event of skin irritation, the patient was advised to reduce the amount of the drug applied or use it less frequently.   The patient verbalized understanding of the proper use and possible adverse effects of topical sulfur application.  All of the patient's questions and concerns were addressed. Bexarotene Pregnancy And Lactation Text: This medication is Pregnancy Category X and should not be given to women who are pregnant or may become pregnant. This medication should not be used if you are breast feeding. Colchicine Counseling:  Patient counseled regarding adverse effects including but not limited to stomach upset (nausea, vomiting, stomach pain, or diarrhea).  Patient instructed to limit alcohol consumption while taking this medication.  Colchicine may reduce blood counts especially with prolonged use.  The patient understands that monitoring of kidney function and blood counts may be required, especially at baseline. The patient verbalized understanding of the proper use and possible adverse effects of colchicine.  All of the patient's questions and concerns were addressed. Albendazole Counseling:  I discussed with the patient the risks of albendazole including but not limited to cytopenia, kidney damage, nausea/vomiting and severe allergy.  The patient understands that this medication is being used in an off-label manner. Topical Clindamycin Counseling: Patient counseled that this medication may cause skin irritation or allergic reactions.  In the event of skin irritation, the patient was advised to reduce the amount of the drug applied or use it less frequently.   The patient verbalized understanding of the proper use and possible adverse effects of clindamycin.  All of the patient's questions and concerns were addressed. Thalidomide Counseling: I discussed with the patient the risks of thalidomide including but not limited to birth defects, anxiety, weakness, chest pain, dizziness, cough and severe allergy. Infliximab Counseling:  I discussed with the patient the risks of infliximab including but not limited to myelosuppression, immunosuppression, autoimmune hepatitis, demyelinating diseases, lymphoma, and serious infections.  The patient understands that monitoring is required including a PPD at baseline and must alert us or the primary physician if symptoms of infection or other concerning signs are noted. Eucrisa Counseling: Patient may experience a mild burning sensation during topical application. Eucrisa is not approved in children less than 2 years of age. Cephalexin Pregnancy And Lactation Text: This medication is Pregnancy Category B and considered safe during pregnancy.  It is also excreted in breast milk but can be used safely for shorter doses. Dupixent Pregnancy And Lactation Text: This medication likely crosses the placenta but the risk for the fetus is uncertain. This medication is excreted in breast milk. Gabapentin Counseling: I discussed with the patient the risks of gabapentin including but not limited to dizziness, somnolence, fatigue and ataxia. Azathioprine Counseling:  I discussed with the patient the risks of azathioprine including but not limited to myelosuppression, immunosuppression, hepatotoxicity, lymphoma, and infections.  The patient understands that monitoring is required including baseline LFTs, Creatinine, possible TPMP genotyping and weekly CBCs for the first month and then every 2 weeks thereafter.  The patient verbalized understanding of the proper use and possible adverse effects of azathioprine.  All of the patient's questions and concerns were addressed. Cimzia Pregnancy And Lactation Text: This medication crosses the placenta but can be considered safe in certain situations. Cimzia may be excreted in breast milk.

## 2021-06-21 NOTE — TELEPHONE ENCOUNTER
REFERRAL INFORMATION:    Referring Provider:  RADHA Martinez CNP     Referring Clinic:  [facility]     Reason for Visit/Diagnosis: RUQ Abdominal pain      FUTURE VISIT INFORMATION:    Appointment Date: 7/13/2021    Appointment Time: 12 PM      NOTES STATUS DETAILS   OFFICE NOTE from Referring Provider Internal 6/18/2021, 6/2/2021 Office visit with RADHA Martinez CNP     OFFICE NOTE from Other Specialist Internal 6/14/2021 Office visit with RADHA Ames CNP ([facility])     1/21/16 Office visit with Thierno Horan PA-C ([facility])      HOSPITAL DISCHARGE SUMMARY/  ED VISITS N/A    OPERATIVE REPORT N/A    MEDICATION LIST Internal         ENDOSCOPY  Internal EGD: 9/3/2020   COLONOSCOPY Internal 3/3/2020, 11/13/18   ERCP N/A    EUS N/A    STOOL TESTING N/A    PERTINENT LABS Internal    PATHOLOGY REPORTS (RELATED) Internal 3/3/2020   IMAGING (CT, MRI, EGD, MRCP, Small Bowel Follow Through/SBT, MR/CT Enterography) Internal US Abdomen: 6/3/2021

## 2021-07-07 NOTE — PROGRESS NOTES
Assessment & Plan     Candidiasis of skin  Symptomatic care strategies reviewed  Keep skin clean and dry.   Begin new medication   - clotrimazole-betamethasone (LOTRISONE) 1-0.05 % external cream  Dispense: 45 g; Refill: 1  Derm consultation for biopsy with no resolution     Encounter for screening mammogram for breast cancer  Due. Order placed.   - *MA Screening Digital Bilateral      Call or return to the clinic with any worsening of symptoms or no resolution. Patient/Parent verbalized understanding and is in agreement. Medication side effects reviewed.   Current Outpatient Medications   Medication Sig Dispense Refill     clotrimazole-betamethasone (LOTRISONE) 1-0.05 % external cream Apply topically 2 times daily 45 g 1     estradiol (ESTRACE) 0.1 MG/GM vaginal cream Topically 3x/week       Hydrophilic ointment        Magnesium Oxide 250 MG TABS Take 2 tablets by mouth 2 times daily       Multiple Vitamin (MULTI-VITAMINS PO)        Omega-3 Fatty Acids (OMEGA 3 PO)        Chart documentation with Dragon Voice recognition Software. Although reviewed after completion, some words and grammatical errors may remain.    RADHA [facility]    Franklyn Cristobal is a 55 year old who presents for the following health issues     HPI     Rash  Onset/Duration: 1 month   Description  Location: lower stomach   Character: raised, red  Itching: mild  Intensity:  moderate  Progression of Symptoms:  worsening  Accompanying signs and symptoms:   Fever: no  Body aches or joint pain: no  Sore throat symptoms: no  Recent cold symptoms: no  History:           Previous episodes of similar rash: None  New exposures:  None  Recent travel: no  Exposure to similar rash: no  Precipitating or alleviating factors: none  Therapies tried and outcome: hydrocortisone cream -  not effective  \ has a past medical history of Menorrhagia with irregular cycle (2/28/2016), Ovarian cyst (6/4/2015),  "Perimenopause (6/4/2015), and Rectal cancer (H).    H/o Anal Cancer    Review of Systems   Constitutional, HEENT, cardiovascular, pulmonary, GI, , musculoskeletal, neuro, skin, endocrine and psych systems are negative, except as otherwise noted.      Objective    /78   Pulse 98   Temp 97.3  F (36.3  C) (Tympanic)   Resp 16   Ht 1.638 m (5' 4.5\")   Wt 93.9 kg (207 lb)   LMP 01/29/2016   SpO2 100%   BMI 34.98 kg/m    Body mass index is 34.98 kg/m .  Physical Exam   GENERAL: healthy, alert and no distress  EYES: Eyes grossly normal to inspection, PERRL and conjunctivae and sclerae normal  HENT: ear canals and TM's normal, nose and mouth without ulcers or lesions  NECK: no adenopathy, no asymmetry, masses, or scars and thyroid normal to palpation  RESP: lungs clear to auscultation - no rales, rhonchi or wheezes  CV: regular rate and rhythm, normal S1 S2, no S3 or S4, no murmur, click or rub, no peripheral edema and peripheral pulses strong  ABDOMEN: soft, nontender, no hepatosplenomegaly, no masses and bowel sounds normal  MS: no gross musculoskeletal defects noted, no edema  SKIN: erythema - abdomen with maceration and areas of excoriation  NEURO: Normal strength and tone, mentation intact and speech normal  PSYCH: mentation appears normal, affect normal/bright    Orders Only on 06/11/2021   Component Date Value Ref Range Status     Cholesterol 06/11/2021 240* <200 mg/dL Final    Desirable:       <200 mg/dl     Triglycerides 06/11/2021 83  <150 mg/dL Final    Fasting specimen     HDL Cholesterol 06/11/2021 69  >49 mg/dL Final     LDL Cholesterol Calculated 06/11/2021 154* <100 mg/dL Final    Comment: Above desirable:  100-129 mg/dl  Borderline High:  130-159 mg/dL  High:             160-189 mg/dL  Very high:       >189 mg/dl       Non HDL Cholesterol 06/11/2021 171* <130 mg/dL Final    Comment: Above Desirable:  130-159 mg/dl  Borderline high:  160-189 mg/dl  High:             190-219 mg/dl  Very high:  "      >219 mg/dl

## 2021-07-09 ENCOUNTER — OFFICE VISIT (OUTPATIENT)
Dept: FAMILY MEDICINE | Facility: CLINIC | Age: 56
End: 2021-07-09
Payer: COMMERCIAL

## 2021-07-09 VITALS
OXYGEN SATURATION: 100 % | WEIGHT: 207 LBS | SYSTOLIC BLOOD PRESSURE: 112 MMHG | RESPIRATION RATE: 16 BRPM | BODY MASS INDEX: 34.49 KG/M2 | DIASTOLIC BLOOD PRESSURE: 78 MMHG | HEART RATE: 98 BPM | TEMPERATURE: 97.3 F | HEIGHT: 65 IN

## 2021-07-09 DIAGNOSIS — B37.2 CANDIDIASIS OF SKIN: Primary | ICD-10-CM

## 2021-07-09 DIAGNOSIS — Z12.31 ENCOUNTER FOR SCREENING MAMMOGRAM FOR BREAST CANCER: ICD-10-CM

## 2021-07-09 PROCEDURE — 99214 OFFICE O/P EST MOD 30 MIN: CPT | Performed by: NURSE PRACTITIONER

## 2021-07-09 RX ORDER — CLOTRIMAZOLE AND BETAMETHASONE DIPROPIONATE 10; .64 MG/G; MG/G
CREAM TOPICAL 2 TIMES DAILY
Qty: 45 G | Refills: 1 | Status: SHIPPED | OUTPATIENT
Start: 2021-07-09 | End: 2021-11-01

## 2021-07-09 ASSESSMENT — MIFFLIN-ST. JEOR: SCORE: 1536.89

## 2021-07-09 NOTE — PATIENT INSTRUCTIONS
Patient Education     Candida Skin Infection (Adult)   Candida is a type of yeast. It grows naturally on the skin and in the mouth. If it grows out of control, it can cause an infection. Candida can cause infections in the genital area, skin folds, in the mouth, and under the breasts. Anyone can get this infection. It is more common in a person with a weak immune system, such as from diabetes, HIV, or cancer. It s also more common in someone who has been on antibiotic therapy. And it s more common in people who are overweight or who have incontinence. Wearing tight-fitting clothing and taking part in activities with lots of skin-to-skin contact can also put you at risk.  Candida causes the skin to become bright red and inflamed. The border of the infected part of the skin is often raised. The infection causes pain and itching. Sometimes the skin peels and bleeds. In the mouth, candida is called thrush, and may cause white thickened areas.  A Candida rash is most often treated with an antifungal cream, gel, or powder. . The rash will clear a few days after starting the medicine. Infections that don t go away may need a prescription medicine. In rare cases, a bacterial infection can also occur.  Home care  Your healthcare provider will advise using an antifungal cream, powder, or gel for the rash. He or she may also prescribe a medicine for the itch. Follow all instructions for using these medicines.  General care    Keep your skin clean by washing the area twice a day.    Use the medicine as directed until your rash is gone. Once the skin has healed, keep it dry to prevent another infection.     If you are overweight, talk with your healthcare provider about a plan to lose extra weight.    Don't wear tight-fitting clothes.    Follow-up care  Follow up with your healthcare provider, or as advised. Your rash will clear in 7 to 14 days. Call your provider if the rash is not gone after 14 days.  When to get medical  advice  Call your healthcare provider right away if any of these occur:    Pain or redness that gets worse or spreads    Fluid coming from the skin    Yellow crusts on the skin    Fever of 100.4 F (38 C) or higher, or as directed by your provider  [employer] last reviewed this educational content on 10/1/2019    9902-5119 [employer]. All rights reserved. This information is not intended as a substitute for professional medical care. Always follow your healthcare professional's instructions.

## 2021-07-13 ENCOUNTER — PRE VISIT (OUTPATIENT)
Dept: GASTROENTEROLOGY | Facility: CLINIC | Age: 56
End: 2021-07-13

## 2021-07-13 ENCOUNTER — VIRTUAL VISIT (OUTPATIENT)
Dept: GASTROENTEROLOGY | Facility: CLINIC | Age: 56
End: 2021-07-13
Attending: NURSE PRACTITIONER
Payer: COMMERCIAL

## 2021-07-13 VITALS — WEIGHT: 206 LBS | BODY MASS INDEX: 37.91 KG/M2 | HEIGHT: 62 IN

## 2021-07-13 DIAGNOSIS — K76.89 LIVER CYST: ICD-10-CM

## 2021-07-13 DIAGNOSIS — K29.70 GASTRITIS WITHOUT BLEEDING, UNSPECIFIED CHRONICITY, UNSPECIFIED GASTRITIS TYPE: ICD-10-CM

## 2021-07-13 DIAGNOSIS — R10.11 RUQ ABDOMINAL PAIN: ICD-10-CM

## 2021-07-13 DIAGNOSIS — R74.01 ELEVATED ALT MEASUREMENT: Primary | ICD-10-CM

## 2021-07-13 DIAGNOSIS — R13.10 DYSPHAGIA, UNSPECIFIED TYPE: ICD-10-CM

## 2021-07-13 DIAGNOSIS — K76.0 FATTY LIVER: ICD-10-CM

## 2021-07-13 PROCEDURE — 99203 OFFICE O/P NEW LOW 30 MIN: CPT | Mod: GT | Performed by: PHYSICIAN ASSISTANT

## 2021-07-13 ASSESSMENT — MIFFLIN-ST. JEOR: SCORE: 1484.72

## 2021-07-13 ASSESSMENT — PAIN SCALES - GENERAL: PAINLEVEL: NO PAIN (1)

## 2021-07-13 NOTE — PATIENT INSTRUCTIONS
It was a pleasure taking care of you today.  I've included a brief summary of our discussion and care plan from today's visit below.  Please review this information with your primary care provider.  ______________________________________________________________________    My recommendations are summarized as follows:    --Stool antigen for H. Pylori  --For pain try heating pads and ice packs  --Meet with the liver group  --Esophagram to evaluate swallowing/sternum sensation  -- please see scheduling information provided below     Return to GI Clinic in 2-3 months to review your progress.    ______________________________________________________________________    How do I schedule labs, imaging studies, or procedures that were ordered in clinic today?     Labs: To schedule lab appointment at the [facility], use my chart or call [phone]. If you have a [facility] lab closer to home where you are regularly seen you can give them a call.     Procedures: If a colonoscopy, upper endoscopy, breath test, esophageal manometry, or pH impedence was ordered today, our endoscopy team will call you to schedule this. If you have not heard from our endoscopy team within a week, please call [phone] to schedule.     Imaging Studies: If you were scheduled for a CT scan, X-ray, MRI, ultrasound, HIDA scan or other imaging study, please call [phone] to have this scheduled.     Referral: If a referral to another specialty was ordered, expect a phone call or follow instructions above. If you have not heard from anyone regarding your referral in a week, please call our clinic to check the status.     Who do I call with any questions after my visit?  Please be in touch if there are any further questions that arise following today's visit.  There are multiple ways to contact your gastroenterology care team.        During business hours, you may reach a Gastroenterology nurse at [phone]      To schedule or  reschedule an appointment, please call [phone].       You can always send a secure message through [employer].  [employer] messages are answered by your nurse or doctor typically within 24 hours.  Please allow extra time on weekends and holidays.        For urgent/emergent questions after business hours, you may reach the on-call GI Fellow by contacting the [facility]  at [phone].     How will I get the results of any tests ordered?    You will receive all of your results.  If you have signed up for [employer], any tests ordered at your visit will be available to you after your physician reviews them.  Typically this takes 1-2 weeks.  If there are urgent results that require a change in your care plan, your physician or nurse will call you to discuss the next steps.      What is [employer]?  [employer] is a secure way for you to access all of your healthcare records from the [facility].  It is a web based computer program, so you can sign on to it from any location.  It also allows you to send secure messages to your care team.  I recommend signing up for [employer] access if you have not already done so and are comfortable with using a computer.      How to I schedule a follow-up visit?  If you did not schedule a follow-up visit today, please call [phone] to schedule a follow-up office visit.      Sincerely,    Shaina Morales PA-C  Division of Gastroenterology, Hepatology & Nutrition  [facility]

## 2021-07-13 NOTE — PROGRESS NOTES
"GI CLINIC VISIT    Susu Lagos is a 55 year old female who is being evaluated via a billable video visit.      The patient has been notified of following:     \"This video visit will be conducted via a call between you and your physician/provider. We have found that certain health care needs can be provided without the need for an in-person physical exam.  This service lets us provide the care you need with a video conversation.  If a prescription is necessary we can send it directly to your pharmacy.  If lab work is needed we can place an order for that and you can then stop by our lab to have the test done at a later time.    If during the course of the call the physician/provider feels a video visit is not appropriate, you will not be charged for this service.\"     Patient confirmed that they are in [place] for today's visit YES    How would you like to obtain your AVS? MyChart  If the video visit is dropped, the invitation should be resent by: Text to cell phone: [phone]  Will anyone else be joining your video visit? No    Video-Visit Details  Type of service:  Video Visit    Video Start Time: 12:03 PM  Video End Time:  12:32 PM    Originating Location (pt. Location): Home    Distant Location (provider location):  [facility] [place]     Platform used: Greg MAJANO/REFERRING MD:  Swati Parmar  REASON FOR CONSULTATION: Abdominal pain (RUQ)    ASSESSMENT/PLAN:  1.  Right upper quadrant abdominal pain  Patient reports a couple of months of right-sided abdominal pain which improves with putting pressure over the area, and was worsened with sneezing.  She also has associated burning abdominal pain which radiated across her epigastric area. Patient has had EGD in the last year with evidence of gastritis-no biopsies were taken for H. pylori.  We will plan for stool antigen today. Unfortunately I was unable to perform a physical exam today as visit was over video, " can plan for next visit to be in person. Suspect that abdominal pain is musculoskeletal versus abdominal wall pain given that this worsened with sneezing and improves with pressure. We discussed plan for her to try heating pads and cooling pads.      She does have significant fatty liver and this was seen on ultrasound, MRI of her liver.  We will plan for a one-time evaluation with hepatology to discuss this further.     In regards to sensation of discomfort at her lower sternum, and pill dysphagia-reassuring that no malignancy was seen on EGD.  Would recommend evaluation with esophagram.  Pending results, could consider esophageal manometry.  --Stool antigen for H. Pylori  --For pain try heating pads and ice packs  --Meet with the liver group  --Esophagram to evaluate swallowing/sternum sensation    RTC 2-3 months     Thank you for this consultation.  It was a pleasure to participate in the care of this patient; please contact us with any further questions.     44 Minutes was spent on the date of the encounter during chart review, history and exam, documentation, and further activities as noted     Note completed using voice recognition software. Some word and grammatical errors may occur.    Shaina Morales PA-C  Division of Gastroenterology, Hepatology & Nutrition  [facility]        HPI  Susu Lagos is a 55 year old woman with a past medical history of anal squamous cell carcinoma, status post cholecystectomy presenting for right upper quadrant abdominal pain.  She is new to the [facility] GI clinic and this is my first encounter with the patient.    Abdominal is pain is located under her right rib cage, and this is is a pressure type sensation. She had been having this through the spring, and then sneezed and it worsened significantly. This is minor pain every day, but has improved from previously. It improves with putting pressure on the area and holding down. In the evening her  pain will worsen. She also has a burning component of pain that might be worsened with eating. The patient will feel the pain bending and it will cramp. Driving can also aggravate this. This radiates to her back. She attributes this to gaining 50 lbs in the past year. She can have a cramping component to her pain which will improve with arching her back. Pain is not extreme enough where she would need.     Has burning pain, has had 2 episodes of reflux in the past couple of months. She feels like food can get caught in chest, and water or additional food can feel stuck. This occurs with pills.     She has a sensation at the bottom of her sternum, that feels like something is flipping over- she has had this for several years. She would feel this with bending over and touching toes. She would have to arch back to make this go into place. This is happening 2-3 times a week and will arch back. Denies nausea, vomiting.      Bowel movements:   Is taking fiber daily. Symptoms are worse in the morning, urgent. Loose, more formed than previously. She will go once before breakfast, and then a couple more times. Sometimes will have to go a couple of times. No nocturnal bowel movements. 1 spoonful in the morning and at night.    Previous work-up:  -Had EGD in 9/2020 with gastritis due to uptake on PET scan.  No biopsies were taken at this time.  Patient was put on PPI which seemed to improve her symptoms, though she notes she did not have right upper quadrant abdominal pain at that time.  -MRI liver with fatty liver  -Right upper quadrant ultrasound unremarkable  -Labs with normal CBC, CMP with elevated ALT       ROS:    No fevers or chills  No weight loss  No blurry vision, double vision or change in vision  No sore throat  No lymphadenopathy  No headache, paraesthesias, or weakness in a limb  No shortness of breath or wheezing  No chest pain or pressure  No arthralgias or myalgias  No rashes or skin changes  No odynophagia or  dysphagia  + BRBPR- previously had fissure  No dysuria, frequency or urgency  No hot/cold intolerance or polyria  No anxiety or depression    PROBLEM LIST  Patient Active Problem List    Diagnosis Date Noted     Anemia with low platelet count (H) 06/02/2021     Priority: Medium     Intractable pain 05/18/2020     Priority: Medium     Cervical cancer screening 03/20/2020     Priority: Medium     2008, 2010, 2011, 2013 All NIL paps.   4/22/15 NIL Pap, Neg HPV. Plan: Cotest in 5 yr       **  3/2020 Dx rectal cancer **  3/13/20 NIL Pap, Neg HPV. TZ absent. Plan: cotest in 3 years per provider and Oncologist.        Anal squamous cell carcinoma (H) 03/03/2020     Priority: Medium     Check 7.21 Farhan for f/u [facility]  Susu Lagos presented to the primary care physician with 3 to 4 months history of blood in the stool.  Subsequently the patient was referred for colonoscopy on March 3, 2020.  The palpable rectal mass on digital examination.  Biopsy came back positive for squamous cell carcinoma, focally keratinizing.  The patient has a previous colonoscopy on November 2018 which shows no abnormalities.  The patient was started on chemoradiation regimen with 5-FU and mitomycin.       CARDIOVASCULAR SCREENING; LDL GOAL LESS THAN 160 02/25/2015     Priority: Medium       PERTINENT PAST MEDICAL HISTORY:  Past Medical History:   Diagnosis Date     Menorrhagia with irregular cycle 2/28/2016     Ovarian cyst 6/4/2015    Right, 6 cm dermoid on MRI-recommend removal.  7/29/2015 Surgery for right salpingoophorectomy.       Perimenopause 6/4/2015     Rectal cancer (H)     had radiation and chemo 2020       PREVIOUS SURGERIES:  Past Surgical History:   Procedure Laterality Date     CHOLECYSTECTOMY  2013    LSC     COLONOSCOPY N/A 11/13/2018    Procedure: colonoscopy;  Surgeon: Sesar Dos Santos MD;  Location: [facility]     COLONOSCOPY N/A 3/3/2020    Procedure: COLONOSCOPY, WITH POLYPECTOMY AND BIOPSY;  Surgeon: Sesar Dos Santos MD;   Location: [facility]     DILATION AND CURETTAGE, OPERATIVE HYSTEROSCOPY, COMBINED N/A 2/29/2016    Procedure: COMBINED DILATION AND CURETTAGE, OPERATIVE HYSTEROSCOPY;  Surgeon: Panda Knight MD;  Location: [facility]     ESOPHAGOSCOPY, GASTROSCOPY, DUODENOSCOPY (EGD), COMBINED N/A 9/3/2020    Procedure: ESOPHAGOGASTRODUODENOSCOPY (EGD);  Surgeon: Karlos Burnett MD;  Location: [facility]     GYN SURGERY  1995    tubes tied     LAPAROSCOPY DIAGNOSTIC (GYN) Right 7/29/2015    Procedure: LAPAROSCOPY DIAGNOSTIC (GYN);  Surgeon: Lian Betancur DO;  Location: [facility]       PREVIOUS ENDOSCOPY:  9/3/2020:  Impression:          - Normal nasopharynx and oropharynx.                        - Normal esophagus.                        - Gastritis.                        - Normal examined duodenum.                        - No specimens collected.     ALLERGIES:     Allergies   Allergen Reactions     Rabies Vaccine Unknown     Scratch test resulted, it was not a good idea to give it to her.     Shellfish-Derived Products Nausea and Vomiting     Sulfa Drugs Rash       PERTINENT MEDICATIONS:    Current Outpatient Medications:      clotrimazole-betamethasone (LOTRISONE) 1-0.05 % external cream, Apply topically 2 times daily, Disp: 45 g, Rfl: 1     diltiazem 2% in PLO gel, Apply small amount to anal opening three times daily for 8 weeks. (Patient not taking: Reported on 7/9/2021), Disp: 60 g, Rfl: 0     estradiol (ESTRACE) 0.1 MG/GM vaginal cream, Topically 3x/week, Disp: , Rfl:      Hydrophilic ointment, , Disp: , Rfl:      Magnesium Oxide 250 MG TABS, Take 2 tablets by mouth 2 times daily, Disp: , Rfl:      Multiple Vitamin (MULTI-VITAMINS PO), , Disp: , Rfl:      Omega-3 Fatty Acids (OMEGA 3 PO), , Disp: , Rfl:     SOCIAL HISTORY:  2-3 drinks a month   Social History     Socioeconomic History     Marital status:      Spouse name: Not on file     Number of children: 3     Years of education: Not on file     Highest education  level: Not on file   Occupational History     Not on file   Tobacco Use     Smoking status: Never Smoker     Smokeless tobacco: Never Used   Substance and Sexual Activity     Alcohol use: Yes     Alcohol/week: 0.0 standard drinks     Comment: 1-2 per month      Drug use: No     Sexual activity: Yes     Partners: Male     Birth control/protection: Surgical     Comment: Tubal ligation with RSO   Other Topics Concern     Parent/sibling w/ CABG, MI or angioplasty before 65F 55M? 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  Has 3 children from her first marriage, as does her .  They have grandchildren as well.  Never smoked.  Rare social drinking.    RADHA Marcelino CNP      Social Determinants of Health     Financial Resource Strain:      Difficulty of Paying Living Expenses:    Food Insecurity:      Worried About Running Out of Food in the Last Year:      Ran Out of Food in the Last Year:    Transportation Needs:      Lack of Transportation (Medical):      Lack of Transportation (Non-Medical):    Physical Activity:      Days of Exercise per Week:      Minutes of Exercise per Session:    Stress:      Feeling of Stress :    Social Connections:      Frequency of Communication with Friends and Family:      Frequency of Social Gatherings with Friends and Family:      Attends [employer] Services:      Active Member of Clubs or Organizations:      Attends Club or Organization Meetings:      Marital Status:    Intimate Partner Violence:      Fear of Current or Ex-Partner:      Emotionally Abused:      Physically Abused:      Sexually Abused:        FAMILY HISTORY:  Family History   Problem Relation Age of Onset     Hypertension Father      Myocardial Infarction Maternal Grandfather      Melanoma No family hx of        Past/family/social history reviewed and no changes    PHYSICAL  EXAMINATION:  Constitutional: aaox3, cooperative, pleasant, not dyspneic/diaphoretic, no acute distress  Vitals reviewed: LMP 01/29/2016   Wt:   Wt Readings from Last 2 Encounters:   07/09/21 93.9 kg (207 lb)   06/15/21 94.3 kg (208 lb)   General appearance: Healthy appearing adult, in no acute distress  Eyes: Sclera anicteric  Ears, nose, mouth and throat: No obvious external lesions of ears and nose, Hearing intact  Neck: Symmetric, No obvious external lesions  Respiratory: Normal respiration, no use of accessory muscles   Skin: No rashes or jaundice   Psychiatric: Oriented to person, place and time, Appropriate mood and affect.     PERTINENT STUDIES:    Orders Only on 06/11/2021   Component Date Value Ref Range Status     Cholesterol 06/11/2021 240* <200 mg/dL Final     Triglycerides 06/11/2021 83  <150 mg/dL Final     HDL Cholesterol 06/11/2021 69  >49 mg/dL Final     LDL Cholesterol Calculated 06/11/2021 154* <100 mg/dL Final     Non HDL Cholesterol 06/11/2021 171* <130 mg/dL Final     MR Liver 6/15/2021:  IMPRESSION:  1. 1 cm mildly complex T2 hyperintense nonenhancing cystic focus in  the left hepatic lobe, likely representing the indeterminate lesions  seen on the 6/3/2021 exam. This lesion likely represents minimally  complex liver cyst. Multiple other smaller T2 hyperintense cystic foci  throughout the liver are too small to characterize, could also  represent liver cysts. No suspicious focal hepatic lesion.  2. Significant hepatic steatosis.    ULTRASOUND ABDOMEN COMPLETE 6/3/2021 9:32 AM     CLINICAL HISTORY: Abdominal pain, right upper quadrant.     TECHNIQUE: Complete abdominal ultrasound.     COMPARISON: None.     FINDINGS:     GALLBLADDER: Surgically absent.     BILE DUCTS: No biliary dilatation. The common duct measures 6 mm.     LIVER: Liver demonstrates increased echogenicity consistent with fatty  infiltration. This demonstrates shadowing through portions of the more  posterior aspects of the  right hepatic lobe limiting evaluation of  some areas of the liver. Anechoic cystic lesion left hepatic lobe  measures 0.8 cm possibly a cyst.     RIGHT KIDNEY: Normal size. Normal echogenicity with no hydronephrosis  or mass.      LEFT KIDNEY: Normal size. Parapelvic cyst measures 1.9 cm. No  hydronephrosis or mass.      SPLEEN: Normal.     PANCREAS: The visualized portions are normal.     AORTA: Normal in caliber.      IVC: Normal where visualized.     No ascites.                                                                      IMPRESSION:  1.  Fatty infiltration of liver with probable subcentimeter cyst left  hepatic lobe. Prior cholecystectomy. No bile duct dilatation.  2.  Left renal parapelvic cyst. No hydronephrosis involving either  kidney.    7/2020 PET:  IMPRESSION: In this patient with a history of squamous cell carcinoma  of the anus, status post chemotherapy and radiation:  1. Persistent hypermetabolic uptake in the anus, slightly decreased  since the previous PET/CT on 3/19/2020 but believed to represent  residual disease.   2. No evidence of metastatic disease.   3. Diffuse FDG uptake in the stomach, which can be seen with  gastritis.

## 2021-07-13 NOTE — NURSING NOTE
"Chief Complaint   Patient presents with     New Patient     New consult for RUQ abdominal pain       Vitals:    07/13/21 1155   Weight: 93.4 kg (206 lb)   Height: 1.562 m (5' 1.5\")       Body mass index is 38.29 kg/m .          Conchita Ojeda CMA    "

## 2021-07-13 NOTE — LETTER
"    7/13/2021         RE: Susu Lagos  [address]  [facility] [address]        Dear Colleague,    Thank you for referring your patient, Susu Lagos, to the [facility] [place]. Please see a copy of my visit note below.    GI CLINIC VISIT    Suus Lagos is a 55 year old female who is being evaluated via a billable video visit.      The patient has been notified of following:     \"This video visit will be conducted via a call between you and your physician/provider. We have found that certain health care needs can be provided without the need for an in-person physical exam.  This service lets us provide the care you need with a video conversation.  If a prescription is necessary we can send it directly to your pharmacy.  If lab work is needed we can place an order for that and you can then stop by our lab to have the test done at a later time.    If during the course of the call the physician/provider feels a video visit is not appropriate, you will not be charged for this service.\"     Patient confirmed that they are in [place] for today's visit YES    How would you like to obtain your AVS? MyChart  If the video visit is dropped, the invitation should be resent by: Text to cell phone: [phone]  Will anyone else be joining your video visit? No    Video-Visit Details  Type of service:  Video Visit    Video Start Time: 12:03 PM  Video End Time:  12:32 PM    Originating Location (pt. Location): Home    Distant Location (provider location):  [facility] [place]     Platform used: Greg MAJANO/REFERRING MD:  Swati Parmar  REASON FOR CONSULTATION: Abdominal pain (RUQ)    ASSESSMENT/PLAN:  1.  Right upper quadrant abdominal pain  Patient reports a couple of months of right-sided abdominal pain which improves with putting pressure over the area, and was worsened with sneezing.  She also has associated burning abdominal pain " which radiated across her epigastric area. Patient has had EGD in the last year with evidence of gastritis-no biopsies were taken for H. pylori.  We will plan for stool antigen today. Unfortunately I was unable to perform a physical exam today as visit was over video, can plan for next visit to be in person. Suspect that abdominal pain is musculoskeletal versus abdominal wall pain given that this worsened with sneezing and improves with pressure. We discussed plan for her to try heating pads and cooling pads.      She does have significant fatty liver and this was seen on ultrasound, MRI of her liver.  We will plan for a one-time evaluation with hepatology to discuss this further.     In regards to sensation of discomfort at her lower sternum, and pill dysphagia-reassuring that no malignancy was seen on EGD.  Would recommend evaluation with esophagram.  Pending results, could consider esophageal manometry.  --Stool antigen for H. Pylori  --For pain try heating pads and ice packs  --Meet with the liver group  --Esophagram to evaluate swallowing/sternum sensation    RTC 2-3 months     Thank you for this consultation.  It was a pleasure to participate in the care of this patient; please contact us with any further questions.     44 Minutes was spent on the date of the encounter during chart review, history and exam, documentation, and further activities as noted     Note completed using voice recognition software. Some word and grammatical errors may occur.    Shaina Morales PA-C  Division of Gastroenterology, Hepatology & Nutrition  [facility]        HPI  Susu Lagos is a 55 year old woman with a past medical history of anal squamous cell carcinoma, status post cholecystectomy presenting for right upper quadrant abdominal pain.  She is new to the [facility] GI clinic and this is my first encounter with the patient.    Abdominal is pain is located under her right rib cage, and this is  is a pressure type sensation. She had been having this through the spring, and then sneezed and it worsened significantly. This is minor pain every day, but has improved from previously. It improves with putting pressure on the area and holding down. In the evening her pain will worsen. She also has a burning component of pain that might be worsened with eating. The patient will feel the pain bending and it will cramp. Driving can also aggravate this. This radiates to her back. She attributes this to gaining 50 lbs in the past year. She can have a cramping component to her pain which will improve with arching her back. Pain is not extreme enough where she would need.     Has burning pain, has had 2 episodes of reflux in the past couple of months. She feels like food can get caught in chest, and water or additional food can feel stuck. This occurs with pills.     She has a sensation at the bottom of her sternum, that feels like something is flipping over- she has had this for several years. She would feel this with bending over and touching toes. She would have to arch back to make this go into place. This is happening 2-3 times a week and will arch back. Denies nausea, vomiting.      Bowel movements:   Is taking fiber daily. Symptoms are worse in the morning, urgent. Loose, more formed than previously. She will go once before breakfast, and then a couple more times. Sometimes will have to go a couple of times. No nocturnal bowel movements. 1 spoonful in the morning and at night.    Previous work-up:  -Had EGD in 9/2020 with gastritis due to uptake on PET scan.  No biopsies were taken at this time.  Patient was put on PPI which seemed to improve her symptoms, though she notes she did not have right upper quadrant abdominal pain at that time.  -MRI liver with fatty liver  -Right upper quadrant ultrasound unremarkable  -Labs with normal CBC, CMP with elevated ALT       ROS:    No fevers or chills  No weight loss  No  blurry vision, double vision or change in vision  No sore throat  No lymphadenopathy  No headache, paraesthesias, or weakness in a limb  No shortness of breath or wheezing  No chest pain or pressure  No arthralgias or myalgias  No rashes or skin changes  No odynophagia or dysphagia  + BRBPR- previously had fissure  No dysuria, frequency or urgency  No hot/cold intolerance or polyria  No anxiety or depression    PROBLEM LIST  Patient Active Problem List    Diagnosis Date Noted     Anemia with low platelet count (H) 06/02/2021     Priority: Medium     Intractable pain 05/18/2020     Priority: Medium     Cervical cancer screening 03/20/2020     Priority: Medium     2008, 2010, 2011, 2013 All NIL paps.   4/22/15 NIL Pap, Neg HPV. Plan: Cotest in 5 yr       **  3/2020 Dx rectal cancer **  3/13/20 NIL Pap, Neg HPV. TZ absent. Plan: cotest in 3 years per provider and Oncologist.        Anal squamous cell carcinoma (H) 03/03/2020     Priority: Medium     Check 7.21 Farhan for f/u YARIEL Lagos presented to the primary care physician with 3 to 4 months history of blood in the stool.  Subsequently the patient was referred for colonoscopy on March 3, 2020.  The palpable rectal mass on digital examination.  Biopsy came back positive for squamous cell carcinoma, focally keratinizing.  The patient has a previous colonoscopy on November 2018 which shows no abnormalities.  The patient was started on chemoradiation regimen with 5-FU and mitomycin.       CARDIOVASCULAR SCREENING; LDL GOAL LESS THAN 160 02/25/2015     Priority: Medium       PERTINENT PAST MEDICAL HISTORY:  Past Medical History:   Diagnosis Date     Menorrhagia with irregular cycle 2/28/2016     Ovarian cyst 6/4/2015    Right, 6 cm dermoid on MRI-recommend removal.  7/29/2015 Surgery for right salpingoophorectomy.       Perimenopause 6/4/2015     Rectal cancer (H)     had radiation and chemo 2020       PREVIOUS SURGERIES:  Past Surgical History:   Procedure  Laterality Date     CHOLECYSTECTOMY  2013    LSC     COLONOSCOPY N/A 11/13/2018    Procedure: colonoscopy;  Surgeon: Sesar Dos Santos MD;  Location: [facility]     COLONOSCOPY N/A 3/3/2020    Procedure: COLONOSCOPY, WITH POLYPECTOMY AND BIOPSY;  Surgeon: Sesar Dos Santos MD;  Location: [facility]     DILATION AND CURETTAGE, OPERATIVE HYSTEROSCOPY, COMBINED N/A 2/29/2016    Procedure: COMBINED DILATION AND CURETTAGE, OPERATIVE HYSTEROSCOPY;  Surgeon: Panda Knight MD;  Location: [facility]     ESOPHAGOSCOPY, GASTROSCOPY, DUODENOSCOPY (EGD), COMBINED N/A 9/3/2020    Procedure: ESOPHAGOGASTRODUODENOSCOPY (EGD);  Surgeon: Karlos Burnett MD;  Location: [facility]     GYN SURGERY  1995    tubes tied     LAPAROSCOPY DIAGNOSTIC (GYN) Right 7/29/2015    Procedure: LAPAROSCOPY DIAGNOSTIC (GYN);  Surgeon: Lian Betancur DO;  Location: [facility]       PREVIOUS ENDOSCOPY:  9/3/2020:  Impression:          - Normal nasopharynx and oropharynx.                        - Normal esophagus.                        - Gastritis.                        - Normal examined duodenum.                        - No specimens collected.     ALLERGIES:     Allergies   Allergen Reactions     Rabies Vaccine Unknown     Scratch test resulted, it was not a good idea to give it to her.     Shellfish-Derived Products Nausea and Vomiting     Sulfa Drugs Rash       PERTINENT MEDICATIONS:    Current Outpatient Medications:      clotrimazole-betamethasone (LOTRISONE) 1-0.05 % external cream, Apply topically 2 times daily, Disp: 45 g, Rfl: 1     diltiazem 2% in PLO gel, Apply small amount to anal opening three times daily for 8 weeks. (Patient not taking: Reported on 7/9/2021), Disp: 60 g, Rfl: 0     estradiol (ESTRACE) 0.1 MG/GM vaginal cream, Topically 3x/week, Disp: , Rfl:      Hydrophilic ointment, , Disp: , Rfl:      Magnesium Oxide 250 MG TABS, Take 2 tablets by mouth 2 times daily, Disp: , Rfl:      Multiple Vitamin (MULTI-VITAMINS PO), , Disp: , Rfl:       Omega-3 Fatty Acids (OMEGA 3 PO), , Disp: , Rfl:     SOCIAL HISTORY:  2-3 drinks a month   Social History     Socioeconomic History     Marital status:      Spouse name: Not on file     Number of children: 3     Years of education: Not on file     Highest education level: Not on file   Occupational History     Not on file   Tobacco Use     Smoking status: Never Smoker     Smokeless tobacco: Never Used   Substance and Sexual Activity     Alcohol use: Yes     Alcohol/week: 0.0 standard drinks     Comment: 1-2 per month      Drug use: No     Sexual activity: Yes     Partners: Male     Birth control/protection: Surgical     Comment: Tubal ligation with RSO   Other Topics Concern     Parent/sibling w/ CABG, MI or angioplasty before 65F 55M? 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  Has 3 children from her first marriage, as does her .  They have grandchildren as well.  Never smoked.  Rare social drinking.    Louise Miguel APRN CNP      Social Determinants of Health     Financial Resource Strain:      Difficulty of Paying Living Expenses:    Food Insecurity:      Worried About Running Out of Food in the Last Year:      Ran Out of Food in the Last Year:    Transportation Needs:      Lack of Transportation (Medical):      Lack of Transportation (Non-Medical):    Physical Activity:      Days of Exercise per Week:      Minutes of Exercise per Session:    Stress:      Feeling of Stress :    Social Connections:      Frequency of Communication with Friends and Family:      Frequency of Social Gatherings with Friends and Family:      Attends Congregational Services:      Active Member of Clubs or Organizations:      Attends Club or Organization Meetings:      Marital Status:    Intimate Partner Violence:      Fear of Current or Ex-Partner:      Emotionally Abused:      Physically Abused:       Sexually Abused:        FAMILY HISTORY:  Family History   Problem Relation Age of Onset     Hypertension Father      Myocardial Infarction Maternal Grandfather      Melanoma No family hx of        Past/family/social history reviewed and no changes    PHYSICAL EXAMINATION:  Constitutional: aaox3, cooperative, pleasant, not dyspneic/diaphoretic, no acute distress  Vitals reviewed: LMP 01/29/2016   Wt:   Wt Readings from Last 2 Encounters:   07/09/21 93.9 kg (207 lb)   06/15/21 94.3 kg (208 lb)   General appearance: Healthy appearing adult, in no acute distress  Eyes: Sclera anicteric  Ears, nose, mouth and throat: No obvious external lesions of ears and nose, Hearing intact  Neck: Symmetric, No obvious external lesions  Respiratory: Normal respiration, no use of accessory muscles   Skin: No rashes or jaundice   Psychiatric: Oriented to person, place and time, Appropriate mood and affect.     PERTINENT STUDIES:    Orders Only on 06/11/2021   Component Date Value Ref Range Status     Cholesterol 06/11/2021 240* <200 mg/dL Final     Triglycerides 06/11/2021 83  <150 mg/dL Final     HDL Cholesterol 06/11/2021 69  >49 mg/dL Final     LDL Cholesterol Calculated 06/11/2021 154* <100 mg/dL Final     Non HDL Cholesterol 06/11/2021 171* <130 mg/dL Final     MR Liver 6/15/2021:  IMPRESSION:  1. 1 cm mildly complex T2 hyperintense nonenhancing cystic focus in  the left hepatic lobe, likely representing the indeterminate lesions  seen on the 6/3/2021 exam. This lesion likely represents minimally  complex liver cyst. Multiple other smaller T2 hyperintense cystic foci  throughout the liver are too small to characterize, could also  represent liver cysts. No suspicious focal hepatic lesion.  2. Significant hepatic steatosis.    ULTRASOUND ABDOMEN COMPLETE 6/3/2021 9:32 AM     CLINICAL HISTORY: Abdominal pain, right upper quadrant.     TECHNIQUE: Complete abdominal ultrasound.     COMPARISON: None.     FINDINGS:     GALLBLADDER:  Surgically absent.     BILE DUCTS: No biliary dilatation. The common duct measures 6 mm.     LIVER: Liver demonstrates increased echogenicity consistent with fatty  infiltration. This demonstrates shadowing through portions of the more  posterior aspects of the right hepatic lobe limiting evaluation of  some areas of the liver. Anechoic cystic lesion left hepatic lobe  measures 0.8 cm possibly a cyst.     RIGHT KIDNEY: Normal size. Normal echogenicity with no hydronephrosis  or mass.      LEFT KIDNEY: Normal size. Parapelvic cyst measures 1.9 cm. No  hydronephrosis or mass.      SPLEEN: Normal.     PANCREAS: The visualized portions are normal.     AORTA: Normal in caliber.      IVC: Normal where visualized.     No ascites.                                                                      IMPRESSION:  1.  Fatty infiltration of liver with probable subcentimeter cyst left  hepatic lobe. Prior cholecystectomy. No bile duct dilatation.  2.  Left renal parapelvic cyst. No hydronephrosis involving either  kidney.    7/2020 PET:  IMPRESSION: In this patient with a history of squamous cell carcinoma  of the anus, status post chemotherapy and radiation:  1. Persistent hypermetabolic uptake in the anus, slightly decreased  since the previous PET/CT on 3/19/2020 but believed to represent  residual disease.   2. No evidence of metastatic disease.   3. Diffuse FDG uptake in the stomach, which can be seen with  gastritis.      Again, thank you for allowing me to participate in the care of your patient.        Sincerely,        Shaina Morales PA-C

## 2021-07-22 NOTE — TELEPHONE ENCOUNTER
RECORDS RECEIVED FROM: Internal   Appt Date: 07.27.2021   NOTES STATUS DETAILS   OFFICE NOTE from referring provider Internal 07.13.2021 Shaina Morales PA-C   OFFICE NOTES from other specialists Internal 06.18.2021 Swati Parmar APRN CNP   DISCHARGE SUMMARY from hospital N/A    MEDICATION LIST Internal / CE    LIVER BIOSPY (IF APPLICABLE)      PATHOLOGY REPORTS  N/A    IMAGING     ENDOSCOPY (IF AVAILABLE) Internal 09.03.2020 08.18.2020   COLONOSCOPY (IF AVAILABLE) Internal 03.03.2020 11.13.2018   ULTRASOUND LIVER Internal 06.03.2021 Swati Parmar APRN CNP   CT OF ABDOMEN N/A    MRI OF LIVER Internal 06.15.2021 MR LIVER WITHOUT AND WITH CONTRAST      FIBROSCAN, US ELASTOGRAPHY, FIBROSIS SCAN, MR ELASTOGRAPHY N/A    LABS     HEPATIC PANEL (LIVER PANEL) N/A    BASIC METABOLIC PANEL Internal 05.27.2020   COMPLETE METABOLIC PANEL Internal 06.02.2021   COMPLETE BLOOD COUNT (CBC) Internal 06.02.2021   INTERNATIONAL NORMALIZED RATIO (INR) N/A    HEPATITIS C ANTIBODY N/A    HEPATITIS C VIRAL LOAD/PCR N/A    HEPATITIS C GENOTYPE N/A    HEPATITIS B SURFACE ANTIGEN N/A    HEPATITIS B SURFACE ANTIBODY N/A    HEPATITIS B DNA QUANT LEVEL N/A    HEPATITIS B CORE ANTIBODY N/A

## 2021-07-26 DIAGNOSIS — K76.0 FATTY LIVER: ICD-10-CM

## 2021-07-26 DIAGNOSIS — R74.01 ELEVATED ALT MEASUREMENT: Primary | ICD-10-CM

## 2021-07-27 ENCOUNTER — OFFICE VISIT (OUTPATIENT)
Dept: GASTROENTEROLOGY | Facility: CLINIC | Age: 56
End: 2021-07-27
Attending: PHYSICIAN ASSISTANT
Payer: COMMERCIAL

## 2021-07-27 ENCOUNTER — PRE VISIT (OUTPATIENT)
Dept: GASTROENTEROLOGY | Facility: CLINIC | Age: 56
End: 2021-07-27

## 2021-07-27 ENCOUNTER — LAB (OUTPATIENT)
Dept: LAB | Facility: CLINIC | Age: 56
End: 2021-07-27
Payer: COMMERCIAL

## 2021-07-27 VITALS
WEIGHT: 206 LBS | SYSTOLIC BLOOD PRESSURE: 154 MMHG | BODY MASS INDEX: 38.29 KG/M2 | HEART RATE: 88 BPM | OXYGEN SATURATION: 94 % | DIASTOLIC BLOOD PRESSURE: 113 MMHG

## 2021-07-27 DIAGNOSIS — R74.01 ELEVATED ALT MEASUREMENT: ICD-10-CM

## 2021-07-27 DIAGNOSIS — R10.11 RUQ ABDOMINAL PAIN: ICD-10-CM

## 2021-07-27 DIAGNOSIS — K76.0 FATTY LIVER: ICD-10-CM

## 2021-07-27 LAB
ALBUMIN SERPL-MCNC: 3.8 G/DL (ref 3.4–5)
ALP SERPL-CCNC: 56 U/L (ref 40–150)
ALT SERPL W P-5'-P-CCNC: 103 U/L (ref 0–50)
ANION GAP SERPL CALCULATED.3IONS-SCNC: 5 MMOL/L (ref 3–14)
AST SERPL W P-5'-P-CCNC: 42 U/L (ref 0–45)
BILIRUB DIRECT SERPL-MCNC: <0.1 MG/DL (ref 0–0.2)
BILIRUB SERPL-MCNC: 0.3 MG/DL (ref 0.2–1.3)
BUN SERPL-MCNC: 18 MG/DL (ref 7–30)
CALCIUM SERPL-MCNC: 9.3 MG/DL (ref 8.5–10.1)
CHLORIDE BLD-SCNC: 113 MMOL/L (ref 94–109)
CO2 SERPL-SCNC: 26 MMOL/L (ref 20–32)
CREAT SERPL-MCNC: 0.79 MG/DL (ref 0.52–1.04)
ERYTHROCYTE [DISTWIDTH] IN BLOOD BY AUTOMATED COUNT: 11.9 % (ref 10–15)
GFR SERPL CREATININE-BSD FRML MDRD: 86 ML/MIN/1.73M2
GLUCOSE BLD-MCNC: 99 MG/DL (ref 70–99)
HBV CORE AB SERPL QL IA: NONREACTIVE
HBV SURFACE AB SERPL IA-ACNC: 0.78 M[IU]/ML
HBV SURFACE AG SERPL QL IA: NONREACTIVE
HCT VFR BLD AUTO: 41.8 % (ref 35–47)
HCV AB SERPL QL IA: NONREACTIVE
HGB BLD-MCNC: 14.4 G/DL (ref 11.7–15.7)
INR PPP: 0.87 (ref 0.85–1.15)
MCH RBC QN AUTO: 32.9 PG (ref 26.5–33)
MCHC RBC AUTO-ENTMCNC: 34.4 G/DL (ref 31.5–36.5)
MCV RBC AUTO: 95 FL (ref 78–100)
PLATELET # BLD AUTO: 200 10E3/UL (ref 150–450)
POTASSIUM BLD-SCNC: 4 MMOL/L (ref 3.4–5.3)
PROT SERPL-MCNC: 7.3 G/DL (ref 6.8–8.8)
RBC # BLD AUTO: 4.38 10E6/UL (ref 3.8–5.2)
SODIUM SERPL-SCNC: 144 MMOL/L (ref 133–144)
WBC # BLD AUTO: 5.6 10E3/UL (ref 4–11)

## 2021-07-27 PROCEDURE — 99204 OFFICE O/P NEW MOD 45 MIN: CPT | Performed by: PHYSICIAN ASSISTANT

## 2021-07-27 PROCEDURE — 82248 BILIRUBIN DIRECT: CPT | Performed by: PATHOLOGY

## 2021-07-27 PROCEDURE — 99000 SPECIMEN HANDLING OFFICE-LAB: CPT | Performed by: PATHOLOGY

## 2021-07-27 PROCEDURE — 86803 HEPATITIS C AB TEST: CPT | Mod: 90 | Performed by: PATHOLOGY

## 2021-07-27 PROCEDURE — 80053 COMPREHEN METABOLIC PANEL: CPT | Performed by: PATHOLOGY

## 2021-07-27 PROCEDURE — 86704 HEP B CORE ANTIBODY TOTAL: CPT | Mod: 90 | Performed by: PATHOLOGY

## 2021-07-27 PROCEDURE — 36415 COLL VENOUS BLD VENIPUNCTURE: CPT | Performed by: PATHOLOGY

## 2021-07-27 PROCEDURE — 86706 HEP B SURFACE ANTIBODY: CPT | Mod: 90 | Performed by: PATHOLOGY

## 2021-07-27 PROCEDURE — 85027 COMPLETE CBC AUTOMATED: CPT | Performed by: PATHOLOGY

## 2021-07-27 PROCEDURE — 85610 PROTHROMBIN TIME: CPT | Performed by: PATHOLOGY

## 2021-07-27 PROCEDURE — G0463 HOSPITAL OUTPT CLINIC VISIT: HCPCS

## 2021-07-27 PROCEDURE — 87340 HEPATITIS B SURFACE AG IA: CPT | Mod: 90 | Performed by: PATHOLOGY

## 2021-07-27 ASSESSMENT — PAIN SCALES - GENERAL: PAINLEVEL: NO PAIN (0)

## 2021-07-27 NOTE — PROGRESS NOTES
Hepatology Clinic note  Susu Lagos   Date of Birth 1965  Date of Service 7/26/2021    REASON FOR CONSULTATION:  Elevated LFT's  REFERRING PROVIDER: Shaina Morales PA-C          Assessment/plan:   Susu Lagos is a 55 year old female with history of intermittently elevated LFT's since March 2020, most recently  and AST 41 and evidence of hepatic steatosis on ultrasound imaging. Risk factors for fatty liver disease includes: obesity, JODI, HTN, hyperlipidemia and diabetes. Discussed that right upper quadrant pain for fatty liver disease is typically self-limiting in nature and unlikely cause of current discomfort. We discussed the pathophysiology and natural history of nonalcoholic fatty liver disease and cirrhosis. We discussed NAFLD treatment includes slow gradual weight loss, as well as optimization of risk factors including hyperlipidemia/blood glucose. We discussed how a weight loss can show improvement on steatosis. Will also rule out genetic and autoimmune causes of hepatobiliary disease.      - Will obtain US elastography in six months to evaluate any fibrosis  - Maintain good control of cholesterol  - Improve nutrition: emphasizing limit carbohydrates, especially simple carbohydrates.  - Increase physical activity: maintain physical activity more than 150 minutes a week  - Labs: Hep B, Hep C, hemochromatosis, alpha-1-antitrypsin deficiency, autoimmune hepatitis, TTG   - Follow-up in clinic in six months or sooner as needed    Jose Badillo PA-C   [facility]     -----------------------------------------------------       HPI:   Susu Lagos is a 55 year old female  presenting for the evaluation of elevated LFT's.     LFT's were first noticed to be abnormal in March 2020, at which time ALT was 70 and AST was 34. Transaminases normalized again until February 2021.     She states she was first told she had fatty liver within the past few months.     Appetite is  normal. Went off keto diet in April 2020. Weight changes. Gained about 50 lbs starting May 2020. She has also been experiencing some RUQ discomfort - sneezed and worsened. Also describes. epigastric and radiates in a belt like distribution, more of a burning discomfort.  Prior to breakfast, will usually having 2-3 bowel movements .     Patient denies jaundice, lower extremity edema, abdominal distension or confusion.  Patient also denies melena, hematochezia or hematemesis. Patient denies weight loss, fevers, sweats or chills. Is taking a fiber supplement with Stevia. This has helped bulk up stools.     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  History of fissure (healed a few months ago).     SMH: s/p D&C, s/p cholecystectomy     Medications:   See below     No previous tobacco use. May have 2-3 drinks of alcohol. No previous IV/IN drug use. 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 Paternal Grandfather had liver disease from alcohol use.     Previous work-up:  HCV antibody nonreactive  HBV SAb: 0.78 - NOT immune  HBV SAg nonreactive   HBV CAb nonreactive  OMAIRA:   F-actin:   AMA:  Iron panel:   Ferritin   Iron Sats:   TTG   Alpha-1-antripsin  Ceruloplasmin   TSH - 2.09   Cholesterol Total - 240  HDL - 69   LDL -154   Triglycerides - 83   Hemoglobin A1c    Medical hx Surgical hx   Past Medical History:   Diagnosis Date     Menorrhagia with irregular cycle 2/28/2016     Ovarian cyst 6/4/2015     Perimenopause 6/4/2015     Rectal cancer (H)     Past Surgical History:   Procedure Laterality Date     CHOLECYSTECTOMY  2013    LSC     COLONOSCOPY N/A 11/13/2018    Procedure: colonoscopy;  Surgeon: Sesar Dos Santos MD;  Location: [facility]     COLONOSCOPY N/A 3/3/2020    Procedure: COLONOSCOPY, WITH POLYPECTOMY AND BIOPSY;  Surgeon: Sesar Dos Santos MD;  Location: [facility]     DILATION AND CURETTAGE, OPERATIVE HYSTEROSCOPY, COMBINED N/A 2/29/2016     Procedure: COMBINED DILATION AND CURETTAGE, OPERATIVE HYSTEROSCOPY;  Surgeon: Panda Knight MD;  Location: [facility]     ESOPHAGOSCOPY, GASTROSCOPY, DUODENOSCOPY (EGD), COMBINED N/A 9/3/2020    Procedure: ESOPHAGOGASTRODUODENOSCOPY (EGD);  Surgeon: Karlos Burnett MD;  Location: [facility]     GYN SURGERY  1995    tubes tied     LAPAROSCOPY DIAGNOSTIC (GYN) Right 7/29/2015    Procedure: LAPAROSCOPY DIAGNOSTIC (GYN);  Surgeon: Lian Betancur DO;  Location: [facility]                 Medications:     Current Outpatient Medications   Medication     clotrimazole-betamethasone (LOTRISONE) 1-0.05 % external cream     estradiol (ESTRACE) 0.1 MG/GM vaginal cream     Hydrophilic ointment     Magnesium Oxide 250 MG TABS     Multiple Vitamin (MULTI-VITAMINS PO)     Omega-3 Fatty Acids (OMEGA 3 PO)     No current facility-administered medications for this visit.            Allergies:     Allergies   Allergen Reactions     Rabies Vaccine Unknown     Scratch test resulted, it was not a good idea to give it to her.     Shellfish-Derived Products Nausea and Vomiting     Sulfa Drugs Rash            Social History:     Social History     Socioeconomic History     Marital status:      Spouse name: Not on file     Number of children: 3     Years of education: Not on file     Highest education level: Not on file   Occupational History     Not on file   Tobacco Use     Smoking status: Never Smoker     Smokeless tobacco: Never Used   Substance and Sexual Activity     Alcohol use: Yes     Alcohol/week: 0.0 standard drinks     Comment: 1-2 per month      Drug use: No     Sexual activity: Yes     Partners: Male     Birth control/protection: Surgical     Comment: Tubal ligation with RSO   Other Topics Concern     Parent/sibling w/ CABG, MI or angioplasty before 65F 55M? Yes   Social History Narrative    May 19, 2020:  , lives in [place], worked as a  grades 7-12 in [place], but laid off with the [employer]  indio.  Has 3 children from her first marriage, as does her .  They have grandchildren as well.  Never smoked.  Rare social drinking.    RADHA Marcelino CNP      Social Determinants of Health     Financial Resource Strain:      Difficulty of Paying Living Expenses:    Food Insecurity:      Worried About Running Out of Food in the Last Year:      Ran Out of Food in the Last Year:    Transportation Needs:      Lack of Transportation (Medical):      Lack of Transportation (Non-Medical):    Physical Activity:      Days of Exercise per Week:      Minutes of Exercise per Session:    Stress:      Feeling of Stress :    Social Connections:      Frequency of Communication with Friends and Family:      Frequency of Social Gatherings with Friends and Family:      Attends Worship Services:      Active Member of Clubs or Organizations:      Attends Club or Organization Meetings:      Marital Status:    Intimate Partner Violence:      Fear of Current or Ex-Partner:      Emotionally Abused:      Physically Abused:      Sexually Abused:             Family History:     Family History   Problem Relation Age of Onset     Hypertension Father      Myocardial Infarction Maternal Grandfather      Melanoma No family hx of               Review of Systems:   Gen: See HPI     HEENT: No change in vision or hearing, mouth sores, dysphagia, lymph nodes  Resp: No shortness of breath, coughing, hx of asthma  CV: No chest pain, palpitations, syncope   GI: See HPI  : No dysuria, history of stones, urine color    Skin: No rash; no pruritus or psoriasis  MS: No arthralgias, myalgias, joint swelling  Neuro: No memory changes, confusion, numbness    Heme: No difficulty clotting, bruising, bleeding  Psych:  No anxiety, depression, agitation          Physical Exam:   BP (!) 154/113 (BP Location: Right arm, Patient Position: Sitting, Cuff Size: Adult Regular)   Pulse 88   Wt 93.4 kg (206 lb)   LMP 01/29/2016   SpO2 94%   BMI 38.29  kg/m      Gen: A&Ox3, NAD, well developed  HEENT: non-icteric   CV: RRR, no overt murmurs  Lung: CTA Bilatererally, no wheezing or crackles.   Lym- no palpable lymphadenopathy  Abd: soft, NT, ND, no palpable splenomegaly, liver is not palpable.   Ext: no edema, intact pulses.   Skin: No rash, no palmar erythema, telangiectasias or jaundice  Neuro: grossly intact, no asterixis   Psych: appropriate mood and affects         Data:   Reviewed in person and significant for:    Lab Results   Component Value Date     06/02/2021      Lab Results   Component Value Date    POTASSIUM 3.9 06/02/2021     Lab Results   Component Value Date    CHLORIDE 105 06/02/2021     Lab Results   Component Value Date    CO2 29 06/02/2021     Lab Results   Component Value Date    BUN 18 06/02/2021     Lab Results   Component Value Date    CR 0.86 06/02/2021       Lab Results   Component Value Date    WBC 5.5 06/02/2021     Lab Results   Component Value Date    HGB 14.2 06/02/2021     Lab Results   Component Value Date    HCT 41.5 06/02/2021     Lab Results   Component Value Date    MCV 95 06/02/2021     Lab Results   Component Value Date     06/02/2021       Lab Results   Component Value Date    AST 41 06/02/2021     Lab Results   Component Value Date     06/02/2021     No results found for: BILICONJ   Lab Results   Component Value Date    BILITOTAL 0.3 06/02/2021       Lab Results   Component Value Date    ALBUMIN 3.7 06/02/2021     Lab Results   Component Value Date    PROTTOTAL 7.5 06/02/2021      Lab Results   Component Value Date    ALKPHOS 51 06/02/2021       No results found for: INR      Imaging:        US ABDOMEN COMPLETE:   6/3/2021:   IMPRESSION:  1.  Fatty infiltration of liver with probable subcentimeter cyst left  hepatic lobe. Prior cholecystectomy. No bile duct dilatation.  2.  Left renal parapelvic cyst. No hydronephrosis involving either  Kidney.    MR Liver WO AND W CONTRAST:   6/15/2021:   IMPRESSION:  1.  1 cm mildly complex T2 hyperintense nonenhancing cystic focus in  the left hepatic lobe, likely representing the indeterminate lesions  seen on the 6/3/2021 exam. This lesion likely represents minimally  complex liver cyst. Multiple other smaller T2 hyperintense cystic foci  throughout the liver are too small to characterize, could also  represent liver cysts. No suspicious focal hepatic lesion.  2. Significant hepatic steatosis.

## 2021-07-27 NOTE — PATIENT INSTRUCTIONS
Physical Activity   - Increase physical activity level by more than 60 minutes/week   - Maintain physical activity more than 150 minutes a week     Limit Carbohydrates In Diet:   What foods are Carbohydrates?    - Main groups: Grains, Fruits, Starchy Vegetables (corn, potatoes, peas, beans) and Sweets/Desserts    When you do have carbohydrates - be mindful of the portions.   - Have ones with more fiber in the them (brown rice, whole grain breads/pastas)   - Fruits - choose ones with edible skins (apples, pears, etc) or seeds (berries)

## 2021-07-27 NOTE — NURSING NOTE
Chief Complaint   Patient presents with     New Patient     RUQ pain, fatty liver, abnormal labs         BP (!) 154/113 (BP Location: Right arm, Patient Position: Sitting, Cuff Size: Adult Regular)   Pulse 88   Wt 93.4 kg (206 lb)   LMP 01/29/2016   SpO2 94%   BMI 38.29 kg/m          Pradeep Escamilla, EMT

## 2021-07-27 NOTE — LETTER
7/27/2021         RE: Susu Lagos  [address]  [facility] [address]        Dear Colleague,    Thank you for referring your patient, Susu Lagos, to the [facility] [place]. Please see a copy of my visit note below.    Hepatology Clinic note  Susu Lagos   Date of Birth 1965  Date of Service 7/26/2021    REASON FOR CONSULTATION:  Elevated LFT's  REFERRING PROVIDER: Shaina Morales PA-C          Assessment/plan:   Susu Lagos is a 55 year old female with history of intermittently elevated LFT's since March 2020, most recently  and AST 41 and evidence of hepatic steatosis on ultrasound imaging. Risk factors for fatty liver disease includes: obesity, JODI, HTN, hyperlipidemia and diabetes. Discussed that right upper quadrant pain for fatty liver disease is typically self-limiting in nature and unlikely cause of current discomfort. We discussed the pathophysiology and natural history of nonalcoholic fatty liver disease and cirrhosis. We discussed NAFLD treatment includes slow gradual weight loss, as well as optimization of risk factors including hyperlipidemia/blood glucose. We discussed how a weight loss can show improvement on steatosis. Will also rule out genetic and autoimmune causes of hepatobiliary disease.      - Will obtain US elastography in six months to evaluate any fibrosis  - Maintain good control of cholesterol  - Improve nutrition: emphasizing limit carbohydrates, especially simple carbohydrates.  - Increase physical activity: maintain physical activity more than 150 minutes a week  - Labs: Hep B, Hep C, hemochromatosis, alpha-1-antitrypsin deficiency, autoimmune hepatitis, TTG   - Follow-up in clinic in six months or sooner as needed    Jose Badillo PA-C   [facility]     -----------------------------------------------------       HPI:   Susu Lagos is a 55 year old female  presenting for the evaluation of  elevated LFT's.     LFT's were first noticed to be abnormal in March 2020, at which time ALT was 70 and AST was 34. Transaminases normalized again until February 2021.     She states she was first told she had fatty liver within the past few months.     Appetite is normal. Went off keto diet in April 2020. Weight changes. Gained about 50 lbs starting May 2020. She has also been experiencing some RUQ discomfort - sneezed and worsened. Also describes. epigastric and radiates in a belt like distribution, more of a burning discomfort.  Prior to breakfast, will usually having 2-3 bowel movements .     Patient denies jaundice, lower extremity edema, abdominal distension or confusion.  Patient also denies melena, hematochezia or hematemesis. Patient denies weight loss, fevers, sweats or chills. Is taking a fiber supplement with Stevia. This has helped bulk up stools.     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  History of fissure (healed a few months ago).     SMH: s/p D&C, s/p cholecystectomy     Medications:   See below     No previous tobacco use. May have 2-3 drinks of alcohol. No previous IV/IN drug use. 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 Paternal Grandfather had liver disease from alcohol use.     Previous work-up:  HCV antibody nonreactive  HBV SAb: 0.78 - NOT immune  HBV SAg nonreactive   HBV CAb nonreactive  OMAIRA:   F-actin:   AMA:  Iron panel:   Ferritin   Iron Sats:   TTG   Alpha-1-antripsin  Ceruloplasmin   TSH - 2.09   Cholesterol Total - 240  HDL - 69   LDL -154   Triglycerides - 83   Hemoglobin A1c    Medical hx Surgical hx   Past Medical History:   Diagnosis Date     Menorrhagia with irregular cycle 2/28/2016     Ovarian cyst 6/4/2015     Perimenopause 6/4/2015     Rectal cancer (H)     Past Surgical History:   Procedure Laterality Date     CHOLECYSTECTOMY  2013    LSC     COLONOSCOPY N/A 11/13/2018    Procedure:  colonoscopy;  Surgeon: Sesar Dos Santos MD;  Location: [facility]     COLONOSCOPY N/A 3/3/2020    Procedure: COLONOSCOPY, WITH POLYPECTOMY AND BIOPSY;  Surgeon: Sesar Dos Santos MD;  Location: [facility]     DILATION AND CURETTAGE, OPERATIVE HYSTEROSCOPY, COMBINED N/A 2/29/2016    Procedure: COMBINED DILATION AND CURETTAGE, OPERATIVE HYSTEROSCOPY;  Surgeon: Panda Knight MD;  Location: [facility]     ESOPHAGOSCOPY, GASTROSCOPY, DUODENOSCOPY (EGD), COMBINED N/A 9/3/2020    Procedure: ESOPHAGOGASTRODUODENOSCOPY (EGD);  Surgeon: Karlos uBrnett MD;  Location: [facility]     GYN SURGERY  1995    tubes tied     LAPAROSCOPY DIAGNOSTIC (GYN) Right 7/29/2015    Procedure: LAPAROSCOPY DIAGNOSTIC (GYN);  Surgeon: Lian Betancur DO;  Location: [facility]                 Medications:     Current Outpatient Medications   Medication     clotrimazole-betamethasone (LOTRISONE) 1-0.05 % external cream     estradiol (ESTRACE) 0.1 MG/GM vaginal cream     Hydrophilic ointment     Magnesium Oxide 250 MG TABS     Multiple Vitamin (MULTI-VITAMINS PO)     Omega-3 Fatty Acids (OMEGA 3 PO)     No current facility-administered medications for this visit.            Allergies:     Allergies   Allergen Reactions     Rabies Vaccine Unknown     Scratch test resulted, it was not a good idea to give it to her.     Shellfish-Derived Products Nausea and Vomiting     Sulfa Drugs Rash            Social History:     Social History     Socioeconomic History     Marital status:      Spouse name: Not on file     Number of children: 3     Years of education: Not on file     Highest education level: Not on file   Occupational History     Not on file   Tobacco Use     Smoking status: Never Smoker     Smokeless tobacco: Never Used   Substance and Sexual Activity     Alcohol use: Yes     Alcohol/week: 0.0 standard drinks     Comment: 1-2 per month      Drug use: No     Sexual activity: Yes     Partners: Male     Birth control/protection: Surgical     Comment:  Tubal ligation with RSO   Other Topics Concern     Parent/sibling w/ CABG, MI or angioplasty before 65F 55M? Yes   Social History Narrative    May 19, 2020:  , lives in [place], worked as a  grades 7-12 in [place], but laid off with the Covid shutdown.  Has 3 children from her first marriage, as does her .  They have grandchildren as well.  Never smoked.  Rare social drinking.    RADHA Marcelino CNP      Social Determinants of Health     Financial Resource Strain:      Difficulty of Paying Living Expenses:    Food Insecurity:      Worried About Running Out of Food in the Last Year:      Ran Out of Food in the Last Year:    Transportation Needs:      Lack of Transportation (Medical):      Lack of Transportation (Non-Medical):    Physical Activity:      Days of Exercise per Week:      Minutes of Exercise per Session:    Stress:      Feeling of Stress :    Social Connections:      Frequency of Communication with Friends and Family:      Frequency of Social Gatherings with Friends and Family:      Attends [employer]ism Services:      Active Member of Clubs or Organizations:      Attends Club or Organization Meetings:      Marital Status:    Intimate Partner Violence:      Fear of Current or Ex-Partner:      Emotionally Abused:      Physically Abused:      Sexually Abused:             Family History:     Family History   Problem Relation Age of Onset     Hypertension Father      Myocardial Infarction Maternal Grandfather      Melanoma No family hx of               Review of Systems:   Gen: See HPI     HEENT: No change in vision or hearing, mouth sores, dysphagia, lymph nodes  Resp: No shortness of breath, coughing, hx of asthma  CV: No chest pain, palpitations, syncope   GI: See HPI  : No dysuria, history of stones, urine color    Skin: No rash; no pruritus or psoriasis  MS: No arthralgias, myalgias, joint swelling  Neuro: No memory changes, confusion, numbness    Heme: No difficulty  clotting, bruising, bleeding  Psych:  No anxiety, depression, agitation          Physical Exam:   BP (!) 154/113 (BP Location: Right arm, Patient Position: Sitting, Cuff Size: Adult Regular)   Pulse 88   Wt 93.4 kg (206 lb)   LMP 01/29/2016   SpO2 94%   BMI 38.29 kg/m      Gen: A&Ox3, NAD, well developed  HEENT: non-icteric   CV: RRR, no overt murmurs  Lung: CTA Bilatererally, no wheezing or crackles.   Lym- no palpable lymphadenopathy  Abd: soft, NT, ND, no palpable splenomegaly, liver is not palpable.   Ext: no edema, intact pulses.   Skin: No rash, no palmar erythema, telangiectasias or jaundice  Neuro: grossly intact, no asterixis   Psych: appropriate mood and affects         Data:   Reviewed in person and significant for:    Lab Results   Component Value Date     06/02/2021      Lab Results   Component Value Date    POTASSIUM 3.9 06/02/2021     Lab Results   Component Value Date    CHLORIDE 105 06/02/2021     Lab Results   Component Value Date    CO2 29 06/02/2021     Lab Results   Component Value Date    BUN 18 06/02/2021     Lab Results   Component Value Date    CR 0.86 06/02/2021       Lab Results   Component Value Date    WBC 5.5 06/02/2021     Lab Results   Component Value Date    HGB 14.2 06/02/2021     Lab Results   Component Value Date    HCT 41.5 06/02/2021     Lab Results   Component Value Date    MCV 95 06/02/2021     Lab Results   Component Value Date     06/02/2021       Lab Results   Component Value Date    AST 41 06/02/2021     Lab Results   Component Value Date     06/02/2021     No results found for: BILICONJ   Lab Results   Component Value Date    BILITOTAL 0.3 06/02/2021       Lab Results   Component Value Date    ALBUMIN 3.7 06/02/2021     Lab Results   Component Value Date    PROTTOTAL 7.5 06/02/2021      Lab Results   Component Value Date    ALKPHOS 51 06/02/2021       No results found for: INR      Imaging:        US ABDOMEN COMPLETE:   6/3/2021:   IMPRESSION:  1.   Fatty infiltration of liver with probable subcentimeter cyst left  hepatic lobe. Prior cholecystectomy. No bile duct dilatation.  2.  Left renal parapelvic cyst. No hydronephrosis involving either  Kidney.    MR Liver WO AND W CONTRAST:   6/15/2021:   IMPRESSION:  1. 1 cm mildly complex T2 hyperintense nonenhancing cystic focus in  the left hepatic lobe, likely representing the indeterminate lesions  seen on the 6/3/2021 exam. This lesion likely represents minimally  complex liver cyst. Multiple other smaller T2 hyperintense cystic foci  throughout the liver are too small to characterize, could also  represent liver cysts. No suspicious focal hepatic lesion.  2. Significant hepatic steatosis.      Again, thank you for allowing me to participate in the care of your patient.        Sincerely,        Jose Badillo PA-C

## 2021-07-28 ENCOUNTER — LAB (OUTPATIENT)
Dept: LAB | Facility: CLINIC | Age: 56
End: 2021-07-28
Payer: COMMERCIAL

## 2021-07-28 DIAGNOSIS — R10.11 RUQ ABDOMINAL PAIN: ICD-10-CM

## 2021-07-28 DIAGNOSIS — K29.70 GASTRITIS WITHOUT BLEEDING, UNSPECIFIED CHRONICITY, UNSPECIFIED GASTRITIS TYPE: ICD-10-CM

## 2021-07-28 PROCEDURE — 87338 HPYLORI STOOL AG IA: CPT

## 2021-07-30 LAB — H PYLORI AG STL QL IA: NEGATIVE

## 2021-08-02 ENCOUNTER — TELEPHONE (OUTPATIENT)
Dept: SURGERY | Facility: CLINIC | Age: 56
End: 2021-08-02

## 2021-08-02 NOTE — TELEPHONE ENCOUNTER
Per patient she has had rectal bleeding again on a couple occasions. The blood is on the toilet paper and drips into the toilet bowl. She has some anal irritation. It sounds like her fissure may have opened up again but schedule visit with Letitia Arevalo NP for next week due to hx of anal cancer. She cannot make it in to see Dr. Banuelos anytime soon.

## 2021-08-02 NOTE — TELEPHONE ENCOUNTER
Health Call Center    Phone Message    May a detailed message be left on voicemail: yes     Reason for Call: Other: Dr. Banuelos advised patient to call if she has more bleeding.  Patient is calling today, to let Dr. Banuelos know that she is having more bleeding.  Please follow up with patient.  Thank you!     Action Taken: Message routed to:  [facility]    Travel Screening: Not Applicable

## 2021-08-05 ENCOUNTER — LAB (OUTPATIENT)
Dept: LAB | Facility: CLINIC | Age: 56
End: 2021-08-05
Attending: PHYSICIAN ASSISTANT
Payer: COMMERCIAL

## 2021-08-05 DIAGNOSIS — R74.01 ELEVATED ALT MEASUREMENT: ICD-10-CM

## 2021-08-05 DIAGNOSIS — K76.0 FATTY LIVER: ICD-10-CM

## 2021-08-05 LAB
HBV CORE AB SERPL QL IA: NONREACTIVE
HBV SURFACE AB SERPL IA-ACNC: 0.39 M[IU]/ML
HCV AB SERPL QL IA: NONREACTIVE

## 2021-08-05 PROCEDURE — 86704 HEP B CORE ANTIBODY TOTAL: CPT | Mod: 90 | Performed by: PATHOLOGY

## 2021-08-05 PROCEDURE — 83516 IMMUNOASSAY NONANTIBODY: CPT | Mod: 90 | Performed by: PATHOLOGY

## 2021-08-05 PROCEDURE — 86706 HEP B SURFACE ANTIBODY: CPT | Mod: 90 | Performed by: PATHOLOGY

## 2021-08-05 PROCEDURE — 82103 ALPHA-1-ANTITRYPSIN TOTAL: CPT | Mod: 90 | Performed by: PATHOLOGY

## 2021-08-05 PROCEDURE — 36415 COLL VENOUS BLD VENIPUNCTURE: CPT | Performed by: PATHOLOGY

## 2021-08-05 PROCEDURE — 86038 ANTINUCLEAR ANTIBODIES: CPT | Mod: 90 | Performed by: PATHOLOGY

## 2021-08-05 PROCEDURE — 81332 SERPINA1 GENE: CPT | Mod: 90 | Performed by: PATHOLOGY

## 2021-08-05 PROCEDURE — 86803 HEPATITIS C AB TEST: CPT | Mod: 90 | Performed by: PATHOLOGY

## 2021-08-05 NOTE — LETTER
August 11, 2021       TO: Susu Lagos  [address]  [facility] [address]       Dear Ms. Lagos,    We are writing to inform you of your test results.     All labs are back now!   You do not have Hepatitis B or C. You are not immune to Hepatitis B through vaccination so recommend a booster vaccination in the future.      You do not have Celiac disease. Autoimmune liver labs are also negative.      You do have one of two genes for a genetic condition called Alpha-1-Anti-trypsin deficiency. Genotype MS. Having one gene doesn't cause liver disease, but we commonly see that if individuals have an overlapping liver condition, they may develop more scarring and earlier.      Continue slow gradual weight loss.   Limit carbohydrates in diet.      It was a pleasure to see you at your recent visit. Please let me know if you have any questions or concerns.     Clinic Staff - [phone] option 3     Sincerely,     Jose Badillo PA-C   [address] [facility], [id number]  [place]  [address].

## 2021-08-06 ENCOUNTER — MYC MEDICAL ADVICE (OUTPATIENT)
Dept: SURGERY | Facility: CLINIC | Age: 56
End: 2021-08-06

## 2021-08-06 LAB
ANA SER QL IF: NEGATIVE
TTG IGA SER-ACNC: 0.2 U/ML
TTG IGG SER-ACNC: <0.6 U/ML

## 2021-08-07 ENCOUNTER — ANCILLARY PROCEDURE (OUTPATIENT)
Dept: MAMMOGRAPHY | Facility: CLINIC | Age: 56
End: 2021-08-07
Attending: NURSE PRACTITIONER
Payer: COMMERCIAL

## 2021-08-07 DIAGNOSIS — Z12.31 ENCOUNTER FOR SCREENING MAMMOGRAM FOR BREAST CANCER: ICD-10-CM

## 2021-08-07 LAB — SMA IGG SER-ACNC: 5 UNITS

## 2021-08-07 PROCEDURE — 77067 SCR MAMMO BI INCL CAD: CPT | Mod: TC | Performed by: RADIOLOGY

## 2021-08-09 ENCOUNTER — OFFICE VISIT (OUTPATIENT)
Dept: SURGERY | Facility: CLINIC | Age: 56
End: 2021-08-09
Payer: COMMERCIAL

## 2021-08-09 VITALS
DIASTOLIC BLOOD PRESSURE: 76 MMHG | WEIGHT: 206.9 LBS | BODY MASS INDEX: 34.47 KG/M2 | OXYGEN SATURATION: 95 % | HEIGHT: 65 IN | HEART RATE: 80 BPM | SYSTOLIC BLOOD PRESSURE: 114 MMHG

## 2021-08-09 DIAGNOSIS — C21.1 MALIGNANT NEOPLASM OF ANAL CANAL (H): ICD-10-CM

## 2021-08-09 DIAGNOSIS — K60.2 ANAL FISSURE: ICD-10-CM

## 2021-08-09 DIAGNOSIS — K62.5 RECTAL BLEEDING: Primary | ICD-10-CM

## 2021-08-09 PROCEDURE — 99213 OFFICE O/P EST LOW 20 MIN: CPT | Performed by: NURSE PRACTITIONER

## 2021-08-09 PROCEDURE — 88305 TISSUE EXAM BY PATHOLOGIST: CPT | Performed by: PATHOLOGY

## 2021-08-09 ASSESSMENT — PAIN SCALES - GENERAL: PAINLEVEL: NO PAIN (0)

## 2021-08-09 ASSESSMENT — MIFFLIN-ST. JEOR: SCORE: 1536.43

## 2021-08-09 NOTE — LETTER
"2021       RE: Susu Lagos  [id number]  [facility] [address]     Dear Colleague,    Thank you for referring your patient, Susu Lagos, to the [facility] COLON AND RECTAL SURGERY CLIN[facility] [place] at [facility]. Please see a copy of my visit note below.    Colon and Rectal Surgery Follow-Up Clinic Note    RE: Susu Lagos  : 1965  MALGORZATA: 2021    Susu Lagos is a very pleasant 55 year old female with history of anal cancer s/p chemoradiation, which she completed 20, who presents today for follow up of anal fissure and rectal bleeding.    Interval history: Susu was seen in May of this year with an anal fissure that subsequently healed with topical diltiazem.  However, in the past week she has had bright red blood with bowel movements again.  She was seen this both on the toilet paper with wiping and in the toilet bowl with some clots.  She last noticed the bleeding yesterday.  She has some minor irritation but no further sharp pain with bowel movements.  Her bowel movements are now very soft with twice daily fiber supplement.  She continues to follow with Dr. Remy of radiation oncology but was following with Dr. Lakhani of medical oncology, who has left the practice and she has not found a new oncologist yet.    Physical Examination: Exam was chaperoned by Rosalva Croft MA   /76 (BP Location: Left arm, Patient Position: Sitting, Cuff Size: Adult Large)   Pulse 80   Ht 5' 4.5\"   Wt 206 lb 14.4 oz   LMP 2016   SpO2 95%   BMI 34.97 kg/m    General: alert, oriented, in no acute distress, sitting comfortably  HEENT: mucous membranes moist  Perianal external examination:  Perianal skin: Intact with no excoriation or lichenification.  Lesions: No evidence of an external lesion, nodularity, or induration in the perianal region.  Eversion of buttocks: There was not evidence of an anal fissure. Details: " N/A.  Skin tags or external hemorrhoids: None.    Digital rectal examination: Was performed.   Sphincter tone: Good.  Palpable lesions: palpable scarring in the anterior position.  Other: None.  Bimanual examination: was not performed.    Anoscopy: Was performed.   Hemorrhoids: No significant internal hemorrhoids.  Lesions: there was a superficial ulceration in the anterior position with active bleeding.    Procedures:  After injection with 1% lidocaine with epinephrine, biopsy was obtained at the ulceration site in the anterior position using a scope biopsy forceps.  Hemostasis was obtained using Monsel solution.  She tolerated this well.    Assessment/Plan: 55 year old female with history of anal cancer status post chemoradiation, completed 5/18/2020, with rectal bleeding.  She has had an anal fissure in the past.  No true fissure present on exam but she did have some superficial ulceration in the anterior anal canal.  This is at the site of her prior cancer.  This appears quite benign, however, given its persistence, I did recommend a biopsy at this site today to rule out any recurrent cancer.  I think this is likely due to radiation.  She is no longer having any constipation or straining and bowel movements have been very soft.  We will keep her scheduled to meet with Dr. Banuelos again in 1 month also but will follow up after the results of her biopsy. Patient's questions were answered to her stated satisfaction and she is in agreement with this plan.    Medical history:  Past Medical History:   Diagnosis Date     Menorrhagia with irregular cycle 2/28/2016     Ovarian cyst 6/4/2015    Right, 6 cm dermoid on MRI-recommend removal.  7/29/2015 Surgery for right salpingoophorectomy.       Perimenopause 6/4/2015     Rectal cancer (H)     had radiation and chemo 2020       Surgical history:  Past Surgical History:   Procedure Laterality Date     CHOLECYSTECTOMY  2013    LSC     COLONOSCOPY N/A 11/13/2018    Procedure:  colonoscopy;  Surgeon: Sesar Dos Santos MD;  Location: [facility]     COLONOSCOPY N/A 3/3/2020    Procedure: COLONOSCOPY, WITH POLYPECTOMY AND BIOPSY;  Surgeon: Sesar Dos Santos MD;  Location: [facility]     DILATION AND CURETTAGE, OPERATIVE HYSTEROSCOPY, COMBINED N/A 2/29/2016    Procedure: COMBINED DILATION AND CURETTAGE, OPERATIVE HYSTEROSCOPY;  Surgeon: Panda Knight MD;  Location: [facility]     ESOPHAGOSCOPY, GASTROSCOPY, DUODENOSCOPY (EGD), COMBINED N/A 9/3/2020    Procedure: ESOPHAGOGASTRODUODENOSCOPY (EGD);  Surgeon: Karlos Burnett MD;  Location: [facility]     GYN SURGERY  1995    tubes tied     LAPAROSCOPY DIAGNOSTIC (GYN) Right 7/29/2015    Procedure: LAPAROSCOPY DIAGNOSTIC (GYN);  Surgeon: Lian Betancur DO;  Location: [facility]       Problem list:  Patient Active Problem List    Diagnosis Date Noted     Anemia with low platelet count (H) 06/02/2021     Priority: Medium     Intractable pain 05/18/2020     Priority: Medium     Cervical cancer screening 03/20/2020     Priority: Medium     2008, 2010, 2011, 2013 All NIL paps.   4/22/15 NIL Pap, Neg HPV. Plan: Cotest in 5 yr       **  3/2020 Dx rectal cancer **  3/13/20 NIL Pap, Neg HPV. TZ absent. Plan: cotest in 3 years per provider and Oncologist.        Anal squamous cell carcinoma (H) 03/03/2020     Priority: Medium     Sent to . Check 7.21 [facility] for f/u [place]  Susu Lagos presented to the primary care physician with 3 to 4 months history of blood in the stool.  Subsequently the patient was referred for colonoscopy on March 3, 2020.  The palpable rectal mass on digital examination.  Biopsy came back positive for squamous cell carcinoma, focally keratinizing.  The patient has a previous colonoscopy on November 2018 which shows no abnormalities.  The patient was started on chemoradiation regimen with 5-FU and mitomycin.       CARDIOVASCULAR SCREENING; LDL GOAL LESS THAN 160 02/25/2015     Priority: Medium       Medications:  Current Outpatient  "Medications   Medication Sig Dispense Refill     clotrimazole-betamethasone (LOTRISONE) 1-0.05 % external cream Apply topically 2 times daily 45 g 1     estradiol (ESTRACE) 0.1 MG/GM vaginal cream Topically 3x/week       Hydrophilic ointment        Magnesium Oxide 250 MG TABS Take 2 tablets by mouth 2 times daily       Multiple Vitamin (MULTI-VITAMINS PO)        Omega-3 Fatty Acids (OMEGA 3 PO)          Allergies:  Allergies   Allergen Reactions     Rabies Vaccine Unknown     Scratch test resulted, it was not a good idea to give it to her.     Shellfish-Derived Products Nausea and Vomiting     Sulfa Drugs Rash       Family history:  Family History   Problem Relation Age of Onset     Hypertension Father      Myocardial Infarction Maternal Grandfather      Melanoma No family hx of        Social history:  Social History     Tobacco Use     Smoking status: Never Smoker     Smokeless tobacco: Never Used   Substance Use Topics     Alcohol use: Yes     Alcohol/week: 0.0 standard drinks     Comment: 1-2 per month      Marital status: .    Nursing Notes:   Rosalva Croft  8/9/2021  9:33 AM  Signed  Chief Complaint   Patient presents with     Rectal Problem     rectal bleeding       Vitals:    08/09/21 0929   BP: 114/76   BP Location: Left arm   Patient Position: Sitting   Cuff Size: Adult Large   Pulse: 80   SpO2: 95%   Weight: 206 lb 14.4 oz   Height: 5' 4.5\"       Body mass index is 34.97 kg/m .    Rosalva Croft CMA        20 minutes spent on the date of the encounter doing chart review, history and exam, documentation and further activities as noted above.     JEN HairC  Colon and Rectal Surgery  [facility]        Again, thank you for allowing me to participate in the care of your patient.      Sincerely,    Letitia Mendez, APRN CNP      "

## 2021-08-09 NOTE — NURSING NOTE
"Chief Complaint   Patient presents with     Rectal Problem     rectal bleeding       Vitals:    08/09/21 0929   BP: 114/76   BP Location: Left arm   Patient Position: Sitting   Cuff Size: Adult Large   Pulse: 80   SpO2: 95%   Weight: 206 lb 14.4 oz   Height: 5' 4.5\"       Body mass index is 34.97 kg/m .    Rosalva Croft CMA    "

## 2021-08-09 NOTE — PROGRESS NOTES
"Colon and Rectal Surgery Follow-Up Clinic Note    RE: Susu Lagos  : 1965  MALGORZATA: 2021    Susu Lagos is a very pleasant 55 year old female with history of anal cancer s/p chemoradiation, which she completed 20, who presents today for follow up of anal fissure and rectal bleeding.    Interval history: Susu was seen in May of this year with an anal fissure that subsequently healed with topical diltiazem.  However, in the past week she has had bright red blood with bowel movements again.  She was seen this both on the toilet paper with wiping and in the toilet bowl with some clots.  She last noticed the bleeding yesterday.  She has some minor irritation but no further sharp pain with bowel movements.  Her bowel movements are now very soft with twice daily fiber supplement.  She continues to follow with Dr. Remy of radiation oncology but was following with Dr. Lakhani of medical oncology, who has left the practice and she has not found a new oncologist yet.    Physical Examination: Exam was chaperoned by Rosalva Croft MA   /76 (BP Location: Left arm, Patient Position: Sitting, Cuff Size: Adult Large)   Pulse 80   Ht 5' 4.5\"   Wt 206 lb 14.4 oz   LMP 2016   SpO2 95%   BMI 34.97 kg/m    General: alert, oriented, in no acute distress, sitting comfortably  HEENT: mucous membranes moist  Perianal external examination:  Perianal skin: Intact with no excoriation or lichenification.  Lesions: No evidence of an external lesion, nodularity, or induration in the perianal region.  Eversion of buttocks: There was not evidence of an anal fissure. Details: N/A.  Skin tags or external hemorrhoids: None.    Digital rectal examination: Was performed.   Sphincter tone: Good.  Palpable lesions: palpable scarring in the anterior position.  Other: None.  Bimanual examination: was not performed.    Anoscopy: Was performed.   Hemorrhoids: No significant internal hemorrhoids.  Lesions: there was " a superficial ulceration in the anterior position with active bleeding.    Procedures:  After injection with 1% lidocaine with epinephrine, biopsy was obtained at the ulceration site in the anterior position using a scope biopsy forceps.  Hemostasis was obtained using Monsel solution.  She tolerated this well.    Assessment/Plan: 55 year old female with history of anal cancer status post chemoradiation, completed 5/18/2020, with rectal bleeding.  She has had an anal fissure in the past.  No true fissure present on exam but she did have some superficial ulceration in the anterior anal canal.  This is at the site of her prior cancer.  This appears quite benign, however, given its persistence, I did recommend a biopsy at this site today to rule out any recurrent cancer.  I think this is likely due to radiation.  She is no longer having any constipation or straining and bowel movements have been very soft.  We will keep her scheduled to meet with Dr. Banuelos again in 1 month also but will follow up after the results of her biopsy. Patient's questions were answered to her stated satisfaction and she is in agreement with this plan.    Medical history:  Past Medical History:   Diagnosis Date     Menorrhagia with irregular cycle 2/28/2016     Ovarian cyst 6/4/2015    Right, 6 cm dermoid on MRI-recommend removal.  7/29/2015 Surgery for right salpingoophorectomy.       Perimenopause 6/4/2015     Rectal cancer (H)     had radiation and chemo 2020       Surgical history:  Past Surgical History:   Procedure Laterality Date     CHOLECYSTECTOMY  2013    LSC     COLONOSCOPY N/A 11/13/2018    Procedure: colonoscopy;  Surgeon: Sesar Dos Santos MD;  Location: [facility]     COLONOSCOPY N/A 3/3/2020    Procedure: COLONOSCOPY, WITH POLYPECTOMY AND BIOPSY;  Surgeon: Sesar Dos Santos MD;  Location: [facility]     DILATION AND CURETTAGE, OPERATIVE HYSTEROSCOPY, COMBINED N/A 2/29/2016    Procedure: COMBINED DILATION AND CURETTAGE, OPERATIVE  HYSTEROSCOPY;  Surgeon: Panda Knight MD;  Location: [facility]     ESOPHAGOSCOPY, GASTROSCOPY, DUODENOSCOPY (EGD), COMBINED N/A 9/3/2020    Procedure: ESOPHAGOGASTRODUODENOSCOPY (EGD);  Surgeon: Karlos Burnett MD;  Location: [facility]     GYN SURGERY  1995    tubes tied     LAPAROSCOPY DIAGNOSTIC (GYN) Right 7/29/2015    Procedure: LAPAROSCOPY DIAGNOSTIC (GYN);  Surgeon: Lian Betancur DO;  Location: [facility]       Problem list:  Patient Active Problem List    Diagnosis Date Noted     Anemia with low platelet count (H) 06/02/2021     Priority: Medium     Intractable pain 05/18/2020     Priority: Medium     Cervical cancer screening 03/20/2020     Priority: Medium     2008, 2010, 2011, 2013 All NIL paps.   4/22/15 NIL Pap, Neg HPV. Plan: Cotest in 5 yr       **  3/2020 Dx rectal cancer **  3/13/20 NIL Pap, Neg HPV. TZ absent. Plan: cotest in 3 years per provider and Oncologist.        Anal squamous cell carcinoma (H) 03/03/2020     Priority: Medium     Sent to . Check 7.21 [facility] for f/u [place]  Susu Lagos presented to the primary care physician with 3 to 4 months history of blood in the stool.  Subsequently the patient was referred for colonoscopy on March 3, 2020.  The palpable rectal mass on digital examination.  Biopsy came back positive for squamous cell carcinoma, focally keratinizing.  The patient has a previous colonoscopy on November 2018 which shows no abnormalities.  The patient was started on chemoradiation regimen with 5-FU and mitomycin.       CARDIOVASCULAR SCREENING; LDL GOAL LESS THAN 160 02/25/2015     Priority: Medium       Medications:  Current Outpatient Medications   Medication Sig Dispense Refill     clotrimazole-betamethasone (LOTRISONE) 1-0.05 % external cream Apply topically 2 times daily 45 g 1     estradiol (ESTRACE) 0.1 MG/GM vaginal cream Topically 3x/week       Hydrophilic ointment        Magnesium Oxide 250 MG TABS Take 2 tablets by mouth 2 times daily       Multiple  "Vitamin (MULTI-VITAMINS PO)        Omega-3 Fatty Acids (OMEGA 3 PO)          Allergies:  Allergies   Allergen Reactions     Rabies Vaccine Unknown     Scratch test resulted, it was not a good idea to give it to her.     Shellfish-Derived Products Nausea and Vomiting     Sulfa Drugs Rash       Family history:  Family History   Problem Relation Age of Onset     Hypertension Father      Myocardial Infarction Maternal Grandfather      Melanoma No family hx of        Social history:  Social History     Tobacco Use     Smoking status: Never Smoker     Smokeless tobacco: Never Used   Substance Use Topics     Alcohol use: Yes     Alcohol/week: 0.0 standard drinks     Comment: 1-2 per month      Marital status: .    Nursing Notes:   Rosalva Croft  8/9/2021  9:33 AM  Signed  Chief Complaint   Patient presents with     Rectal Problem     rectal bleeding       Vitals:    08/09/21 0929   BP: 114/76   BP Location: Left arm   Patient Position: Sitting   Cuff Size: Adult Large   Pulse: 80   SpO2: 95%   Weight: 206 lb 14.4 oz   Height: 5' 4.5\"       Body mass index is 34.97 kg/m .    Rosalva Croft CMA        20 minutes spent on the date of the encounter doing chart review, history and exam, documentation and further activities as noted above.     Letitia Arevalo NP-C  Colon and Rectal Surgery  [facility]    "

## 2021-08-10 ENCOUNTER — OFFICE VISIT (OUTPATIENT)
Dept: URGENT CARE | Facility: URGENT CARE | Age: 56
End: 2021-08-10
Payer: COMMERCIAL

## 2021-08-10 VITALS
RESPIRATION RATE: 18 BRPM | DIASTOLIC BLOOD PRESSURE: 88 MMHG | TEMPERATURE: 98.2 F | SYSTOLIC BLOOD PRESSURE: 128 MMHG | OXYGEN SATURATION: 96 % | HEART RATE: 98 BPM

## 2021-08-10 DIAGNOSIS — J02.9 SORE THROAT: ICD-10-CM

## 2021-08-10 DIAGNOSIS — J06.9 VIRAL URI WITH COUGH: Primary | ICD-10-CM

## 2021-08-10 LAB
DEPRECATED S PYO AG THROAT QL EIA: NEGATIVE
GROUP A STREP BY PCR: NOT DETECTED
PATH REPORT.COMMENTS IMP SPEC: NORMAL
PATH REPORT.COMMENTS IMP SPEC: NORMAL
PATH REPORT.FINAL DX SPEC: NORMAL
PATH REPORT.GROSS SPEC: NORMAL
PATH REPORT.MICROSCOPIC SPEC OTHER STN: NORMAL
PATH REPORT.RELEVANT HX SPEC: NORMAL

## 2021-08-10 PROCEDURE — U0003 INFECTIOUS AGENT DETECTION BY NUCLEIC ACID (DNA OR RNA); SEVERE ACUTE RESPIRATORY SYNDROME CORONAVIRUS 2 (SARS-COV-2) (CORONAVIRUS DISEASE [COVID-19]), AMPLIFIED PROBE TECHNIQUE, MAKING USE OF HIGH THROUGHPUT TECHNOLOGIES AS DESCRIBED BY CMS-2020-01-R: HCPCS | Performed by: PHYSICIAN ASSISTANT

## 2021-08-10 PROCEDURE — U0005 INFEC AGEN DETEC AMPLI PROBE: HCPCS | Performed by: PHYSICIAN ASSISTANT

## 2021-08-10 PROCEDURE — 87651 STREP A DNA AMP PROBE: CPT | Performed by: PHYSICIAN ASSISTANT

## 2021-08-10 PROCEDURE — 99213 OFFICE O/P EST LOW 20 MIN: CPT | Performed by: PHYSICIAN ASSISTANT

## 2021-08-10 RX ORDER — BENZONATATE 200 MG/1
200 CAPSULE ORAL 3 TIMES DAILY PRN
Qty: 21 CAPSULE | Refills: 1 | Status: SHIPPED | OUTPATIENT
Start: 2021-08-10 | End: 2021-11-01

## 2021-08-10 NOTE — PROGRESS NOTES
"    Assessment & Plan     Viral URI with cough  Rapid strep negative. COVID pending. This is likely viral. Continue with supportive care. Get plenty of rest and push fluids. Can use Tylenol and/or ibuprofen as needed for pain and/or fever control. Discussed quarantine guidelines. Return to clinic if symptoms worsen or do not improve; otherwise follow up as needed    - benzonatate (TESSALON) 200 MG capsule; Take 1 capsule (200 mg) by mouth 3 times daily as needed for cough    Sore throat    - Streptococcus A Rapid Screen w/Reflex to PCR - Clinic Collect  - Symptomatic COVID-19 Virus (Coronavirus) by PCR Nasopharyngeal  - Group A Streptococcus PCR Throat Swab             BMI:   Estimated body mass index is 34.97 kg/m  as calculated from the following:    Height as of 8/9/21: 1.638 m (5' 4.5\").    Weight as of 8/9/21: 93.8 kg (206 lb 14.4 oz).           Return in about 1 week (around 8/17/2021), or if symptoms worsen or fail to improve.    Linda Hua PA-C  [facility]    Subjective   Chief Complaint   Patient presents with     URI     2 days patient is having sore throat with a cough, then cough got worse and settled in her chest laying down was harder.     Cough       HPI     URI Adult    Onset of symptoms was 2 day(s) ago.  Course of illness is same.    Severity moderate  Current and Associated symptoms: sore throat, cough  Treatment measures tried include None tried.  Predisposing factors include None.              Review of Systems   Constitutional, HEENT, cardiovascular, pulmonary, gi and gu systems are negative, except as otherwise noted.      Objective    /88 (BP Location: Right arm, Patient Position: Sitting, Cuff Size: Adult Large)   Pulse 98   Temp 98.2  F (36.8  C) (Tympanic)   Resp 18   LMP 01/29/2016   SpO2 96%   There is no height or weight on file to calculate BMI.  Physical Exam  Constitutional:       Appearance: She is well-developed.   HENT:      Head: " Normocephalic.      Right Ear: Tympanic membrane and ear canal normal.      Left Ear: Tympanic membrane and ear canal normal.   Eyes:      Conjunctiva/sclera: Conjunctivae normal.   Cardiovascular:      Rate and Rhythm: Normal rate.      Heart sounds: Normal heart sounds.   Pulmonary:      Effort: Pulmonary effort is normal.      Breath sounds: Normal breath sounds.      Comments: Frequent dry reactive cough  Skin:     General: Skin is warm and dry.      Findings: No rash.   Psychiatric:         Behavior: Behavior normal.            Results for orders placed or performed in visit on 08/10/21 (from the past 24 hour(s))   Streptococcus A Rapid Screen w/Reflex to PCR - Clinic Collect    Specimen: Throat; Swab   Result Value Ref Range    Group A Strep antigen Negative Negative   Symptomatic COVID-19 Virus (Coronavirus) by PCR Nasopharyngeal    Specimen: Nasopharyngeal; Swab    Narrative    The following orders were created for panel order Symptomatic COVID-19 Virus (Coronavirus) by PCR Nasopharyngeal.  Procedure                               Abnormality         Status                     ---------                               -----------         ------                     SARS-COV2 (COVID-19) Vir...[[id number]]                      In process                   Please view results for these tests on the individual orders.

## 2021-08-11 LAB
A1AT PHENOTYP SERPL-IMP: ABNORMAL
A1AT SERPL-MCNC: 96 MG/DL
A1AT SS SERPL-MCNC: ABNORMAL G/L
A1AT SZ SERPL-MCNC: ABNORMAL G/L
A1AT ZZ SERPL-MCNC: NEGATIVE G/L
SARS-COV-2 RNA RESP QL NAA+PROBE: NEGATIVE
SPECIMEN SOURCE: ABNORMAL

## 2021-08-11 NOTE — RESULT ENCOUNTER NOTE
Group A Streptococcus PCR is NEGATIVE  No treatment or change in treatment [facility] ED lab result Strep Group A protocol.

## 2021-08-12 ENCOUNTER — PATIENT OUTREACH (OUTPATIENT)
Dept: ONCOLOGY | Facility: CLINIC | Age: 56
End: 2021-08-12

## 2021-08-12 ENCOUNTER — ANCILLARY PROCEDURE (OUTPATIENT)
Dept: GENERAL RADIOLOGY | Facility: CLINIC | Age: 56
End: 2021-08-12
Attending: PHYSICIAN ASSISTANT
Payer: COMMERCIAL

## 2021-08-12 DIAGNOSIS — C21.0 ANAL SQUAMOUS CELL CARCINOMA (H): Primary | ICD-10-CM

## 2021-08-12 DIAGNOSIS — R13.10 DYSPHAGIA, UNSPECIFIED TYPE: ICD-10-CM

## 2021-08-12 PROCEDURE — 74220 X-RAY XM ESOPHAGUS 1CNTRST: CPT | Mod: GC | Performed by: RADIOLOGY

## 2021-09-03 NOTE — PROGRESS NOTES
"Colon and Rectal Surgery Clinic Note      RE: Susu Lagos  : 1965  MALGORZATA: 2021    Susu Lagos is a very pleasant 56 year old female with history of anal cancer s/p chemoradiation, which she completed 20. She has seen Letitia Arevalo NP in clinic for anal fissure with a superficial ulceration in the anterior anal canal that failed to heal. Biopsy of this site on 21 was normal.   She continues to follow with Dr. Remy of radiation oncology but was following and was seeing Dr. Lakhani of medical oncology, who has left the practice. She is getting set up with a new oncologist in [place].    MR Pelvis 20:  IMPRESSION: Posttreatment changes of the anus without clear evidence  for local recurrence or pelvic metastatic disease.    Pet CT 20:  IMPRESSION: In this patient with a history of squamous cell carcinoma  of the anus, status post chemotherapy and radiation:  1. Persistent hypermetabolic uptake in the anus, slightly decreased  since the previous PET/CT on 3/19/2020 but believed to represent  residual disease.   2. No evidence of metastatic disease.   3. Diffuse FDG uptake in the stomach, which can be seen with gastritis.    Interval History: She has some occasional bright red blood with bowel movements still. She has occasional diarrhea despite twice daily fiber powder and this has caused some irritation on her skin.    Physical examination:  Examination was chaperoned by Letitia Arevalo NP     Vitals: /85 (BP Location: Left arm, Patient Position: Sitting, Cuff Size: Adult Regular)   Pulse 76   Ht 5' 4.5\"   Wt 206 lb 1.6 oz   LMP 2016   SpO2 97%   BMI 34.83 kg/m    BMI= Body mass index is 34.83 kg/m .    No palpable inguinal adenopathy.  Perianal examination shows skin tags but no other lesions. Eversion of buttocks caused a split in the anterior midline that appeared healed before exam. Digital rectal examination with no palpable " lesions. Anoscopy without any visible lesions but with superficial anal fissure in the posterior midline with active bleeding and some internal hemorrhoids.    Laboratory data:    Recent Labs   Lab Test 07/27/21  0859   WBC 5.6   HGB 14.4      CR 0.79   ALBUMIN 3.8   BILITOTAL 0.3   ALKPHOS 56   *   AST 42   INR 0.87       Assessment/plan:  Anal cancer s/p chemoradiation completed 5/18/20. No evidence of recurrence on exam today. Will get her up for PET and MRI while waiting for a new oncologist. Recommended increasing fiber supplement to three times a day for occasional diarrhea.    Follow up per protocol:  A. Digital rectal examination every 3-6 months for 5 years. Until 5/2025  Next in 12/2021  B. Inguinal node palpation every 3-6 months for 5 years. Until 5/2025  Next in 12/2021  C. PET and pelvic MRI annually for 3 years. Until 5/2023 Due now    For details of past medical history, surgical history, family history, medications, allergies, and review of systems, please see details below.    Medical history:  Past Medical History:   Diagnosis Date     Menorrhagia with irregular cycle 2/28/2016     Ovarian cyst 6/4/2015    Right, 6 cm dermoid on MRI-recommend removal.  7/29/2015 Surgery for right salpingoophorectomy.       Perimenopause 6/4/2015     Rectal cancer (H)     had radiation and chemo 2020       Surgical history:  Past Surgical History:   Procedure Laterality Date     CHOLECYSTECTOMY  2013    LSC     COLONOSCOPY N/A 11/13/2018    Procedure: colonoscopy;  Surgeon: Sesar Dos Santos MD;  Location: [facility]     COLONOSCOPY N/A 3/3/2020    Procedure: COLONOSCOPY, WITH POLYPECTOMY AND BIOPSY;  Surgeon: Sesar Dos Santos MD;  Location: [facility]     DILATION AND CURETTAGE, OPERATIVE HYSTEROSCOPY, COMBINED N/A 2/29/2016    Procedure: COMBINED DILATION AND CURETTAGE, OPERATIVE HYSTEROSCOPY;  Surgeon: Panda Knight MD;  [facility]: [facility]     ESOPHAGOSCOPY, GASTROSCOPY, DUODENOSCOPY (EGD), COMBINED N/A  "9/3/2020    Procedure: ESOPHAGOGASTRODUODENOSCOPY (EGD);  Surgeon: Karlos Burnett MD;  Location: [facility]     GYN SURGERY  1995    tubes tied     LAPAROSCOPY DIAGNOSTIC (GYN) Right 7/29/2015    Procedure: LAPAROSCOPY DIAGNOSTIC (GYN);  Surgeon: Lian Betancur DO;  [facility]: [facility]       Family history:  Family History   Problem Relation Age of Onset     Hypertension Father      Myocardial Infarction Maternal Grandfather      Melanoma No family hx of        Medications:  Current Outpatient Medications   Medication Sig Dispense Refill     benzonatate (TESSALON) 200 MG capsule Take 1 capsule (200 mg) by mouth 3 times daily as needed for cough 21 capsule 1     estradiol (ESTRACE) 0.1 MG/GM vaginal cream Topically 3x/week       Hydrophilic ointment        Magnesium Oxide 250 MG TABS Take 2 tablets by mouth 2 times daily       Multiple Vitamin (MULTI-VITAMINS PO)        Omega-3 Fatty Acids (OMEGA 3 PO)        clotrimazole-betamethasone (LOTRISONE) 1-0.05 % external cream Apply topically 2 times daily (Patient not taking: Reported on 8/10/2021) 45 g 1       Allergies:  The patientis allergic to rabies vaccine, shellfish-derived products, and sulfa drugs.    Social history:  Social History     Tobacco Use     Smoking status: Never Smoker     Smokeless tobacco: Never Used   Substance Use Topics     Alcohol use: Yes     Alcohol/week: 0.0 standard drinks     Comment: 1-2 per month      Marital status: .    Review of Systems:  Nursing Notes:   Rosalva Croft  9/14/2021 10:42 AM  Signed  Chief Complaint   Patient presents with     Follow Up     4 month anoscopy for Hx of anal cancer       Vitals:    09/14/21 1039   BP: 123/85   BP Location: Left arm   Patient Position: Sitting   Cuff Size: Adult Regular   Pulse: 76   SpO2: 97%   Weight: 206 lb 1.6 oz   Height: 5' 4.5\"       Body mass index is 34.83 kg/m .    Rosalva Croft CMA         15 minutes spent on the date of the encounter doing chart review, history and " exam, documentation, review of imaging, and further activities as noted above.     Stefano Banuelos MD   Professor and Chief  Division of Colon and Rectal Surgery  [facility]      Referring Provider:  Referred Self, MD  No address on file     Primary Care Provider:  Lakesha Aguirre    This note was created using speech recognition software and may contain unintended word substitutions.

## 2021-09-05 ENCOUNTER — HEALTH MAINTENANCE LETTER (OUTPATIENT)
Age: 56
End: 2021-09-05

## 2021-09-14 ENCOUNTER — OFFICE VISIT (OUTPATIENT)
Dept: SURGERY | Facility: CLINIC | Age: 56
End: 2021-09-14
Payer: COMMERCIAL

## 2021-09-14 VITALS
WEIGHT: 206.1 LBS | DIASTOLIC BLOOD PRESSURE: 85 MMHG | SYSTOLIC BLOOD PRESSURE: 123 MMHG | HEIGHT: 65 IN | BODY MASS INDEX: 34.34 KG/M2 | OXYGEN SATURATION: 97 % | HEART RATE: 76 BPM

## 2021-09-14 DIAGNOSIS — C21.1 MALIGNANT NEOPLASM OF ANAL CANAL (H): Primary | ICD-10-CM

## 2021-09-14 PROCEDURE — 99212 OFFICE O/P EST SF 10 MIN: CPT | Performed by: COLON & RECTAL SURGERY

## 2021-09-14 ASSESSMENT — PAIN SCALES - GENERAL: PAINLEVEL: NO PAIN (0)

## 2021-09-14 ASSESSMENT — MIFFLIN-ST. JEOR: SCORE: 1527.8

## 2021-09-14 NOTE — LETTER
"2021       RE: Susu Lagos  [id number]  [facility] [address]     Dear Colleague,    Thank you for referring your patient, Susu Lagos, to the [facility] COLON AND RECTAL SURGERY [facility]IN[facility] [place] [facility]. Please see a copy of my visit note below.    Colon and Rectal Surgery Clinic Note      RE: Susu Lagos  : 1965  MALGORZATA: 2021    Susu Lagos is a very pleasant 56 year old female with history of anal cancer s/p chemoradiation, which she completed 20. She has seen Letitia Arevalo NP in clinic for anal fissure with a superficial ulceration in the anterior anal canal that failed to heal. Biopsy of this site on 21 was normal.   She continues to follow with Dr. Remy of radiation oncology but was following and was seeing Dr. Lakhani of medical oncology, who has left the practice. She is getting set up with a new oncologist in [place].    MR Pelvis 20:  IMPRESSION: Posttreatment changes of the anus without clear evidence  for local recurrence or pelvic metastatic disease.    Pet CT 20:  IMPRESSION: In this patient with a history of squamous cell carcinoma  of the anus, status post chemotherapy and radiation:  1. Persistent hypermetabolic uptake in the anus, slightly decreased  since the previous PET/CT on 3/19/2020 but believed to represent  residual disease.   2. No evidence of metastatic disease.   3. Diffuse FDG uptake in the stomach, which can be seen with gastritis.    Interval History: She has some occasional bright red blood with bowel movements still. She has occasional diarrhea despite twice daily fiber powder and this has caused some irritation on her skin.    Physical examination:  Examination was chaperoned by Letitia Arevalo NP     Vitals: /85 (BP Location: Left arm, Patient Position: Sitting, Cuff Size: Adult Regular)   Pulse 76   Ht 5' 4.5\"   Wt 206 lb 1.6 oz   " LMP 01/29/2016   SpO2 97%   BMI 34.83 kg/m    BMI= Body mass index is 34.83 kg/m .    No palpable inguinal adenopathy.  Perianal examination shows skin tags but no other lesions. Eversion of buttocks caused a split in the anterior midline that appeared healed before exam. Digital rectal examination with no palpable lesions. Anoscopy without any visible lesions but with superficial anal fissure in the posterior midline with active bleeding and some internal hemorrhoids.    Laboratory data:    Recent Labs   Lab Test 07/27/21  0859   WBC 5.6   HGB 14.4      CR 0.79   ALBUMIN 3.8   BILITOTAL 0.3   ALKPHOS 56   *   AST 42   INR 0.87       Assessment/plan:  Anal cancer s/p chemoradiation completed 5/18/20. No evidence of recurrence on exam today. Will get her up for PET and MRI while waiting for a new oncologist. Recommended increasing fiber supplement to three times a day for occasional diarrhea.    Follow up per protocol:  A. Digital rectal examination every 3-6 months for 5 years. Until 5/2025  Next in 12/2021  B. Inguinal node palpation every 3-6 months for 5 years. Until 5/2025  Next in 12/2021  C. PET and pelvic MRI annually for 3 years. Until 5/2023 Due now    For details of past medical history, surgical history, family history, medications, allergies, and review of systems, please see details below.    Medical history:  Past Medical History:   Diagnosis Date     Menorrhagia with irregular cycle 2/28/2016     Ovarian cyst 6/4/2015    Right, 6 cm dermoid on MRI-recommend removal.  7/29/2015 Surgery for right salpingoophorectomy.       Perimenopause 6/4/2015     Rectal cancer (H)     had radiation and chemo 2020       Surgical history:  Past Surgical History:   Procedure Laterality Date     CHOLECYSTECTOMY  2013    LSC     COLONOSCOPY N/A 11/13/2018    Procedure: colonoscopy;  Surgeon: Sesar Dos Santos MD;  Location: [facility]     COLONOSCOPY N/A 3/3/2020    Procedure: COLONOSCOPY, WITH POLYPECTOMY AND  BIOPSY;  Surgeon: Sesar Dos Santos MD;  [facility]     DILATION AND CURETTAGE, OPERATIVE HYSTEROSCOPY, COMBINED N/A 2/29/2016    Procedure: COMBINED DILATION AND CURETTAGE, OPERATIVE HYSTEROSCOPY;  Surgeon: Panda Knight MD;  [facility]     ESOPHAGOSCOPY, GASTROSCOPY, DUODENOSCOPY (EGD), COMBINED N/A 9/3/2020    Procedure: ESOPHAGOGASTRODUODENOSCOPY (EGD);  Surgeon: Karlos Burnett MD;  Location: [facility]     GYN SURGERY  1995    tubes tied     LAPAROSCOPY DIAGNOSTIC (GYN) Right 7/29/2015    Procedure: LAPAROSCOPY DIAGNOSTIC (GYN);  Surgeon: Lian Betancur DO;  [facility]       Family history:  Family History   Problem Relation Age of Onset     Hypertension Father      Myocardial Infarction Maternal Grandfather      Melanoma No family hx of        Medications:  Current Outpatient Medications   Medication Sig Dispense Refill     benzonatate (TESSALON) 200 MG capsule Take 1 capsule (200 mg) by mouth 3 times daily as needed for cough 21 capsule 1     estradiol (ESTRACE) 0.1 MG/GM vaginal cream Topically 3x/week       Hydrophilic ointment        Magnesium Oxide 250 MG TABS Take 2 tablets by mouth 2 times daily       Multiple Vitamin (MULTI-VITAMINS PO)        Omega-3 Fatty Acids (OMEGA 3 PO)        clotrimazole-betamethasone (LOTRISONE) 1-0.05 % external cream Apply topically 2 times daily (Patient not taking: Reported on 8/10/2021) 45 g 1       Allergies:  The patientis allergic to rabies vaccine, shellfish-derived products, and sulfa drugs.    Social history:  Social History     Tobacco Use     Smoking status: Never Smoker     Smokeless tobacco: Never Used   Substance Use Topics     Alcohol use: Yes     Alcohol/week: 0.0 standard drinks     Comment: 1-2 per month      Marital status: .    Review of Systems:  Nursing Notes:   Rosalva Croft  9/14/2021 10:42 AM  Signed  Chief Complaint   Patient presents with     Follow Up     4 month anoscopy for Hx of anal cancer       Vitals:     "09/14/21 1039   BP: 123/85   BP Location: Left arm   Patient Position: Sitting   Cuff Size: Adult Regular   Pulse: 76   SpO2: 97%   Weight: 206 lb 1.6 oz   Height: 5' 4.5\"       Body mass index is 34.83 kg/m .    Rosalva Croft CMA         15 minutes spent on the date of the encounter doing chart review, history and exam, documentation, review of imaging, and further activities as noted above.     Stefano Banuelos MD   Professor and Chief  Division of Colon and Rectal Surgery  [facility]      Referring Provider:  Referred Self, MD  No address on file     Primary Care Provider:  Lakesha Aguirre    This note was created using speech recognition software and may contain unintended word substitutions.      Again, thank you for allowing me to participate in the care of your patient.      Sincerely,    Stefano Banuelos MD      "

## 2021-09-14 NOTE — NURSING NOTE
"Chief Complaint   Patient presents with     Follow Up     4 month anoscopy for Hx of anal cancer       Vitals:    09/14/21 1039   BP: 123/85   BP Location: Left arm   Patient Position: Sitting   Cuff Size: Adult Regular   Pulse: 76   SpO2: 97%   Weight: 206 lb 1.6 oz   Height: 5' 4.5\"       Body mass index is 34.83 kg/m .    Rosalva Croft CMA    "

## 2021-09-23 ENCOUNTER — HOSPITAL ENCOUNTER (OUTPATIENT)
Dept: PET IMAGING | Facility: CLINIC | Age: 56
Discharge: HOME | End: 2021-09-23
Attending: COLON & RECTAL SURGERY | Admitting: COLON & RECTAL SURGERY
Payer: COMMERCIAL

## 2021-09-23 DIAGNOSIS — C21.1 MALIGNANT NEOPLASM OF ANAL CANAL (H): ICD-10-CM

## 2021-09-23 PROCEDURE — 71260 CT THORAX DX C+: CPT | Mod: 26 | Performed by: STUDENT IN AN ORGANIZED HEALTH CARE EDUCATION/TRAINING PROGRAM

## 2021-09-23 PROCEDURE — 78816 PET IMAGE W/CT FULL BODY: CPT | Mod: 26 | Performed by: STUDENT IN AN ORGANIZED HEALTH CARE EDUCATION/TRAINING PROGRAM

## 2021-09-23 PROCEDURE — A9552 F18 FDG: HCPCS | Performed by: COLON & RECTAL SURGERY

## 2021-09-23 PROCEDURE — 343N000001 HC RX 343: Performed by: COLON & RECTAL SURGERY

## 2021-09-23 PROCEDURE — 250N000011 HC RX IP 250 OP 636: Performed by: COLON & RECTAL SURGERY

## 2021-09-23 PROCEDURE — 74177 CT ABD & PELVIS W/CONTRAST: CPT | Mod: 26 | Performed by: STUDENT IN AN ORGANIZED HEALTH CARE EDUCATION/TRAINING PROGRAM

## 2021-09-23 PROCEDURE — 71260 CT THORAX DX C+: CPT

## 2021-09-23 PROCEDURE — 78816 PET IMAGE W/CT FULL BODY: CPT | Mod: PI

## 2021-09-23 RX ORDER — IOPAMIDOL 755 MG/ML
10-135 INJECTION, SOLUTION INTRAVASCULAR ONCE
Status: COMPLETED | OUTPATIENT
Start: 2021-09-23 | End: 2021-09-23

## 2021-09-23 RX ADMIN — IOPAMIDOL 100 ML: 755 INJECTION, SOLUTION INTRAVENOUS at 08:44

## 2021-09-23 RX ADMIN — FLUDEOXYGLUCOSE F-18 11.18 MCI.: 500 INJECTION, SOLUTION INTRAVENOUS at 08:18

## 2021-09-30 ENCOUNTER — LAB (OUTPATIENT)
Dept: LAB | Facility: CLINIC | Age: 56
End: 2021-09-30
Payer: COMMERCIAL

## 2021-09-30 ENCOUNTER — HOSPITAL ENCOUNTER (OUTPATIENT)
Dept: MRI IMAGING | Facility: CLINIC | Age: 56
Discharge: HOME | End: 2021-09-30
Attending: COLON & RECTAL SURGERY | Admitting: COLON & RECTAL SURGERY
Payer: COMMERCIAL

## 2021-09-30 DIAGNOSIS — C21.0 ANAL SQUAMOUS CELL CARCINOMA (H): ICD-10-CM

## 2021-09-30 DIAGNOSIS — C21.1 MALIGNANT NEOPLASM OF ANAL CANAL (H): ICD-10-CM

## 2021-09-30 LAB
ALBUMIN SERPL-MCNC: 3.9 G/DL (ref 3.4–5)
ALP SERPL-CCNC: 47 U/L (ref 40–150)
ALT SERPL W P-5'-P-CCNC: 150 U/L (ref 0–50)
ANION GAP SERPL CALCULATED.3IONS-SCNC: 3 MMOL/L (ref 3–14)
AST SERPL W P-5'-P-CCNC: 70 U/L (ref 0–45)
BASOPHILS # BLD AUTO: 0 10E3/UL (ref 0–0.2)
BASOPHILS NFR BLD AUTO: 0 %
BILIRUB SERPL-MCNC: 0.3 MG/DL (ref 0.2–1.3)
BUN SERPL-MCNC: 18 MG/DL (ref 7–30)
CALCIUM SERPL-MCNC: 9.2 MG/DL (ref 8.5–10.1)
CHLORIDE BLD-SCNC: 109 MMOL/L (ref 94–109)
CO2 SERPL-SCNC: 29 MMOL/L (ref 20–32)
CREAT SERPL-MCNC: 0.92 MG/DL (ref 0.52–1.04)
EOSINOPHIL # BLD AUTO: 0.1 10E3/UL (ref 0–0.7)
EOSINOPHIL NFR BLD AUTO: 3 %
ERYTHROCYTE [DISTWIDTH] IN BLOOD BY AUTOMATED COUNT: 11.8 % (ref 10–15)
GFR SERPL CREATININE-BSD FRML MDRD: 71 ML/MIN/1.73M2
GLUCOSE BLD-MCNC: 91 MG/DL (ref 70–99)
HCT VFR BLD AUTO: 41.2 % (ref 35–47)
HGB BLD-MCNC: 14.2 G/DL (ref 11.7–15.7)
IMM GRANULOCYTES # BLD: 0 10E3/UL
IMM GRANULOCYTES NFR BLD: 0 %
LYMPHOCYTES # BLD AUTO: 1.1 10E3/UL (ref 0.8–5.3)
LYMPHOCYTES NFR BLD AUTO: 23 %
MCH RBC QN AUTO: 33.1 PG (ref 26.5–33)
MCHC RBC AUTO-ENTMCNC: 34.5 G/DL (ref 31.5–36.5)
MCV RBC AUTO: 96 FL (ref 78–100)
MONOCYTES # BLD AUTO: 0.5 10E3/UL (ref 0–1.3)
MONOCYTES NFR BLD AUTO: 10 %
NEUTROPHILS # BLD AUTO: 2.9 10E3/UL (ref 1.6–8.3)
NEUTROPHILS NFR BLD AUTO: 64 %
NRBC # BLD AUTO: 0 10E3/UL
NRBC BLD AUTO-RTO: 0 /100
PLATELET # BLD AUTO: 201 10E3/UL (ref 150–450)
POTASSIUM BLD-SCNC: 4.5 MMOL/L (ref 3.4–5.3)
PROT SERPL-MCNC: 7.3 G/DL (ref 6.8–8.8)
RBC # BLD AUTO: 4.29 10E6/UL (ref 3.8–5.2)
SODIUM SERPL-SCNC: 141 MMOL/L (ref 133–144)
WBC # BLD AUTO: 4.5 10E3/UL (ref 4–11)

## 2021-09-30 PROCEDURE — 255N000002 HC RX 255 OP 636: Performed by: COLON & RECTAL SURGERY

## 2021-09-30 PROCEDURE — 82040 ASSAY OF SERUM ALBUMIN: CPT

## 2021-09-30 PROCEDURE — 72197 MRI PELVIS W/O & W/DYE: CPT

## 2021-09-30 PROCEDURE — 36415 COLL VENOUS BLD VENIPUNCTURE: CPT

## 2021-09-30 PROCEDURE — 85025 COMPLETE CBC W/AUTO DIFF WBC: CPT

## 2021-09-30 PROCEDURE — 72197 MRI PELVIS W/O & W/DYE: CPT | Mod: 26 | Performed by: RADIOLOGY

## 2021-09-30 PROCEDURE — A9585 GADOBUTROL INJECTION: HCPCS | Performed by: COLON & RECTAL SURGERY

## 2021-09-30 RX ORDER — GADOBUTROL 604.72 MG/ML
10 INJECTION INTRAVENOUS ONCE
Status: COMPLETED | OUTPATIENT
Start: 2021-09-30 | End: 2021-09-30

## 2021-09-30 RX ADMIN — GADOBUTROL 10 ML: 604.72 INJECTION INTRAVENOUS at 09:28

## 2021-10-22 ENCOUNTER — PATIENT OUTREACH (OUTPATIENT)
Dept: SURGERY | Facility: CLINIC | Age: 56
End: 2021-10-22

## 2021-10-22 NOTE — PROGRESS NOTES
"Reviewed MRI and PET scan with patient. No evidence of recurrent disease. There is some \"warm light up\" in the anus due to fissure. Follow up exam in December.   "

## 2021-11-01 ENCOUNTER — ONCOLOGY VISIT (OUTPATIENT)
Dept: ONCOLOGY | Facility: CLINIC | Age: 56
End: 2021-11-01
Attending: INTERNAL MEDICINE
Payer: COMMERCIAL

## 2021-11-01 VITALS
DIASTOLIC BLOOD PRESSURE: 78 MMHG | WEIGHT: 203.3 LBS | TEMPERATURE: 97.4 F | OXYGEN SATURATION: 96 % | HEART RATE: 71 BPM | SYSTOLIC BLOOD PRESSURE: 120 MMHG | RESPIRATION RATE: 18 BRPM | BODY MASS INDEX: 34.36 KG/M2

## 2021-11-01 DIAGNOSIS — C21.0 ANAL SQUAMOUS CELL CARCINOMA (H): Primary | ICD-10-CM

## 2021-11-01 PROCEDURE — 99214 OFFICE O/P EST MOD 30 MIN: CPT | Performed by: INTERNAL MEDICINE

## 2021-11-01 ASSESSMENT — PAIN SCALES - GENERAL: PAINLEVEL: NO PAIN (0)

## 2021-11-01 NOTE — PROGRESS NOTES
[facility] Hematology and Oncology Progress Note    Patient: Susu Lagos  MRN: [id number]  Date of Service: Nov 1, 2021         Reason for Visit    Chief Complaint   Patient presents with     Oncology Clinic Visit     Follow up anal cancer       Assessment and Plan    Cancer Staging  No matching staging information was found for the patient.    ECOG Performance    0 - Independent     Pain  Pain Score: No Pain (0)    Ms. Lagos is a 56 year old female a diagnosis of squamous cell carcinoma of the anal canal, clinical T1N0.    She completed definitive chemo-radiation successfully and remains free of local or distant recurrent disease.    She does have long term side effects including rectal pain and urgency.  This interferes with her ability to work as a , since she on occasion would need to abruptly leave the classroom.  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    We will continue to follow (along with Marixa Banuelos and Sandrita).      Encounter Diagnoses:    Problem List Items Addressed This Visit        Digestive    Anal squamous cell carcinoma (H) - Primary (Chronic)    Relevant Orders    **Comprehensive metabolic panel FUTURE 2mo    **CBC with platelets FUTURE 2mo             CC: Laeksha Aguirre PA-C   ______________________________________________________________________________    Interval History (Sanford Banuelos MD, 14 Sept 2021):  Susu Lagos is a very pleasant 56 year old female with history of anal cancer s/p chemoradiation, which she completed 5/18/20. She has seen Letitia Arevalo NP in clinic for anal fissure with a superficial ulceration in the anterior anal canal that failed to heal. Biopsy of this site on 8/9/21 was normal.   She continues to follow with Dr. Remy of radiation oncology but was following and was seeing Dr. Lakhani of medical oncology, who has left the practice. She is getting set up with a new  oncologist in [place].    MR Pelvis 8/6/20:  IMPRESSION: Posttreatment changes of the anus without clear evidence  for local recurrence or pelvic metastatic disease.    Pet CT 7/28/20:  IMPRESSION: In this patient with a history of squamous cell carcinoma  of the anus, status post chemotherapy and radiation:  1. Persistent hypermetabolic uptake in the anus, slightly decreased  since the previous PET/CT on 3/19/2020 but believed to represent  residual disease.   2. No evidence of metastatic disease.   3. Diffuse FDG uptake in the stomach, which can be seen with gastritis.    Recorded 10 Feb 2021 (Farhan):  Susu Lagos presented to the primary care physician with 3 to 4 months history of blood in the stool.  Subsequently the patient was referred for colonoscopy on March 3, 2020.  The palpable rectal mass on digital examination.  Biopsy came back positive for squamous cell carcinoma, focally keratinizing.  The patient has a previous colonoscopy on November 2018 which shows no abnormalities.  The patient was started on chemoradiation regimen with 5-FU and mitomycin.    Review of systems.  No fever or night sweats.  No loss of weight.  She's unhappy that she's GAINED weight  No lumps or bumps anywhere.  No unusual headaches or eyesight issues.  No dizziness.  No bleeding from the nose.  No sores in the mouth. No problems with swallowing.  No chest pain. No shortness of breath. No cough.  No nausea or vomiting.  Pain and urgency with defecation  No blood in stool or black colored stools.  No problems passing urine.  No numbness or tingling in hands or feet.  No skin rashes.  A 14 point review of systems is otherwise negative.      Past History    Past Medical History:   Diagnosis Date     Menorrhagia with irregular cycle 2/28/2016     Ovarian cyst 6/4/2015    Right, 6 cm dermoid on MRI-recommend removal.  7/29/2015 Surgery for right salpingoophorectomy.       Perimenopause 6/4/2015     Rectal cancer (H)     had  radiation and chemo 2020       Past Surgical History:   Procedure Laterality Date     CHOLECYSTECTOMY  2013    LSC     COLONOSCOPY N/A 11/13/2018    Procedure: colonoscopy;  Surgeon: Sesar Dos Santos MD;  Location: [facility]     COLONOSCOPY N/A 3/3/2020    Procedure: COLONOSCOPY, WITH POLYPECTOMY AND BIOPSY;  Surgeon: Sesar Dos Santos MD;  [facility]: [facility]     DILATION AND CURETTAGE, OPERATIVE HYSTEROSCOPY, COMBINED N/A 2/29/2016    Procedure: COMBINED DILATION AND CURETTAGE, OPERATIVE HYSTEROSCOPY;  Surgeon: Panda Knight MD;  [facility]     ESOPHAGOSCOPY, GASTROSCOPY, DUODENOSCOPY (EGD), COMBINED N/A 9/3/2020    Procedure: ESOPHAGOGASTRODUODENOSCOPY (EGD);  Surgeon: Karlos Burnett MD;  Location: [facility]     GYN SURGERY  1995    tubes tied     LAPAROSCOPY DIAGNOSTIC (GYN) Right 7/29/2015    Procedure: LAPAROSCOPY DIAGNOSTIC (GYN);  Surgeon: Lian Betancur DO;  Location: [facility]         Physical Exam    /78 (BP Location: Right arm, Patient Position: Sitting, Cuff Size: Adult Regular)   Pulse 71   Temp 97.4  F (36.3  C) (Axillary)   Resp 18   Wt 92.2 kg (203 lb 4.8 oz)   LMP 01/29/2016   SpO2 96%   BMI 34.36 kg/m        GENERAL: Alert and oriented to time place and person. Seated comfortably. In no distress.    HEAD: Atraumatic and normocephalic.    EYES: ABIGAIL, EOMI.  No pallor.  No icterus.    NECK: supple. JVP normal.  No thyroid enlargement.    LYMPH NODES: No palpable, cervical, axillary or inguinal lymphadenopathy.    CHEST: clear to auscultation bilaterally.  Resonant to percussion throughout bilaterally.  Symmetrical breath movements bilaterally.    CVS: S1 and S2 are heard. Regular rate and rhythm.  No murmur or gallop or rub heard.  No peripheral edema.    ABDOMEN: Soft. Not tender. Not distended.  No palpable hepatomegaly or splenomegaly.  No other mass palpable.  Bowel sounds heard.    EXTREMITIES: Warm.  No deformity or edema    SKIN: no rash, or bruising or purpura.  Has a  full head of hair.    Lab Results    No results found for this or any previous visit (from the past 720 hour(s)).    Imaging    No results found.    Total time including counseling and coordination of care = 36 minutes      Signed by: Manjula Moise MD

## 2021-11-01 NOTE — PROGRESS NOTES
"Oncology Rooming Note    November 1, 2021 9:12 AM   Susu Lagos is a 56 year old female who presents for:    Chief Complaint   Patient presents with     Oncology Clinic Visit     Follow up anal cancer     Initial Vitals: /78 (BP Location: Right arm, Patient Position: Sitting, Cuff Size: Adult Regular)   Pulse 71   Temp 97.4  F (36.3  C) (Axillary)   Resp 18   Wt 92.2 kg (203 lb 4.8 oz)   LMP 01/29/2016   SpO2 96%   BMI 34.36 kg/m   Estimated body mass index is 34.36 kg/m  as calculated from the following:    Height as of 9/14/21: 1.638 m (5' 4.5\").    Weight as of this encounter: 92.2 kg (203 lb 4.8 oz). Body surface area is 2.05 meters squared.  No Pain (0) Comment: Data Unavailable   Patient's last menstrual period was 01/29/2016.  Allergies reviewed: Yes  Medications reviewed: Yes    Medications: Medication refills not needed today.  Pharmacy name entered into [facility]:    [facility] [place], [place] - [address] [place] [address] [id number] - [place], [place] - [address] [facility] - [place], [place] - [address]    Clinical concerns: 6 mo follow up anal cancer       Юлия Dixon RN              "

## 2021-11-01 NOTE — LETTER
11/1/2021         RE: Susu Lagos  [address]  [facility] [address]        Dear Colleague,    Thank you for referring your patient, Susu Lagos, to the [facility] [place]. Please see a copy of my visit note below.    [facility] Hematology and Oncology Progress Note    Patient: Susu Lagos  MRN: [id number]  Date of Service: Nov 1, 2021         Reason for Visit    Chief Complaint   Patient presents with     Oncology Clinic Visit     Follow up anal cancer       Assessment and Plan    Cancer Staging  No matching staging information was found for the patient.    ECOG Performance    0 - Independent     Pain  Pain Score: No Pain (0)    Ms. Lagos is a 56 year old female a diagnosis of squamous cell carcinoma of the anal canal, clinical T1N0.    She completed definitive chemo-radiation successfully and remains free of local or distant recurrent disease.    She does have long term side effects including rectal pain and urgency.  This interferes with her ability to work as a , since she on occasion would need to abruptly leave the classroom.  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    We will continue to follow (along with Marixa Bnauelos and Sandrita).      Encounter Diagnoses:    Problem List Items Addressed This Visit        Digestive    Anal squamous cell carcinoma (H) - Primary (Chronic)    Relevant Orders    **Comprehensive metabolic panel FUTURE 2mo    **CBC with platelets FUTURE 2mo             CC: Lakesha Aguirre PA-C   ______________________________________________________________________________    Interval History (Sanford Banuelos MD, 14 Sept 2021):  Susu Lagos is a very pleasant 56 year old female with history of anal cancer s/p chemoradiation, which she completed 5/18/20. She has seen Letitia Arevalo NP in clinic for anal fissure with a superficial ulceration in the anterior anal canal  that failed to heal. Biopsy of this site on 8/9/21 was normal.   She continues to follow with Dr. eRmy of radiation oncology but was following and was seeing Dr. Lakhani of medical oncology, who has left the practice. She is getting set up with a new oncologist in [place].    MR Pelvis 8/6/20:  IMPRESSION: Posttreatment changes of the anus without clear evidence  for local recurrence or pelvic metastatic disease.    Pet CT 7/28/20:  IMPRESSION: In this patient with a history of squamous cell carcinoma  of the anus, status post chemotherapy and radiation:  1. Persistent hypermetabolic uptake in the anus, slightly decreased  since the previous PET/CT on 3/19/2020 but believed to represent  residual disease.   2. No evidence of metastatic disease.   3. Diffuse FDG uptake in the stomach, which can be seen with gastritis.    Recorded 10 Feb 2021 (Farhan):  Susu Lagos presented to the primary care physician with 3 to 4 months history of blood in the stool.  Subsequently the patient was referred for colonoscopy on March 3, 2020.  The palpable rectal mass on digital examination.  Biopsy came back positive for squamous cell carcinoma, focally keratinizing.  The patient has a previous colonoscopy on November 2018 which shows no abnormalities.  The patient was started on chemoradiation regimen with 5-FU and mitomycin.    Review of systems.  No fever or night sweats.  No loss of weight.  She's unhappy that she's GAINED weight  No lumps or bumps anywhere.  No unusual headaches or eyesight issues.  No dizziness.  No bleeding from the nose.  No sores in the mouth. No problems with swallowing.  No chest pain. No shortness of breath. No cough.  No nausea or vomiting.  Pain and urgency with defecation  No blood in stool or black colored stools.  No problems passing urine.  No numbness or tingling in hands or feet.  No skin rashes.  A 14 point review of systems is otherwise negative.      Past History    Past Medical History:    Diagnosis Date     Menorrhagia with irregular cycle 2/28/2016     Ovarian cyst 6/4/2015    Right, 6 cm dermoid on MRI-recommend removal.  7/29/2015 Surgery for right salpingoophorectomy.       Perimenopause 6/4/2015     Rectal cancer (H)     had radiation and chemo 2020       Past Surgical History:   Procedure Laterality Date     CHOLECYSTECTOMY  2013    LSC     COLONOSCOPY N/A 11/13/2018    Procedure: colonoscopy;  Surgeon: Sesar Dos Santos MD;  Location: [facility]     COLONOSCOPY N/A 3/3/2020    Procedure: COLONOSCOPY, WITH POLYPECTOMY AND BIOPSY;  Surgeon: Sesar Dos Santos MD;  Location: [facility]     DILATION AND CURETTAGE, OPERATIVE HYSTEROSCOPY, COMBINED N/A 2/29/2016    Procedure: COMBINED DILATION AND CURETTAGE, OPERATIVE HYSTEROSCOPY;  Surgeon: Panda Knight MD;  [facility]: [facility]     ESOPHAGOSCOPY, GASTROSCOPY, DUODENOSCOPY (EGD), COMBINED N/A 9/3/2020    Procedure: ESOPHAGOGASTRODUODENOSCOPY (EGD);  Surgeon: Karlos Burnett MD;  Location: [facility]     GYN SURGERY  1995    tubes tied     LAPAROSCOPY DIAGNOSTIC (GYN) Right 7/29/2015    Procedure: LAPAROSCOPY DIAGNOSTIC (GYN);  Surgeon: Lian Betancur DO;  Location: [facility]         Physical Exam    /78 (BP Location: Right arm, Patient Position: Sitting, Cuff Size: Adult Regular)   Pulse 71   Temp 97.4  F (36.3  C) (Axillary)   Resp 18   Wt 92.2 kg (203 lb 4.8 oz)   LMP 01/29/2016   SpO2 96%   BMI 34.36 kg/m        GENERAL: Alert and oriented to time place and person. Seated comfortably. In no distress.    HEAD: Atraumatic and normocephalic.    EYES: ABIGAIL, EOMI.  No pallor.  No icterus.    NECK: supple. JVP normal.  No thyroid enlargement.    LYMPH NODES: No palpable, cervical, axillary or inguinal lymphadenopathy.    CHEST: clear to auscultation bilaterally.  Resonant to percussion throughout bilaterally.  Symmetrical breath movements bilaterally.    CVS: S1 and S2 are heard. Regular rate and rhythm.  No murmur or gallop or rub heard.  No  "peripheral edema.    ABDOMEN: Soft. Not tender. Not distended.  No palpable hepatomegaly or splenomegaly.  No other mass palpable.  Bowel sounds heard.    EXTREMITIES: Warm.  No deformity or edema    SKIN: no rash, or bruising or purpura.  Has a full head of hair.    Lab Results    No results found for this or any previous visit (from the past 720 hour(s)).    Imaging    No results found.    Total time including counseling and coordination of care = 36 minutes      Signed by: Manjula Moise MD      Oncology Rooming Note    November 1, 2021 9:12 AM   Susu Lagos is a 56 year old female who presents for:    Chief Complaint   Patient presents with     Oncology Clinic Visit     Follow up anal cancer     Initial Vitals: /78 (BP Location: Right arm, Patient Position: Sitting, Cuff Size: Adult Regular)   Pulse 71   Temp 97.4  F (36.3  C) (Axillary)   Resp 18   Wt 92.2 kg (203 lb 4.8 oz)   LMP 01/29/2016   SpO2 96%   BMI 34.36 kg/m   Estimated body mass index is 34.36 kg/m  as calculated from the following:    Height as of 9/14/21: 1.638 m (5' 4.5\").    Weight as of this encounter: 92.2 kg (203 lb 4.8 oz). Body surface area is 2.05 meters squared.  No Pain (0) Comment: Data Unavailable   Patient's last menstrual period was 01/29/2016.  Allergies reviewed: Yes  Medications reviewed: Yes    Medications: Medication refills not needed today.  Pharmacy name entered into Sverve:    [facility] [place], [place] - [address] [place] [address] [id number] - [place], [place] - [address] [facility] [place], [place] - [address]    Clinical concerns: 6 mo follow up anal cancer       Юлия Dixon RN                  Again, thank you for allowing me to participate in the care of your patient.        Sincerely,        Manjula Moise MD    "

## 2021-12-13 NOTE — PROGRESS NOTES
Colon and Rectal Surgery Clinic Note      RE: Susu Lagos  : 1965  MALGORZTAA: 2021    Susu Lagos is a very pleasant 56 year old female with history of anal cancer s/p chemoradiation, which she completed 20. She has seen Letitia Arevalo NP in clinic for anal fissure with a superficial ulceration in the anterior anal canal that failed to heal. Biopsy of this site on 21 was normal.     She continues to follow with Dr. Remy of radiation oncology    I saw her last on 21 with no evidence of recurrence.    MR Pelvis 21:  IMPRESSION: Postradiation changes of the anus and distal rectum  without evidence for recurrent tumor. No pelvic lymphadenopathy.    Pet CT 21:  1. Posttreatment changes to the anus with unchanged size of the  masslike thickening compared to 2020. Overall uptake has  increased slightly in distribution, focally greatest in the posterior  aspect of the tissue thickening. This uptake is indeterminant. Given  stability in size, may represent infection/inflammation in the setting  of known anal fissure. Differential diagnosis includes delayed  radiation changes, or possibly residual disease. If repeat biopsy is a  consideration, recommend targeting the posterior tissue that is most  avid.  2. No evidence for metastasis in the chest, abdomen, or pelvis.  3. Diffused hepatic steatosis.    Interval History: She has some occasional bright red blood with bowel movements still. She has occasional diarrhea despite twice daily fiber powder and has been having incontinence of a large amount of stool about twice a week. She has had three vaginal births with babies being over 10 pounds. She had tearing with the first. She is now following with Dr. Moise of medical oncology.    Physical examination:  Examination was chaperoned by Letitia Arevalo NP     Vitals: /86 (BP Location: Left arm, Patient Position: Sitting, Cuff Size: Adult Regular)    "Pulse 87   Ht 5' 4.5\"   Wt 202 lb 4.8 oz   LMP 01/29/2016   SpO2 99%   BMI 34.19 kg/m    BMI= Body mass index is 34.19 kg/m .    No palpable inguinal adenopathy.  Perianal examination shows skin tags but no other lesions. Eversion of buttocks caused a split in the posterior midline that appeared healed before exam. Digital rectal examination with no palpable lesions. Anoscopy without any visible lesions but with superficial anal fissure in the posterior midline with active bleeding and some internal hemorrhoids.    Laboratory data:    Recent Labs   Lab Test 07/27/21  0859   WBC 5.6   HGB 14.4      CR 0.79   ALBUMIN 3.8   BILITOTAL 0.3   ALKPHOS 56   *   AST 42   INR 0.87       Assessment/plan:    Anal cancer s/p chemoradiation completed 5/18/20. No evidence of recurrence on exam today. Recommended continuing on twice daily fiber supplement and can try cholestyramine given history of cholecystectomy and imodium. If she continues to have fecal incontinence could consider sacral nerve stimulator. Would like to see her in follow up in 4 months. Patient's questions were answered to her stated satisfaction and she is in agreement with this plan..    For details of past medical history, surgical history, family history, medications, allergies, and review of systems, please see details below.    Medical history:  Past Medical History:   Diagnosis Date     Menorrhagia with irregular cycle 2/28/2016     Ovarian cyst 6/4/2015    Right, 6 cm dermoid on MRI-recommend removal.  7/29/2015 Surgery for right salpingoophorectomy.       Perimenopause 6/4/2015     Rectal cancer (H)     had radiation and chemo 2020       Surgical history:  Past Surgical History:   Procedure Laterality Date     CHOLECYSTECTOMY  2013    LSC     COLONOSCOPY N/A 11/13/2018    Procedure: colonoscopy;  Surgeon: Sesar Dos Santos MD;  [facility]: [facility]     COLONOSCOPY N/A 3/3/2020    Procedure: COLONOSCOPY, WITH POLYPECTOMY AND BIOPSY;  Surgeon: " "Sesar Dos Santos MD;  Location: [facility]     DILATION AND CURETTAGE, OPERATIVE HYSTEROSCOPY, COMBINED N/A 2/29/2016    Procedure: COMBINED DILATION AND CURETTAGE, OPERATIVE HYSTEROSCOPY;  Surgeon: Panda Knight MD;  [facility]: [facility]     ESOPHAGOSCOPY, GASTROSCOPY, DUODENOSCOPY (EGD), COMBINED N/A 9/3/2020    Procedure: ESOPHAGOGASTRODUODENOSCOPY (EGD);  Surgeon: Karlos Burnett MD;  Location: [facility]     GYN SURGERY  1995    tubes tied     LAPAROSCOPY DIAGNOSTIC (GYN) Right 7/29/2015    Procedure: LAPAROSCOPY DIAGNOSTIC (GYN);  Surgeon: Lian Betancur DO;  [facility]: [facility]       Family history:  Family History   Problem Relation Age of Onset     Hypertension Father      Myocardial Infarction Maternal Grandfather      Melanoma No family hx of        Medications:  Current Outpatient Medications   Medication Sig Dispense Refill     cholestyramine (QUESTRAN) 4 g packet Take 1 packet (4 g) by mouth 2 times daily (with meals) 60 packet 3     estradiol (ESTRACE) 0.1 MG/GM vaginal cream Topically 3x/week       Hydrophilic ointment        Magnesium Oxide 250 MG TABS Take 2 tablets by mouth 2 times daily       Multiple Vitamin (MULTI-VITAMINS PO)        Omega-3 Fatty Acids (OMEGA 3 PO)          Allergies:  The patientis allergic to rabies vaccine, shellfish-derived products, and sulfa drugs.    Social history:  Social History     Tobacco Use     Smoking status: Never Smoker     Smokeless tobacco: Never Used   Substance Use Topics     Alcohol use: Yes     Alcohol/week: 0.0 standard drinks     Comment: 1-2 per month      Marital status: .    Review of Systems:  Nursing Notes:   Rosalva Croft  12/14/2021  9:22 AM  Signed  Chief Complaint   Patient presents with     Follow Up     Hx of anal cancer       Vitals:    12/14/21 0919   BP: 126/86   BP Location: Left arm   Patient Position: Sitting   Cuff Size: Adult Regular   Pulse: 87   SpO2: 99%   Weight: 202 lb 4.8 oz   Height: 5' 4.5\"       Body mass index is " 34.19 kg/m .    Rosalva Croft CMA         15 minutes spent on the date of the encounter doing chart review, history and exam, documentation, review of imaging, and further activities as noted above.     Stefano Banuelos MD   Professor and Chief  Division of Colon and Rectal Surgery  [facility]      Referring Provider:  Referred Self, MD  No address on file     Primary Care Provider:  Lakesha Aguirre    This note was created using speech recognition software and may contain unintended word substitutions.

## 2021-12-14 ENCOUNTER — OFFICE VISIT (OUTPATIENT)
Dept: SURGERY | Facility: CLINIC | Age: 56
End: 2021-12-14
Payer: COMMERCIAL

## 2021-12-14 VITALS
HEART RATE: 87 BPM | WEIGHT: 202.3 LBS | BODY MASS INDEX: 33.7 KG/M2 | OXYGEN SATURATION: 99 % | SYSTOLIC BLOOD PRESSURE: 126 MMHG | DIASTOLIC BLOOD PRESSURE: 86 MMHG | HEIGHT: 65 IN

## 2021-12-14 DIAGNOSIS — R15.2 INCONTINENCE OF FECES WITH FECAL URGENCY: ICD-10-CM

## 2021-12-14 DIAGNOSIS — C21.0 ANAL SQUAMOUS CELL CARCINOMA (H): Primary | ICD-10-CM

## 2021-12-14 DIAGNOSIS — R15.9 INCONTINENCE OF FECES WITH FECAL URGENCY: ICD-10-CM

## 2021-12-14 PROCEDURE — 99212 OFFICE O/P EST SF 10 MIN: CPT | Performed by: COLON & RECTAL SURGERY

## 2021-12-14 RX ORDER — CHOLESTYRAMINE 4 G/9G
1 POWDER, FOR SUSPENSION ORAL 2 TIMES DAILY WITH MEALS
Qty: 60 PACKET | Refills: 3 | Status: SHIPPED | OUTPATIENT
Start: 2021-12-14

## 2021-12-14 ASSESSMENT — MIFFLIN-ST. JEOR: SCORE: 1510.57

## 2021-12-14 ASSESSMENT — PAIN SCALES - GENERAL: PAINLEVEL: NO PAIN (0)

## 2021-12-14 NOTE — NURSING NOTE
"Chief Complaint   Patient presents with     Follow Up     Hx of anal cancer       Vitals:    12/14/21 0919   BP: 126/86   BP Location: Left arm   Patient Position: Sitting   Cuff Size: Adult Regular   Pulse: 87   SpO2: 99%   Weight: 202 lb 4.8 oz   Height: 5' 4.5\"       Body mass index is 34.19 kg/m .    Rosalva Croft CMA    "

## 2021-12-14 NOTE — LETTER
2021       RE: Susu Lagos  [address]  [facility] [address]     Dear Colleague,    Thank you for referring your patient, Susu Lagos, to the [facility] [place] [facility]. Please see a copy of my visit note below.    Colon and Rectal Surgery Clinic Note  RE: Susu Lagos  : 1965  MALGORZATA: 2021    Susu Lagos is a very pleasant 56 year old female with history of anal cancer s/p chemoradiation, which she completed 20. She has seen Letitia Arevalo NP in clinic for anal fissure with a superficial ulceration in the anterior anal canal that failed to heal. Biopsy of this site on 21 was normal.     She continues to follow with Dr. Remy of radiation oncology    I saw her last on 21 with no evidence of recurrence.    MR Pelvis 21:  IMPRESSION: Postradiation changes of the anus and distal rectum  without evidence for recurrent tumor. No pelvic lymphadenopathy.    Pet CT 21:  1. Posttreatment changes to the anus with unchanged size of the  masslike thickening compared to 2020. Overall uptake has  increased slightly in distribution, focally greatest in the posterior  aspect of the tissue thickening. This uptake is indeterminant. Given  stability in size, may represent infection/inflammation in the setting  of known anal fissure. Differential diagnosis includes delayed  radiation changes, or possibly residual disease. If repeat biopsy is a  consideration, recommend targeting the posterior tissue that is most  avid.  2. No evidence for metastasis in the chest, abdomen, or pelvis.  3. Diffused hepatic steatosis.    Interval History: She has some occasional bright red blood with bowel movements still. She has occasional diarrhea despite twice daily fiber powder and has been having incontinence of a large amount of stool about twice a week. She has had three  "vaginal births with babies being over 10 pounds. She had tearing with the first. She is now following with Dr. Moise of medical oncology.    Physical examination:  Examination was chaperoned by Letitia Arevalo NP     Vitals: /86 (BP Location: Left arm, Patient Position: Sitting, Cuff Size: Adult Regular)   Pulse 87   Ht 5' 4.5\"   Wt 202 lb 4.8 oz   LMP 01/29/2016   SpO2 99%   BMI 34.19 kg/m    BMI= Body mass index is 34.19 kg/m .    No palpable inguinal adenopathy.  Perianal examination shows skin tags but no other lesions. Eversion of buttocks caused a split in the posterior midline that appeared healed before exam. Digital rectal examination with no palpable lesions. Anoscopy without any visible lesions but with superficial anal fissure in the posterior midline with active bleeding and some internal hemorrhoids.    Laboratory data:    Recent Labs   Lab Test 07/27/21  0859   WBC 5.6   HGB 14.4      CR 0.79   ALBUMIN 3.8   BILITOTAL 0.3   ALKPHOS 56   *   AST 42   INR 0.87       Assessment/plan:    Anal cancer s/p chemoradiation completed 5/18/20. No evidence of recurrence on exam today. Recommended continuing on twice daily fiber supplement and can try cholestyramine given history of cholecystectomy and imodium. If she continues to have fecal incontinence could consider sacral nerve stimulator. Would like to see her in follow up in 4 months. Patient's questions were answered to her stated satisfaction and she is in agreement with this plan..    For details of past medical history, surgical history, family history, medications, allergies, and review of systems, please see details below.    Medical history:  Past Medical History:   Diagnosis Date     Menorrhagia with irregular cycle 2/28/2016     Ovarian cyst 6/4/2015    Right, 6 cm dermoid on MRI-recommend removal.  7/29/2015 Surgery for right salpingoophorectomy.       Perimenopause 6/4/2015     Rectal cancer (H)     had " radiation and chemo 2020       Surgical history:  Past Surgical History:   Procedure Laterality Date     CHOLECYSTECTOMY  2013    LSC     COLONOSCOPY N/A 11/13/2018    Procedure: colonoscopy;  Surgeon: Sesar Dos Santos MD;  Location: [facility]     COLONOSCOPY N/A 3/3/2020    Procedure: COLONOSCOPY, WITH POLYPECTOMY AND BIOPSY;  Surgeon: Sesar Dos Santos MD;  [facility]: [facility]     DILATION AND CURETTAGE, OPERATIVE HYSTEROSCOPY, COMBINED N/A 2/29/2016    Procedure: COMBINED DILATION AND CURETTAGE, OPERATIVE HYSTEROSCOPY;  Surgeon: Panda Knight MD;  [facility]: [facility]     ESOPHAGOSCOPY, GASTROSCOPY, DUODENOSCOPY (EGD), COMBINED N/A 9/3/2020    Procedure: ESOPHAGOGASTRODUODENOSCOPY (EGD);  Surgeon: Karlos Burnett MD;  Location: [facility]     GYN SURGERY  1995    tubes tied     LAPAROSCOPY DIAGNOSTIC (GYN) Right 7/29/2015    Procedure: LAPAROSCOPY DIAGNOSTIC (GYN);  Surgeon: Lian Betancur DO;  Location: [facility]       Family history:  Family History   Problem Relation Age of Onset     Hypertension Father      Myocardial Infarction Maternal Grandfather      Melanoma No family hx of        Medications:  Current Outpatient Medications   Medication Sig Dispense Refill     cholestyramine (QUESTRAN) 4 g packet Take 1 packet (4 g) by mouth 2 times daily (with meals) 60 packet 3     estradiol (ESTRACE) 0.1 MG/GM vaginal cream Topically 3x/week       Hydrophilic ointment        Magnesium Oxide 250 MG TABS Take 2 tablets by mouth 2 times daily       Multiple Vitamin (MULTI-VITAMINS PO)        Omega-3 Fatty Acids (OMEGA 3 PO)          Allergies:  The patientis allergic to rabies vaccine, shellfish-derived products, and sulfa drugs.    Social history:  Social History     Tobacco Use     Smoking status: Never Smoker     Smokeless tobacco: Never Used   Substance Use Topics     Alcohol use: Yes     Alcohol/week: 0.0 standard drinks     Comment: 1-2 per month      Marital status: .    Review of Systems:  Nursing Notes:  "  Rosalva Croft  12/14/2021  9:22 AM  Signed  Chief Complaint   Patient presents with     Follow Up     Hx of anal cancer       Vitals:    12/14/21 0919   BP: 126/86   BP Location: Left arm   Patient Position: Sitting   Cuff Size: Adult Regular   Pulse: 87   SpO2: 99%   Weight: 202 lb 4.8 oz   Height: 5' 4.5\"       Body mass index is 34.19 kg/m .    Rosalva Croft CMA         15 minutes spent on the date of the encounter doing chart review, history and exam, documentation, review of imaging, and further activities as noted above.     Stefano Banuelos MD   Professor and Chief  Division of Colon and Rectal Surgery  [facility]      Referring Provider:  Referred Self, MD  No address on file     Primary Care Provider:  Lakesha Aguirre    This note was created using speech recognition software and may contain unintended word substitutions.  "

## 2021-12-16 ENCOUNTER — VIRTUAL VISIT (OUTPATIENT)
Dept: RADIATION THERAPY | Facility: OUTPATIENT CENTER | Age: 56
End: 2021-12-16
Payer: COMMERCIAL

## 2021-12-16 ENCOUNTER — VIRTUAL VISIT (OUTPATIENT)
Dept: INTERNAL MEDICINE | Facility: CLINIC | Age: 56
End: 2021-12-16
Payer: COMMERCIAL

## 2021-12-16 ENCOUNTER — LAB (OUTPATIENT)
Dept: URGENT CARE | Facility: URGENT CARE | Age: 56
End: 2021-12-16
Attending: NURSE PRACTITIONER
Payer: COMMERCIAL

## 2021-12-16 DIAGNOSIS — R15.2 INCONTINENCE OF FECES WITH FECAL URGENCY: Primary | ICD-10-CM

## 2021-12-16 DIAGNOSIS — J02.9 SORE THROAT: Primary | ICD-10-CM

## 2021-12-16 DIAGNOSIS — J02.9 SORE THROAT: ICD-10-CM

## 2021-12-16 DIAGNOSIS — R15.9 INCONTINENCE OF FECES WITH FECAL URGENCY: Primary | ICD-10-CM

## 2021-12-16 DIAGNOSIS — C21.1 MALIGNANT NEOPLASM OF ANAL CANAL (H): ICD-10-CM

## 2021-12-16 LAB
FLUAV AG SPEC QL IA: NEGATIVE
FLUBV AG SPEC QL IA: NEGATIVE
SARS-COV-2 RNA RESP QL NAA+PROBE: NEGATIVE

## 2021-12-16 PROCEDURE — 87804 INFLUENZA ASSAY W/OPTIC: CPT | Performed by: NURSE PRACTITIONER

## 2021-12-16 PROCEDURE — U0003 INFECTIOUS AGENT DETECTION BY NUCLEIC ACID (DNA OR RNA); SEVERE ACUTE RESPIRATORY SYNDROME CORONAVIRUS 2 (SARS-COV-2) (CORONAVIRUS DISEASE [COVID-19]), AMPLIFIED PROBE TECHNIQUE, MAKING USE OF HIGH THROUGHPUT TECHNOLOGIES AS DESCRIBED BY CMS-2020-01-R: HCPCS

## 2021-12-16 PROCEDURE — 99213 OFFICE O/P EST LOW 20 MIN: CPT | Mod: TEL | Performed by: NURSE PRACTITIONER

## 2021-12-16 PROCEDURE — U0005 INFEC AGEN DETEC AMPLI PROBE: HCPCS

## 2021-12-16 NOTE — PROGRESS NOTES
"Susu is a 56 year old who is being evaluated via a billable telephone visit.      What phone number would you like to be contacted at? [phone]   How would you like to obtain your AVS? [facility]    Assessment & Plan   Problem List Items Addressed This Visit     None      Visit Diagnoses     Sore throat    -  Primary    Relevant Orders    Symptomatic; Unknown COVID-19 Virus (Coronavirus) by PCR    Influenza A & B Antigen - Clinic Collect       quarantine until results have been returned.  Symptom relief is encouraged push fluids utilization over-the-counter pain reliever     BMI:   Estimated body mass index is 34.19 kg/m  as calculated from the following:    Height as of 12/14/21: 1.638 m (5' 4.5\").    Weight as of 12/14/21: 91.8 kg (202 lb 4.8 oz).       No LOS data to display   Time spent doing chart review, history and exam, documentation and further activities per the note    No follow-ups on file.    Dionna Ramon NP  [facility]    Franklyn Cristobal is a 56 year old who presents for the following health issues     HPI     Acute Illness  Acute illness concerns: Sore throat with cough  Onset/Duration: Just today woke up with  Symptoms:  Fever: no  Chills/Sweats: no  Headache (location?): no  Sinus Pressure: YES  Conjunctivitis:  no  Ear Pain: no  Rhinorrhea: YES  Congestion: YES  Sore Throat: YES  Cough: YES-non-productive  Wheeze: no  Decreased Appetite: no  Nausea: no  Vomiting: no  Diarrhea: no  Dysuria/Freq.: no  Dysuria or Hematuria: no  Fatigue/Achiness: no  Sick/Strep Exposure: no  Therapies tried and outcome: essential oils       Patient indicates she has not been vaccinated for Covid 19 or influenza. Patient indicates she has not had Covid.    Review of Systems   Unremarkable other than listed above and below       Objective           Vitals:  No vitals were obtained today due to virtual visit.    Physical Exam   healthy, alert and no distress  PSYCH: Alert and oriented " times 3; coherent speech, normal   rate and volume, able to articulate logical thoughts, able   to abstract reason, no tangential thoughts, no hallucinations   or delusions  Her affect is normal  RESP: No cough, no audible wheezing, able to talk in full sentences  Remainder of exam unable to be completed due to telephone visits                Phone call duration: 5 minutes

## 2021-12-16 NOTE — PROGRESS NOTES
Department of Radiation Oncology  Radiation Therapy Center  [facility] [place] [address]  [phone]       Radiation Oncology Follow-up Visit  2021    Susu Lagos  MRN: [id number]   : 1965     DIAGNOSIS: squamous cell carcinoma of the anal canal  PATHOLOGY:  squamous cell carcinoma                              STAGE: clinical T1N0  INTENT OF RADIOTHERAPY: definitve CRT  CONCURRENT SYSTEMIC THERAPY: Yes  Oncologist: Dr. Real  Drug Name/Frequency 1: 5FU  Drug Name/Frequency 1: Mitomycin     ONCOLOGIC HISTORY:   Ms. 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       The patient initially presented with symptoms of hematochezia for several months eventually prompting further evaluation.     On 3/3/2020 the patient underwent colonoscopy which demonstrated a mass located at the dentate line in the left anterior position.  The mass was less than one third circumferentially involved.  Biopsy of the mass obtained demonstrated squamous cell carcinoma.  The patient was evaluated by gynecology on 3/13/2020, Pap smear was negative.  On 3/19/2020 the patient underwent a PET scan.  Imaging demonstrated a focal hypermetabolic activity in the anal canal, max SUV 12.6.  No regional or distant disease was noted.  On 3/19/2020 the patient underwent MRI which demonstrated a 1.2 x 0.9 cm mass located 2.7 cm above the anal verge.  No enlarged regional nodes were noted.  The patient was seen by Dr. Real of medical oncology who discussed treatment with chemoradiation therapy.Patient subsequently presented to radiation oncology clinic to discuss potential role of radiation therapy as a part of treatment strategy.            SITE OF TREATMENT: pelvis     DATES  OF TREATMENT: 20-20     TOTAL DOSE OF TREATMENT: 5040 cGy     DOSE PER FRACTION OF TREATMENT: 180 cGy x 28 fractions    INTERVAL SINCE COMPLETION OF  RADIATION THERAPY:   19 months    SUBJECTIVE:   The patient presents for follow up.     Post treatment MRI (20) was JOSE LUIS. PET on 20 demonstrated excellent response with indeterminate but mild activity in anal canal region without clear evidence of disease. Most recent PET (21) and MRI pelvis (21) remained without any clear evidence of disease. Scope and exam by Dr. Banuelos's team on 21 was JOSE LUIS. She has undergone biopsy of an anal canal ulceration on 21 which was negative for malignancy.     Patient is overall doing well. She continues to have anal fissure and associated intermittent pain and bleeding. She reports bleeding is about 1x month. Continues with daily fiber and has used topical diltiazem in the past. Intermittent bouts of bowel urgency at times and possible incontinence. Surgery team has discussed consideration of sacral nerve stimulator if needed. No  trouble. No lower extremity lymphedema. Using estradiol cream for vaginal dryness.    PHYSICAL EXAM:  LMP 2016       LABS AND IMAGIN/921  RECTUM, CANAL, BIOPSY:  Squamous (anal) mucosa with no dysplasia, malignancy, or other significant histologic abnormality (rectal mucosa not represented)           21  PET  IMPRESSION:  In this patient with history of anal carcinoma status  post chemoradiation:     1. Posttreatment changes to the anus with unchanged size of the  masslike thickening compared to 2020. Overall uptake has  increased slightly in distribution, focally greatest in the posterior  aspect of the tissue thickening. This uptake is indeterminant. Given  stability in size, may represent infection/inflammation in the setting  of known anal fissure. Differential diagnosis includes delayed  radiation changes, or possibly residual disease. If repeat biopsy is a  consideration, recommend targeting the posterior tissue that is most  avid.     2. No evidence for metastasis in the chest, abdomen, or  pelvis.     3. Diffused hepatic steatosis.    9/30/21  MRI pelvis  IMPRESSION: Postradiation changes of the anus and distal rectum  without evidence for recurrent tumor. No pelvic lymphadenopathy.       IMPRESSION:   Ms. Lagos is a 56 year old female a diagnosis of squamous cell carcinoma of the anal canal, clinical T1N0.  She completed definitive CRT to a total dose of 50.4 Gy in 28 fractions with concurrent 5FU-MMC  (completed on 5/18/20).    PLAN:   1. Post treatment MRI (8/6/20) was JOSE LUIS. PET on 7/28/20 demonstrated excellent response with indeterminate but mild activity in anal canal region without clear evidence of disease. Most recent PET (9/23/21) and MRI pelvis (9/30/21) remained without any clear evidence of disease. Scope and exam by Dr. Banuelos's team on 12/14/21 was JOSE LUIS. She has undergone biopsy of an anal canal ulceration on 8/9/21 which was negative for malignancy.     2. GI bother. Urgency and possible incontinence?. Will refer to pelvic floor rehab. Surgery has discussed possibility of sacral nerve stimulator in the future if needed.     3. Anal fissure. Managed by Dr. Banuelos's team.    4. The patient will benefit from continued oncologic surveillance from our colo-rectal colleagues (Dr. Banuelos). The patient will see Dr. Banuelos on 4/12/21.    5. RTC in 6 months.    Due to the concerns around COVID-19 and adhering to social distancing we conduct this visit over the telephone. Telephone call lasted 15 minutes.       Tyree Remy M.D.  Department of Radiation Oncology  [facility]

## 2021-12-16 NOTE — NURSING NOTE
Susu is a 56 year old who is being evaluated via a billable telephone visit.      What phone number would you like to be contacted at? home  How would you like to obtain your AVS? [employer]annel[employer]  Phone call duration: 5 minutes  FOLLOW-UP VISIT    Patient Name: Susu Lagos      : 1965     Age: 56 year old        ______________________________________________________________________________     Chief Complaint   Patient presents with     Radiation Therapy     Follow up telephone call, anal cancer     Date Radiation Completed: 20    Pain  Denies    Meds  Current Med List Reviewed: Yes  Medication Note:     Imaging  PET and MRI in September, WNL    On Chemo?: No  Bowel: occasional rectal bleeding, working with Dr Banuelos, new Rx sent to pharmacy, [facility]  Bladder: negative  Skin: fine  Energy Level: normal    Other Appointments:     Date  Oncologist: Suma    Surgeon: Lakisha      Other Notes:

## 2021-12-16 NOTE — LETTER
2021         RE: Susu Lagos  [address]  [facility] [address]        Dear Colleague,    Thank you for referring your patient, Susu Lagos, to the RADIATION THERAPY CENTER. Please see a copy of my visit note below.       Department of Radiation Oncology  Radiation Therapy Center  [facility]  [place] [address]  [phone]       Radiation Oncology Follow-up Visit  2021    Susu Lagos  MRN: [id number]   : 1965     DIAGNOSIS: squamous cell carcinoma of the anal canal  PATHOLOGY:  squamous cell carcinoma                              STAGE: clinical T1N0  INTENT OF RADIOTHERAPY: definitve CRT  CONCURRENT SYSTEMIC THERAPY: Yes  Oncologist: Dr. Real  Drug Name/Frequency 1: 5FU  Drug Name/Frequency 1: Mitomycin     ONCOLOGIC HISTORY:   Ms. 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       The patient initially presented with symptoms of hematochezia for several months eventually prompting further evaluation.     On 3/3/2020 the patient underwent colonoscopy which demonstrated a mass located at the dentate line in the left anterior position.  The mass was less than one third circumferentially involved.  Biopsy of the mass obtained demonstrated squamous cell carcinoma.  The patient was evaluated by gynecology on 3/13/2020, Pap smear was negative.  On 3/19/2020 the patient underwent a PET scan.  Imaging demonstrated a focal hypermetabolic activity in the anal canal, max SUV 12.6.  No regional or distant disease was noted.  On 3/19/2020 the patient underwent MRI which demonstrated a 1.2 x 0.9 cm mass located 2.7 cm above the anal verge.  No enlarged regional nodes were noted.  The patient was seen by Dr. Real of medical oncology who discussed treatment with chemoradiation therapy.Patient subsequently presented to radiation oncology clinic to discuss potential role of radiation therapy  as a part of treatment strategy.            SITE OF TREATMENT: pelvis     DATES  OF TREATMENT: 20-20     TOTAL DOSE OF TREATMENT: 5040 cGy     DOSE PER FRACTION OF TREATMENT: 180 cGy x 28 fractions    INTERVAL SINCE COMPLETION OF RADIATION THERAPY:   19 months    SUBJECTIVE:   The patient presents for follow up.     Post treatment MRI (20) was JOSE LUIS. PET on 20 demonstrated excellent response with indeterminate but mild activity in anal canal region without clear evidence of disease. Most recent PET (21) and MRI pelvis (21) remained without any clear evidence of disease. Scope and exam by Dr. Banuelos's team on 21 was JOSE LUIS. She has undergone biopsy of an anal canal ulceration on 21 which was negative for malignancy.     Patient is overall doing well. She continues to have anal fissure and associated intermittent pain and bleeding. She reports bleeding is about 1x month. Continues with daily fiber and has used topical diltiazem in the past. Intermittent bouts of bowel urgency at times and possible incontinence. Surgery team has discussed consideration of sacral nerve stimulator if needed. No  trouble. No lower extremity lymphedema. Using estradiol cream for vaginal dryness.    PHYSICAL EXAM:  LMP 2016       LABS AND IMAGIN/921  RECTUM, CANAL, BIOPSY:  Squamous (anal) mucosa with no dysplasia, malignancy, or other significant histologic abnormality (rectal mucosa not represented)           21  PET  IMPRESSION:  In this patient with history of anal carcinoma status  post chemoradiation:     1. Posttreatment changes to the anus with unchanged size of the  masslike thickening compared to 2020. Overall uptake has  increased slightly in distribution, focally greatest in the posterior  aspect of the tissue thickening. This uptake is indeterminant. Given  stability in size, may represent infection/inflammation in the setting  of known anal fissure. Differential  diagnosis includes delayed  radiation changes, or possibly residual disease. If repeat biopsy is a  consideration, recommend targeting the posterior tissue that is most  avid.     2. No evidence for metastasis in the chest, abdomen, or pelvis.     3. Diffused hepatic steatosis.    9/30/21  MRI pelvis  IMPRESSION: Postradiation changes of the anus and distal rectum  without evidence for recurrent tumor. No pelvic lymphadenopathy.       IMPRESSION:   Ms. Lagos is a 56 year old female a diagnosis of squamous cell carcinoma of the anal canal, clinical T1N0.  She completed definitive CRT to a total dose of 50.4 Gy in 28 fractions with concurrent 5FU-MMC  (completed on 5/18/20).    PLAN:   1. Post treatment MRI (8/6/20) was JOSE LUIS. PET on 7/28/20 demonstrated excellent response with indeterminate but mild activity in anal canal region without clear evidence of disease. Most recent PET (9/23/21) and MRI pelvis (9/30/21) remained without any clear evidence of disease. Scope and exam by Dr. Banuelos's team on 12/14/21 was JOSE LUIS. She has undergone biopsy of an anal canal ulceration on 8/9/21 which was negative for malignancy.     2. GI bother. Urgency and possible incontinence?. Will refer to pelvic floor rehab. Surgery has discussed possibility of sacral nerve stimulator in the future if needed.     3. Anal fissure. Managed by Dr. Banuelos's team.    4. The patient will benefit from continued oncologic surveillance from our colo-rectal colleagues (Dr. Banuelos). The patient will see Dr. Banuelos on 4/12/21.    5. RTC in 6 months.    Due to the concerns around COVID-19 and adhering to social distancing we conduct this visit over the telephone. Telephone call lasted 15 minutes.       Tyree Remy M.D.  Department of Radiation Oncology  [facility]

## 2021-12-23 ENCOUNTER — HOSPITAL ENCOUNTER (OUTPATIENT)
Dept: PHYSICAL THERAPY | Facility: CLINIC | Age: 56
Setting detail: THERAPIES SERIES
End: 2021-12-23
Attending: RADIOLOGY
Payer: COMMERCIAL

## 2021-12-23 DIAGNOSIS — R15.9 INCONTINENCE OF FECES WITH FECAL URGENCY: ICD-10-CM

## 2021-12-23 DIAGNOSIS — R15.2 INCONTINENCE OF FECES WITH FECAL URGENCY: ICD-10-CM

## 2021-12-23 DIAGNOSIS — C21.1 MALIGNANT NEOPLASM OF ANAL CANAL (H): ICD-10-CM

## 2021-12-23 PROCEDURE — 97535 SELF CARE MNGMENT TRAINING: CPT | Mod: GP | Performed by: PHYSICAL THERAPIST

## 2021-12-23 PROCEDURE — 97110 THERAPEUTIC EXERCISES: CPT | Mod: GP | Performed by: PHYSICAL THERAPIST

## 2021-12-23 PROCEDURE — 97161 PT EVAL LOW COMPLEX 20 MIN: CPT | Mod: GP | Performed by: PHYSICAL THERAPIST

## 2021-12-23 NOTE — PROGRESS NOTES
[facility]    OUTPATIENT PHYSICAL THERAPY ORTHOPEDIC EVALUATION  PLAN OF TREATMENT FOR OUTPATIENT REHABILITATION  (COMPLETE FOR INITIAL CLAIMS ONLY)  Patient's Last Name, First Name, M.I.  YOB: 1965  Susu Lagos    Provider s Name:  [facility]   Medical Record No.  [id number]   Start of Care Date:  12/23/21   Onset Date:  04/01/20   Type:     _X__PT   ___OT   ___SLP Medical Diagnosis:  Incontinence of feces with fecal urgency R15.9, R15.2  - Primary Malignant neoplasm of anal canal (H) C21.1     PT Diagnosis:  PFM weakness s/p radiation   Visits from SOC:  1      _________________________________________________________________________________  Plan of Treatment/Functional Goals:  balance training,gait training,joint mobilization,manual therapy,neuromuscular re-education,ROM,strengthening,stretching     Biofeedback,Cryotherapy,Electrical stimulation,TENS,Ultrasound,Traction,Hot packs     Goals  Goal Identifier: Tolieting strategieis  Goal Description: Pt will relate improved emptying of bowel maranda defication techniques and relate straining less than 25% of the time.   Target Date: 01/20/22    Goal Identifier: bladder  Goal Description: Pt will relate leaking less than 1/5 times with a cough/physical activity  to reduce stress incontinence  Target Date: 01/20/22    Goal Identifier: bowel  Goal Description: Pt will relate less than 1 episode of owel incontinecne per week to demonstrate improved PFM strength.   Target Date: 02/17/22    Goal Identifier: HEP  Goal Description: Pt will be ind in HEP to prevent return of symptoms,   Target Date: 02/17/22          Therapy Frequency:  1 time/week  Predicted Duration of Therapy Intervention:  8 weeks    Marie Bah, PT                 I CERTIFY THE NEED FOR THESE SERVICES FURNISHED UNDER        THIS PLAN OF  TREATMENT AND WHILE UNDER MY CARE     (Physician co-signature of this document indicates review and certification of the therapy plan).                       Certification Date From:  12/23/21   Certification Date To:  02/17/22    Referring Provider:  Tyree Remy MD     Initial Assessment        See Epic Evaluation Start of Care Date: 12/23/21 12/23/21 1000   General Information   Type of Visit Initial OP Ortho PT Evaluation   Start of Care Date 12/23/21   Referring Physician Tyree Remy MD    Patient/Family Goals Statement reduce incontince    Orders Evaluate and Treat   Orders Comment s/p surgery, RT and chemo for anal cancer, having issues with incontinence   Date of Order 12/16/21   Certification Required? Yes   Medical Diagnosis Incontinence of feces with fecal urgency R15.9, R15.2  - Primary Malignant neoplasm of anal canal (H) C21.1   Surgical/Medical history reviewed Yes   Precautions/Limitations no known precautions/limitations   Body Part(s)   Body Part(s) Pelvic Floor Dysfunction;Lumbar Spine/SI   Presentation and Etiology   Pertinent history of current problem (include personal factors and/or comorbidities that impact the POC) Pt relates was dx w rectal cancer and had radiation last was in May 2020. Pt also currently dealing with anal fissure. Pt relates dealing w fecal incontinence since radiation. Pt relates when she gets urge for BM can sometimes hold, sometimes not. Holding can last for 2 min to 20 min pending day. Pt denies pelvic pain, does has some hip issues w prolonged hiking. Pt has had mild urinary stress incontinence for years since having children. Pt relates she does take fiber 2x/day to help w motility. Stool consistency varies.  Pt occasional strains to have BM, does have some pain w straining.    Impairments P. Bowel or bladder problems   Functional Limitations perform activities of daily living;perform required work activities;perform desired leisure / sports  activities   Symptom Location PFM   How/Where did it occur Other   Onset date of current episode/exacerbation 04/01/20   Chronicity New   Pain rating (0-10 point scale) Denies pain   Pain/symptoms exacerbated by J. ADL   Progression of symptoms since onset: Improved   Current Level of Function   Current Community Support Family/friend caregiver   Patient role/employment history A. Employed   Employment Comments subsititue teach    [facility]   Fall Risk Screen   Fall screen completed by PT   Have you fallen 2 or more times in the past year? No   Have you fallen and had an injury in the past year? No   Is patient a fall risk? No   Abuse Screen (yes response referral indicated)   Feels Unsafe at Home or Work/School no   Feels Threatened by Someone no   Does Anyone Try to Keep You From Having Contact with Others or Doing Things Outside Your Home? no   Physical Signs of Abuse Present no   Functional Scales   Functional Scales Other   Other Scales  AUA: 5   Lumbar Spine/SI Objective Findings   Hip Screen WFL   Hip Flexion (L2) Strength 5/5   Hip Abduction Strength 4/5   Lumbar/Hip/Knee/Foot Strength Comments hip ER: 3/5, Hip IR: 4/5   Specific Questions   Specific Questions Women's Health;Pregnancy;Pelvic Floor Dysfunction   Pelvic Floor Dysfunction Questions   Regular exercise No   Fluid intake-glasses/day (one glass/cup = 8oz pt relates drinks a lot of H20   Recent diet change? No  (low carb diet)   How many times do you wake to urinate at night?   1   Do you empty your bladder frequently, before you experience the urge to pass urine?  No   Frequency of bowel movements:  multipel times per day   Consistency of stool?  Other   Do you ignore the urge to defecate?  No   Women's Health Questions   Number of vaginal deliveries  3   Planned Therapy Interventions   Planned Therapy Interventions balance training;gait training;joint mobilization;manual therapy;neuromuscular  re-education;ROM;strengthening;stretching   Planned Modality Interventions   Planned Modality Interventions Biofeedback;Cryotherapy;Electrical stimulation;TENS;Ultrasound;Traction;Hot packs   Clinical Impression   Criteria for Skilled Therapeutic Interventions Met yes, treatment indicated   PT Diagnosis PFM weakness s/p radiation   Influenced by the following impairments weakness,    Functional limitations due to impairments incontinuece, hip pain   Clinical Presentation Stable/Uncomplicated   Clinical Presentation Rationale Pt is 56 yr old female who presents w mild stress inconitnece and fecal incontince after radication. Pt is motivated to get better and return to OF.    Clinical Decision Making (Complexity) Low complexity   Therapy Frequency 1 time/week   Predicted Duration of Therapy Intervention (days/wks) 8 weeks   Risk & Benefits of therapy have been explained Yes   Patient, Family & other staff in agreement with plan of care Yes   Pelvic Health Informed Consent Statement Discussed with patient/guardian reason for referral regarding pelvic health needs and external/internal pelvic floor muscle examination.  Opportunity provided to ask questions and verbal consent for assessment and intervention was given.   Education Assessment   Preferred Learning Style Listening;Demonstration;Pictures/video;Reading   Barriers to Learning No barriers   ORTHO GOALS   PT Ortho Eval Goals 1;2;3;4   Ortho Goal 1   Goal Identifier Tolieting strategieis   Goal Description Pt will relate improved emptying of bowel maranda defication techniques and relate straining less than 25% of the time.    Target Date 01/20/22   Ortho Goal 2   Goal Identifier bladder   Goal Description Pt will relate leaking less than 1/5 times with a cough/physical activity  to reduce stress incontinence   Target Date 01/20/22   Ortho Goal 3   Goal Identifier bowel   Goal Description Pt will relate less than 1 episode of owel incontinecne per week to  demonstrate improved PFM strength.    Target Date 02/17/22   Ortho Goal 4   Goal Identifier HEP   Goal Description Pt will be ind in HEP to prevent return of symptoms,    Target Date 02/17/22   Total Evaluation Time   PT Eval, Low Complexity Minutes (71023) 20   Therapy Certification   Certification date from 12/23/21   Certification date to 02/17/22   Medical Diagnosis Incontinence of feces with fecal urgency R15.9, R15.2  - Primary Malignant neoplasm of anal canal (H) C21.1

## 2021-12-26 ENCOUNTER — HEALTH MAINTENANCE LETTER (OUTPATIENT)
Age: 56
End: 2021-12-26

## 2022-01-06 ENCOUNTER — HOSPITAL ENCOUNTER (OUTPATIENT)
Dept: PHYSICAL THERAPY | Facility: CLINIC | Age: 57
Setting detail: THERAPIES SERIES
End: 2022-01-06
Attending: RADIOLOGY
Payer: COMMERCIAL

## 2022-01-06 PROCEDURE — 90912 BFB TRAINING 1ST 15 MIN: CPT | Mod: GP | Performed by: PHYSICAL THERAPIST

## 2022-01-06 PROCEDURE — 97535 SELF CARE MNGMENT TRAINING: CPT | Mod: GP | Performed by: PHYSICAL THERAPIST

## 2022-01-06 PROCEDURE — 97110 THERAPEUTIC EXERCISES: CPT | Mod: GP,59 | Performed by: PHYSICAL THERAPIST

## 2022-01-27 ENCOUNTER — HOSPITAL ENCOUNTER (OUTPATIENT)
Dept: PHYSICAL THERAPY | Facility: CLINIC | Age: 57
Setting detail: THERAPIES SERIES
End: 2022-01-27
Attending: RADIOLOGY
Payer: COMMERCIAL

## 2022-01-27 ENCOUNTER — OFFICE VISIT (OUTPATIENT)
Dept: GASTROENTEROLOGY | Facility: CLINIC | Age: 57
End: 2022-01-27
Attending: PHYSICIAN ASSISTANT
Payer: COMMERCIAL

## 2022-01-27 VITALS
DIASTOLIC BLOOD PRESSURE: 82 MMHG | OXYGEN SATURATION: 98 % | BODY MASS INDEX: 33.5 KG/M2 | WEIGHT: 198.2 LBS | TEMPERATURE: 97.4 F | SYSTOLIC BLOOD PRESSURE: 122 MMHG | HEART RATE: 75 BPM

## 2022-01-27 DIAGNOSIS — K76.0 FATTY LIVER: Primary | ICD-10-CM

## 2022-01-27 DIAGNOSIS — E88.01 HETEROZYGOUS ALPHA 1-ANTITRYPSIN DEFICIENCY (H): ICD-10-CM

## 2022-01-27 PROCEDURE — G0463 HOSPITAL OUTPT CLINIC VISIT: HCPCS

## 2022-01-27 PROCEDURE — 90912 BFB TRAINING 1ST 15 MIN: CPT | Mod: GP | Performed by: PHYSICAL THERAPIST

## 2022-01-27 PROCEDURE — 97140 MANUAL THERAPY 1/> REGIONS: CPT | Mod: GP | Performed by: PHYSICAL THERAPIST

## 2022-01-27 PROCEDURE — 97110 THERAPEUTIC EXERCISES: CPT | Mod: GP,59 | Performed by: PHYSICAL THERAPIST

## 2022-01-27 PROCEDURE — 99214 OFFICE O/P EST MOD 30 MIN: CPT | Mod: 25 | Performed by: PHYSICIAN ASSISTANT

## 2022-01-27 ASSESSMENT — PAIN SCALES - GENERAL: PAINLEVEL: NO PAIN (0)

## 2022-01-27 NOTE — LETTER
1/27/2022     RE: Susu Lagos  [address]  [facility] [address]    Dear Colleague,    Thank you for referring your patient, Susu Lagos, to the [facility] [place]. Please see a copy of my visit note below.    Hepatology Follow-up Clinic note  Susu Lagos   Date of Birth 1965  Date of Service 1/27/2022    Reason for follow-up: fatty liver.          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 No recent labs since September 2021. She has made lifestyle changes including 8 lbs weight loss and diet changes. Discussed importance of baseline fibrosis.     - Labs in the near future: BMP, Hepatic panel, CBC, INR  - Continue slow gradual weight loss  - Limit carbohydrates in the diet   - Continue optimization of blood glucose and cholesterol  - Continue regular physical activity   - Follow-up in clinic in one year or sooner as needed    Jose Badillo PA-C   [facility] Hepatology clinic    -----------------------------------------------------       HPI:   Susu Lagos is a 56 year old female  presenting for follow-up.     Dx: NALFD / Heterozygote A1AT - MS   Risk factors: obesity, hyperlipidemia   Bx: No     Patient was last seen by me on 7/27/21. No recent hospitalizations or ER visits. Was started on Questran, taking is as needed. 4 times. Stools more in the morning. Improved.     Appetite is good. Reduced carbohydrates in the diet. Weight down a bit (8 lbs).  Discomfort has improved.     Patient denies jaundice, lower extremity edema, abdominal distension or confusion.    Patient also denies melena, hematochezia or hematemesis.Patient denies weight loss, fevers, sweats or chills. Alcohol use is rare.     HCV antibody nonreactive  HBV SAb: 0.78 - NOT immune  HBV SAg nonreactive   HBV CAb  nonreactive  OMAIRA: negative  F-actin: 5 - normal   Iron panel:   Ferritin   Iron Sats:   TTG - normal   Alpha-1-antripsin - HETEROZYGOTE MS  TSH - 2.09   Cholesterol Total - 240  HDL - 69   LDL -154   Triglycerides - 83   Hemoglobin A1c    Medical hx Surgical hx   Past Medical History:   Diagnosis Date     Menorrhagia with irregular cycle 2/28/2016     Ovarian cyst 6/4/2015     Perimenopause 6/4/2015     Rectal cancer (H)     Past Surgical History:   Procedure Laterality Date     CHOLECYSTECTOMY  2013    LSC     COLONOSCOPY N/A 11/13/2018    Procedure: colonoscopy;  Surgeon: Sesar Dos Santos MD;  Location: [facility]     COLONOSCOPY N/A 3/3/2020    Procedure: COLONOSCOPY, WITH POLYPECTOMY AND BIOPSY;  Surgeon: Sesar Dos Santos MD;  Location: [facility]     DILATION AND CURETTAGE, OPERATIVE HYSTEROSCOPY, COMBINED N/A 2/29/2016    Procedure: COMBINED DILATION AND CURETTAGE, OPERATIVE HYSTEROSCOPY;  Surgeon: Panda Knight MD;  Location: [facility]     ESOPHAGOSCOPY, GASTROSCOPY, DUODENOSCOPY (EGD), COMBINED N/A 9/3/2020    Procedure: ESOPHAGOGASTRODUODENOSCOPY (EGD);  Surgeon: Karlos Burnett MD;  Location: [facility]     GYN SURGERY  1995    tubes tied     LAPAROSCOPY DIAGNOSTIC (GYN) Right 7/29/2015    Procedure: LAPAROSCOPY DIAGNOSTIC (GYN);  Surgeon: Lian Betancur DO;  Location: [facility]                 Medications:     Current Outpatient Medications   Medication     cholestyramine (QUESTRAN) 4 g packet     estradiol (ESTRACE) 0.1 MG/GM vaginal cream     Hydrophilic ointment     Magnesium Oxide 250 MG TABS     Multiple Vitamin (MULTI-VITAMINS PO)     Omega-3 Fatty Acids (OMEGA 3 PO)     No current facility-administered medications for this visit.            Allergies:     Allergies   Allergen Reactions     Rabies Vaccine Unknown     Scratch test resulted, it was not a good idea to give it to her.     Shellfish-Derived Products Nausea and Vomiting     Sulfa Drugs Rash            Review of Systems:   10 points ROS was  obtained and highlighted in the HPI, otherwise negative.          Physical Exam:   VS:  /82   Pulse 75   Temp 97.4  F (36.3  C) (Oral)   Wt 89.9 kg (198 lb 3.2 oz)   LMP 01/29/2016   SpO2 98%   BMI 33.50 kg/m        Gen- well, NAD, A+Ox3, normal color  Lym- no palpable LAD  CVS- RRR  RS- CTA  Abd- soft, nontender.   Extr- hands normal, no HOMERO  Skin- no rash or jaundice  Neuro- no asterixis  Psych- normal mood           Data:   Reviewed in person and significant for:    Lab Results   Component Value Date     09/30/2021     06/02/2021      Lab Results   Component Value Date    POTASSIUM 4.5 09/30/2021    POTASSIUM 3.9 06/02/2021     Lab Results   Component Value Date    CHLORIDE 109 09/30/2021    CHLORIDE 105 06/02/2021     Lab Results   Component Value Date    CO2 29 09/30/2021    CO2 29 06/02/2021     Lab Results   Component Value Date    BUN 18 09/30/2021    BUN 18 06/02/2021     Lab Results   Component Value Date    CR 0.92 09/30/2021    CR 0.86 06/02/2021       Lab Results   Component Value Date    WBC 4.5 09/30/2021    WBC 5.5 06/02/2021     Lab Results   Component Value Date    HGB 14.2 09/30/2021    HGB 14.2 06/02/2021     Lab Results   Component Value Date    HCT 41.2 09/30/2021    HCT 41.5 06/02/2021     Lab Results   Component Value Date    MCV 96 09/30/2021    MCV 95 06/02/2021     Lab Results   Component Value Date     09/30/2021     06/02/2021       Lab Results   Component Value Date    AST 70 09/30/2021    AST 41 06/02/2021     Lab Results   Component Value Date     09/30/2021     06/02/2021     No results found for: BILICONJ   Lab Results   Component Value Date    BILITOTAL 0.3 09/30/2021    BILITOTAL 0.3 06/02/2021       Lab Results   Component Value Date    ALBUMIN 3.9 09/30/2021    ALBUMIN 3.7 06/02/2021     Lab Results   Component Value Date    PROTTOTAL 7.3 09/30/2021    PROTTOTAL 7.5 06/02/2021      Lab Results   Component Value Date    ALKPHOS  47 09/30/2021    ALKPHOS 51 06/02/2021       Lab Results   Component Value Date    INR 0.87 07/27/2021       1. PET of the neck, chest, abdomen, and pelvis.  2. PET CT Fusion for Attenuation Correction and Anatomical  Localization:    3. Diagnostic CT scan of the chest, abdomen, and pelvis with  intravenous contrast for interpretation.  3. CT of the chest, abdomen and pelvis obtained for diagnostic  interpretation.  4. 3D MIP and PET-CT fused images were processed on an independent  workstation and archived to PACS and reviewed by a radiologist.     Technique:     1. PET: The patient received 11.18 mCi of F-18-FDG; the serum glucose  was 100 prior to administration, body weight was 93.5 kg. Images were  evaluated in the axial, sagittal, and coronal planes as well as the  rotational whole body MIP. Images were acquired from the Vertex to the  Feet.     UPTAKE WAS MEASURED AT 60 MINUTES.      BACKGROUND:  Liver SUV max= 3.0,   Aorta Blood SUV Max: 4.32.      2. CT: Volumetric acquisition for clinical interpretation of the  chest, abdomen, and pelvis acquired at 3 mm sections after the  uneventful administration of intravenous contrast. The chest, abdomen,  and pelvis were evaluated at 5 mm sections in bone, soft tissue, and  lung windows.       The patient received 126 cc of Isovue 370 intravenously for the  examination.       3. 3D MIP and PET-CT fused images were processed on an independent  workstation and archived to PACS and reviewed by a radiologist.     INDICATION: Anal carcinoma, monitor; Malignant neoplasm of anal canal  (H)     ADDITIONAL INFORMATION OBTAINED FROM EMR: 56?year old female?with  history of anal cancer s/p chemoradiation, which she completed  5/18/20. She has seen?in clinic for anal fissure with a superficial  ulceration in the anterior anal canal that failed to heal.     COMPARISON: PET/CT 7/28/2020. Pelvic MR 8/6/2020.     FINDINGS:      HEAD/NECK:  There is no suspicious FDG uptake in the  neck.      The paranasal sinuses and mastoid air cells are clear. Orbits are  unremarkable. The mucosal pharyngeal, prevertebral, , and  carotid spaces are within normal limits. No hypermetabolic cervical  lymphadenopathy. No soft tissue masses in the neck. Thyroid gland is  normal.     CHEST:  There is no suspicious FDG uptake in the chest.      Heart size is normal. No pericardial effusion. No significant coronary  artery calcifications. Thoracic aorta and main pulmonary arteries are  normal in caliber and patent. Conventional three-vessel branching  pattern of the aortic arch. No mediastinal, hilar, or axillary  lymphadenopathy. Esophagus is normal.     Trachea and central airways are clear. No focal airspace  consolidation. Mild bibasilar dependent atelectasis. No pleural  effusion or pneumothorax. No suspicious pulmonary nodules.     ABDOMEN AND PELVIS:  Posttreatment changes to the anus. Similar appearance of persistent  soft tissue thickening measuring 5.9 x 4.8 cm with overall increase  hypermetabolic activity and distribution around the anus and perineum  with a max SUV of 6.08, previously 5.58 (series 5, image 451).      Diffuse fatty infiltration of the liver with small focal area of  increased attenuation along the ligamentum teres likely represents  area of fatty sparing. No focal enhancing lesion. Postsurgical  cholecystectomy changes. Spleen size is within normal limits.  Homogenous enhancement of the pancreas. The main pancreatic and common  bile ducts are not dilated. No adrenal mass. Symmetric renal  enhancement. No hydronephrosis, calculus or stone, or contour  deforming mass. Persistent diffuse bladder wall thickening. Uterus is  unremarkable. No adnexal mass.     Stomach is decompressed. No small or large bowel wall thickening or  dilation. Appendix is normal. No free intraperitoneal air. No  intra-abdominal or pelvic free fluid. There is diffuse radiotracer  uptake in the small and  large bowel, most prominent along the  ascending colon without focal lesion appreciated on CT. Findings are  nonspecific and likely represents physiologic uptake versus less  likely mild inflammation/infection.     Abdominal aorta and major branches are normal in caliber and patent  without significant atherosclerotic disease. The main portal vein and  major branches are patent. No mesenteric, retroperitoneal, or pelvic  lymphadenopathy. No hypermetabolic inguinal lymph nodes.     LOWER EXTREMITIES:   No abnormal masses or hypermetabolic lesions. Postsurgical changes of  the bilateral first metatarsal bones.     BONES:   There are no suspicious lytic or blastic osseous lesions.  There is no  abnormal FDG uptake in the skeleton. Multilevel degenerative changes  of the thoracolumbar spine.                                                                      IMPRESSION:  In this patient with history of anal carcinoma status  post chemoradiation:     1. Posttreatment changes to the anus with unchanged size of the  masslike thickening compared to 7/28/2020. Overall uptake has  increased slightly in distribution, focally greatest in the posterior  aspect of the tissue thickening. This uptake is indeterminant. Given  stability in size, may represent infection/inflammation in the setting  of known anal fissure. Differential diagnosis includes delayed  radiation changes, or possibly residual disease. If repeat biopsy is a  consideration, recommend targeting the posterior tissue that is most  avid.     2. No evidence for metastasis in the chest, abdomen, or pelvis.     3. Diffused hepatic steatosis.    Again, thank you for allowing me to participate in the care of your patient.      Sincerely,    Jose Badillo PA-C

## 2022-01-27 NOTE — NURSING NOTE
Chief Complaint   Patient presents with     RECHECK     f/u liver issues     /82   Pulse 75   Temp 97.4  F (36.3  C) (Oral)   Wt 89.9 kg (198 lb 3.2 oz)   LMP 01/29/2016   SpO2 98%   BMI 33.50 kg/m    [place]ano Lagos MA on 1/27/2022 at 9:32 AM

## 2022-01-27 NOTE — PROGRESS NOTES
Hepatology Follow-up Clinic note  Susu Lagos   Date of Birth 1965  Date of Service 1/27/2022    Reason for follow-up: fatty liver.          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 No recent labs since September 2021. She has made lifestyle changes including 8 lbs weight loss and diet changes. Discussed importance of baseline fibrosis.     - Labs in the near future: BMP, Hepatic panel, CBC, INR  - Continue slow gradual weight loss  - Limit carbohydrates in the diet   - Continue optimization of blood glucose and cholesterol  - Continue regular physical activity   - Follow-up in clinic in one year or sooner as needed    Jose Badillo PA-C   [facility] Hepatology clinic    -----------------------------------------------------       HPI:   Susu Lagos is a 56 year old female  presenting for follow-up.     Dx: NALFD / Heterozygote A1AT - MS   Risk factors: obesity, hyperlipidemia   Bx: No     Patient was last seen by me on 7/27/21. No recent hospitalizations or ER visits. Was started on Questran, taking is as needed. 4 times. Stools more in the morning. Improved.     Appetite is good. Reduced carbohydrates in the diet. Weight down a bit (8 lbs).  Discomfort has improved.     Patient denies jaundice, lower extremity edema, abdominal distension or confusion.    Patient also denies melena, hematochezia or hematemesis.Patient denies weight loss, fevers, sweats or chills. Alcohol use is rare.     HCV antibody nonreactive  HBV SAb: 0.78 - NOT immune  HBV SAg nonreactive   HBV CAb nonreactive  OMAIRA: negative  F-actin: 5 - normal   Iron panel:   Ferritin   Iron Sats:   TTG - normal   Alpha-1-antripsin - HETEROZYGOTE MS  TSH - 2.09   Cholesterol Total - 240  HDL - 69   LDL -154   Triglycerides - 83   Hemoglobin A1c    Medical hx  Surgical hx   Past Medical History:   Diagnosis Date     Menorrhagia with irregular cycle 2/28/2016     Ovarian cyst 6/4/2015     Perimenopause 6/4/2015     Rectal cancer (H)     Past Surgical History:   Procedure Laterality Date     CHOLECYSTECTOMY  2013    LSC     COLONOSCOPY N/A 11/13/2018    Procedure: colonoscopy;  Surgeon: Sesar Dos Santos MD;  Location: [facility]     COLONOSCOPY N/A 3/3/2020    Procedure: COLONOSCOPY, WITH POLYPECTOMY AND BIOPSY;  Surgeon: Sesar Dos Santos MD;  Location: [facility]     DILATION AND CURETTAGE, OPERATIVE HYSTEROSCOPY, COMBINED N/A 2/29/2016    Procedure: COMBINED DILATION AND CURETTAGE, OPERATIVE HYSTEROSCOPY;  Surgeon: Panda Knight MD;  Location: [facility]     ESOPHAGOSCOPY, GASTROSCOPY, DUODENOSCOPY (EGD), COMBINED N/A 9/3/2020    Procedure: ESOPHAGOGASTRODUODENOSCOPY (EGD);  Surgeon: Karlos Burnett MD;  Location: [facility]     GYN SURGERY  1995    tubes tied     LAPAROSCOPY DIAGNOSTIC (GYN) Right 7/29/2015    Procedure: LAPAROSCOPY DIAGNOSTIC (GYN);  Surgeon: Lian Betancur DO;  Location: [facility]                 Medications:     Current Outpatient Medications   Medication     cholestyramine (QUESTRAN) 4 g packet     estradiol (ESTRACE) 0.1 MG/GM vaginal cream     Hydrophilic ointment     Magnesium Oxide 250 MG TABS     Multiple Vitamin (MULTI-VITAMINS PO)     Omega-3 Fatty Acids (OMEGA 3 PO)     No current facility-administered medications for this visit.            Allergies:     Allergies   Allergen Reactions     Rabies Vaccine Unknown     Scratch test resulted, it was not a good idea to give it to her.     Shellfish-Derived Products Nausea and Vomiting     Sulfa Drugs Rash            Review of Systems:   10 points ROS was obtained and highlighted in the HPI, otherwise negative.          Physical Exam:   VS:  /82   Pulse 75   Temp 97.4  F (36.3  C) (Oral)   Wt 89.9 kg (198 lb 3.2 oz)   LMP 01/29/2016   SpO2 98%   BMI 33.50 kg/m        Gen- well, NAD, A+Ox3,  normal color  Lym- no palpable LAD  CVS- RRR  RS- CTA  Abd- soft, nontender.   Extr- hands normal, no HOMERO  Skin- no rash or jaundice  Neuro- no asterixis  Psych- normal mood           Data:   Reviewed in person and significant for:    Lab Results   Component Value Date     09/30/2021     06/02/2021      Lab Results   Component Value Date    POTASSIUM 4.5 09/30/2021    POTASSIUM 3.9 06/02/2021     Lab Results   Component Value Date    CHLORIDE 109 09/30/2021    CHLORIDE 105 06/02/2021     Lab Results   Component Value Date    CO2 29 09/30/2021    CO2 29 06/02/2021     Lab Results   Component Value Date    BUN 18 09/30/2021    BUN 18 06/02/2021     Lab Results   Component Value Date    CR 0.92 09/30/2021    CR 0.86 06/02/2021       Lab Results   Component Value Date    WBC 4.5 09/30/2021    WBC 5.5 06/02/2021     Lab Results   Component Value Date    HGB 14.2 09/30/2021    HGB 14.2 06/02/2021     Lab Results   Component Value Date    HCT 41.2 09/30/2021    HCT 41.5 06/02/2021     Lab Results   Component Value Date    MCV 96 09/30/2021    MCV 95 06/02/2021     Lab Results   Component Value Date     09/30/2021     06/02/2021       Lab Results   Component Value Date    AST 70 09/30/2021    AST 41 06/02/2021     Lab Results   Component Value Date     09/30/2021     06/02/2021     No results found for: BILICONJ   Lab Results   Component Value Date    BILITOTAL 0.3 09/30/2021    BILITOTAL 0.3 06/02/2021       Lab Results   Component Value Date    ALBUMIN 3.9 09/30/2021    ALBUMIN 3.7 06/02/2021     Lab Results   Component Value Date    PROTTOTAL 7.3 09/30/2021    PROTTOTAL 7.5 06/02/2021      Lab Results   Component Value Date    ALKPHOS 47 09/30/2021    ALKPHOS 51 06/02/2021       Lab Results   Component Value Date    INR 0.87 07/27/2021       1. PET of the neck, chest, abdomen, and pelvis.  2. PET CT Fusion for Attenuation Correction and Anatomical  Localization:    3. Diagnostic CT  scan of the chest, abdomen, and pelvis with  intravenous contrast for interpretation.  3. CT of the chest, abdomen and pelvis obtained for diagnostic  interpretation.  4. 3D MIP and PET-CT fused images were processed on an independent  workstation and archived to PACS and reviewed by a radiologist.     Technique:     1. PET: The patient received 11.18 mCi of F-18-FDG; the serum glucose  was 100 prior to administration, body weight was 93.5 kg. Images were  evaluated in the axial, sagittal, and coronal planes as well as the  rotational whole body MIP. Images were acquired from the Vertex to the  Feet.     UPTAKE WAS MEASURED AT 60 MINUTES.      BACKGROUND:  Liver SUV max= 3.0,   Aorta Blood SUV Max: 4.32.      2. CT: Volumetric acquisition for clinical interpretation of the  chest, abdomen, and pelvis acquired at 3 mm sections after the  uneventful administration of intravenous contrast. The chest, abdomen,  and pelvis were evaluated at 5 mm sections in bone, soft tissue, and  lung windows.       The patient received 126 cc of Isovue 370 intravenously for the  examination.       3. 3D MIP and PET-CT fused images were processed on an independent  workstation and archived to PACS and reviewed by a radiologist.     INDICATION: Anal carcinoma, monitor; Malignant neoplasm of anal canal  (H)     ADDITIONAL INFORMATION OBTAINED FROM EMR: 56?year old female?with  history of anal cancer s/p chemoradiation, which she completed  5/18/20. She has seen?in clinic for anal fissure with a superficial  ulceration in the anterior anal canal that failed to heal.     COMPARISON: PET/CT 7/28/2020. Pelvic MR 8/6/2020.     FINDINGS:      HEAD/NECK:  There is no suspicious FDG uptake in the neck.      The paranasal sinuses and mastoid air cells are clear. Orbits are  unremarkable. The mucosal pharyngeal, prevertebral, , and  carotid spaces are within normal limits. No hypermetabolic cervical  lymphadenopathy. No soft tissue masses  in the neck. Thyroid gland is  normal.     CHEST:  There is no suspicious FDG uptake in the chest.      Heart size is normal. No pericardial effusion. No significant coronary  artery calcifications. Thoracic aorta and main pulmonary arteries are  normal in caliber and patent. Conventional three-vessel branching  pattern of the aortic arch. No mediastinal, hilar, or axillary  lymphadenopathy. Esophagus is normal.     Trachea and central airways are clear. No focal airspace  consolidation. Mild bibasilar dependent atelectasis. No pleural  effusion or pneumothorax. No suspicious pulmonary nodules.     ABDOMEN AND PELVIS:  Posttreatment changes to the anus. Similar appearance of persistent  soft tissue thickening measuring 5.9 x 4.8 cm with overall increase  hypermetabolic activity and distribution around the anus and perineum  with a max SUV of 6.08, previously 5.58 (series 5, image 451).      Diffuse fatty infiltration of the liver with small focal area of  increased attenuation along the ligamentum teres likely represents  area of fatty sparing. No focal enhancing lesion. Postsurgical  cholecystectomy changes. Spleen size is within normal limits.  Homogenous enhancement of the pancreas. The main pancreatic and common  bile ducts are not dilated. No adrenal mass. Symmetric renal  enhancement. No hydronephrosis, calculus or stone, or contour  deforming mass. Persistent diffuse bladder wall thickening. Uterus is  unremarkable. No adnexal mass.     Stomach is decompressed. No small or large bowel wall thickening or  dilation. Appendix is normal. No free intraperitoneal air. No  intra-abdominal or pelvic free fluid. There is diffuse radiotracer  uptake in the small and large bowel, most prominent along the  ascending colon without focal lesion appreciated on CT. Findings are  nonspecific and likely represents physiologic uptake versus less  likely mild inflammation/infection.     Abdominal aorta and major branches are  normal in caliber and patent  without significant atherosclerotic disease. The main portal vein and  major branches are patent. No mesenteric, retroperitoneal, or pelvic  lymphadenopathy. No hypermetabolic inguinal lymph nodes.     LOWER EXTREMITIES:   No abnormal masses or hypermetabolic lesions. Postsurgical changes of  the bilateral first metatarsal bones.     BONES:   There are no suspicious lytic or blastic osseous lesions.  There is no  abnormal FDG uptake in the skeleton. Multilevel degenerative changes  of the thoracolumbar spine.                                                                      IMPRESSION:  In this patient with history of anal carcinoma status  post chemoradiation:     1. Posttreatment changes to the anus with unchanged size of the  masslike thickening compared to 7/28/2020. Overall uptake has  increased slightly in distribution, focally greatest in the posterior  aspect of the tissue thickening. This uptake is indeterminant. Given  stability in size, may represent infection/inflammation in the setting  of known anal fissure. Differential diagnosis includes delayed  radiation changes, or possibly residual disease. If repeat biopsy is a  consideration, recommend targeting the posterior tissue that is most  avid.     2. No evidence for metastasis in the chest, abdomen, or pelvis.     3. Diffused hepatic steatosis.

## 2022-02-15 ENCOUNTER — HOSPITAL ENCOUNTER (OUTPATIENT)
Dept: PHYSICAL THERAPY | Facility: CLINIC | Age: 57
Setting detail: THERAPIES SERIES
End: 2022-02-15
Attending: RADIOLOGY
Payer: COMMERCIAL

## 2022-02-15 PROCEDURE — 97110 THERAPEUTIC EXERCISES: CPT | Mod: GP,59 | Performed by: PHYSICAL THERAPIST

## 2022-02-15 PROCEDURE — 90912 BFB TRAINING 1ST 15 MIN: CPT | Mod: GP | Performed by: PHYSICAL THERAPIST

## 2022-03-17 ENCOUNTER — HOSPITAL ENCOUNTER (OUTPATIENT)
Dept: PHYSICAL THERAPY | Facility: CLINIC | Age: 57
Setting detail: THERAPIES SERIES
Discharge: HOME | End: 2022-03-17
Attending: RADIOLOGY
Payer: COMMERCIAL

## 2022-03-17 PROCEDURE — 97110 THERAPEUTIC EXERCISES: CPT | Mod: GP | Performed by: PHYSICAL THERAPIST

## 2022-03-17 PROCEDURE — 90912 BFB TRAINING 1ST 15 MIN: CPT | Mod: GP | Performed by: PHYSICAL THERAPIST

## 2022-03-17 PROCEDURE — 97535 SELF CARE MNGMENT TRAINING: CPT | Mod: GP | Performed by: PHYSICAL THERAPIST

## 2022-03-17 NOTE — PROGRESS NOTES
RECERTIFICATION    Susu Lagos  1965    Session Number: 4/365 HP w cert since start of care.      Diagnosis: PFM weakness s/p radiation  Onset Date: 20  Start of Care Date: 21    Reasons for Continuing Treatment:   PFM weakness, difficulty prolonging BM    Frequency/Duration  1 times per 2 weeks for 6 weeks for a total of 3 visits.    Recertification Period  22 - 22    Physician Signature:    Date:    X_______________________________________________________    Physician Name Tyree Remy MD    I certify the need for these services furnished under this plan of treatment and while under my care. Physician co-signature of this document indicates review and certification of the therapy plan.  This signature may be written on paper, or electronically signed within [facility]    Outpatient Physical Therapy Progress Note  Patient: Susu Lagos  : 1965    Beginning/End Dates of Reporting Period:  21 to 3/17/22    Referring Provider: Tyree Remy MD    Therapy Diagnosis: PFM weakness s/p radiation     Client Self Report: Pt relates is challenged by bridging w PFM squeeze.  Overall,  pt relates improvement in strength w control but relates at times she cannot hold it. Usually worse in mornings for double voiding.    Objective Measurements:  Objective Measure: leaks  Details: 0    Objective Measure: Pelvic Exam  Details: NT    Objective Measure: L hip ROM  Details: NT                Goals:  Goal Identifier Tolieting strategieis   Goal Description Pt will relate improved emptying of bowel maranda defication techniques and relate straining less than 25% of the time.    Target Date 22   Date Met      Progress (detail required for progress note): less than 25% but will go mulitple times in am      Goal Identifier bladder   Goal Description Pt will  relate leaking less than 1/5 times with a cough/physical activity  to reduce stress incontinence   Target Date 01/20/22   Date Met  01/27/22   Progress (detail required for progress note):  Goal met     Goal Identifier bowel   Goal Description Pt will relate less than 1 episode of bowel incontinecne per week to demonstrate improved PFM strength.    Target Date 02/17/22   Date Met      Progress (detail required for progress note): 0 as pt was at home - can only delay urge short times      Goal Identifier HEP   Goal Description Pt will be ind in HEP to prevent return of symptoms,    Target Date 02/17/22   Date Met      Progress (detail required for progress note):  Compliant with HEP, continue as progresses        Pt has been seen for 4 visits over this POC. Pt relates less fecal incontinence however at times is not able to delay bowel movement. Per use of biofeedback, pt PFM fatigues very quickly and has strong compensation with abdominals. While pt is improving due to continued weakness, pt is appropriate to continue on an every other week basis to continue to make gains in strength.       Plan:  Changes to therapy plan of care: continue 1x every 2 weeks for up to 6 weeks    Discharge:  No

## 2022-04-07 ENCOUNTER — HOSPITAL ENCOUNTER (OUTPATIENT)
Dept: PHYSICAL THERAPY | Facility: CLINIC | Age: 57
Setting detail: THERAPIES SERIES
Discharge: HOME | End: 2022-04-07
Attending: RADIOLOGY
Payer: COMMERCIAL

## 2022-04-07 PROCEDURE — 97110 THERAPEUTIC EXERCISES: CPT | Mod: GP,59 | Performed by: PHYSICAL THERAPIST

## 2022-04-07 PROCEDURE — 90912 BFB TRAINING 1ST 15 MIN: CPT | Mod: GP | Performed by: PHYSICAL THERAPIST

## 2022-04-07 NOTE — PROGRESS NOTES
Colon and Rectal Surgery Clinic Note      RE: Susu Lagos  : 1965  MALGORZATA: 2022    Susu Lagos is a very pleasant 56 year old female with history of anal cancer s/p chemoradiation, which she completed 20. She has seen Letitia Arevalo NP in clinic for anal fissure with a superficial ulceration in the anterior anal canal that failed to heal. Biopsy of this site on 21 was normal.     She continues to follow with Dr. Remy of radiation oncology and saw him last on 21. He recommended pelvic floor therapy.     She was originally following with Dr Lakhani for medical oncology but he retired. She saw Dr. Moise on 21, is waiting to establish with a new medical oncologist.    I saw her last on 21 with no evidence of recurrence. At that time she had fecal incontinence, recommended twice daily fiber supplement and cholestyramine.    MR Pelvis 21:  IMPRESSION: Postradiation changes of the anus and distal rectum  without evidence for recurrent tumor. No pelvic lymphadenopathy.    Pet CT 21:  1. Posttreatment changes to the anus with unchanged size of the  masslike thickening compared to 2020. Overall uptake has  increased slightly in distribution, focally greatest in the posterior  aspect of the tissue thickening. This uptake is indeterminant. Given  stability in size, may represent infection/inflammation in the setting  of known anal fissure. Differential diagnosis includes delayed  radiation changes, or possibly residual disease. If repeat biopsy is a  consideration, recommend targeting the posterior tissue that is most  avid.  2. No evidence for metastasis in the chest, abdomen, or pelvis.  3. Diffused hepatic steatosis.    Interval History: Since her last follow up, she continues to have some fecal incontinence but it is improved. She describes mostly urgency and accidents are rare. She occasionally has blood mixed in with her stool. No pain in her  "rectum and she hasn't felt any lesions. Stools are on the looser side, has been taking metamucil. Last colonoscopy was 2 years ago.      Physical examination:  Examination was chaperoned by Silvina Johnson, Medical student.    Vitals: /79 (BP Location: Left arm, Patient Position: Sitting, Cuff Size: Adult Regular)   Pulse 90   Ht 5' 4.5\"   Wt 193 lb 8 oz   LMP 01/29/2016   SpO2 98%   BMI 32.70 kg/m    BMI= Body mass index is 32.7 kg/m .    No palpable inguinal adenopathy.  Perianal examination shows skin tags but no other lesions. Digital rectal examination with no palpable lesions. Anoscopy without any visible lesions and some internal hemorrhoids.    Laboratory data:    Recent Labs   Lab Test 07/27/21  0859   WBC 5.6   HGB 14.4      CR 0.79   ALBUMIN 3.8   BILITOTAL 0.3   ALKPHOS 56   *   AST 42   INR 0.87       Assessment/plan:    Anal cancer s/p chemoradiation completed 5/18/20. No evidence of recurrence on exam today. Recommended continuing on twice daily fiber supplement for fecal urgency. If fecal incontinence worsens could consider sacral nerve stimulator. Anal fissure appears to have resolved. Would like to see her in follow up in 4 months to check for recurrence. Patient's questions were answered to her stated satisfaction and she is in agreement with this plan.    For details of past medical history, surgical history, family history, medications, allergies, and review of systems, please see details below.    Medical history:  Past Medical History:   Diagnosis Date     Menorrhagia with irregular cycle 2/28/2016     Ovarian cyst 6/4/2015    Right, 6 cm dermoid on MRI-recommend removal.  7/29/2015 Surgery for right salpingoophorectomy.       Perimenopause 6/4/2015     Rectal cancer (H)     had radiation and chemo 2020       Surgical history:  Past Surgical History:   Procedure Laterality Date     CHOLECYSTECTOMY  2013    [facility]     COLONOSCOPY N/A 11/13/2018    Procedure: colonoscopy;  " Surgeon: Sesar Dos Santos MD;  Location: [facility]     COLONOSCOPY N/A 3/3/2020    Procedure: COLONOSCOPY, WITH POLYPECTOMY AND BIOPSY;  Surgeon: Sesar Dos Santos MD;  Location: [facility]     DILATION AND CURETTAGE, OPERATIVE HYSTEROSCOPY, COMBINED N/A 2/29/2016    Procedure: COMBINED DILATION AND CURETTAGE, OPERATIVE HYSTEROSCOPY;  Surgeon: Panda Knight MD;  Location: [facility]     ESOPHAGOSCOPY, GASTROSCOPY, DUODENOSCOPY (EGD), COMBINED N/A 9/3/2020    Procedure: ESOPHAGOGASTRODUODENOSCOPY (EGD);  Surgeon: Karlos Burnett MD;  Location: [facility]     GYN SURGERY  1995    tubes tied     LAPAROSCOPY DIAGNOSTIC (GYN) Right 7/29/2015    Procedure: LAPAROSCOPY DIAGNOSTIC (GYN);  Surgeon: Lian Betancur DO;  Location: [facility]       Family history:  Family History   Problem Relation Age of Onset     Hypertension Father      Myocardial Infarction Maternal Grandfather      Melanoma No family hx of        Medications:  Current Outpatient Medications   Medication Sig Dispense Refill     estradiol (ESTRACE) 0.1 MG/GM vaginal cream Topically 3x/week       Hydrophilic ointment        Magnesium Oxide 250 MG TABS Take 2 tablets by mouth 2 times daily       Multiple Vitamin (MULTI-VITAMINS PO) Take 1 tablet by mouth daily        Omega-3 Fatty Acids (OMEGA 3 PO) Take 1 capsule by mouth daily        triamcinolone (KENALOG) 0.1 % external cream Apply topically 2 times daily 30 g 3     cholestyramine (QUESTRAN) 4 g packet Take 1 packet (4 g) by mouth 2 times daily (with meals) (Patient not taking: No sig reported) 60 packet 3       Allergies:  The patientis allergic to rabies vaccine, shellfish-derived products, and sulfa drugs.    Social history:  Social History     Tobacco Use     Smoking status: Never Smoker     Smokeless tobacco: Never Used   Substance Use Topics     Alcohol use: Yes     Alcohol/week: 0.0 standard drinks     Comment: 1-2 per month      Marital status: .    Review of Systems:  Nursing Notes:   Lang  "Rosalva  4/12/2022 10:00 AM  Signed  Chief Complaint   Patient presents with     Follow Up     Rectal exam, anoscopy for Hx of anal cancer       Vitals:    04/12/22 0957   BP: 124/79   BP Location: Left arm   Patient Position: Sitting   Cuff Size: Adult Regular   Pulse: 90   SpO2: 98%   Weight: 193 lb 8 oz   Height: 5' 4.5\"       Body mass index is 32.7 kg/m .    MARAL Lackey   Medical Student  [facility]    I saw and examined the patient, led the discussion and edited the resident note.  I agree with the assessment and plan as outlined.  In brief, Susu is now 2 years out from her chemoradiation.  There is no evidence of local recurrence.  Her main complaint is fecal urgency with rare loss of stool.  Her stool consistency remains loose despite twice daily Metamucil and she will try Metamucil wafers midday to try to increase her bulking.  She can also try some Imodium to reduce her urgency.  She should follow-up for an exam in clinic in 3 to 4 months.  I answered all of Lizzeth's questions to her stated satisfaction.  She expressed her understanding and agreement.        20 minutes spent on the date of the encounter doing chart review, history and exam, documentation, review of imaging, and further activities as noted above.     Stefano Banuelos MD   Professor and Chief  Division of Colon and Rectal Surgery  [facility]      Referring Provider:  Referred Self, MD  No address on file     Primary Care Provider:  Lakesha Aguirre    This note was created using speech recognition software and may contain unintended word substitutions.  "

## 2022-04-08 ENCOUNTER — OFFICE VISIT (OUTPATIENT)
Dept: FAMILY MEDICINE | Facility: CLINIC | Age: 57
End: 2022-04-08
Payer: COMMERCIAL

## 2022-04-08 VITALS
DIASTOLIC BLOOD PRESSURE: 64 MMHG | TEMPERATURE: 97.4 F | BODY MASS INDEX: 32.79 KG/M2 | RESPIRATION RATE: 16 BRPM | WEIGHT: 194 LBS | HEART RATE: 72 BPM | SYSTOLIC BLOOD PRESSURE: 118 MMHG

## 2022-04-08 DIAGNOSIS — L40.9 PSORIASIS: Primary | ICD-10-CM

## 2022-04-08 PROCEDURE — 99213 OFFICE O/P EST LOW 20 MIN: CPT | Performed by: NURSE PRACTITIONER

## 2022-04-08 RX ORDER — TRIAMCINOLONE ACETONIDE 1 MG/G
CREAM TOPICAL 2 TIMES DAILY
Qty: 30 G | Refills: 3 | Status: SHIPPED | OUTPATIENT
Start: 2022-04-08

## 2022-04-08 ASSESSMENT — PAIN SCALES - GENERAL: PAINLEVEL: MILD PAIN (3)

## 2022-04-08 NOTE — PATIENT INSTRUCTIONS
1.  Use topical steroid twice daily for 2 weeks then take a vacation for 1 week and restart for 2 weeks.  If using a couple days and then stopping okay to use when needed every couple days.  Continue to moisturize your skin on the elbows daily.  2.  Handouts given on understanding and managing psoriasis.  3.  Follow-up if any worsening symptoms.  Patient Education     Managing Psoriasis     Take baths in warm water to help soften scales.   The success of your medical treatment depends on you. When your healthcare provider gives you a treatment plan, ask when you should expect to see results. Then, follow your plan. If your treatment does not work in the expected time, let your healthcare provider know.   Psoriasis is a common chronic skin disorder, that may respond to many different treatments depending on the location, size, and symptoms each person experiences. Some treatments are simple (tar-based therapies or topical steroids). Other treatments are complex (new biologic medicines or light therapy). Your healthcare provider will need to personalize your treatment. Psoriasis can't be cured. But it will often get better with treatment. Symptoms may become worse later if you stop treatment or if a new illness occurs. In most cases, you can get control of your psoriasis again. You will likely need to see your healthcare provider regularly about treatment options.   Psoriasis self-care  Follow these steps to help manage your symptoms:    Take baths to help soften scales. Use warm water, not hot water. To prevent drying out your skin, limit each bath to about 15 minutes. Add bath oil, bath salts, or colloidal oatmeal.    After you bathe, apply lotion right away, while your skin is damp. Dry skin can make symptoms worse.    Use a scalp treatment as prescribed by your healthcare provider. There are different solutions and dosages based on your symptoms.     Seek treatment right away for any illnesses or skin injuries  because they can cause flare-ups.    Manage your stress, and use relaxation techniques.    Expose your psoriatic skin to sunlight for 5 minutes a day. But don t do this if you feel that sun exposure makes your psoriasis worse. Use sunscreen on the normal, unaffected skin, but avoid sunburns.    Use over-the-counter hydrocortisone cream for itching to reduce scaling for active outbreaks. Ask your healthcare provider about long-term use.    Stick with treatment that your healthcare provider has recommended for you, especially if it's controlling your psoriasis.    Stay away from abrasive cleansers, harsh detergents, and household chemicals.  Next step  Now that you know more about psoriasis, the next step is up to you. Follow your healthcare provider's treatment plan and self-care routine. Doing so can help you control your symptoms. If your symptoms don t improve or they get worse, call your healthcare provider. Psoriasis can t be cured. But its symptoms can be managed.   [employer] last reviewed this educational content on 8/1/2019 2000-2021 [employer]. All rights reserved. This information is not intended as a substitute for professional medical care. Always follow your healthcare professional's instructions.           Patient Education     What Is Psoriasis?  Psoriasis is a chronic skin disease. Researchers believe this condition develops from a combination of immune, genetic, and environmental factors. Psoriasis can start at any age. It's most common between ages 30 and 39 and also between ages 50 and 69. Psoriasis affects nearly equal numbers of men and women. In people with this disease, the skin grows too fast. Dead skin cells build up on the skin s surface to form inflamed, thick, silvery scales called plaques. Sometimes people form many small lesions that can hurt or have pus in them. Psoriasis does not spread from person to person.   About your symptoms  Psoriasis plaques tend to form on the  elbows, knees, scalp, navel, arms, legs, and the penis or vulva. They can be unsightly, painful, and itchy. Plaques on the joints can limit movement. On the fingernails or toenails, psoriasis can cause pitting, a change in nail color, and a change in nail shape. In some cases, psoriasis also causes arthritis. Symptoms may come and go on their own. Factors such as stress, infection, and certain medicines may cause flare-ups. If symptoms bother you, many treatments are available to help ease symptoms. Discuss your treatment options with your healthcare provider.   Treatments for your skin  There are many types of medical medicines that can treat the affected skin lesions. Your healthcare provider may prescribe one of many types of medicines that are put on the skin. These include moisturizers, steroids, types of vitamin D, medicines made from vitamin A (retinoids), and other non-steroid medicines. You put them on your skin on a regular basis. Coal tar is a thick black liquid. You may put it on thick plaques. In some cases, your skin may be exposed to a special light in the healthcare provider's office. Or you can expose the psoriatic plaques to short periods (5 minutes) of natural sun as directed by your healthcare provider. This method is called phototherapy. It can be helped with a type of medicine called psoralen.   Treatments by mouth or by shot  Internal treatments are taken by mouth or given by shot (injection). A number of new oral and injectable medicines can treat severe psoriasis. Your healthcare provider can tell you more about these treatments.   [employer] last reviewed this educational content on 8/1/2019 2000-2021 [employer]. All rights reserved. This information is not intended as a substitute for professional medical care. Always follow your healthcare professional's instructions.

## 2022-04-08 NOTE — PROGRESS NOTES
Assessment & Plan     Psoriasis  Patient has small areas of psoriasis on elbows.  The remainder of her skin is normal.  Treating with kenalog cream twice daily. Handouts given on understanding psoriasis and managing symptoms.  Follow-up as needed if any worsening symptoms and no improvement despite treatment.  - triamcinolone (KENALOG) 0.1 % external cream; Apply topically 2 times daily    See Patient Instructions    Return if symptoms worsen or fail to improve.    Lashaun Blandon NP  [facility]    Franklyn Cristobal is a 56 year old who presents for the following health issues     History of Present Illness       Reason for visit:  Rash on right elbow  Symptom onset:  More than a month  Symptoms include:  Scaly skin and rash  Symptom intensity:  Moderate  Symptom progression:  Worsening  Had these symptoms before:  No  What makes it worse:  Clothing or bedding brushing against it  What makes it better:  Anti itch creams work a little    She eats 2-3 servings of fruits and vegetables daily.She consumes 0 sweetened beverage(s) daily.She exercises with enough effort to increase her heart rate 20 to 29 minutes per day.  She exercises with enough effort to increase her heart rate 3 or less days per week.   She is taking medications regularly.     It is itchy so she is using benadryl cream.  When it was raised and got red and then burned.    Review of Systems   CONSTITUTIONAL: NEGATIVE for fever, chills, change in weight  INTEGUMENTARY/SKIN: POSITIVE for scaling bilateral elbows worse ton the right  RESP: NEGATIVE for significant cough or SOB  CV: NEGATIVE for chest pain, palpitations or peripheral edema  PSYCHIATRIC: NEGATIVE for changes in mood or affect  ROS otherwise negative      Objective    /64 (BP Location: Right arm, Patient Position: Chair, Cuff Size: Adult Large)   Pulse 72   Temp 97.4  F (36.3  C) (Tympanic)   Resp 16   Wt 88 kg (194 lb)   LMP 01/29/2016    Breastfeeding No   BMI 32.79 kg/m    Body mass index is 32.79 kg/m .  Physical Exam   GENERAL: healthy, alert and no distress  SKIN: plaque - large area on right lower elbow with scaling; left elbow has a couple small scale bumps  PSYCH: mentation appears normal, affect normal/bright

## 2022-04-08 NOTE — NURSING NOTE
"Chief Complaint   Patient presents with     Derm Problem       Initial /64 (BP Location: Right arm, Patient Position: Chair, Cuff Size: Adult Large)   Pulse 72   Temp 97.4  F (36.3  C) (Tympanic)   Resp 16   Wt 88 kg (194 lb)   LMP 01/29/2016   Breastfeeding No   BMI 32.79 kg/m   Estimated body mass index is 32.79 kg/m  as calculated from the following:    Height as of 12/14/21: 1.638 m (5' 4.5\").    Weight as of this encounter: 88 kg (194 lb).    Patient presents to the clinic using No DME    Health Maintenance that is potentially due pending provider review:  Declines any immunizations    Pt declines to have .    Is there anyone who you would like to be able to receive your results? No  If yes have patient fill out WILLY  Marie Oropeza CMA    "

## 2022-04-12 ENCOUNTER — OFFICE VISIT (OUTPATIENT)
Dept: SURGERY | Facility: CLINIC | Age: 57
End: 2022-04-12
Payer: COMMERCIAL

## 2022-04-12 VITALS
DIASTOLIC BLOOD PRESSURE: 79 MMHG | HEART RATE: 90 BPM | OXYGEN SATURATION: 98 % | HEIGHT: 65 IN | BODY MASS INDEX: 32.24 KG/M2 | SYSTOLIC BLOOD PRESSURE: 124 MMHG | WEIGHT: 193.5 LBS

## 2022-04-12 DIAGNOSIS — C21.0 ANAL SQUAMOUS CELL CARCINOMA (H): ICD-10-CM

## 2022-04-12 DIAGNOSIS — R15.2 INCONTINENCE OF FECES WITH FECAL URGENCY: Primary | ICD-10-CM

## 2022-04-12 DIAGNOSIS — R15.9 INCONTINENCE OF FECES WITH FECAL URGENCY: Primary | ICD-10-CM

## 2022-04-12 PROCEDURE — 46600 DIAGNOSTIC ANOSCOPY SPX: CPT | Performed by: COLON & RECTAL SURGERY

## 2022-04-12 ASSESSMENT — PAIN SCALES - GENERAL: PAINLEVEL: NO PAIN (0)

## 2022-04-12 NOTE — LETTER
2022       RE: Susu Lagos  [address]  [facility] [address]     Dear Colleague,    Thank you for referring your patient, Susu Lagos, to the [facility] COLON AND RECTAL SURGERY CLINIC [place] at [facility]. Please see a copy of my visit note below.    Colon and Rectal Surgery Clinic Note      RE: Susu Lagos  : 1965  MALGORZATA: 2022    Susu Lagos is a very pleasant 56 year old female with history of anal cancer s/p chemoradiation, which she completed 20. She has seen Letitia Arevalo NP in clinic for anal fissure with a superficial ulceration in the anterior anal canal that failed to heal. Biopsy of this site on 21 was normal.     She continues to follow with Dr. Remy of radiation oncology and saw him last on 21. He recommended pelvic floor therapy.     She was originally following with Dr Lakhani for medical oncology but he retired. She saw Dr. Moise on 21, is waiting to establish with a new medical oncologist.    I saw her last on 21 with no evidence of recurrence. At that time she had fecal incontinence, recommended twice daily fiber supplement and cholestyramine.    MR Pelvis 21:  IMPRESSION: Postradiation changes of the anus and distal rectum  without evidence for recurrent tumor. No pelvic lymphadenopathy.    Pet CT 21:  1. Posttreatment changes to the anus with unchanged size of the  masslike thickening compared to 2020. Overall uptake has  increased slightly in distribution, focally greatest in the posterior  aspect of the tissue thickening. This uptake is indeterminant. Given  stability in size, may represent infection/inflammation in the setting  of known anal fissure. Differential diagnosis includes delayed  radiation changes, or possibly residual disease. If repeat biopsy is a  consideration, recommend targeting the posterior tissue that is most  avid.  2.  "No evidence for metastasis in the chest, abdomen, or pelvis.  3. Diffused hepatic steatosis.    Interval History: Since her last follow up, she continues to have some fecal incontinence but it is improved. She describes mostly urgency and accidents are rare. She occasionally has blood mixed in with her stool. No pain in her rectum and she hasn't felt any lesions. Stools are on the looser side, has been taking metamucil. Last colonoscopy was 2 years ago.      Physical examination:  Examination was chaperoned by Silvina Johnson, Medical student.    Vitals: /79 (BP Location: Left arm, Patient Position: Sitting, Cuff Size: Adult Regular)   Pulse 90   Ht 5' 4.5\"   Wt 193 lb 8 oz   LMP 01/29/2016   SpO2 98%   BMI 32.70 kg/m    BMI= Body mass index is 32.7 kg/m .    No palpable inguinal adenopathy.  Perianal examination shows skin tags but no other lesions. Digital rectal examination with no palpable lesions. Anoscopy without any visible lesions and some internal hemorrhoids.    Laboratory data:    Recent Labs   Lab Test 07/27/21  0859   WBC 5.6   HGB 14.4      CR 0.79   ALBUMIN 3.8   BILITOTAL 0.3   ALKPHOS 56   *   AST 42   INR 0.87       Assessment/plan:    Anal cancer s/p chemoradiation completed 5/18/20. No evidence of recurrence on exam today. Recommended continuing on twice daily fiber supplement for fecal urgency. If fecal incontinence worsens could consider sacral nerve stimulator. Anal fissure appears to have resolved. Would like to see her in follow up in 4 months to check for recurrence. Patient's questions were answered to her stated satisfaction and she is in agreement with this plan.    For details of past medical history, surgical history, family history, medications, allergies, and review of systems, please see details below.    Medical history:  Past Medical History:   Diagnosis Date     Menorrhagia with irregular cycle 2/28/2016     Ovarian cyst 6/4/2015    Right, 6 cm dermoid on " MRI-recommend removal.  7/29/2015 Surgery for right salpingoophorectomy.       Perimenopause 6/4/2015     Rectal cancer (H)     had radiation and chemo 2020       Surgical history:  Past Surgical History:   Procedure Laterality Date     CHOLECYSTECTOMY  2013    LSC     COLONOSCOPY N/A 11/13/2018    Procedure: colonoscopy;  Surgeon: Sesar Dos Santos MD;  Location: [facility]     COLONOSCOPY N/A 3/3/2020    Procedure: COLONOSCOPY, WITH POLYPECTOMY AND BIOPSY;  Surgeon: Sesar Dos Santos MD;  Location: [facility]     DILATION AND CURETTAGE, OPERATIVE HYSTEROSCOPY, COMBINED N/A 2/29/2016    Procedure: COMBINED DILATION AND CURETTAGE, OPERATIVE HYSTEROSCOPY;  Surgeon: Panda Knight MD;  Location: [facility]     ESOPHAGOSCOPY, GASTROSCOPY, DUODENOSCOPY (EGD), COMBINED N/A 9/3/2020    Procedure: ESOPHAGOGASTRODUODENOSCOPY (EGD);  Surgeon: Karlos Burnett MD;  Location: [facility]     GYN SURGERY  1995    tubes tied     LAPAROSCOPY DIAGNOSTIC (GYN) Right 7/29/2015    Procedure: LAPAROSCOPY DIAGNOSTIC (GYN);  Surgeon: Lian Betancur DO;  Location: [facility]       Family history:  Family History   Problem Relation Age of Onset     Hypertension Father      Myocardial Infarction Maternal Grandfather      Melanoma No family hx of        Medications:  Current Outpatient Medications   Medication Sig Dispense Refill     estradiol (ESTRACE) 0.1 MG/GM vaginal cream Topically 3x/week       Hydrophilic ointment        Magnesium Oxide 250 MG TABS Take 2 tablets by mouth 2 times daily       Multiple Vitamin (MULTI-VITAMINS PO) Take 1 tablet by mouth daily        Omega-3 Fatty Acids (OMEGA 3 PO) Take 1 capsule by mouth daily        triamcinolone (KENALOG) 0.1 % external cream Apply topically 2 times daily 30 g 3     cholestyramine (QUESTRAN) 4 g packet Take 1 packet (4 g) by mouth 2 times daily (with meals) (Patient not taking: No sig reported) 60 packet 3       Allergies:  The patientis allergic to rabies vaccine, shellfish-derived products,  "and sulfa drugs.    Social history:  Social History     Tobacco Use     Smoking status: Never Smoker     Smokeless tobacco: Never Used   Substance Use Topics     Alcohol use: Yes     Alcohol/week: 0.0 standard drinks     Comment: 1-2 per month      Marital status: .    Review of Systems:  Nursing Notes:   Rosalva Croft  4/12/2022 10:00 AM  Signed  Chief Complaint   Patient presents with     Follow Up     Rectal exam, anoscopy for Hx of anal cancer       Vitals:    04/12/22 0957   BP: 124/79   BP Location: Left arm   Patient Position: Sitting   Cuff Size: Adult Regular   Pulse: 90   SpO2: 98%   Weight: 193 lb 8 oz   Height: 5' 4.5\"       Body mass index is 32.7 kg/m .    MARAL Lackey   Medical Student  [employer] [facility]    I saw and examined the patient, led the discussion and edited the resident note.  I agree with the assessment and plan as outlined.  In brief, Susu is now 2 years out from her chemoradiation.  There is no evidence of local recurrence.  Her main complaint is fecal urgency with rare loss of stool.  Her stool consistency remains loose despite twice daily Metamucil and she will try Metamucil wafers midday to try to increase her bulking.  She can also try some Imodium to reduce her urgency.  She should follow-up for an exam in clinic in 3 to 4 months.  I answered all of Lizzeth's questions to her stated satisfaction.  She expressed her understanding and agreement.        20 minutes spent on the date of the encounter doing chart review, history and exam, documentation, review of imaging, and further activities as noted above.     Stefano Banuelos MD   Professor and Chief  Division of Colon and Rectal Surgery  [facility]      Referring Provider:  Referred Self, MD  No address on file     Primary Care Provider:  Lakesha Aguirre    This note was created using speech recognition software and may contain unintended word " substitutions.

## 2022-04-12 NOTE — NURSING NOTE
"Chief Complaint   Patient presents with     Follow Up     Rectal exam, anoscopy for Hx of anal cancer       Vitals:    04/12/22 0957   BP: 124/79   BP Location: Left arm   Patient Position: Sitting   Cuff Size: Adult Regular   Pulse: 90   SpO2: 98%   Weight: 193 lb 8 oz   Height: 5' 4.5\"       Body mass index is 32.7 kg/m .    Rosalva Croft CMA    "

## 2022-05-18 ENCOUNTER — HOSPITAL ENCOUNTER (OUTPATIENT)
Dept: PHYSICAL THERAPY | Facility: CLINIC | Age: 57
Setting detail: THERAPIES SERIES
Discharge: HOME | End: 2022-05-18
Attending: RADIOLOGY
Payer: COMMERCIAL

## 2022-05-18 PROCEDURE — 97110 THERAPEUTIC EXERCISES: CPT | Mod: GP,59 | Performed by: PHYSICAL THERAPIST

## 2022-05-18 PROCEDURE — 90912 BFB TRAINING 1ST 15 MIN: CPT | Mod: GP | Performed by: PHYSICAL THERAPIST

## 2022-05-18 NOTE — PROGRESS NOTES
RECERTIFICATION    Susu Lagos  1965    Session Number: 6/365 HP w cert since start of care.      Diagnosis: PFM weakness s/p radiation  Onset Date: 20  Start of Care Date: 21    Reasons for Continuing Treatment:   Unable to get in to therapy     Frequency/Duration  1 times per week for 1 weeks for a total of 1 visits.    Recertification Period  22 - 22    Physician Signature:    Date:    X_______________________________________________________    Physician Name: Tyree Remy MD    I certify the need for these services furnished under this plan of treatment and while under my care. Physician co-signature of this document indicates review and certification of the therapy plan.  This signature may be written on paper, or electronically signed within [facility]    Outpatient Physical Therapy Discharge Note  Patient: Susu Lagos  : 1965    Beginning/End Dates of Reporting Period:  3/17/22 to 22    Referring Provider: Tyree Remy MD     Therapy Diagnosis: PFM weakness s/p radiation     Client Self Report: Pt relates things have been going better. Pt relates able to wait longer but time varies. Pt relates had 1 leak since last visit.    Objective Measurements:  Objective Measure: leaks  Details: 1 fecal incontinecne episode    Objective Measure: Pelvic Exam  Details: P: 4/5, E: 4-5 secs: reps: 10    Objective Measure: L hip ROM  Details: NT         Goals:  Goal Identifier Tolieting strategieis   Goal Description Pt will relate improved emptying of bowel maranda defication techniques and relate straining less than 25% of the time.    Target Date 22   Date Met      Progress (detail required for progress note): 1-2 times per week     Goal Identifier bladder   Goal Description Pt will relate leaking less than 1/5 times with a cough/physical  activity  to reduce stress incontinence   Target Date 01/20/22   Date Met  01/27/22   Progress (detail required for progress note):  Goal met     Goal Identifier bowel   Goal Description Pt will relate less than 1 episode of bowel incontinecne per week to demonstrate improved PFM strength.    Target Date 02/17/22   Date Met  05/18/22   Progress (detail required for progress note): 1 fecal      Goal Identifier HEP   Goal Description Pt will be ind in HEP to prevent return of symptoms,    Target Date 02/17/22   Date Met  05/18/22   Progress (detail required for progress note):  Goal met       Pt has been seen for 6 visits over this POC. In that time, Pt relates increased ability to delay bowel movements and improved strength. Pt does take fiber supplements to help manage constipation but relates able to manage well on current routine. Pt does have occasional fecal incontinence and demonstrates poor endurance but able to continue to improve strength at home with HEP thus is appropriate to discharge at this time.         Plan:  Discharge from therapy.    Discharge:    Reason for Discharge: Patient has met all goals.  No further expectation of progress.    Equipment Issued: NA    Discharge Plan: Patient to continue home program.

## 2022-05-24 ENCOUNTER — LAB (OUTPATIENT)
Dept: LAB | Facility: CLINIC | Age: 57
End: 2022-05-24
Payer: COMMERCIAL

## 2022-05-24 DIAGNOSIS — C21.0 ANAL SQUAMOUS CELL CARCINOMA (H): ICD-10-CM

## 2022-05-24 LAB
ALBUMIN SERPL-MCNC: 3.9 G/DL (ref 3.4–5)
ALP SERPL-CCNC: 53 U/L (ref 40–150)
ALT SERPL W P-5'-P-CCNC: 76 U/L (ref 0–50)
ANION GAP SERPL CALCULATED.3IONS-SCNC: 5 MMOL/L (ref 3–14)
AST SERPL W P-5'-P-CCNC: 31 U/L (ref 0–45)
BILIRUB SERPL-MCNC: 0.3 MG/DL (ref 0.2–1.3)
BUN SERPL-MCNC: 22 MG/DL (ref 7–30)
CALCIUM SERPL-MCNC: 9.4 MG/DL (ref 8.5–10.1)
CHLORIDE BLD-SCNC: 106 MMOL/L (ref 94–109)
CO2 SERPL-SCNC: 32 MMOL/L (ref 20–32)
CREAT SERPL-MCNC: 0.81 MG/DL (ref 0.52–1.04)
ERYTHROCYTE [DISTWIDTH] IN BLOOD BY AUTOMATED COUNT: 11.5 % (ref 10–15)
GFR SERPL CREATININE-BSD FRML MDRD: 86 ML/MIN/1.73M2
GLUCOSE BLD-MCNC: 98 MG/DL (ref 70–99)
HCT VFR BLD AUTO: 41.3 % (ref 35–47)
HGB BLD-MCNC: 13.8 G/DL (ref 11.7–15.7)
MCH RBC QN AUTO: 32 PG (ref 26.5–33)
MCHC RBC AUTO-ENTMCNC: 33.4 G/DL (ref 31.5–36.5)
MCV RBC AUTO: 96 FL (ref 78–100)
PLATELET # BLD AUTO: 208 10E3/UL (ref 150–450)
POTASSIUM BLD-SCNC: 4 MMOL/L (ref 3.4–5.3)
PROT SERPL-MCNC: 7.3 G/DL (ref 6.8–8.8)
RBC # BLD AUTO: 4.31 10E6/UL (ref 3.8–5.2)
SODIUM SERPL-SCNC: 143 MMOL/L (ref 133–144)
WBC # BLD AUTO: 5.6 10E3/UL (ref 4–11)

## 2022-05-24 PROCEDURE — 80053 COMPREHEN METABOLIC PANEL: CPT

## 2022-05-24 PROCEDURE — 36415 COLL VENOUS BLD VENIPUNCTURE: CPT

## 2022-05-24 PROCEDURE — 85027 COMPLETE CBC AUTOMATED: CPT

## 2022-06-01 ENCOUNTER — ONCOLOGY VISIT (OUTPATIENT)
Dept: ONCOLOGY | Facility: CLINIC | Age: 57
End: 2022-06-01
Attending: INTERNAL MEDICINE
Payer: COMMERCIAL

## 2022-06-01 VITALS
WEIGHT: 197 LBS | HEART RATE: 70 BPM | BODY MASS INDEX: 33.29 KG/M2 | SYSTOLIC BLOOD PRESSURE: 140 MMHG | DIASTOLIC BLOOD PRESSURE: 95 MMHG | TEMPERATURE: 98 F | OXYGEN SATURATION: 97 %

## 2022-06-01 DIAGNOSIS — R74.01 ELEVATED ALANINE AMINOTRANSFERASE (ALT) LEVEL: ICD-10-CM

## 2022-06-01 DIAGNOSIS — C21.0 ANAL SQUAMOUS CELL CARCINOMA (H): Primary | ICD-10-CM

## 2022-06-01 DIAGNOSIS — K76.0 FATTY LIVER: ICD-10-CM

## 2022-06-01 PROCEDURE — G0463 HOSPITAL OUTPT CLINIC VISIT: HCPCS

## 2022-06-01 PROCEDURE — 99214 OFFICE O/P EST MOD 30 MIN: CPT | Performed by: INTERNAL MEDICINE

## 2022-06-01 ASSESSMENT — PAIN SCALES - GENERAL: PAINLEVEL: MILD PAIN (2)

## 2022-06-01 NOTE — PROGRESS NOTES
The patient is being seen for the following issue/s:  1. Anal squamous cell carcinoma (H)    2. Elevated alanine aminotransferase (ALT) level    3. Fatty liver      Susu Lagos is a very pleasant 56 year old female with history of anal cancer s/p 5-FU/mitomycin based definitive chemoradiation, which she completed 5/18/20.     According to the patient, it sounds like her anal cancer was an incidental finding when she had a colonoscopy for rectal bleeding due to hemorrhoids and they found a small tumor in the anal canal when they were removing the colonoscope.    She has seen Letitia Arevalo NP in clinic for anal fissure with a superficial ulceration in the anterior anal canal that failed to heal. Biopsy of this site on 8/9/21 was normal.     She continues to follow with Dr. Remy of radiation oncology and saw him last on 12/16/21. He recommended pelvic floor therapy.     PET CT 9/23/21: Posttreatment changes to the anus with unchanged size of the masslike thickening compared to 7/28/2020. Overall uptake has increased slightly in distribution, focally greatest in the posterior aspect of the tissue thickening. This uptake is indeterminant. Given stability in size, may represent infection/inflammation in the setting of known anal fissure. Differential diagnosis includes delayed radiation changes, or possibly residual disease. If repeat biopsy is a consideration, recommend targeting the posterior tissue that is most avid. No evidence for metastasis in the chest, abdomen, or pelvis. Diffused hepatic steatosis.    MR Pelvis 9/30/21 ordered by Dr. Banuelos from surgery reported postradiation changes of the anus and distal rectum.    Dr. Banuelos saw her last on 4/12/2022. At that time she had fecal incontinence, recommended twice daily fiber supplement and as needed Imodium.    She was originally following with Dr Real in medical oncology. She saw Dr. Moise on 11/1/21.     She has not had any further  changes in her bowel habits.  She denies any significant bleeding from her hemorrhoids at this time. He has been dieting to try to lose some weight and recently lost and then gained some weight back.    PHYSICAL EXAMINATION:  BP (!) 140/95 (BP Location: Right arm, Patient Position: Sitting, Cuff Size: Adult Regular)   Pulse 70   Temp 98  F (36.7  C) (Tympanic)   Wt 89.4 kg (197 lb)   LMP 01/29/2016   SpO2 97%   BMI 33.29 kg/m    General: Todays' vital signs reviewed in the EMR.    ECOG PS is 1  HEENT: No scleral icterus  Cardiovascular: Cor RRR  Respiratory: Lungs clear to auscultation bilaterally  Gastrointestinal: No abdominal tenderness, no palpable hepatosplenomegaly lymphatic: No palpable cervical, supraclavicular, infraclavicular, axillary, or inguinal lymphadenopathy    ASSESSMENT/PLAN:    1. Anal squamous cell carcinoma (H)    2. Elevated alanine aminotransferase (ALT) level    3. Fatty liver      Susu aLgos is a very pleasant 56 year old female with history of anal cancer s/p chemoradiation, which she completed 5/18/20. She has been in complete remission since then.     She continues to follow with Dr. Remy every 6 months and surgery in [facility] every 6 months.  She just saw Dr. Banuelos and there was no evidence of local recurrence.     I will defer further imaging surveillance to Dr. Remy from radiation oncology and the surgery team.      CBC and CMP from May 24, 2022 were unremarkable for mildly elevated ALT (76) due to her known fatty liver.    She may return for follow-up with me in 6-12 months. I think checking a CBC before future office visits would suffice for lab work.     I spent a total of 32 minutes on the care of this patient on the day of service including face to face time and the remainder in chart review, care coordination, and documentation on the day of service.

## 2022-06-01 NOTE — LETTER
6/1/2022         RE: Susu Lagos  [address]  [facility] [address]        Dear Colleague,    Thank you for referring your patient, Susu Lagos, to the [facility]. Please see a copy of my visit note below.    The patient is being seen for the following issue/s:  1. Anal squamous cell carcinoma (H)    2. Elevated alanine aminotransferase (ALT) level    3. Fatty liver      Susu Lagos is a very pleasant 56 year old female with history of anal cancer s/p 5-FU/mitomycin based definitive chemoradiation, which she completed 5/18/20.     According to the patient, it sounds like her anal cancer was an incidental finding when she had a colonoscopy for rectal bleeding due to hemorrhoids and they found a small tumor in the anal canal when they were removing the colonoscope.    She has seen Letitia Arevalo NP in clinic for anal fissure with a superficial ulceration in the anterior anal canal that failed to heal. Biopsy of this site on 8/9/21 was normal.     She continues to follow with Dr. Remy of radiation oncology and saw him last on 12/16/21. He recommended pelvic floor therapy.     PET CT 9/23/21: Posttreatment changes to the anus with unchanged size of the masslike thickening compared to 7/28/2020. Overall uptake has increased slightly in distribution, focally greatest in the posterior aspect of the tissue thickening. This uptake is indeterminant. Given stability in size, may represent infection/inflammation in the setting of known anal fissure. Differential diagnosis includes delayed radiation changes, or possibly residual disease. If repeat biopsy is a consideration, recommend targeting the posterior tissue that is most avid. No evidence for metastasis in the chest, abdomen, or pelvis. Diffused hepatic steatosis.    MR Pelvis 9/30/21 ordered by Dr. Banuelos from surgery reported postradiation changes of the anus and distal rectum.    Dr. Banuelos saw her last on  4/12/2022. At that time she had fecal incontinence, recommended twice daily fiber supplement and as needed Imodium.    She was originally following with Dr Real in medical oncology. She saw Dr. Moise on 11/1/21.     She has not had any further changes in her bowel habits.  She denies any significant bleeding from her hemorrhoids at this time. He has been dieting to try to lose some weight and recently lost and then gained some weight back.    PHYSICAL EXAMINATION:  BP (!) 140/95 (BP Location: Right arm, Patient Position: Sitting, Cuff Size: Adult Regular)   Pulse 70   Temp 98  F (36.7  C) (Tympanic)   Wt 89.4 kg (197 lb)   LMP 01/29/2016   SpO2 97%   BMI 33.29 kg/m    General: Todays' vital signs reviewed in the EMR.    ECOG PS is 1  HEENT: No scleral icterus  Cardiovascular: Cor RRR  Respiratory: Lungs clear to auscultation bilaterally  Gastrointestinal: No abdominal tenderness, no palpable hepatosplenomegaly lymphatic: No palpable cervical, supraclavicular, infraclavicular, axillary, or inguinal lymphadenopathy    ASSESSMENT/PLAN:    1. Anal squamous cell carcinoma (H)    2. Elevated alanine aminotransferase (ALT) level    3. Fatty liver      Susu Lagos is a very pleasant 56 year old female with history of anal cancer s/p chemoradiation, which she completed 5/18/20. She has been in complete remission since then.     She continues to follow with Dr. Remy every 6 months and surgery in [facility] every 6 months.  She just saw Dr. Banuelos and there was no evidence of local recurrence.     I will defer further imaging surveillance to Dr. Remy from radiation oncology and the surgery team.      CBC and CMP from May 24, 2022 were unremarkable for mildly elevated ALT (76) due to her known fatty liver.    She may return for follow-up with me in 6-12 months. I think checking a CBC before future office visits would suffice for lab work.     I spent a total of 32 minutes on the care of this patient  "on the day of service including face to face time and the remainder in chart review, care coordination, and documentation on the day of service.          Oncology Rooming Note    June 1, 2022 3:06 PM   Susu Lagos is a 56 year old female who presents for:    Chief Complaint   Patient presents with     Oncology Clinic Visit     Anal squamous cell carcinoma - Provider visit only     Initial Vitals: BP (!) 140/95 (BP Location: Right arm, Patient Position: Sitting, Cuff Size: Adult Regular)   Pulse 70   Temp 98  F (36.7  C) (Tympanic)   Wt 89.4 kg (197 lb)   LMP 01/29/2016   SpO2 97%   BMI 33.29 kg/m   Estimated body mass index is 33.29 kg/m  as calculated from the following:    Height as of 4/12/22: 1.638 m (5' 4.5\").    Weight as of this encounter: 89.4 kg (197 lb). Body surface area is 2.02 meters squared.  Mild Pain (2) Comment: Data Unavailable   Patient's last menstrual period was 01/29/2016.  Allergies reviewed: Yes  Medications reviewed: Yes    Medications: Medication refills not needed today.  Pharmacy name entered into [employer]:    [facility] [place], [place] - [address] [place] [address] [facility]ACY #[id number] - [place], [place] - [address] [facility] - [place], [place] - [address]    Clinical concerns:  None      Sharron Diaomnd CMA                Again, thank you for allowing me to participate in the care of your patient.        Sincerely,        Brooks Saldivar MD    "

## 2022-06-01 NOTE — PROGRESS NOTES
"Oncology Rooming Note    June 1, 2022 3:06 PM   Susu Lagos is a 56 year old female who presents for:    Chief Complaint   Patient presents with     Oncology Clinic Visit     Anal squamous cell carcinoma - Provider visit only     Initial Vitals: BP (!) 140/95 (BP Location: Right arm, Patient Position: Sitting, Cuff Size: Adult Regular)   Pulse 70   Temp 98  F (36.7  C) (Tympanic)   Wt 89.4 kg (197 lb)   LMP 01/29/2016   SpO2 97%   BMI 33.29 kg/m   Estimated body mass index is 33.29 kg/m  as calculated from the following:    Height as of 4/12/22: 1.638 m (5' 4.5\").    Weight as of this encounter: 89.4 kg (197 lb). Body surface area is 2.02 meters squared.  Mild Pain (2) Comment: Data Unavailable   Patient's last menstrual period was 01/29/2016.  Allergies reviewed: Yes  Medications reviewed: Yes    Medications: Medication refills not needed today.  Pharmacy name entered into [facility]:    [facility] [place], [place] - [address] [place] [address] [facility]ACY #[id number] - [place], [place] - [address] [facility] - [place], [place] - [address]    Clinical concerns:  None      Sharron Diamond CMA            "

## 2022-06-01 NOTE — PATIENT INSTRUCTIONS
You may return for follow-up with me in 6-12 months. I think checking a CBC before future office visits would suffice for lab work.

## 2022-06-16 ENCOUNTER — OFFICE VISIT (OUTPATIENT)
Dept: RADIATION THERAPY | Facility: OUTPATIENT CENTER | Age: 57
End: 2022-06-16
Payer: COMMERCIAL

## 2022-06-16 VITALS
WEIGHT: 198.6 LBS | SYSTOLIC BLOOD PRESSURE: 114 MMHG | DIASTOLIC BLOOD PRESSURE: 79 MMHG | RESPIRATION RATE: 16 BRPM | HEART RATE: 82 BPM | OXYGEN SATURATION: 94 % | BODY MASS INDEX: 33.56 KG/M2

## 2022-06-16 DIAGNOSIS — C21.0 ANAL SQUAMOUS CELL CARCINOMA (H): Primary | ICD-10-CM

## 2022-06-16 ASSESSMENT — PAIN SCALES - GENERAL: PAINLEVEL: NO PAIN (0)

## 2022-06-16 NOTE — PROGRESS NOTES
Department of Radiation Oncology  Radiation Therapy Center  [facility] [place] [address]  [phone]       Radiation Oncology Follow-up Visit  2022    Susu Lagos  MRN: [id number]   : 1965     DIAGNOSIS: squamous cell carcinoma of the anal canal  PATHOLOGY:  squamous cell carcinoma                              STAGE: clinical T1N0  INTENT OF RADIOTHERAPY: definitve CRT  CONCURRENT SYSTEMIC THERAPY: Yes  Oncologist: Dr. Real  Drug Name/Frequency 1: 5FU  Drug Name/Frequency 1: Mitomycin     ONCOLOGIC HISTORY:   Ms. Lagos is a 56 year old female a diagnosis of squamous cell carcinoma of the anal canal, clinical T1N0.       The patient initially presented with symptoms of hematochezia for several months eventually prompting further evaluation.     On 3/3/2020 the patient underwent colonoscopy which demonstrated a mass located at the dentate line in the left anterior position.  The mass was less than one third circumferentially involved.  Biopsy of the mass obtained demonstrated squamous cell carcinoma.  The patient was evaluated by gynecology on 3/13/2020, Pap smear was negative.  On 3/19/2020 the patient underwent a PET scan.  Imaging demonstrated a focal hypermetabolic activity in the anal canal, max SUV 12.6.  No regional or distant disease was noted.  On 3/19/2020 the patient underwent MRI which demonstrated a 1.2 x 0.9 cm mass located 2.7 cm above the anal verge.  No enlarged regional nodes were noted.  The patient was seen by Dr. Real of medical oncology who discussed treatment with chemoradiation therapy.Patient subsequently presented to radiation oncology clinic to discuss potential role of radiation therapy as a part of treatment strategy.            SITE OF TREATMENT: pelvis     DATES  OF TREATMENT: 20-20     TOTAL DOSE OF TREATMENT: 5040 cGy     DOSE PER FRACTION OF TREATMENT: 180 cGy x 28 fractions    INTERVAL SINCE COMPLETION OF  RADIATION THERAPY:   ~2 years    SUBJECTIVE:   The patient presents for follow up.     Post treatment MRI (20) was JOSE LUIS. PET on 20 demonstrated excellent response with indeterminate but mild activity in anal canal region without clear evidence of disease. Most recent PET (21) and MRI pelvis (21) remained without any clear evidence of disease. Scope and exam by Dr. Banuelos's team on 22 was JOSE LUIS.     Patient is overall doing well. She has intermittent bouts of bowel urgency at times. These episodes are less frequent with pelvic floor rehab therapy. Uses imodium PRN. Has very intermittent bleeding at times. Has had previous diagnosis of anal fissure. No  trouble. No lower extremity lymphedema. Using estradiol cream for vaginal dryness.    PHYSICAL EXAM:  /79 (BP Location: Left arm, Cuff Size: Adult Large)   Pulse 82   Resp 16   Wt 90.1 kg (198 lb 9.6 oz)   LMP 2016   SpO2 94%   BMI 33.56 kg/m        LABS AND IMAGIN/921  RECTUM, CANAL, BIOPSY:  Squamous (anal) mucosa with no dysplasia, malignancy, or other significant histologic abnormality (rectal mucosa not represented)       21  PET  IMPRESSION:  In this patient with history of anal carcinoma status  post chemoradiation:     1. Posttreatment changes to the anus with unchanged size of the  masslike thickening compared to 2020. Overall uptake has  increased slightly in distribution, focally greatest in the posterior  aspect of the tissue thickening. This uptake is indeterminant. Given  stability in size, may represent infection/inflammation in the setting  of known anal fissure. Differential diagnosis includes delayed  radiation changes, or possibly residual disease. If repeat biopsy is a  consideration, recommend targeting the posterior tissue that is most  avid.     2. No evidence for metastasis in the chest, abdomen, or pelvis.     3. Diffused hepatic steatosis.    21  MRI pelvis  IMPRESSION:  Postradiation changes of the anus and distal rectum  without evidence for recurrent tumor. No pelvic lymphadenopathy.       IMPRESSION:   Ms. Lagos is a 56 year old female a diagnosis of squamous cell carcinoma of the anal canal, clinical T1N0.  She completed definitive CRT to a total dose of 50.4 Gy in 28 fractions with concurrent 5FU-MMC  (completed on 5/18/20).    PLAN:   1. Post treatment MRI (8/6/20) was JOSE LUIS. PET on 7/28/20 demonstrated excellent response with indeterminate but mild activity in anal canal region without clear evidence of disease. Most recent PET (9/23/21) and MRI pelvis (9/30/21) remained without any clear evidence of disease.  Scope and exam by Dr. Banuelos's team on 4/12/22 was JOSE LUIS.     2. GI bother. Urgency.  Improved with pelvic floor rehab, continue exercises at home. Surgery has discussed possibility of sacral nerve stimulator in the future if needed.     3. The patient will benefit from continued oncologic surveillance from our colo-rectal colleagues. Will see next on 8/4/22. Defer imaging to surgery team.    4. Continue oncologic surveillance with medical oncology team (Dr. Saldivar).     5. RTC in 6 months.      Tyree Remy M.D.  Department of Radiation Oncology  [facility]

## 2022-06-16 NOTE — LETTER
2022         RE: Susu Lagos  [id number]  [facility] [address]        Dear Colleague,    Thank you for referring your patient, Susu Lagos, to the RADIATION THERAPY CENTER. Please see a copy of my visit note below.       Department of Radiation Oncology  Radiation Therapy Center  [facility]  [place] [address]  [phone]       Radiation Oncology Follow-up Visit  2022    Susu Lagos  MRN: [id number]   : 1965     DIAGNOSIS: squamous cell carcinoma of the anal canal  PATHOLOGY:  squamous cell carcinoma                              STAGE: clinical T1N0  INTENT OF RADIOTHERAPY: definitve CRT  CONCURRENT SYSTEMIC THERAPY: Yes  Oncologist: Dr. Real  Drug Name/Frequency 1: 5FU  Drug Name/Frequency 1: Mitomycin     ONCOLOGIC HISTORY:   Ms. Lagos is a 56 year old female a diagnosis of squamous cell carcinoma of the anal canal, clinical T1N0.       The patient initially presented with symptoms of hematochezia for several months eventually prompting further evaluation.     On 3/3/2020 the patient underwent colonoscopy which demonstrated a mass located at the dentate line in the left anterior position.  The mass was less than one third circumferentially involved.  Biopsy of the mass obtained demonstrated squamous cell carcinoma.  The patient was evaluated by gynecology on 3/13/2020, Pap smear was negative.  On 3/19/2020 the patient underwent a PET scan.  Imaging demonstrated a focal hypermetabolic activity in the anal canal, max SUV 12.6.  No regional or distant disease was noted.  On 3/19/2020 the patient underwent MRI which demonstrated a 1.2 x 0.9 cm mass located 2.7 cm above the anal verge.  No enlarged regional nodes were noted.  The patient was seen by Dr. Real of medical oncology who discussed treatment with chemoradiation therapy.Patient subsequently presented to radiation oncology clinic to discuss potential role of radiation therapy as a  part of treatment strategy.            SITE OF TREATMENT: pelvis     DATES  OF TREATMENT: 20-20     TOTAL DOSE OF TREATMENT: 5040 cGy     DOSE PER FRACTION OF TREATMENT: 180 cGy x 28 fractions    INTERVAL SINCE COMPLETION OF RADIATION THERAPY:   ~2 years    SUBJECTIVE:   The patient presents for follow up.     Post treatment MRI (20) was JOSE LUIS. PET on 20 demonstrated excellent response with indeterminate but mild activity in anal canal region without clear evidence of disease. Most recent PET (21) and MRI pelvis (21) remained without any clear evidence of disease. Scope and exam by Dr. Banuelos's team on 22 was JOSE LUIS.     Patient is overall doing well. She has intermittent bouts of bowel urgency at times. These episodes are less frequent with pelvic floor rehab therapy. Uses imodium PRN. Has very intermittent bleeding at times. Has had previous diagnosis of anal fissure. No  trouble. No lower extremity lymphedema. Using estradiol cream for vaginal dryness.    PHYSICAL EXAM:  /79 (BP Location: Left arm, Cuff Size: Adult Large)   Pulse 82   Resp 16   Wt 90.1 kg (198 lb 9.6 oz)   LMP 2016   SpO2 94%   BMI 33.56 kg/m        LABS AND IMAGIN/921  RECTUM, CANAL, BIOPSY:  Squamous (anal) mucosa with no dysplasia, malignancy, or other significant histologic abnormality (rectal mucosa not represented)       21  PET  IMPRESSION:  In this patient with history of anal carcinoma status  post chemoradiation:     1. Posttreatment changes to the anus with unchanged size of the  masslike thickening compared to 2020. Overall uptake has  increased slightly in distribution, focally greatest in the posterior  aspect of the tissue thickening. This uptake is indeterminant. Given  stability in size, may represent infection/inflammation in the setting  of known anal fissure. Differential diagnosis includes delayed  radiation changes, or possibly residual disease. If repeat  biopsy is a  consideration, recommend targeting the posterior tissue that is most  avid.     2. No evidence for metastasis in the chest, abdomen, or pelvis.     3. Diffused hepatic steatosis.    9/30/21  MRI pelvis  IMPRESSION: Postradiation changes of the anus and distal rectum  without evidence for recurrent tumor. No pelvic lymphadenopathy.       IMPRESSION:   Ms. Lagos is a 56 year old female a diagnosis of squamous cell carcinoma of the anal canal, clinical T1N0.  She completed definitive CRT to a total dose of 50.4 Gy in 28 fractions with concurrent 5FU-MMC  (completed on 5/18/20).    PLAN:   1. Post treatment MRI (8/6/20) was JOSE LUIS. PET on 7/28/20 demonstrated excellent response with indeterminate but mild activity in anal canal region without clear evidence of disease. Most recent PET (9/23/21) and MRI pelvis (9/30/21) remained without any clear evidence of disease.  Scope and exam by Dr. Banuelos's team on 4/12/22 was JOSE LUIS.     2. GI bother. Urgency.  Improved with pelvic floor rehab, continue exercises at home. Surgery has discussed possibility of sacral nerve stimulator in the future if needed.     3. The patient will benefit from continued oncologic surveillance from our colo-rectal colleagues. Will see next on 8/4/22. Defer imaging to surgery team.    4. Continue oncologic surveillance with medical oncology team (Dr. Saldivar).     5. RTC in 6 months.      Tyree Remy M.D.  Department of Radiation Oncology  [facility]

## 2022-06-16 NOTE — NURSING NOTE
"Oncology Rooming Note    June 16, 2022 9:03 AM   Susu Lagos is a 56 year old female who presents for:    Chief Complaint   Patient presents with     Radiation Therapy     Return visit, anal cancer     Initial Vitals: /79 (BP Location: Left arm, Cuff Size: Adult Large)   Pulse 82   Resp 16   Wt 90.1 kg (198 lb 9.6 oz)   LMP 01/29/2016   SpO2 94%   BMI 33.56 kg/m   Estimated body mass index is 33.56 kg/m  as calculated from the following:    Height as of 4/12/22: 1.638 m (5' 4.5\").    Weight as of this encounter: 90.1 kg (198 lb 9.6 oz). Body surface area is 2.02 meters squared.  No Pain (0) Comment: Data Unavailable   Patient's last menstrual period was 01/29/2016.  Allergies reviewed: Yes  Medications reviewed: Yes    Medications: Medication refills not needed today.  Pharmacy name entered into [employer]:    [facility] [place], [place] - [address] [place] [address] [facility]AC[id number] - [place], [place] - [address] [facility] - [place], [place] - [facility] [address]    Clinical concerns: follow up today with Dr. Remy     Doing well, no new concerns/complaints     Mild, occasional rectal bleeding, better with fiber and home strengthening exercises (recommended by pelvic floor rehab)     No pain, no gu issues. Using vaginal dilator regularly/prn.      Lauryn Cloud RN              "

## 2022-06-17 ENCOUNTER — PATIENT OUTREACH (OUTPATIENT)
Dept: SURGERY | Facility: CLINIC | Age: 57
End: 2022-06-17
Payer: COMMERCIAL

## 2022-06-17 DIAGNOSIS — K60.2 ANAL FISSURE: Primary | ICD-10-CM

## 2022-06-17 NOTE — PROGRESS NOTES
Susu has a hx of anal cancer and completed chemorads in 2020. Additionally has a hx of an anal fissure. She left me a VM stating that she has noticed rectal bleeding after a bout of diarrhea. Denies rectal pain. Discussed with Letitia Arevalo NP. Plan to restart diltiazem and follow up in 4 weeks to make sure she is improving. Ordered diltiazem and scheduled f/u appt.

## 2022-07-11 ENCOUNTER — OFFICE VISIT (OUTPATIENT)
Dept: SURGERY | Facility: CLINIC | Age: 57
End: 2022-07-11
Payer: COMMERCIAL

## 2022-07-11 VITALS
BODY MASS INDEX: 33.22 KG/M2 | WEIGHT: 199.4 LBS | OXYGEN SATURATION: 96 % | SYSTOLIC BLOOD PRESSURE: 115 MMHG | HEART RATE: 76 BPM | HEIGHT: 65 IN | DIASTOLIC BLOOD PRESSURE: 83 MMHG

## 2022-07-11 DIAGNOSIS — C21.0 ANAL SQUAMOUS CELL CARCINOMA (H): Primary | ICD-10-CM

## 2022-07-11 PROCEDURE — 99213 OFFICE O/P EST LOW 20 MIN: CPT | Performed by: NURSE PRACTITIONER

## 2022-07-11 ASSESSMENT — PAIN SCALES - GENERAL: PAINLEVEL: NO PAIN (0)

## 2022-07-11 NOTE — PROGRESS NOTES
"Colon and Rectal Surgery Follow-Up Clinic Note    RE: Susu Lagos  : 1965  MALGORZATA: 2022    Susu Lagos is a very pleasant 56 year old female with history of anal cancer s/p chemoradiation completed 20 here for follow-up of anal fissure.    Interval history: Susu has been doing well. She reports complete resolution of her rectal pain with the diltiazem. She is due for NCCN anal cancer surveillance guidelines next month and imaging in September.    Physical Examination: Exam was chaperoned by Licha Jones PA-C   /83 (BP Location: Left arm, Patient Position: Sitting, Cuff Size: Adult Large)   Pulse 76   Ht 5' 4.5\"   Wt 199 lb 6.4 oz   LMP 2016   SpO2 96%   BMI 33.70 kg/m    General: alert, oriented, in no acute distress, sitting comfortably  HEENT:moist mucous membranes  No palpable inguinal adenopathy.  Perianal external examination:  Perianal skin: Intact with no excoriation or lichenification. Telangectasia consistent with prior radiation.  Lesions: No evidence of an external lesion, nodularity, or induration in the perianal region.  Eversion of buttocks: There was not evidence of an anal fissure. Details: N/A.  Skin tags or external hemorrhoids: No.    Digital rectal examination: Was performed.   Sphincter tone: Good.  Palpable lesions: No.  Other: None.  Bimanual examination: was not performed.    Anoscopy: Was performed.   Hemorrhoids: No significant internal hemorrhoids. Grade 1-2 internal hemorrhoids.  Lesions: Small, superficial bleeding fissure present in the posterior midline    Assessment/Plan: 56 year old female with history of anal cancer s/p chemoradiation completed 20. No evidence of recurrence on exam today. Her anal fissure has been asymptomatic and I likely opened this back up with anoscopy today. If she continues to be asymptomatic, no further intervention is needed but if pain or bleeding returns, could treat with topical diltiazem again. I " ordered a MRI pelvis and PET for September, and will have her follow up in 4 months to check for recurrence. Patient's questions were answered to her stated satisfaction and she is in agreement with this plan.     Surveillance per NCCN guidelines:                          A. Digital rectal examination every 3-6 months for 5 years.  Next in 4 months                          B. Inguinal node palpation every 3-6 months for 5 years.   Next in 4 months                          C. CT CAP with pelvic MRI annually for 3 years. Next in September 2022    Medical history:  Past Medical History:   Diagnosis Date     Menorrhagia with irregular cycle 2/28/2016     Ovarian cyst 6/4/2015    Right, 6 cm dermoid on MRI-recommend removal.  7/29/2015 Surgery for right salpingoophorectomy.       Perimenopause 6/4/2015     Rectal cancer (H)     had radiation and chemo 2020       Surgical history:  Past Surgical History:   Procedure Laterality Date     CHOLECYSTECTOMY  2013    [facility]     COLONOSCOPY N/A 11/13/2018    Procedure: colonoscopy;  Surgeon: Sesar Dos Santos MD;  Location: [facility]     COLONOSCOPY N/A 3/3/2020    Procedure: COLONOSCOPY, WITH POLYPECTOMY AND BIOPSY;  Surgeon: Sesar Dos Santos MD;  Location: [facility]     DILATION AND CURETTAGE, OPERATIVE HYSTEROSCOPY, COMBINED N/A 2/29/2016    Procedure: COMBINED DILATION AND CURETTAGE, OPERATIVE HYSTEROSCOPY;  Surgeon: Panda Knight MD;  Location: [facility]     ESOPHAGOSCOPY, GASTROSCOPY, DUODENOSCOPY (EGD), COMBINED N/A 9/3/2020    Procedure: ESOPHAGOGASTRODUODENOSCOPY (EGD);  Surgeon: Karlos Burnett MD;  Location: [facility]     GYN SURGERY  1995    tubes tied     LAPAROSCOPY DIAGNOSTIC (GYN) Right 7/29/2015    Procedure: LAPAROSCOPY DIAGNOSTIC (GYN);  Surgeon: Lian Betancur DO;  Location: [facility]       Problem list:  Patient Active Problem List    Diagnosis Date Noted     Anemia with low platelet count (H) 06/02/2021     Priority: Medium     Intractable pain 05/18/2020      Priority: Medium     Cervical cancer screening 03/20/2020     Priority: Medium     2008, 2010, 2011, 2013 All NIL paps.   4/22/15 NIL Pap, Neg HPV. Plan: Cotest in 5 yr       **  3/2020 Dx rectal cancer **  3/13/20 NIL Pap, Neg HPV. TZ absent. Plan: cotest in 3 years per provider and Oncologist.        Anal squamous cell carcinoma (H) 03/03/2020     Priority: Medium     Sent to . Check 7.21 Farhan for f/u [facility]  Susu Lagos presented to the primary care physician with 3 to 4 months history of blood in the stool.  Subsequently the patient was referred for colonoscopy on March 3, 2020.  The palpable rectal mass on digital examination.  Biopsy came back positive for squamous cell carcinoma, focally keratinizing.  The patient has a previous colonoscopy on November 2018 which shows no abnormalities.  The patient was started on chemoradiation regimen with 5-FU and mitomycin.       CARDIOVASCULAR SCREENING; LDL GOAL LESS THAN 160 02/25/2015     Priority: Medium       Medications:  Current Outpatient Medications   Medication Sig Dispense Refill     cholestyramine (QUESTRAN) 4 g packet Take 1 packet (4 g) by mouth 2 times daily (with meals) 60 packet 3     diltiazem 2% in PLO gel To anal opening three times daily.  Use a pea-sized amount.  Store at room temperature. 60 g 3     estradiol (ESTRACE) 0.1 MG/GM vaginal cream Topically 3x/week       Hydrophilic ointment        Magnesium Oxide 250 MG TABS Take 2 tablets by mouth 2 times daily       Multiple Vitamin (MULTI-VITAMINS PO) Take 1 tablet by mouth daily        Omega-3 Fatty Acids (OMEGA 3 PO) Take 1 capsule by mouth daily        triamcinolone (KENALOG) 0.1 % external cream Apply topically 2 times daily 30 g 3       Allergies:  Allergies   Allergen Reactions     Rabies Vaccine Unknown     Scratch test resulted, it was not a good idea to give it to her.     Shellfish-Derived Products Nausea and Vomiting     Sulfa Drugs Rash       Family history:  Family History  "  Problem Relation Age of Onset     Hypertension Father      Myocardial Infarction Maternal Grandfather      Melanoma No family hx of        Social history:  Social History     Tobacco Use     Smoking status: Never Smoker     Smokeless tobacco: Never Used   Substance Use Topics     Alcohol use: Yes     Alcohol/week: 0.0 standard drinks     Comment: 1-2 per month      Marital status: .    Nursing Notes:   Jennifer Ratliff, EMT  7/11/2022  3:48 PM  Signed  Chief Complaint   Patient presents with     RECHECK     Follow up fissure       Vitals:    07/11/22 1545   BP: 115/83   BP Location: Left arm   Patient Position: Sitting   Cuff Size: Adult Large   Pulse: 76   SpO2: 96%   Weight: 199 lb 6.4 oz   Height: 5' 4.5\"       Body mass index is 33.7 kg/m .                          Jennifer Ratliff, EMT      10 minutes spent on the date of encounter (excluding time performing procedures) performing chart review, history and exam, documentation and further activities as noted above with an additional 2 minutes for anoscopy.     JEN HairC  Colon and Rectal Surgery  [facility]    "

## 2022-07-11 NOTE — LETTER
"2022       RE: Susu Lagos  [address]  [facility] [address]     Dear Colleague,    Thank you for referring your patient, Susu Lagos, to the [facility] COLON AND RECTAL SURGERY CLINIC [place] at [facility]. Please see a copy of my visit note below.    Colon and Rectal Surgery Follow-Up Clinic Note    RE: Susu Lagos  : 1965  MALGORZATA: 2022    Susu Lagos is a very pleasant 56 year old female with history of anal cancer s/p chemoradiation completed 20 here for follow-up of anal fissure.    Interval history: Susu has been doing well. She reports complete resolution of her rectal pain with the diltiazem. She is due for NCCN anal cancer surveillance guidelines next month and imaging in September.    Physical Examination: Exam was chaperoned by Licha Jones PA-C   /83 (BP Location: Left arm, Patient Position: Sitting, Cuff Size: Adult Large)   Pulse 76   Ht 5' 4.5\"   Wt 199 lb 6.4 oz   LMP 2016   SpO2 96%   BMI 33.70 kg/m    General: alert, oriented, in no acute distress, sitting comfortably  HEENT:moist mucous membranes  No palpable inguinal adenopathy.  Perianal external examination:  Perianal skin: Intact with no excoriation or lichenification. Telangectasia consistent with prior radiation.  Lesions: No evidence of an external lesion, nodularity, or induration in the perianal region.  Eversion of buttocks: There was not evidence of an anal fissure. Details: N/A.  Skin tags or external hemorrhoids: No.    Digital rectal examination: Was performed.   Sphincter tone: Good.  Palpable lesions: No.  Other: None.  Bimanual examination: was not performed.    Anoscopy: Was performed.   Hemorrhoids: No significant internal hemorrhoids. Grade 1-2 internal hemorrhoids.  Lesions: Small, superficial bleeding fissure present in the posterior midline    Assessment/Plan: 56 year old female with history of anal " cancer s/p chemoradiation completed 5/18/20. No evidence of recurrence on exam today. Her anal fissure has been asymptomatic and I likely opened this back up with anoscopy today. If she continues to be asymptomatic, no further intervention is needed but if pain or bleeding returns, could treat with topical diltiazem again. I ordered a MRI pelvis and PET for September, and will have her follow up in 4 months to check for recurrence. Patient's questions were answered to her stated satisfaction and she is in agreement with this plan.     Surveillance per NCCN guidelines:                          A. Digital rectal examination every 3-6 months for 5 years.  Next in 4 months                          B. Inguinal node palpation every 3-6 months for 5 years.   Next in 4 months                          C. CT CAP with pelvic MRI annually for 3 years. Next in September 2022    Medical history:  Past Medical History:   Diagnosis Date     Menorrhagia with irregular cycle 2/28/2016     Ovarian cyst 6/4/2015    Right, 6 cm dermoid on MRI-recommend removal.  7/29/2015 Surgery for right salpingoophorectomy.       Perimenopause 6/4/2015     Rectal cancer (H)     had radiation and chemo 2020       Surgical history:  Past Surgical History:   Procedure Laterality Date     CHOLECYSTECTOMY  2013    LSC     COLONOSCOPY N/A 11/13/2018    Procedure: colonoscopy;  Surgeon: Sesar Dos Santos MD;  Location: [facility]     COLONOSCOPY N/A 3/3/2020    Procedure: COLONOSCOPY, WITH POLYPECTOMY AND BIOPSY;  Surgeon: Sesar Dos Santos MD;  Location: [facility]     DILATION AND CURETTAGE, OPERATIVE HYSTEROSCOPY, COMBINED N/A 2/29/2016    Procedure: COMBINED DILATION AND CURETTAGE, OPERATIVE HYSTEROSCOPY;  Surgeon: Panda Knight MD;  Location: [facility]     ESOPHAGOSCOPY, GASTROSCOPY, DUODENOSCOPY (EGD), COMBINED N/A 9/3/2020    Procedure: ESOPHAGOGASTRODUODENOSCOPY (EGD);  Surgeon: Karlos Burnett MD;  Location: [facility]     GYN SURGERY  1995    tubes tied      LAPAROSCOPY DIAGNOSTIC (GYN) Right 7/29/2015    Procedure: LAPAROSCOPY DIAGNOSTIC (GYN);  Surgeon: Lian Betancur DO;  Location: [facility]       Problem list:  Patient Active Problem List    Diagnosis Date Noted     Anemia with low platelet count (H) 06/02/2021     Priority: Medium     Intractable pain 05/18/2020     Priority: Medium     Cervical cancer screening 03/20/2020     Priority: Medium     2008, 2010, 2011, 2013 All NIL paps.   4/22/15 NIL Pap, Neg HPV. Plan: Cotest in 5 yr       **  3/2020 Dx rectal cancer **  3/13/20 NIL Pap, Neg HPV. TZ absent. Plan: cotest in 3 years per provider and Oncologist.        Anal squamous cell carcinoma (H) 03/03/2020     Priority: Medium     Sent to . Check 7.21 Farhan for f/u [facility]  Susu Lagos presented to the primary care physician with 3 to 4 months history of blood in the stool.  Subsequently the patient was referred for colonoscopy on March 3, 2020.  The palpable rectal mass on digital examination.  Biopsy came back positive for squamous cell carcinoma, focally keratinizing.  The patient has a previous colonoscopy on November 2018 which shows no abnormalities.  The patient was started on chemoradiation regimen with 5-FU and mitomycin.       CARDIOVASCULAR SCREENING; LDL GOAL LESS THAN 160 02/25/2015     Priority: Medium       Medications:  Current Outpatient Medications   Medication Sig Dispense Refill     cholestyramine (QUESTRAN) 4 g packet Take 1 packet (4 g) by mouth 2 times daily (with meals) 60 packet 3     diltiazem 2% in PLO gel To anal opening three times daily.  Use a pea-sized amount.  Store at room temperature. 60 g 3     estradiol (ESTRACE) 0.1 MG/GM vaginal cream Topically 3x/week       Hydrophilic ointment        Magnesium Oxide 250 MG TABS Take 2 tablets by mouth 2 times daily       Multiple Vitamin (MULTI-VITAMINS PO) Take 1 tablet by mouth daily        Omega-3 Fatty Acids (OMEGA 3 PO) Take 1 capsule by mouth daily        triamcinolone  "(KENALOG) 0.1 % external cream Apply topically 2 times daily 30 g 3       Allergies:  Allergies   Allergen Reactions     Rabies Vaccine Unknown     Scratch test resulted, it was not a good idea to give it to her.     Shellfish-Derived Products Nausea and Vomiting     Sulfa Drugs Rash       Family history:  Family History   Problem Relation Age of Onset     Hypertension Father      Myocardial Infarction Maternal Grandfather      Melanoma No family hx of        Social history:  Social History     Tobacco Use     Smoking status: Never Smoker     Smokeless tobacco: Never Used   Substance Use Topics     Alcohol use: Yes     Alcohol/week: 0.0 standard drinks     Comment: 1-2 per month      Marital status: .    Nursing Notes:   Jennifer Ratliff, EMT  7/11/2022  3:48 PM  Signed  Chief Complaint   Patient presents with     RECHECK     Follow up fissure       Vitals:    07/11/22 1545   BP: 115/83   BP Location: Left arm   Patient Position: Sitting   Cuff Size: Adult Large   Pulse: 76   SpO2: 96%   Weight: 199 lb 6.4 oz   Height: 5' 4.5\"       Body mass index is 33.7 kg/m .                          Jennifer Ratliff, EMT      10 minutes spent on the date of encounter (excluding time performing procedures) performing chart review, history and exam, documentation and further activities as noted above with an additional 2 minutes for anoscopy.       JEN HairC  Colon and Rectal Surgery  [facility]  "

## 2022-07-11 NOTE — NURSING NOTE
"Chief Complaint   Patient presents with     RECHECK     Follow up fissure       Vitals:    07/11/22 1545   BP: 115/83   BP Location: Left arm   Patient Position: Sitting   Cuff Size: Adult Large   Pulse: 76   SpO2: 96%   Weight: 199 lb 6.4 oz   Height: 5' 4.5\"       Body mass index is 33.7 kg/m .                          Jennifer Ratliff, EMT  "

## 2022-08-05 ENCOUNTER — OFFICE VISIT (OUTPATIENT)
Dept: URGENT CARE | Facility: URGENT CARE | Age: 57
End: 2022-08-05
Payer: COMMERCIAL

## 2022-08-05 VITALS
DIASTOLIC BLOOD PRESSURE: 97 MMHG | BODY MASS INDEX: 32.95 KG/M2 | HEART RATE: 77 BPM | SYSTOLIC BLOOD PRESSURE: 136 MMHG | WEIGHT: 195 LBS | OXYGEN SATURATION: 100 % | TEMPERATURE: 98.5 F

## 2022-08-05 DIAGNOSIS — M89.8X1 CLAVICLE PAIN: Primary | ICD-10-CM

## 2022-08-05 PROCEDURE — 99213 OFFICE O/P EST LOW 20 MIN: CPT | Performed by: STUDENT IN AN ORGANIZED HEALTH CARE EDUCATION/TRAINING PROGRAM

## 2022-08-29 ENCOUNTER — OFFICE VISIT (OUTPATIENT)
Dept: DERMATOLOGY | Facility: CLINIC | Age: 57
End: 2022-08-29
Payer: COMMERCIAL

## 2022-08-29 DIAGNOSIS — L82.1 SEBORRHEIC KERATOSIS: ICD-10-CM

## 2022-08-29 DIAGNOSIS — D22.9 NEVUS: Primary | ICD-10-CM

## 2022-08-29 DIAGNOSIS — L81.4 LENTIGO: ICD-10-CM

## 2022-08-29 DIAGNOSIS — D18.01 ANGIOMA OF SKIN: ICD-10-CM

## 2022-08-29 PROCEDURE — 99203 OFFICE O/P NEW LOW 30 MIN: CPT | Performed by: PHYSICIAN ASSISTANT

## 2022-08-29 ASSESSMENT — PAIN SCALES - GENERAL: PAINLEVEL: NO PAIN (0)

## 2022-08-29 NOTE — NURSING NOTE
Chief Complaint   Patient presents with     Skin Check     Spot on left cheek and right forearm        There were no vitals filed for this visit.  Wt Readings from Last 1 Encounters:   08/05/22 88.5 kg (195 lb)       Юлия Gamez LPN .................8/29/2022

## 2022-08-29 NOTE — LETTER
8/29/2022         RE: Susu Lagos  [address]  [facility] [address]        Dear Colleague,    Thank you for referring your patient, Susu Lagos, to the [facility]. Please see a copy of my visit note below.    HPI:   Chief complaints: Susu Lagos is a pleasant 57 year old female who presents for Full skin cancer screening to rule out skin cancer   Last Skin Exam: n/a      1st Baseline: yes  Personal HX of Skin Cancer: no   Personal HX of Malignant Melanoma: no   Family HX of Skin Cancer / Malignant Melanoma: no  Personal HX of Atypical Moles:   no  Risk factors: history of sun exposure and burns  New / Changing lesions:yes spot on the left upper cheek  Social History: teaches math; now a sub and loves the flexibility   On review of systems, there are no further skin complaints, patient is feeling otherwise well.   ROS of the following were done and are negative: Constitutional, Eyes, Ears, Nose,   Mouth, Throat, Cardiovascular, Respiratory, GI, Genitourinary, Musculoskeletal,   Psychiatric, Endocrine, Allergic/Immunologic.    PHYSICAL EXAM:   LMP 01/29/2016   Skin exam performed as follows: Type 2 skin. Mood appropriate  Alert and Oriented X 3. Well developed, well nourished in no distress.  General appearance: Normal  Head including face: Normal  Eyes: conjunctiva and lids: Normal  Mouth: Lips, teeth, gums: Normal  Neck: Normal  Chest-breast/axillae: Normal  Back: Normal  Spleen and liver: Normal  Cardiovascular: Exam of peripheral vascular system by observation for swelling, varicosities, edema: Normal  Genitalia: groin, buttocks: Normal  Extremities: digits/nails (clubbing): Normal  Eccrine and Apocrine glands: Normal  Right upper extremity: Normal  Left upper extremity: Normal  Right lower extremity: Normal  Left lower extremity: Normal  Skin: Scalp and body hair: See below    Pt deferred exam of breasts, groin, buttocks: No    Other physical findings:  1. Multiple  pigmented macules on extremities and trunk  2. Multiple pigmented macules on face, trunk and extremities  3. Multiple vascular papules on trunk, arms and legs  4. Multiple scattered keratotic plaques  5. 10 mm light brown patch on the left upper cheek   6. 3 mm fleshy papule on the right nasal side wall       Except as noted above, no other signs of skin cancer or melanoma.     ASSESSMENT/PLAN:   Benign Full skin cancer screening today. . Patient with history of none  Advised on monthly self exams and 1 year  Patient Education: Appropriate brochures given.    1. Multiple benign appearing melanocytic nevi on arms, legs and trunk. Discussed ABCDEs of melanoma and sunscreen.   2. Multiple lentigos on arms, legs and trunk. Advised benign, no treatment needed.  3. Multiple scattered angiomas. Advised benign, no treatment needed.   4. Seborrheic keratosis on arms, legs and trunk. Advised benign, no treatment needed.  5. Benign appearing lentigo on the left upper cheek - will monitor. Photograph taken for reference.   6. Dermal nevus on the right nsw - appears benign; will monitor.             Follow-up: yearly    1.) Patient was asked about new and changing moles. YES  2.) Patient received a complete physical skin examination: YES  3.) Patient was counseled to perform a monthly self skin examination: YES  Scribed By: Caroline Calderon, MS, PAANGELA          Again, thank you for allowing me to participate in the care of your patient.        Sincerely,        Caroline Calderon PA-C

## 2022-09-01 ENCOUNTER — HOSPITAL ENCOUNTER (OUTPATIENT)
Dept: PET IMAGING | Facility: CLINIC | Age: 57
Discharge: HOME | End: 2022-09-01
Attending: NURSE PRACTITIONER | Admitting: NURSE PRACTITIONER
Payer: COMMERCIAL

## 2022-09-01 DIAGNOSIS — C21.0 ANAL SQUAMOUS CELL CARCINOMA (H): ICD-10-CM

## 2022-09-01 PROCEDURE — A9552 F18 FDG: HCPCS | Performed by: NURSE PRACTITIONER

## 2022-09-01 PROCEDURE — 343N000001 HC RX 343: Performed by: NURSE PRACTITIONER

## 2022-09-01 PROCEDURE — 78816 PET IMAGE W/CT FULL BODY: CPT | Mod: PS

## 2022-09-01 PROCEDURE — 78816 PET IMAGE W/CT FULL BODY: CPT | Mod: 26

## 2022-09-01 RX ADMIN — FLUDEOXYGLUCOSE F-18 12.55 MCI.: 500 INJECTION, SOLUTION INTRAVENOUS at 08:20

## 2022-09-02 ENCOUNTER — HOSPITAL ENCOUNTER (OUTPATIENT)
Dept: MRI IMAGING | Facility: CLINIC | Age: 57
Discharge: HOME | End: 2022-09-02
Attending: NURSE PRACTITIONER | Admitting: NURSE PRACTITIONER
Payer: COMMERCIAL

## 2022-09-02 DIAGNOSIS — C21.0 ANAL SQUAMOUS CELL CARCINOMA (H): ICD-10-CM

## 2022-09-02 PROCEDURE — 258N000003 HC RX IP 258 OP 636: Performed by: NURSE PRACTITIONER

## 2022-09-02 PROCEDURE — 72197 MRI PELVIS W/O & W/DYE: CPT

## 2022-09-02 PROCEDURE — 255N000002 HC RX 255 OP 636: Performed by: NURSE PRACTITIONER

## 2022-09-02 PROCEDURE — A9585 GADOBUTROL INJECTION: HCPCS | Performed by: NURSE PRACTITIONER

## 2022-09-02 RX ORDER — GADOBUTROL 604.72 MG/ML
8.5 INJECTION INTRAVENOUS ONCE
Status: COMPLETED | OUTPATIENT
Start: 2022-09-02 | End: 2022-09-02

## 2022-09-02 RX ADMIN — GADOBUTROL 8.5 ML: 604.72 INJECTION INTRAVENOUS at 07:43

## 2022-09-02 RX ADMIN — SODIUM CHLORIDE 50 ML: 9 INJECTION, SOLUTION INTRAVENOUS at 07:50

## 2022-09-28 ENCOUNTER — OFFICE VISIT (OUTPATIENT)
Dept: URGENT CARE | Facility: URGENT CARE | Age: 57
End: 2022-09-28
Payer: COMMERCIAL

## 2022-09-28 VITALS
TEMPERATURE: 99.3 F | OXYGEN SATURATION: 96 % | DIASTOLIC BLOOD PRESSURE: 87 MMHG | HEART RATE: 117 BPM | BODY MASS INDEX: 33.97 KG/M2 | WEIGHT: 201 LBS | SYSTOLIC BLOOD PRESSURE: 135 MMHG

## 2022-09-28 DIAGNOSIS — R06.2 WHEEZING: ICD-10-CM

## 2022-09-28 DIAGNOSIS — R07.0 THROAT PAIN: ICD-10-CM

## 2022-09-28 DIAGNOSIS — R09.89 RHONCHI AT BOTH LUNG BASES: ICD-10-CM

## 2022-09-28 DIAGNOSIS — R05.9 COUGH: ICD-10-CM

## 2022-09-28 DIAGNOSIS — J06.9 VIRAL URI WITH COUGH: Primary | ICD-10-CM

## 2022-09-28 LAB
DEPRECATED S PYO AG THROAT QL EIA: NEGATIVE
FLUAV AG SPEC QL IA: NEGATIVE
FLUBV AG SPEC QL IA: NEGATIVE
GROUP A STREP BY PCR: NOT DETECTED

## 2022-09-28 PROCEDURE — 87651 STREP A DNA AMP PROBE: CPT | Performed by: NURSE PRACTITIONER

## 2022-09-28 PROCEDURE — 87804 INFLUENZA ASSAY W/OPTIC: CPT | Performed by: NURSE PRACTITIONER

## 2022-09-28 PROCEDURE — 99213 OFFICE O/P EST LOW 20 MIN: CPT | Performed by: NURSE PRACTITIONER

## 2022-09-28 NOTE — PROGRESS NOTES
"Assessment & Plan        Diagnosis Comments   1. Viral URI with cough     2. Cough  Streptococcus A Rapid Screen w/Reflex to PCR - Clinic Collect, Influenza A & B Antigen - Clinic Collect, Group A Streptococcus PCR Throat Swab, XR Chest 2 Views    3. Throat pain  Streptococcus A Rapid Screen w/Reflex to PCR - Clinic Collect, Influenza A & B Antigen - Clinic Collect, Group A Streptococcus PCR Throat Swab    4. Rhonchi at both lung bases  XR Chest 2 Views    5. Wheezing  XR Chest 2 Views      Patient Instructions   Home care for sinusitis includes;   Humidifier in room if available. May use flonase daily to help decrease swelling and dry up drainage. Recommend saline lavage to help remove mucous and moisturize sinuses.     Drink plenty of fluids and rest.  May use salt water gargles- about 8 oz warm water with about 1 teaspoon salt  Sucrets and Cepacol spray are over the counter medications that numb the throat.  Over the counter pain relievers such as tylenol or ibuprofen may be used as needed.   Honey lemon tea helps to soothe the throat. \"Throat Coat\" tea is soothing as well.        Please follow up in clinic, with your primary care provider if symptoms not improving with treatment.                   RADHA Hernandez [facility]    Franklyn Cristobal is a 57 year old female who presents to clinic today for the following health issues:  Chief Complaint   Patient presents with     Cold Symptoms     Sore throat started on Monday, getting better, last night cough started, hurts in the middle of her chest when she coughs, runny nose, post nasal drip. Did home COVID test yesterday, neg.      HPI      URI Adult    Onset of symptoms was 3 day(s) ago.  Course of illness is same.    Severity moderate  Current and Associated symptoms: runny nose, cough - non-productive and sore throat  Treatment measures tried include Tylenol/Ibuprofen, Fluids and Rest.  Predisposing factors " include None.        Review of Systems  Constitutional, HEENT, cardiovascular, pulmonary, gi and gu systems are negative, except as otherwise noted.      Objective    /87   Pulse 117   Temp 99.3  F (37.4  C) (Tympanic)   Wt 91.2 kg (201 lb)   LMP 01/29/2016   SpO2 96%   BMI 33.97 kg/m    Physical Exam   GENERAL: alert and no distress  EYES: Eyes grossly normal to inspection, PERRL and conjunctivae and sclerae normal  HENT: normal cephalic/atraumatic, ear canals and TM's normal, nose and mouth without ulcers or lesions, oropharynx clear, oral mucous membranes moist and tonsillar erythema  NECK: bilateral anterior cervical adenopathy, no asymmetry, masses, or scars and thyroid normal to palpation  RESP: rhonchi bibasilar  CV: regular rate and rhythm, normal S1 S2, no S3 or S4, no murmur, click or rub, no peripheral edema and peripheral pulses strong  ABDOMEN: soft, nontender, no hepatosplenomegaly, no masses and bowel sounds normal  MS: no gross musculoskeletal defects noted, no edema  SKIN: no suspicious lesions or rashes  NEURO: Normal strength and tone, mentation intact and speech normal  PSYCH: mentation appears normal, affect normal/bright    Results for orders placed or performed in visit on 09/28/22   XR Chest 2 Views     Status: None    Narrative    EXAM: XR CHEST 2 VIEWS  LOCATION: [facility]  DATE/TIME: 9/28/2022 7:13 PM    INDICATION:  Cough, Rhonchi at both lung bases, Wheezing  COMPARISON: 10/15/2020      Impression    IMPRESSION: Negative chest with no change. Prominent left cardiac apex fat pad of no significance.   Streptococcus A Rapid Screen w/Reflex to PCR - Clinic Collect     Status: Normal    Specimen: Throat; Swab   Result Value Ref Range    Group A Strep antigen Negative Negative   Influenza A & B Antigen - Clinic Collect     Status: Normal    Specimen: Nose; Swab   Result Value Ref Range    Influenza A antigen Negative Negative    Influenza B antigen  Negative Negative    Narrative    Test results must be correlated with clinical data. If necessary, results should be confirmed by a molecular assay or viral culture.

## 2022-09-29 NOTE — RESULT ENCOUNTER NOTE
Group A Streptococcus PCR is NEGATIVE  No treatment or change in treatment [facility] ED lab result Strep Group A protocol.

## 2022-09-29 NOTE — PATIENT INSTRUCTIONS
"Home care for sinusitis includes;   Humidifier in room if available. May use flonase daily to help decrease swelling and dry up drainage. Recommend saline lavage to help remove mucous and moisturize sinuses.     Drink plenty of fluids and rest.  May use salt water gargles- about 8 oz warm water with about 1 teaspoon salt  Sucrets and Cepacol spray are over the counter medications that numb the throat.  Over the counter pain relievers such as tylenol or ibuprofen may be used as needed.   Honey lemon tea helps to soothe the throat. \"Throat Coat\" tea is soothing as well.        Please follow up in clinic, with your primary care provider if symptoms not improving with treatment.       "

## 2022-10-23 ENCOUNTER — HEALTH MAINTENANCE LETTER (OUTPATIENT)
Age: 57
End: 2022-10-23

## 2022-11-09 ENCOUNTER — MYC MEDICAL ADVICE (OUTPATIENT)
Dept: SURGERY | Facility: CLINIC | Age: 57
End: 2022-11-09

## 2023-01-10 ENCOUNTER — MYC MEDICAL ADVICE (OUTPATIENT)
Dept: SURGERY | Facility: CLINIC | Age: 58
End: 2023-01-10

## 2023-01-16 ENCOUNTER — OFFICE VISIT (OUTPATIENT)
Dept: RADIATION THERAPY | Facility: OUTPATIENT CENTER | Age: 58
End: 2023-01-16
Payer: COMMERCIAL

## 2023-01-16 VITALS
DIASTOLIC BLOOD PRESSURE: 73 MMHG | BODY MASS INDEX: 34.37 KG/M2 | OXYGEN SATURATION: 96 % | WEIGHT: 203.4 LBS | HEART RATE: 78 BPM | RESPIRATION RATE: 16 BRPM | SYSTOLIC BLOOD PRESSURE: 122 MMHG

## 2023-01-16 DIAGNOSIS — C21.0 ANAL SQUAMOUS CELL CARCINOMA (H): Primary | ICD-10-CM

## 2023-01-16 NOTE — NURSING NOTE
"Oncology Rooming Note    January 16, 2023 10:39 AM   Susu Lagos is a 57 year old female who presents for:    Chief Complaint   Patient presents with     Radiation Therapy     Follow up with Dr. Remy     Initial Vitals: /73 (BP Location: Left arm, Patient Position: Chair, Cuff Size: Adult Regular)   Pulse 78   Resp 16   Wt 92.3 kg (203 lb 6.4 oz)   LMP 01/29/2016   SpO2 96%   BMI 34.37 kg/m   Estimated body mass index is 34.37 kg/m  as calculated from the following:    Height as of 7/11/22: 1.638 m (5' 4.5\").    Weight as of this encounter: 92.3 kg (203 lb 6.4 oz). Body surface area is 2.05 meters squared.  Data Unavailable Comment: Data Unavailable   Patient's last menstrual period was 01/29/2016.  Allergies reviewed: Yes  Medications reviewed: Yes    Medications: Medication refills not needed today.  Pharmacy name entered into [email or web address]:    [facility] [place], [place] - [address] [place] [address] [id number] - [place], [place] - [address] [facility] - [place], [place] - [facility] [address]    Clinical concerns: follow up today with Dr. Sandrita Cloud RN            "

## 2023-01-16 NOTE — PROGRESS NOTES
Department of Radiation Oncology  Radiation Therapy Center  [facility] [place] [address]  [phone]       Radiation Oncology Follow-up Visit  23    Susu Lagos  MRN: [id number]   : 1965     DIAGNOSIS: squamous cell carcinoma of the anal canal  PATHOLOGY:  squamous cell carcinoma                              STAGE: clinical T1N0  INTENT OF RADIOTHERAPY: definitve CRT  CONCURRENT SYSTEMIC THERAPY: Yes  Oncologist: Dr. Real  Drug Name/Frequency 1: 5FU  Drug Name/Frequency 1: Mitomycin     ONCOLOGIC HISTORY:   Ms. Lagos is a 57 year old female a diagnosis of squamous cell carcinoma of the anal canal, clinical T1N0.       The patient initially presented with symptoms of hematochezia for several months eventually prompting further evaluation.     On 3/3/2020 the patient underwent colonoscopy which demonstrated a mass located at the dentate line in the left anterior position.  The mass was less than one third circumferentially involved.  Biopsy of the mass obtained demonstrated squamous cell carcinoma.  The patient was evaluated by gynecology on 3/13/2020, Pap smear was negative.  On 3/19/2020 the patient underwent a PET scan.  Imaging demonstrated a focal hypermetabolic activity in the anal canal, max SUV 12.6.  No regional or distant disease was noted.  On 3/19/2020 the patient underwent MRI which demonstrated a 1.2 x 0.9 cm mass located 2.7 cm above the anal verge.  No enlarged regional nodes were noted.  The patient was seen by Dr. Real of medical oncology who discussed treatment with chemoradiation therapy.Patient subsequently presented to radiation oncology clinic to discuss potential role of radiation therapy as a part of treatment strategy.            SITE OF TREATMENT: pelvis     DATES  OF TREATMENT: 20-20     TOTAL DOSE OF TREATMENT: 5040 cGy     DOSE PER FRACTION OF TREATMENT: 180 cGy x 28 fractions    INTERVAL SINCE COMPLETION OF RADIATION  THERAPY:   ~2.5 years    SUBJECTIVE:   The patient presents for follow up.     Post treatment MRI (20) was JOSE LUIS. PET on 20 demonstrated excellent response with indeterminate but mild activity in anal canal region without clear evidence of disease. Most recent PET (22) and MRI pelvis (22) remained without any clear evidence of disease. Exam by  Surgery team on 22 was JOSE LUIS.     Patient is overall doing well. She has intermittent bouts of bowel urgency at times. These episodes are less frequent with pelvic floor rehab therapy. Uses imodium PRN. Has very intermittent bleeding at times. Has had previous diagnosis of anal fissure. No  trouble. No lower extremity lymphedema.     PHYSICAL EXAM:  /73 (BP Location: Left arm, Patient Position: Chair, Cuff Size: Adult Regular)   Pulse 78   Resp 16   Wt 92.3 kg (203 lb 6.4 oz)   LMP 2016   SpO2 96%   BMI 34.37 kg/m        LABS AND IMAGIN/921  RECTUM, CANAL, BIOPSY:  Squamous (anal) mucosa with no dysplasia, malignancy, or other significant histologic abnormality (rectal mucosa not represented)       22  PET  IMPRESSION: In this patient with a history of anal carcinoma, status  post chemoradiation completed on 2020:     1. Post treatment changes to the anus with similar soft tissue  thickening when compared with the previous examination. Mild decrease  in SUV uptake along the anus and perineum likely secondary to  inflammatory changes such as patient's known anal fissure and/or  hemorrhoids.   2. No metastatic disease in the chest, abdomen, or pelvis.  3. Diffuse hepatic steatosis.    22  MRI  IMPRESSION:  No evidence for recurrent or metastatic malignancy in the pelvis.     IMPRESSION:   Ms. Lagos is a 57 year old female a diagnosis of squamous cell carcinoma of the anal canal, clinical T1N0.  She completed definitive CRT to a total dose of 50.4 Gy in 28 fractions with concurrent 5FU-[facility]  (completed on  5/18/20).    PLAN:   1. Post treatment MRI (8/6/20) was JOSE LUIS. PET on 7/28/20 demonstrated excellent response with indeterminate but mild activity in anal canal region without clear evidence of disease. Most recent PET (9/1/22) and MRI pelvis (9/2/22) remained without any clear evidence of disease. Exam by surgery team on 7/11/22 was JOSE LUIS.     2. GI bother. Urgency.  Improved with pelvic floor rehab, continue exercises at home. Surgery in the past has discussed possibility of sacral nerve stimulator in the future if needed.     3. The patient will benefit from continued oncologic surveillance from our colo-rectal colleagues. Will see next on 1/19/23. Defer imaging to surgery team.    4. RTC in 12 months.      Tyree Remy M.D.  Department of Radiation Oncology  [facility]

## 2023-01-16 NOTE — LETTER
2023    RE: Susu Lagos  [address]  [facility] [address]    Dear Colleague,    Thank you for referring your patient, Susu Lagos, to the [facility]. Please see a copy of my visit note below.       Department of Radiation Oncology  Radiation Therapy Center  [facility]  [place] [address]  [phone]       Radiation Oncology Follow-up Visit  23    Susu Lagos  MRN: [id number]   : 1965     DIAGNOSIS: squamous cell carcinoma of the anal canal  PATHOLOGY:  squamous cell carcinoma                              STAGE: clinical T1N0  INTENT OF RADIOTHERAPY: definitve CRT  CONCURRENT SYSTEMIC THERAPY: Yes  Oncologist: Dr. Real  Drug Name/Frequency 1: 5FU  Drug Name/Frequency 1: Mitomycin     ONCOLOGIC HISTORY:   Ms. Lagos is a 57 year old female a diagnosis of squamous cell carcinoma of the anal canal, clinical T1N0.       The patient initially presented with symptoms of hematochezia for several months eventually prompting further evaluation.     On 3/3/2020 the patient underwent colonoscopy which demonstrated a mass located at the dentate line in the left anterior position.  The mass was less than one third circumferentially involved.  Biopsy of the mass obtained demonstrated squamous cell carcinoma.  The patient was evaluated by gynecology on 3/13/2020, Pap smear was negative.  On 3/19/2020 the patient underwent a PET scan.  Imaging demonstrated a focal hypermetabolic activity in the anal canal, max SUV 12.6.  No regional or distant disease was noted.  On 3/19/2020 the patient underwent MRI which demonstrated a 1.2 x 0.9 cm mass located 2.7 cm above the anal verge.  No enlarged regional nodes were noted.  The patient was seen by Dr. Real of medical oncology who discussed treatment with chemoradiation therapy.Patient subsequently presented to radiation oncology clinic to discuss potential role of radiation therapy as a part of treatment  strategy.            SITE OF TREATMENT: pelvis     DATES  OF TREATMENT: 20-20     TOTAL DOSE OF TREATMENT: 5040 cGy     DOSE PER FRACTION OF TREATMENT: 180 cGy x 28 fractions    INTERVAL SINCE COMPLETION OF RADIATION THERAPY:   ~2.5 years    SUBJECTIVE:   The patient presents for follow up.     Post treatment MRI (20) was JOSE LUIS. PET on 20 demonstrated excellent response with indeterminate but mild activity in anal canal region without clear evidence of disease. Most recent PET (22) and MRI pelvis (22) remained without any clear evidence of disease. Exam by  Surgery team on 22 was JOSE LUIS.     Patient is overall doing well. She has intermittent bouts of bowel urgency at times. These episodes are less frequent with pelvic floor rehab therapy. Uses imodium PRN. Has very intermittent bleeding at times. Has had previous diagnosis of anal fissure. No  trouble. No lower extremity lymphedema.     PHYSICAL EXAM:  /73 (BP Location: Left arm, Patient Position: Chair, Cuff Size: Adult Regular)   Pulse 78   Resp 16   Wt 92.3 kg (203 lb 6.4 oz)   LMP 2016   SpO2 96%   BMI 34.37 kg/m        LABS AND IMAGIN/921  RECTUM, CANAL, BIOPSY:  Squamous (anal) mucosa with no dysplasia, malignancy, or other significant histologic abnormality (rectal mucosa not represented)       22  PET  IMPRESSION: In this patient with a history of anal carcinoma, status  post chemoradiation completed on 2020:     1. Post treatment changes to the anus with similar soft tissue  thickening when compared with the previous examination. Mild decrease  in SUV uptake along the anus and perineum likely secondary to  inflammatory changes such as patient's known anal fissure and/or  hemorrhoids.   2. No metastatic disease in the chest, abdomen, or pelvis.  3. Diffuse hepatic steatosis.    22  MRI  IMPRESSION:  No evidence for recurrent or metastatic malignancy in the pelvis.     IMPRESSION:   Ms.  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  She completed definitive CRT to a total dose of 50.4 Gy in 28 fractions with concurrent 5FU-[facility]MC  (completed on 5/18/20).    PLAN:   1. Post treatment MRI (8/6/20) was JOSE LUIS. PET on 7/28/20 demonstrated excellent response with indeterminate but mild activity in anal canal region without clear evidence of disease. Most recent PET (9/1/22) and MRI pelvis (9/2/22) remained without any clear evidence of disease. Exam by surgery team on 7/11/22 was JOSE LUIS.     2. GI bother. Urgency.  Improved with pelvic floor rehab, continue exercises at home. Surgery in the past has discussed possibility of sacral nerve stimulator in the future if needed.     3. The patient will benefit from continued oncologic surveillance from our colo-rectal colleagues. Will see next on 1/19/23. Defer imaging to surgery team.    4. RTC in 12 months.      Tyree Remy M.D.  Department of Radiation Oncology  [facility]

## 2023-01-19 ENCOUNTER — OFFICE VISIT (OUTPATIENT)
Dept: SURGERY | Facility: CLINIC | Age: 58
End: 2023-01-19
Payer: COMMERCIAL

## 2023-01-19 VITALS
HEART RATE: 101 BPM | OXYGEN SATURATION: 97 % | WEIGHT: 198.7 LBS | DIASTOLIC BLOOD PRESSURE: 83 MMHG | HEIGHT: 65 IN | BODY MASS INDEX: 33.11 KG/M2 | SYSTOLIC BLOOD PRESSURE: 128 MMHG

## 2023-01-19 DIAGNOSIS — C21.0 ANAL SQUAMOUS CELL CARCINOMA (H): Primary | ICD-10-CM

## 2023-01-19 PROCEDURE — 99213 OFFICE O/P EST LOW 20 MIN: CPT | Performed by: NURSE PRACTITIONER

## 2023-01-19 ASSESSMENT — PAIN SCALES - GENERAL: PAINLEVEL: NO PAIN (0)

## 2023-01-19 NOTE — NURSING NOTE
"Chief Complaint   Patient presents with     RECHECK     Surveillance anoscopy       Vitals:    01/19/23 1402   BP: 128/83   BP Location: Left arm   Patient Position: Sitting   Cuff Size: Adult Regular   Pulse: 101   SpO2: 97%   Weight: 198 lb 11.2 oz   Height: 5' 5\"       Body mass index is 33.07 kg/m .     Americo Flores, EMT- P    "

## 2023-01-19 NOTE — PROGRESS NOTES
"Colon and Rectal Surgery Follow-Up Clinic Note    RE: Susu Lagos  : 1965  MALGORZATA: 23    Susu Lagos is a very pleasant 57 year old female with history of anal cancer s/p chemoradiation completed 20 here for follow-up.    PET 22:  1. Post treatment changes to the anus with similar soft tissue  thickening when compared with the previous examination. Mild decrease  in SUV uptake along the anus and perineum likely secondary to  inflammatory changes such as patient's known anal fissure and/or  hemorrhoids.   2. No metastatic disease in the chest, abdomen, or pelvis.  3. Diffuse hepatic steatosis.    MR Pelvis 22:  IMPRESSION:  No evidence for recurrent or metastatic malignancy in the pelvis.    Interval history: Susu has been doing well. She reports complete resolution of her rectal pain with the diltiazem.    Physical Examination: Exam was chaperoned by Americo Flores, EMT-P   /83 (BP Location: Left arm, Patient Position: Sitting, Cuff Size: Adult Regular)   Pulse 101   Ht 5' 5\"   Wt 198 lb 11.2 oz   LMP 2016   SpO2 97%   BMI 33.07 kg/m    General: alert, oriented, in no acute distress, sitting comfortably  HEENT:moist mucous membranes  No palpable inguinal adenopathy.  Perianal external examination:  Perianal skin: Intact with no excoriation or lichenification. Telangectasia consistent with prior radiation.  Lesions: No evidence of an external lesion, nodularity, or induration in the perianal region.  Eversion of buttocks: There was not evidence of an anal fissure. Details: N/A.  Skin tags or external hemorrhoids: No.    Digital rectal examination: Was performed.   Sphincter tone: Good.  Palpable lesions: No.  Other: None.  Bimanual examination: was not performed.    Anoscopy: Was performed.   Hemorrhoids: Small internal hemorrhoids.   Lesions: Small, superficial bleeding fissure present in the posterior midline    Assessment/Plan: 57 year old female with history of " anal cancer s/p chemoradiation completed 5/18/20. No evidence of recurrence on exam today. Follow up in 4 months to check for recurrence. Patient's questions were answered to her stated satisfaction and she is in agreement with this plan.     Surveillance per NCCN guidelines:                          A. Digital rectal examination every 3-6 months for 5 years.  Next in 4-6 months                          B. Inguinal node palpation every 3-6 months for 5 years.   Next in 4-6 months                          C. CT CAP with pelvic MRI annually for 3 years. Next in September 2023 and then complete    Medical history:  Past Medical History:   Diagnosis Date     Menorrhagia with irregular cycle 2/28/2016     Ovarian cyst 6/4/2015    Right, 6 cm dermoid on MRI-recommend removal.  7/29/2015 Surgery for right salpingoophorectomy.       Perimenopause 6/4/2015     Rectal cancer (H)     had radiation and chemo 2020       Surgical history:  Past Surgical History:   Procedure Laterality Date     CHOLECYSTECTOMY  2013    LSC     COLONOSCOPY N/A 11/13/2018    Procedure: colonoscopy;  Surgeon: Sesar Dos Santos MD;  Location: [facility]     COLONOSCOPY N/A 3/3/2020    Procedure: COLONOSCOPY, WITH POLYPECTOMY AND BIOPSY;  Surgeon: Sesar Dos Santos MD;  Location: [facility]     DILATION AND CURETTAGE, OPERATIVE HYSTEROSCOPY, COMBINED N/A 2/29/2016    Procedure: COMBINED DILATION AND CURETTAGE, OPERATIVE HYSTEROSCOPY;  Surgeon: Panda Knight MD;  Location: [facility]     ESOPHAGOSCOPY, GASTROSCOPY, DUODENOSCOPY (EGD), COMBINED N/A 9/3/2020    Procedure: ESOPHAGOGASTRODUODENOSCOPY (EGD);  Surgeon: Karlos Burnett MD;  Location: [facility]     GYN SURGERY  1995    tubes tied     LAPAROSCOPY DIAGNOSTIC (GYN) Right 7/29/2015    Procedure: LAPAROSCOPY DIAGNOSTIC (GYN);  Surgeon: Lian Betancur DO;  Location: [facility]       Problem list:    Patient Active Problem List    Diagnosis Date Noted     Anemia with low platelet count (H) 06/02/2021      Priority: Medium     Intractable pain 05/18/2020     Priority: Medium     Cervical cancer screening 03/20/2020     Priority: Medium     2008, 2010, 2011, 2013 All NIL paps.   4/22/15 NIL Pap, Neg HPV. Plan: Cotest in 5 yr       **  3/2020 Dx rectal cancer **  3/13/20 NIL Pap, Neg HPV. TZ absent. Plan: cotest in 3 years per provider and Oncologist.        Anal squamous cell carcinoma (H) 03/03/2020     Priority: Medium     Sent to . Check 7.21 Farhan for f/u [place]  Susu Lagos presented to the primary care physician with 3 to 4 months history of blood in the stool.  Subsequently the patient was referred for colonoscopy on March 3, 2020.  The palpable rectal mass on digital examination.  Biopsy came back positive for squamous cell carcinoma, focally keratinizing.  The patient has a previous colonoscopy on November 2018 which shows no abnormalities.  The patient was started on chemoradiation regimen with 5-FU and mitomycin.       CARDIOVASCULAR SCREENING; LDL GOAL LESS THAN 160 02/25/2015     Priority: Medium       Medications:  Current Outpatient Medications   Medication Sig Dispense Refill     cholestyramine (QUESTRAN) 4 g packet Take 1 packet (4 g) by mouth 2 times daily (with meals) 60 packet 3     diltiazem 2% in PLO gel To anal opening three times daily.  Use a pea-sized amount.  Store at room temperature. 60 g 3     estradiol (ESTRACE) 0.1 MG/GM vaginal cream Topically 3x/week       Hydrophilic ointment        Magnesium Oxide 250 MG TABS Take 2 tablets by mouth 2 times daily       Multiple Vitamin (MULTI-VITAMINS PO) Take 1 tablet by mouth daily        Omega-3 Fatty Acids (OMEGA 3 PO) Take 1 capsule by mouth daily        triamcinolone (KENALOG) 0.1 % external cream Apply topically 2 times daily 30 g 3       Allergies:  Allergies   Allergen Reactions     Rabies Vaccine Unknown     Scratch test resulted, it was not a good idea to give it to her.     Shellfish-Derived Products Nausea and Vomiting     Sulfa  "Drugs Rash       Family history:  Family History   Problem Relation Age of Onset     Hypertension Father      Myocardial Infarction Maternal Grandfather      Melanoma No family hx of        Social history:  Social History     Tobacco Use     Smoking status: Never     Smokeless tobacco: Never   Substance Use Topics     Alcohol use: Yes     Alcohol/week: 0.0 standard drinks     Comment: 1-2 per month      Marital status: .    Nursing Notes:   Americo Flores, EMT  1/19/2023  2:09 PM  Signed  Chief Complaint   Patient presents with     RECHECK     Surveillance anoscopy       Vitals:    01/19/23 1402   BP: 128/83   BP Location: Left arm   Patient Position: Sitting   Cuff Size: Adult Regular   Pulse: 101   SpO2: 97%   Weight: 198 lb 11.2 oz   Height: 5' 5\"       Body mass index is 33.07 kg/m .     Americo Flores, EMT- P        10 minutes spent on the date of encounter (excluding time performing procedures) performing chart review, history and exam, documentation and further activities as noted above with an additional 2 minutes for anoscopy.     Letitia Arevalo, NP-C  Colon and Rectal Surgery  [facility]    "

## 2023-01-19 NOTE — LETTER
"2023       RE: Susu Lagos  [address]  [facility] [address]     Dear Colleague,    Thank you for referring your patient, Ssuu Lagos, to the [facility] COLON AND RECTAL SURGERY CLINIC [place] at . Please see a copy of my visit note below.    Colon and Rectal Surgery Follow-Up Clinic Note    RE: Susu Lagos  : 1965  MALGORZATA: 23    Susu Lagos is a very pleasant 57 year old female with history of anal cancer s/p chemoradiation completed 20 here for follow-up.    PET 22:  1. Post treatment changes to the anus with similar soft tissue  thickening when compared with the previous examination. Mild decrease  in SUV uptake along the anus and perineum likely secondary to  inflammatory changes such as patient's known anal fissure and/or  hemorrhoids.   2. No metastatic disease in the chest, abdomen, or pelvis.  3. Diffuse hepatic steatosis.    MR Pelvis 22:  IMPRESSION:  No evidence for recurrent or metastatic malignancy in the pelvis.    Interval history: Susu has been doing well. She reports complete resolution of her rectal pain with the diltiazem.    Physical Examination: Exam was chaperoned by Americo Flores, EMT-P   /83 (BP Location: Left arm, Patient Position: Sitting, Cuff Size: Adult Regular)   Pulse 101   Ht 5' 5\"   Wt 198 lb 11.2 oz   LMP 2016   SpO2 97%   BMI 33.07 kg/m    General: alert, oriented, in no acute distress, sitting comfortably  HEENT:moist mucous membranes  No palpable inguinal adenopathy.  Perianal external examination:  Perianal skin: Intact with no excoriation or lichenification. Telangectasia consistent with prior radiation.  Lesions: No evidence of an external lesion, nodularity, or induration in the perianal region.  Eversion of buttocks: There was not evidence of an anal fissure. Details: N/A.  Skin tags or external hemorrhoids: No.    Digital rectal " examination: Was performed.   Sphincter tone: Good.  Palpable lesions: No.  Other: None.  Bimanual examination: was not performed.    Anoscopy: Was performed.   Hemorrhoids: Small internal hemorrhoids.   Lesions: Small, superficial bleeding fissure present in the posterior midline    Assessment/Plan: 57 year old female with history of anal cancer s/p chemoradiation completed 5/18/20. No evidence of recurrence on exam today. Follow up in 4 months to check for recurrence. Patient's questions were answered to her stated satisfaction and she is in agreement with this plan.     Surveillance per NCCN guidelines:                          A. Digital rectal examination every 3-6 months for 5 years.  Next in 4-6 months                          B. Inguinal node palpation every 3-6 months for 5 years.   Next in 4-6 months                          C. CT CAP with pelvic MRI annually for 3 years. Next in September 2023 and then complete    Medical history:  Past Medical History:   Diagnosis Date     Menorrhagia with irregular cycle 2/28/2016     Ovarian cyst 6/4/2015    Right, 6 cm dermoid on MRI-recommend removal.  7/29/2015 Surgery for right salpingoophorectomy.       Perimenopause 6/4/2015     Rectal cancer (H)     had radiation and chemo 2020       Surgical history:  Past Surgical History:   Procedure Laterality Date     CHOLECYSTECTOMY  2013    LSC     COLONOSCOPY N/A 11/13/2018    Procedure: colonoscopy;  Surgeon: Sesar Dos Santos MD;  Location: [facility]     COLONOSCOPY N/A 3/3/2020    Procedure: COLONOSCOPY, WITH POLYPECTOMY AND BIOPSY;  Surgeon: Sesar Dos Santos MD;  Location: [facility]     DILATION AND CURETTAGE, OPERATIVE HYSTEROSCOPY, COMBINED N/A 2/29/2016    Procedure: COMBINED DILATION AND CURETTAGE, OPERATIVE HYSTEROSCOPY;  Surgeon: Panda Knight MD;  Location: [facility]     ESOPHAGOSCOPY, GASTROSCOPY, DUODENOSCOPY (EGD), COMBINED N/A 9/3/2020    Procedure: ESOPHAGOGASTRODUODENOSCOPY (EGD);  Surgeon: Karlos Burnett MD;   Location: [facility]     GYN SURGERY  1995    tubes tied     LAPAROSCOPY DIAGNOSTIC (GYN) Right 7/29/2015    Procedure: LAPAROSCOPY DIAGNOSTIC (GYN);  Surgeon: Lian Betancur DO;  Location: [facility]       Problem list:    Patient Active Problem List    Diagnosis Date Noted     Anemia with low platelet count (H) 06/02/2021     Priority: Medium     Intractable pain 05/18/2020     Priority: Medium     Cervical cancer screening 03/20/2020     Priority: Medium     2008, 2010, 2011, 2013 All NIL paps.   4/22/15 NIL Pap, Neg HPV. Plan: Cotest in 5 yr       **  3/2020 Dx rectal cancer **  3/13/20 NIL Pap, Neg HPV. TZ absent. Plan: cotest in 3 years per provider and Oncologist.        Anal squamous cell carcinoma (H) 03/03/2020     Priority: Medium     Sent to [facility]. Check 7.21 [place] for f/u [place]  Susu Lagos presented to the primary care physician with 3 to 4 months history of blood in the stool.  Subsequently the patient was referred for colonoscopy on March 3, 2020.  The palpable rectal mass on digital examination.  Biopsy came back positive for squamous cell carcinoma, focally keratinizing.  The patient has a previous colonoscopy on November 2018 which shows no abnormalities.  The patient was started on chemoradiation regimen with 5-FU and mitomycin.       CARDIOVASCULAR SCREENING; LDL GOAL LESS THAN 160 02/25/2015     Priority: Medium       Medications:  Current Outpatient Medications   Medication Sig Dispense Refill     cholestyramine (QUESTRAN) 4 g packet Take 1 packet (4 g) by mouth 2 times daily (with meals) 60 packet 3     diltiazem 2% in PLO gel To anal opening three times daily.  Use a pea-sized amount.  Store at room temperature. 60 g 3     estradiol (ESTRACE) 0.1 MG/GM vaginal cream Topically 3x/week       Hydrophilic ointment        Magnesium Oxide 250 MG TABS Take 2 tablets by mouth 2 times daily       Multiple Vitamin (MULTI-VITAMINS PO) Take 1 tablet by mouth daily        Omega-3 Fatty Acids (OMEGA  "3 PO) Take 1 capsule by mouth daily        triamcinolone (KENALOG) 0.1 % external cream Apply topically 2 times daily 30 g 3       Allergies:  Allergies   Allergen Reactions     Rabies Vaccine Unknown     Scratch test resulted, it was not a good idea to give it to her.     Shellfish-Derived Products Nausea and Vomiting     Sulfa Drugs Rash       Family history:  Family History   Problem Relation Age of Onset     Hypertension Father      Myocardial Infarction Maternal Grandfather      Melanoma No family hx of        Social history:  Social History     Tobacco Use     Smoking status: Never     Smokeless tobacco: Never   Substance Use Topics     Alcohol use: Yes     Alcohol/week: 0.0 standard drinks     Comment: 1-2 per month      Marital status: .    Nursing Notes:   Americo Flores, EMT  1/19/2023  2:09 PM  Signed  Chief Complaint   Patient presents with     RECHECK     Surveillance anoscopy       Vitals:    01/19/23 1402   BP: 128/83   BP Location: Left arm   Patient Position: Sitting   Cuff Size: Adult Regular   Pulse: 101   SpO2: 97%   Weight: 198 lb 11.2 oz   Height: 5' 5\"       Body mass index is 33.07 kg/m .     Americo Flores, EMT- P        10 minutes spent on the date of encounter (excluding time performing procedures) performing chart review, history and exam, documentation and further activities as noted above with an additional 2 minutes for anoscopy.     Letitia Arevalo NP-C  Colon and Rectal Surgery  [facility]  "

## 2023-02-10 DIAGNOSIS — C21.0 ANAL SQUAMOUS CELL CARCINOMA (H): Primary | ICD-10-CM

## 2023-04-02 ENCOUNTER — HEALTH MAINTENANCE LETTER (OUTPATIENT)
Age: 58
End: 2023-04-02

## 2023-04-07 ENCOUNTER — OFFICE VISIT (OUTPATIENT)
Dept: URGENT CARE | Facility: URGENT CARE | Age: 58
End: 2023-04-07
Payer: COMMERCIAL

## 2023-04-07 VITALS
WEIGHT: 206 LBS | SYSTOLIC BLOOD PRESSURE: 148 MMHG | OXYGEN SATURATION: 95 % | HEART RATE: 92 BPM | DIASTOLIC BLOOD PRESSURE: 94 MMHG | TEMPERATURE: 98.7 F | BODY MASS INDEX: 34.28 KG/M2

## 2023-04-07 DIAGNOSIS — J06.9 VIRAL URI WITH COUGH: Primary | ICD-10-CM

## 2023-04-07 PROCEDURE — 87651 STREP A DNA AMP PROBE: CPT

## 2023-04-07 PROCEDURE — U0005 INFEC AGEN DETEC AMPLI PROBE: HCPCS

## 2023-04-07 PROCEDURE — 87804 INFLUENZA ASSAY W/OPTIC: CPT

## 2023-04-07 PROCEDURE — U0003 INFECTIOUS AGENT DETECTION BY NUCLEIC ACID (DNA OR RNA); SEVERE ACUTE RESPIRATORY SYNDROME CORONAVIRUS 2 (SARS-COV-2) (CORONAVIRUS DISEASE [COVID-19]), AMPLIFIED PROBE TECHNIQUE, MAKING USE OF HIGH THROUGHPUT TECHNOLOGIES AS DESCRIBED BY CMS-2020-01-R: HCPCS

## 2023-04-07 PROCEDURE — 99213 OFFICE O/P EST LOW 20 MIN: CPT | Mod: CS

## 2023-04-07 NOTE — PATIENT INSTRUCTIONS
"Diagnosis: viral URI_ upper respiratory infection   Plan:   Fluids: you need to drink lots of fluids: water, electrolytes, broth    Rest: you need lots and lots of rest to get better, you have permission to sleep all day and stay home from work or school until you feel better   Treat the most bothersome symptoms.... see symptoms below.....   Monitor for:   Fever: >101 F that is not relieved w/ tylenol, worsening fevers   Difficulty breathing: shortness of breath, wheezing, chest pain with breathing   Signs of dehydration: Feeling weak, dizzy or that you might faint  Chest pain   You have been fighting this illness for >14 days and are not getting better           Cold and Flu     Unfortunately, <2% of sinus/throat/cough symptoms are caused by a bacteria   However, You are SICK! This is often miserable! And I feel for you!   We cannot make it go away, but lets work to shorten the duration and severity!   Your most bothersome symptom is often where the virus settled in your body:    Nose, throat, or lungs, it may cause cough, sore throat, congestion, runny nose, headache, earache or shortness of breath.   It can also settle in your stomach and cause nausea, vomiting, and diarrhea.   Sometimes it causes generalized symptoms like \"aching all over,\" feeling tired, loss of energy, or loss of appetite.  ------- This illness usually lasts anywhere from 10-14 days ----------- hang in there.         We Treat URIs by helping relieve symptoms     Symptoms: - what is your most bothersome symptom?   Nasal congestion:           Decongestants:                > Pseudoephedrine (Sudafed) 60mg every 4 hours (not before bedtime)      Children >4yrs old: 15mg every 4hours chew (1mg/kg every 6hrs)       Liquid: Children's Silfedrine: 15 mg/5 mL      children >2 years old Phenylephrine (sudafed PE child)             Antihistamines:    > chlorpheniramine 4mg Q4hrs -or- clemastine 1mg twice a day (no more than 7 days)      Children >2yrs " old: Claritin or zyrtec      >> add ibuprofen or tylenol for added effect   cough:          antitussives :: suppresses cough by topical anesthetic action on the respiratory stretch receptor    tessalon perles / Benzonatate - need prescription      >only in children >10yrs of age          Expectorants  ::increase respiratory tract clearance of mucus by adding hydration/viscosity causing thinning and loosening   Guaifenesin AND Dextromethorphan :: [adds cough Suppressant] inhibits cough reflex by decreasing cough receptors (relieves the irritating  dry cough)   Robitussin DM / Mucinex DM   Guaifenesin 200 to 400 mg // dextromethorphan 10 to 20 mg every 4 hours,   Combo tabs: 1-2 tabs every 12hrs         Children 4yr to <6 years: Dextromethorphan 2.5 to 5 mg with Guaifenesin 50 to 100 mg every  4 hours as needed  sore throat:   gargle saltwater, honey, warm fluids          cough drops or lozenges:    Cepacol Lozenge / Benzocaine     Children >5yrs old : one lozenge every 2 hours   Herbal:    - honey = good for cough suppressant    - zinc = 2-3mg lozenge Q2hrs    - menthol vapor rup on chest before sleep

## 2023-04-07 NOTE — PROGRESS NOTES
influURGENT CARE  Assessment & Plan   Assessment:   Susu Lagos is a 57 year old female who's clinical presentation today is consistent with:   1. Viral URI with cough  - Group A Streptococcus PCR Throat Swab  - Influenza A & B Antigen - Clinic Collect  - Symptomatic COVID-19 Virus (Coronavirus) by PCR Nose    No alarm signs or symptoms present   Differential Diagnoses for this patient's CC include    Bacterial vs viral etiology of URI    Covid, influenza, pharyngitis, pneumonia, bronchitis,    Common cold, allergic rhinitis, seasonal allergies    ABRS, viral sinusitis, cough, pertussis,   Mononucleosis, tonsillitis, chronic sinusitis, meningococcal disease     Plan:  Will treat patient with supportive and symptomatic measures for viral upper respiratory infection at this time which include: Fluids, rest, over-the-counter medications; cough suppressants, decongestants, antihistamines, antitussives and expectorants, side effects of medications reviewed  Patient is well appearing, afebrile, had reassuring vital signs and a benign physical exam. Given lung sounds are clear no concerns for bacterial lung pathology at this time and will encourage patient to continue to closely monitor symptoms  Educated patient to monitor their symptoms for worsening and/or no improvement and to follow up in the next 7-10 days    Patient  is  agreeable to treatment plan and state they will follow-up if symptoms do not improve and/or if symptoms worsen (see patient's AVS 'monitor for' section for specific patient instructions given and discussed regarding what to watch for and when to follow up)    Medications ordered are listed above, please see AVS for patient's specific and personalized discharge instructions given     RADHA Myers [facility]      ______________________________________________________________________        Subjective  Subjective     HPI: Susu Lagos  is a 57 year old  " female who presents today for evaluation the following concerns:   Patient  endorses cough today which started 2 days ago on 4/5/23   Patient reports cough that is \"deep\" and having a sore throat    Patient denies any fevers,  sweats, chills, myalgias, wheezing, shortness of breath, difficulty breathing, chest pain, weakness or signs of dehydration   additionally patient denies a history of asthma or any other past medical history of breathing conditions}   Patient endorses her most bothersome symptom is her  Cough .        Allergies   Allergen Reactions     Rabies Vaccine Unknown     Scratch test resulted, it was not a good idea to give it to her.     Shellfish-Derived Products Nausea and Vomiting     Sulfa Drugs Rash     Patient Active Problem List   Diagnosis     CARDIOVASCULAR SCREENING; LDL GOAL LESS THAN 160     Anal squamous cell carcinoma (H)     Cervical cancer screening     Intractable pain     Anemia with low platelet count (H)       Review of Systems:  Pertinent review of systems as reflected in HPI, otherwise negative.     Objective  Objective    Physical Exam:  Vitals:    04/07/23 1333   BP: (!) 148/94   Pulse: 92   Temp: 98.7  F (37.1  C)   TempSrc: Tympanic   SpO2: 95%   Weight: 93.4 kg (206 lb)      General: Alert and oriented, no acute distress, Vital signs reviewed: afebrile,  normotensive   Psy/mental status: Cooperative, nonanxious  SKIN: Intact, no rashes  EYES: EOMs intact, PERRLA bilaterally   Conjunctiva: Clear bilaterally, no injection or erythema present  EARS: TMs intact, translucent gray in color with normal landmarks present no erythema  or bulging tympanic membrane   Canals are without swelling, however have a mild amount of cerumen, no impaction  NOSE:  mucosa erythematous bilaterally with a mild amount of rhinorrhea, clear  discharge}               No frontal or maxillary sinus tenderness present bilaterally  MOUTH/THROAT: lips, tongue, & oral mucosa appear normal upon inspection    "             Posterior oropharynx is erythematous but without exudate, lesions or tonsillar  Edema, no dysphonia, no unilateral tonsillar edema, no uvular deviation,   no signs of peritonsillar abscess  NECK: supple, has full range of motion with no meningeal signs              No lymphadenopathy present  LUNG: normal work of breathing, good respiratory effort without retractions, good air  movement, non labored, inspection reveals normal chest expansion w/  inspiration            Lung sounds are clear to auscultation bilaterally,            No rales/rhonic/crackles wheezing noted           No cough noted     LABS:   Results for orders placed or performed in visit on 04/07/23   Streptococcus A Rapid Screen w/Reflex to PCR - Clinic Collect     Status: Normal    Specimen: Throat; Swab   Result Value Ref Range    Group A Strep antigen Negative Negative   Influenza A & B Antigen - Clinic Collect     Status: Normal    Specimen: Nose; Swab   Result Value Ref Range    Influenza A antigen Negative Negative    Influenza B antigen Negative Negative    Narrative    Test results must be correlated with clinical data. If necessary, results should be confirmed by a molecular assay or viral culture.         I explained my diagnostic considerations and recommendations to the patient, who voiced understanding and agreement with the treatment plan.   All questions were answered.   We discussed potential side effects, risks and benefits of any prescribed or recommended therapies, as well as expectations for response to treatments.  Please see AVS for any patient instructions & handouts given.   Patient was advised to contact the Nurse Care Line, their Primary Care provider, Urgent Care, or the Emergency Department if there are new or worsening symptoms, or call 911 for emergencies.        ______________________________________________________________________          Patient Instructions   Diagnosis: viral URI_ upper respiratory  "infection   Plan:     Fluids: you need to drink lots of fluids: water, electrolytes, broth      Rest: you need lots and lots of rest to get better, you have permission to sleep all day and stay home from work or school until you feel better     Treat the most bothersome symptoms.... see symptoms below.....   Monitor for:     Fever: >101 F that is not relieved w/ tylenol, worsening fevers     Difficulty breathing: shortness of breath, wheezing, chest pain with breathing     Signs of dehydration: Feeling weak, dizzy or that you might faint    Chest pain     You have been fighting this illness for >14 days and are not getting better           Cold and Flu     Unfortunately, <2% of sinus/throat/cough symptoms are caused by a bacteria   However, You are SICK! This is often miserable! And I feel for you!   We cannot make it go away, but lets work to shorten the duration and severity!   Your most bothersome symptom is often where the virus settled in your body:    Nose, throat, or lungs, it may cause cough, sore throat, congestion, runny nose, headache, earache or shortness of breath.   It can also settle in your stomach and cause nausea, vomiting, and diarrhea.   Sometimes it causes generalized symptoms like \"aching all over,\" feeling tired, loss of energy, or loss of appetite.  ------- This illness usually lasts anywhere from 10-14 days ----------- hang in there.         We Treat URIs by helping relieve symptoms     Symptoms: - what is your most bothersome symptom?   Nasal congestion:           Decongestants:                > Pseudoephedrine (Sudafed) 60mg every 4 hours (not before bedtime)      Children >4yrs old: 15mg every 4hours chew (1mg/kg every 6hrs)       Liquid: Children's Silfedrine: 15 mg/5 mL      children >2 years old Phenylephrine (sudafed PE child)             Antihistamines:    > chlorpheniramine 4mg Q4hrs -or- clemastine 1mg twice a day (no more than 7 days)      Children >2yrs old: Claritin or zyrtec  "     >> add ibuprofen or tylenol for added effect   cough:          antitussives :: suppresses cough by topical anesthetic action on the respiratory stretch receptor    tessalon perles / Benzonatate - need prescription      >only in children >10yrs of age          Expectorants  ::increase respiratory tract clearance of mucus by adding hydration/viscosity causing thinning and loosening   Guaifenesin AND Dextromethorphan :: [adds cough Suppressant] inhibits cough reflex by decreasing cough receptors (relieves the irritating  dry cough)   Robitussin DM / Mucinex DM   Guaifenesin 200 to 400 mg // dextromethorphan 10 to 20 mg every 4 hours,   Combo tabs: 1-2 tabs every 12hrs         Children 4yr to <6 years: Dextromethorphan 2.5 to 5 mg with Guaifenesin 50 to 100 mg every  4 hours as needed  sore throat:   gargle saltwater, honey, warm fluids          cough drops or lozenges:    Cepacol Lozenge / Benzocaine     Children >5yrs old : one lozenge every 2 hours   Herbal:    - honey = good for cough suppressant    - zinc = 2-3mg lozenge Q2hrs    - menthol vapor rup on chest before sleep

## 2023-04-08 LAB — SARS-COV-2 RNA RESP QL NAA+PROBE: NEGATIVE

## 2023-06-09 ENCOUNTER — ONCOLOGY VISIT (OUTPATIENT)
Dept: ONCOLOGY | Facility: CLINIC | Age: 58
End: 2023-06-09
Attending: INTERNAL MEDICINE
Payer: COMMERCIAL

## 2023-06-09 ENCOUNTER — LAB (OUTPATIENT)
Dept: LAB | Facility: CLINIC | Age: 58
End: 2023-06-09
Payer: COMMERCIAL

## 2023-06-09 VITALS
SYSTOLIC BLOOD PRESSURE: 131 MMHG | OXYGEN SATURATION: 100 % | WEIGHT: 207.4 LBS | DIASTOLIC BLOOD PRESSURE: 80 MMHG | HEART RATE: 77 BPM | TEMPERATURE: 96.6 F | BODY MASS INDEX: 34.51 KG/M2 | RESPIRATION RATE: 12 BRPM

## 2023-06-09 DIAGNOSIS — C21.0 ANAL SQUAMOUS CELL CARCINOMA (H): Primary | ICD-10-CM

## 2023-06-09 LAB
ALBUMIN SERPL BCG-MCNC: 4.6 G/DL (ref 3.5–5.2)
ALP SERPL-CCNC: 51 U/L (ref 35–104)
ALT SERPL W P-5'-P-CCNC: 133 U/L (ref 10–35)
ANION GAP SERPL CALCULATED.3IONS-SCNC: 11 MMOL/L (ref 7–15)
AST SERPL W P-5'-P-CCNC: 64 U/L (ref 10–35)
BASOPHILS # BLD AUTO: 0 10E3/UL (ref 0–0.2)
BASOPHILS NFR BLD AUTO: 1 %
BILIRUB SERPL-MCNC: 0.4 MG/DL
BUN SERPL-MCNC: 19.4 MG/DL (ref 6–20)
CALCIUM SERPL-MCNC: 10.1 MG/DL (ref 8.6–10)
CHLORIDE SERPL-SCNC: 103 MMOL/L (ref 98–107)
CREAT SERPL-MCNC: 0.9 MG/DL (ref 0.51–0.95)
DEPRECATED HCO3 PLAS-SCNC: 27 MMOL/L (ref 22–29)
EOSINOPHIL # BLD AUTO: 0.1 10E3/UL (ref 0–0.7)
EOSINOPHIL NFR BLD AUTO: 2 %
ERYTHROCYTE [DISTWIDTH] IN BLOOD BY AUTOMATED COUNT: 12.4 % (ref 10–15)
GFR SERPL CREATININE-BSD FRML MDRD: 75 ML/MIN/1.73M2
GLUCOSE SERPL-MCNC: 97 MG/DL (ref 70–99)
HCT VFR BLD AUTO: 42.3 % (ref 35–47)
HGB BLD-MCNC: 14.4 G/DL (ref 11.7–15.7)
IMM GRANULOCYTES # BLD: 0 10E3/UL
IMM GRANULOCYTES NFR BLD: 0 %
LYMPHOCYTES # BLD AUTO: 1.3 10E3/UL (ref 0.8–5.3)
LYMPHOCYTES NFR BLD AUTO: 25 %
MCH RBC QN AUTO: 33.6 PG (ref 26.5–33)
MCHC RBC AUTO-ENTMCNC: 34 G/DL (ref 31.5–36.5)
MCV RBC AUTO: 99 FL (ref 78–100)
MONOCYTES # BLD AUTO: 0.5 10E3/UL (ref 0–1.3)
MONOCYTES NFR BLD AUTO: 9 %
NEUTROPHILS # BLD AUTO: 3.4 10E3/UL (ref 1.6–8.3)
NEUTROPHILS NFR BLD AUTO: 63 %
NRBC # BLD AUTO: 0 10E3/UL
NRBC BLD AUTO-RTO: 0 /100
PLATELET # BLD AUTO: 200 10E3/UL (ref 150–450)
POTASSIUM SERPL-SCNC: 4.6 MMOL/L (ref 3.4–5.3)
PROT SERPL-MCNC: 7.4 G/DL (ref 6.4–8.3)
RBC # BLD AUTO: 4.29 10E6/UL (ref 3.8–5.2)
SODIUM SERPL-SCNC: 141 MMOL/L (ref 136–145)
WBC # BLD AUTO: 5.3 10E3/UL (ref 4–11)

## 2023-06-09 PROCEDURE — 85025 COMPLETE CBC W/AUTO DIFF WBC: CPT

## 2023-06-09 PROCEDURE — 99213 OFFICE O/P EST LOW 20 MIN: CPT | Performed by: INTERNAL MEDICINE

## 2023-06-09 PROCEDURE — 80053 COMPREHEN METABOLIC PANEL: CPT

## 2023-06-09 PROCEDURE — 36415 COLL VENOUS BLD VENIPUNCTURE: CPT

## 2023-06-09 PROCEDURE — G0463 HOSPITAL OUTPT CLINIC VISIT: HCPCS | Performed by: INTERNAL MEDICINE

## 2023-06-09 ASSESSMENT — ENCOUNTER SYMPTOMS
EYES NEGATIVE: 1
ENDOCRINE NEGATIVE: 1
BLOOD IN STOOL: 1
RESPIRATORY NEGATIVE: 1
NEUROLOGICAL NEGATIVE: 1
CONSTITUTIONAL NEGATIVE: 1
BACK PAIN: 1
CARDIOVASCULAR NEGATIVE: 1
SLEEP DISTURBANCE: 1
ARTHRALGIAS: 1
RECTAL PAIN: 1
HEMATOLOGIC/LYMPHATIC NEGATIVE: 1

## 2023-06-09 ASSESSMENT — PAIN SCALES - GENERAL: PAINLEVEL: MODERATE PAIN (5)
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
## 2023-06-09 NOTE — PROGRESS NOTES
"Oncology Rooming Note    June 9, 2023 10:12 AM   Susu Lagos is a 57 year old female who presents for:    Chief Complaint   Patient presents with     Oncology Clinic Visit     Anal squamous cell carcinoma - Lab and provider     Initial Vitals: /80 (BP Location: Left arm, Patient Position: Sitting, Cuff Size: Adult Large)   Pulse 77   Temp (!) 96.6  F (35.9  C) (Tympanic)   Resp 12   Wt 94.1 kg (207 lb 6.4 oz)   LMP 01/29/2016   SpO2 100%   BMI 34.51 kg/m   Estimated body mass index is 34.51 kg/m  as calculated from the following:    Height as of 1/19/23: 1.651 m (5' 5\").    Weight as of this encounter: 94.1 kg (207 lb 6.4 oz). Body surface area is 2.08 meters squared.  Moderate Pain (5) Comment: Data Unavailable   Patient's last menstrual period was 01/29/2016.  Allergies reviewed: Yes  Medications reviewed: Yes    Medications: Medication refills not needed today.  Pharmacy name entered into [facility]:    [facility] [place], [place] - [address] [place] [address] PHARMACY #[id number] - [place], [place] - [address] [facility] - [place], [place] - [address]    Clinical concerns:  None      Stacey Ledesma CMA            "
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
## 2023-06-12 ENCOUNTER — OFFICE VISIT (OUTPATIENT)
Dept: SURGERY | Facility: CLINIC | Age: 58
End: 2023-06-12
Payer: COMMERCIAL

## 2023-06-12 VITALS — OXYGEN SATURATION: 100 % | SYSTOLIC BLOOD PRESSURE: 121 MMHG | DIASTOLIC BLOOD PRESSURE: 86 MMHG | HEART RATE: 80 BPM

## 2023-06-12 DIAGNOSIS — C21.0 ANAL SQUAMOUS CELL CARCINOMA (H): Primary | ICD-10-CM

## 2023-06-12 PROCEDURE — 99213 OFFICE O/P EST LOW 20 MIN: CPT | Performed by: NURSE PRACTITIONER

## 2023-06-12 ASSESSMENT — PAIN SCALES - GENERAL: PAINLEVEL: NO PAIN (0)

## 2023-06-12 NOTE — PROGRESS NOTES
Colon and Rectal Surgery Follow-Up Clinic Note    RE: Susu Lagos  : 1965  MALGORZATA: 2023    Susu Lagos is a very pleasant 57 year old female with history of anal cancer s/p chemoradiation completed 20 here for follow-up.    PET 22:  1. Post treatment changes to the anus with similar soft tissue  thickening when compared with the previous examination. Mild decrease  in SUV uptake along the anus and perineum likely secondary to  inflammatory changes such as patient's known anal fissure and/or  hemorrhoids.   2. No metastatic disease in the chest, abdomen, or pelvis.  3. Diffuse hepatic steatosis.    MR Pelvis 22:  IMPRESSION:  No evidence for recurrent or metastatic malignancy in the pelvis.    Interval history: Susu has been doing well. She reports complete resolution of her rectal pain with the diltiazem. She had a small amount of bleeding about a month or so ago with harder stools but this has resolved.    Physical Examination:   /86 (BP Location: Left arm, Patient Position: Sitting, Cuff Size: Adult Large)   Pulse 80   LMP 2016   SpO2 100%   General: alert, oriented, in no acute distress, sitting comfortably  HEENT:moist mucous membranes  No palpable inguinal adenopathy.  Perianal external examination:  Perianal skin: Intact with no excoriation or lichenification. Telangectasia consistent with prior radiation.  Lesions: No evidence of an external lesion, nodularity, or induration in the perianal region.  Eversion of buttocks: There was not evidence of an anal fissure. Details: N/A.  Skin tags or external hemorrhoids: No.    Digital rectal examination: Was performed.   Sphincter tone: Good.  Palpable lesions: No.  Other: None.  Bimanual examination: was not performed.    Anoscopy: Was performed.   Hemorrhoids: Small internal hemorrhoids.   Lesions: Non telangectasia consistent with prior radiation.    Assessment/Plan: 57 year old female with history of anal  cancer s/p chemoradiation completed 5/18/20. No evidence of recurrence on exam today. Follow up in 6 months to check for recurrence. Patient's questions were answered to her stated satisfaction and she is in agreement with this plan.     Surveillance per NCCN guidelines:                          A. Digital rectal examination every 3-6 months for 5 years.  Next in 4-6 months                          B. Inguinal node palpation every 3-6 months for 5 years.   Next in 4-6 months                          C. CT CAP with pelvic MRI annually for 3 years. Next in September 2023 and then complete    Medical history:  Past Medical History:   Diagnosis Date     Menorrhagia with irregular cycle 2/28/2016     Ovarian cyst 6/4/2015    Right, 6 cm dermoid on MRI-recommend removal.  7/29/2015 Surgery for right salpingoophorectomy.       Perimenopause 6/4/2015     Rectal cancer (H)     had radiation and chemo 2020       Surgical history:  Past Surgical History:   Procedure Laterality Date     CHOLECYSTECTOMY  2013    LSC     COLONOSCOPY N/A 11/13/2018    Procedure: colonoscopy;  Surgeon: Sesar Dos Santos MD;  Location: [facility]     COLONOSCOPY N/A 3/3/2020    Procedure: COLONOSCOPY, WITH POLYPECTOMY AND BIOPSY;  Surgeon: Sesar Dos Santos MD;  Location: [facility]     DILATION AND CURETTAGE, OPERATIVE HYSTEROSCOPY, COMBINED N/A 2/29/2016    Procedure: COMBINED DILATION AND CURETTAGE, OPERATIVE HYSTEROSCOPY;  Surgeon: Panda Knight MD;  Location: [facility]     ESOPHAGOSCOPY, GASTROSCOPY, DUODENOSCOPY (EGD), COMBINED N/A 9/3/2020    Procedure: ESOPHAGOGASTRODUODENOSCOPY (EGD);  Surgeon: Karlos Burnett MD;  Location: [facility]     GYN SURGERY  1995    tubes tied     LAPAROSCOPY DIAGNOSTIC (GYN) Right 7/29/2015    Procedure: LAPAROSCOPY DIAGNOSTIC (GYN);  Surgeon: Lian Betancur DO;  Location: [facility]       Problem list:    Patient Active Problem List    Diagnosis Date Noted     Anemia with low platelet count (H) 06/02/2021     Priority:  Medium     Intractable pain 05/18/2020     Priority: Medium     Cervical cancer screening 03/20/2020     Priority: Medium     2008, 2010, 2011, 2013 All NIL paps.   4/22/15 NIL Pap, Neg HPV. Plan: Cotest in 5 yr       **  3/2020 Dx rectal cancer **  3/13/20 NIL Pap, Neg HPV. TZ absent. Plan: cotest in 3 years per provider and Oncologist.        Anal squamous cell carcinoma (H) 03/03/2020     Priority: Medium     Sent to . Check 7.21 Farhan for f/u [place]  Susu Lagos presented to the primary care physician with 3 to 4 months history of blood in the stool.  Subsequently the patient was referred for colonoscopy on March 3, 2020.  The palpable rectal mass on digital examination.  Biopsy came back positive for squamous cell carcinoma, focally keratinizing.  The patient has a previous colonoscopy on November 2018 which shows no abnormalities.  The patient was started on chemoradiation regimen with 5-FU and mitomycin.       CARDIOVASCULAR SCREENING; LDL GOAL LESS THAN 160 02/25/2015     Priority: Medium       Medications:  Current Outpatient Medications   Medication Sig Dispense Refill     cholestyramine (QUESTRAN) 4 g packet Take 1 packet (4 g) by mouth 2 times daily (with meals) (Patient taking differently: Take 1 packet by mouth 2 times daily (with meals) Uses PRN) 60 packet 3     Hydrophilic ointment PRN       Magnesium Oxide 250 MG TABS Take 2 tablets by mouth 2 times daily       Multiple Vitamin (MULTI-VITAMINS PO) Take 1 tablet by mouth daily        Omega-3 Fatty Acids (OMEGA 3 PO) Take 1 capsule by mouth daily        diltiazem 2% in PLO gel To anal opening three times daily.  Use a pea-sized amount.  Store at room temperature. (Patient not taking: Reported on 6/9/2023) 60 g 3     estradiol (ESTRACE) 0.1 MG/GM vaginal cream Topically 3x/week (Patient not taking: Reported on 6/9/2023)       triamcinolone (KENALOG) 0.1 % external cream Apply topically 2 times daily (Patient not taking: Reported on 6/9/2023) 30 g  3       Allergies:  Allergies   Allergen Reactions     Rabies Vaccine (Hamster) Unknown     Scratch test resulted, it was not a good idea to give it to her.     Shellfish-Derived Products Nausea and Vomiting     Sulfa Antibiotics Rash       Family history:  Family History   Problem Relation Age of Onset     Hypertension Father      Myocardial Infarction Maternal Grandfather      Melanoma No family hx of        Social history:  Social History     Tobacco Use     Smoking status: Never     Smokeless tobacco: Never   Vaping Use     Vaping status: Never Used     Passive vaping exposure: Yes   Substance Use Topics     Alcohol use: Yes     Alcohol/week: 0.0 standard drinks of alcohol     Comment: 1-2 per month      Marital status: .    Nursing Notes:   Shoaib Chamberlain, EMT  6/12/2023 11:14 AM  Signed  Chief Complaint   Patient presents with     Follow Up       Vitals:    06/12/23 1112   BP: 121/86   BP Location: Left arm   Patient Position: Sitting   Cuff Size: Adult Large   Pulse: 80   SpO2: 100%       There is no height or weight on file to calculate BMI.    Shoaib Chamberlain EMT-P        10 minutes spent on the date of encounter performing chart review, history and exam, documentation and further activities as noted above with an additional 2 minutes for anoscopy.     Letitia Arevalo, NP-C  Colon and Rectal Surgery  [facility]

## 2023-06-12 NOTE — LETTER
2023       RE: Susu Lagos  [phone]  [facility] [place] [facility] [address]     Dear Colleague,    Thank you for referring your patient, Susu Lagos, to the [facility] COLON AND RECTAL SURGERY CLINIC [place] [facility]. Please see a copy of my visit note below.    Colon and Rectal Surgery Follow-Up Clinic Note    RE: Susu Lagos  : 1965  MALGORZATA: 2023    Susu Lagos is a very pleasant 57 year old female with history of anal cancer s/p chemoradiation completed 20 here for follow-up.    PET 22:  1. Post treatment changes to the anus with similar soft tissue  thickening when compared with the previous examination. Mild decrease  in SUV uptake along the anus and perineum likely secondary to  inflammatory changes such as patient's known anal fissure and/or  hemorrhoids.   2. No metastatic disease in the chest, abdomen, or pelvis.  3. Diffuse hepatic steatosis.    MR Pelvis 22:  IMPRESSION:  No evidence for recurrent or metastatic malignancy in the pelvis.    Interval history: Susu has been doing well. She reports complete resolution of her rectal pain with the diltiazem. She had a small amount of bleeding about a month or so ago with harder stools but this has resolved.    Physical Examination:   /86 (BP Location: Left arm, Patient Position: Sitting, Cuff Size: Adult Large)   Pulse 80   LMP 2016   SpO2 100%   General: alert, oriented, in no acute distress, sitting comfortably  HEENT:moist mucous membranes  No palpable inguinal adenopathy.  Perianal external examination:  Perianal skin: Intact with no excoriation or lichenification. Telangectasia consistent with prior radiation.  Lesions: No evidence of an external lesion, nodularity, or induration in the perianal region.  Eversion of buttocks: There was not evidence of an anal fissure. Details: N/A.  Skin tags or external hemorrhoids: No.    Digital rectal  examination: Was performed.   Sphincter tone: Good.  Palpable lesions: No.  Other: None.  Bimanual examination: was not performed.    Anoscopy: Was performed.   Hemorrhoids: Small internal hemorrhoids.   Lesions: Non telangectasia consistent with prior radiation.    Assessment/Plan: 57 year old female with history of anal cancer s/p chemoradiation completed 5/18/20. No evidence of recurrence on exam today. Follow up in 6 months to check for recurrence. Patient's questions were answered to her stated satisfaction and she is in agreement with this plan.     Surveillance per NCCN guidelines:                          A. Digital rectal examination every 3-6 months for 5 years.  Next in 4-6 months                          B. Inguinal node palpation every 3-6 months for 5 years.   Next in 4-6 months                          C. CT CAP with pelvic MRI annually for 3 years. Next in September 2023 and then complete    Medical history:  Past Medical History:   Diagnosis Date    Menorrhagia with irregular cycle 2/28/2016    Ovarian cyst 6/4/2015    Right, 6 cm dermoid on MRI-recommend removal.  7/29/2015 Surgery for right salpingoophorectomy.      Perimenopause 6/4/2015    Rectal cancer (H)     had radiation and chemo 2020       Surgical history:  Past Surgical History:   Procedure Laterality Date    CHOLECYSTECTOMY  2013    LSC    COLONOSCOPY N/A 11/13/2018    Procedure: colonoscopy;  Surgeon: Sesar Dos Santos MD;  Location: [facility]    COLONOSCOPY N/A 3/3/2020    Procedure: COLONOSCOPY, WITH POLYPECTOMY AND BIOPSY;  Surgeon: Sesar Dos Santos MD;  Location: [facility]    DILATION AND CURETTAGE, OPERATIVE HYSTEROSCOPY, COMBINED N/A 2/29/2016    Procedure: COMBINED DILATION AND CURETTAGE, OPERATIVE HYSTEROSCOPY;  Surgeon: Panda Knight MD;  [facility]: [facility]    ESOPHAGOSCOPY, GASTROSCOPY, DUODENOSCOPY (EGD), COMBINED N/A 9/3/2020    Procedure: ESOPHAGOGASTRODUODENOSCOPY (EGD);  Surgeon: Karlos Burnett MD;  Location: [facility]    GYN  SURGERY  1995    tubes tied    LAPAROSCOPY DIAGNOSTIC (GYN) Right 7/29/2015    Procedure: LAPAROSCOPY DIAGNOSTIC (GYN);  Surgeon: Lian Betancur, ;  Location: [facility]       Problem list:    Patient Active Problem List    Diagnosis Date Noted    Anemia with low platelet count (H) 06/02/2021     Priority: Medium    Intractable pain 05/18/2020     Priority: Medium    Cervical cancer screening 03/20/2020     Priority: Medium     2008, 2010, 2011, 2013 All NIL paps.   4/22/15 NIL Pap, Neg HPV. Plan: Cotest in 5 yr       **  3/2020 Dx rectal cancer **  3/13/20 NIL Pap, Neg HPV. TZ absent. Plan: cotest in 3 years per provider and Oncologist.       Anal squamous cell carcinoma (H) 03/03/2020     Priority: Medium     Sent to [facility]. Check 7.21 Farhan for f/u [place]  Susu Lagos presented to the primary care physician with 3 to 4 months history of blood in the stool.  Subsequently the patient was referred for colonoscopy on March 3, 2020.  The palpable rectal mass on digital examination.  Biopsy came back positive for squamous cell carcinoma, focally keratinizing.  The patient has a previous colonoscopy on November 2018 which shows no abnormalities.  The patient was started on chemoradiation regimen with 5-FU and mitomycin.      CARDIOVASCULAR SCREENING; LDL GOAL LESS THAN 160 02/25/2015     Priority: Medium       Medications:  Current Outpatient Medications   Medication Sig Dispense Refill    cholestyramine (QUESTRAN) 4 g packet Take 1 packet (4 g) by mouth 2 times daily (with meals) (Patient taking differently: Take 1 packet by mouth 2 times daily (with meals) Uses PRN) 60 packet 3    Hydrophilic ointment PRN      Magnesium Oxide 250 MG TABS Take 2 tablets by mouth 2 times daily      Multiple Vitamin (MULTI-VITAMINS PO) Take 1 tablet by mouth daily       Omega-3 Fatty Acids (OMEGA 3 PO) Take 1 capsule by mouth daily       diltiazem 2% in PLO gel To anal opening three times daily.  Use a pea-sized amount.  Store  at room temperature. (Patient not taking: Reported on 6/9/2023) 60 g 3    estradiol (ESTRACE) 0.1 MG/GM vaginal cream Topically 3x/week (Patient not taking: Reported on 6/9/2023)      triamcinolone (KENALOG) 0.1 % external cream Apply topically 2 times daily (Patient not taking: Reported on 6/9/2023) 30 g 3       Allergies:  Allergies   Allergen Reactions    Rabies Vaccine (Hamster) Unknown     Scratch test resulted, it was not a good idea to give it to her.    Shellfish-Derived Products Nausea and Vomiting    Sulfa Antibiotics Rash       Family history:  Family History   Problem Relation Age of Onset    Hypertension Father     Myocardial Infarction Maternal Grandfather     Melanoma No family hx of        Social history:  Social History     Tobacco Use    Smoking status: Never    Smokeless tobacco: Never   Vaping Use    Vaping status: Never Used     Passive vaping exposure: Yes   Substance Use Topics    Alcohol use: Yes     Alcohol/week: 0.0 standard drinks of alcohol     Comment: 1-2 per month      Marital status: .    Nursing Notes:   Shoaib Chamberlain, EMT  6/12/2023 11:14 AM  Signed  Chief Complaint   Patient presents with    Follow Up       Vitals:    06/12/23 1112   BP: 121/86   BP Location: Left arm   Patient Position: Sitting   Cuff Size: Adult Large   Pulse: 80   SpO2: 100%       There is no height or weight on file to calculate BMI.    Shoaib Chamberlain EMT-P        10 minutes spent on the date of encounter performing chart review, history and exam, documentation and further activities as noted above with an additional 2 minutes for anoscopy.     Letitia Arevalo, NP-C  Colon and Rectal Surgery  [facility]

## 2023-06-12 NOTE — NURSING NOTE
Chief Complaint   Patient presents with     Follow Up       Vitals:    06/12/23 1112   BP: 121/86   BP Location: Left arm   Patient Position: Sitting   Cuff Size: Adult Large   Pulse: 80   SpO2: 100%       There is no height or weight on file to calculate BMI.    Shoaib Chamberlain EMT-P

## 2023-07-10 ENCOUNTER — PATIENT OUTREACH (OUTPATIENT)
Dept: CARE COORDINATION | Facility: CLINIC | Age: 58
End: 2023-07-10
Payer: COMMERCIAL

## 2023-09-05 ENCOUNTER — HOSPITAL ENCOUNTER (OUTPATIENT)
Dept: PET IMAGING | Facility: CLINIC | Age: 58
Discharge: HOME | End: 2023-09-05
Attending: NURSE PRACTITIONER
Payer: COMMERCIAL

## 2023-09-05 ENCOUNTER — HOSPITAL ENCOUNTER (OUTPATIENT)
Dept: MRI IMAGING | Facility: CLINIC | Age: 58
Discharge: HOME | End: 2023-09-05
Attending: NURSE PRACTITIONER
Payer: COMMERCIAL

## 2023-09-05 DIAGNOSIS — C21.0 ANAL SQUAMOUS CELL CARCINOMA (H): ICD-10-CM

## 2023-09-05 PROCEDURE — 71260 CT THORAX DX C+: CPT | Mod: 26 | Performed by: RADIOLOGY

## 2023-09-05 PROCEDURE — 78816 PET IMAGE W/CT FULL BODY: CPT | Mod: PS

## 2023-09-05 PROCEDURE — A9585 GADOBUTROL INJECTION: HCPCS | Performed by: STUDENT IN AN ORGANIZED HEALTH CARE EDUCATION/TRAINING PROGRAM

## 2023-09-05 PROCEDURE — 72197 MRI PELVIS W/O & W/DYE: CPT

## 2023-09-05 PROCEDURE — 72197 MRI PELVIS W/O & W/DYE: CPT | Mod: 26 | Performed by: RADIOLOGY

## 2023-09-05 PROCEDURE — A9552 F18 FDG: HCPCS | Performed by: NURSE PRACTITIONER

## 2023-09-05 PROCEDURE — 74177 CT ABD & PELVIS W/CONTRAST: CPT

## 2023-09-05 PROCEDURE — 78816 PET IMAGE W/CT FULL BODY: CPT | Mod: 26 | Performed by: RADIOLOGY

## 2023-09-05 PROCEDURE — 250N000011 HC RX IP 250 OP 636: Performed by: NURSE PRACTITIONER

## 2023-09-05 PROCEDURE — 343N000001 HC RX 343: Performed by: NURSE PRACTITIONER

## 2023-09-05 PROCEDURE — 74177 CT ABD & PELVIS W/CONTRAST: CPT | Mod: 26 | Performed by: RADIOLOGY

## 2023-09-05 PROCEDURE — 255N000002 HC RX 255 OP 636: Performed by: STUDENT IN AN ORGANIZED HEALTH CARE EDUCATION/TRAINING PROGRAM

## 2023-09-05 RX ORDER — GADOBUTROL 604.72 MG/ML
10 INJECTION INTRAVENOUS ONCE
Status: COMPLETED | OUTPATIENT
Start: 2023-09-05 | End: 2023-09-05

## 2023-09-05 RX ORDER — IOPAMIDOL 755 MG/ML
20-135 INJECTION, SOLUTION INTRAVASCULAR ONCE
Status: COMPLETED | OUTPATIENT
Start: 2023-09-05 | End: 2023-09-05

## 2023-09-05 RX ADMIN — FLUDEOXYGLUCOSE F-18 12.53 MILLICURIE: 500 INJECTION, SOLUTION INTRAVENOUS at 10:10

## 2023-09-05 RX ADMIN — IOPAMIDOL 118 ML: 755 INJECTION, SOLUTION INTRAVENOUS at 11:01

## 2023-09-05 RX ADMIN — GADOBUTROL 9 ML: 604.72 INJECTION INTRAVENOUS at 09:13

## 2023-11-05 ENCOUNTER — HEALTH MAINTENANCE LETTER (OUTPATIENT)
Age: 58
End: 2023-11-05

## 2023-12-12 ENCOUNTER — OFFICE VISIT (OUTPATIENT)
Dept: SURGERY | Facility: CLINIC | Age: 58
End: 2023-12-12
Payer: COMMERCIAL

## 2023-12-12 VITALS
HEIGHT: 65 IN | SYSTOLIC BLOOD PRESSURE: 124 MMHG | DIASTOLIC BLOOD PRESSURE: 77 MMHG | HEART RATE: 91 BPM | OXYGEN SATURATION: 100 % | BODY MASS INDEX: 34.51 KG/M2

## 2023-12-12 DIAGNOSIS — C21.0 ANAL SQUAMOUS CELL CARCINOMA (H): Primary | ICD-10-CM

## 2023-12-12 PROCEDURE — 46600 DIAGNOSTIC ANOSCOPY SPX: CPT | Performed by: NURSE PRACTITIONER

## 2023-12-12 PROCEDURE — 99213 OFFICE O/P EST LOW 20 MIN: CPT | Mod: 25 | Performed by: NURSE PRACTITIONER

## 2023-12-12 ASSESSMENT — PAIN SCALES - GENERAL: PAINLEVEL: NO PAIN (0)

## 2023-12-12 NOTE — PROGRESS NOTES
"Colon and Rectal Surgery Follow-Up Clinic Note    RE: Susu Lagos  : 1965  MALGORZATA: 2023    Susu Lagos is a very pleasant 58 year old female with history of anal cancer s/p chemoradiation completed 20 here for follow-up.    MR Pelvis 23:  IMPRESSION: Radiation treatment changes without evidence of residual  or recurrent malignancy in the pelvis.    PET 23:  Overall, findings are stable post treatment changes with  hypermetabolic uptake within the anal canal.  No definite evidence of  recurrent or metastatic tumor.    Interval history: Susu has been doing well. No anorectal complaints.    Physical Examination:   /77 (BP Location: Left arm, Patient Position: Sitting, Cuff Size: Adult Large)   Pulse 91   Ht 5' 5\"   LMP 2016   SpO2 100%   BMI 34.51 kg/m    General: alert, oriented, in no acute distress, sitting comfortably  HEENT:moist mucous membranes  No palpable inguinal adenopathy.  Perianal external examination:  Perianal skin: Intact with no excoriation or lichenification. Telangectasia consistent with prior radiation.  Lesions: No evidence of an external lesion, nodularity, or induration in the perianal region.  Eversion of buttocks: There was not evidence of an anal fissure. Details: N/A.  Skin tags or external hemorrhoids: No.    Digital rectal examination: Was performed.   Sphincter tone: Good.  Palpable lesions: No.  Other: None.  Bimanual examination: was not performed.    Anoscopy: Was performed.   Hemorrhoids: Small internal hemorrhoids. Small fissure opened in the posterior midline with anosocopy.  Lesions: Non telangectasia consistent with prior radiation.    Assessment/Plan: 58 year old female with history of anal cancer s/p chemoradiation completed 20. No evidence of recurrence on exam today. Follow up in 6 months to check for recurrence. Patient's questions were answered to her stated satisfaction and she is in agreement with this plan. "     Anal Cancer Surveillance:  Digital rectal examination, anoscopy/flexible sigmoidoscopy, and inguinal node palpation every 6 months for 5 years. Next 6/2024  CT Chest and MR AP annually for 3 years. Completed      Medical history:  Past Medical History:   Diagnosis Date    Menorrhagia with irregular cycle 2/28/2016    Ovarian cyst 6/4/2015    Right, 6 cm dermoid on MRI-recommend removal.  7/29/2015 Surgery for right salpingoophorectomy.      Perimenopause 6/4/2015    Rectal cancer (H)     had radiation and chemo 2020       Surgical history:  Past Surgical History:   Procedure Laterality Date    CHOLECYSTECTOMY  2013    [facility]    COLONOSCOPY N/A 11/13/2018    Procedure: colonoscopy;  Surgeon: Sesar Dos Santos MD;  Location: [facility]    COLONOSCOPY N/A 3/3/2020    Procedure: COLONOSCOPY, WITH POLYPECTOMY AND BIOPSY;  Surgeon: Sesar Dos Santos MD;  Location: [facility]    DILATION AND CURETTAGE, OPERATIVE HYSTEROSCOPY, COMBINED N/A 2/29/2016    Procedure: COMBINED DILATION AND CURETTAGE, OPERATIVE HYSTEROSCOPY;  Surgeon: Panda Knight MD;  Location: [facility]    ESOPHAGOSCOPY, GASTROSCOPY, DUODENOSCOPY (EGD), COMBINED N/A 9/3/2020    Procedure: ESOPHAGOGASTRODUODENOSCOPY (EGD);  Surgeon: Karlos Burnett MD;  Location: [facility]    GYN SURGERY  1995    tubes tied    LAPAROSCOPY DIAGNOSTIC (GYN) Right 7/29/2015    Procedure: LAPAROSCOPY DIAGNOSTIC (GYN);  Surgeon: Lian Betancur DO;  Location: [facility]       Problem list:      Patient Active Problem List    Diagnosis Date Noted    Anemia with low platelet count (H24) 06/02/2021     Priority: Medium    Intractable pain 05/18/2020     Priority: Medium    Cervical cancer screening 03/20/2020     Priority: Medium     2008, 2010, 2011, 2013 All NIL paps.   4/22/15 NIL Pap, Neg HPV. Plan: Cotest in 5 yr       **  3/2020 Dx rectal cancer **  3/13/20 NIL Pap, Neg HPV. TZ absent. Plan: cotest in 3 years per provider and Oncologist.       Anal squamous cell carcinoma (H) 03/03/2020      Priority: Medium     Sent to . Check 7.21 [facility] for f/u [facility]  Susu Lagos presented to the primary care physician with 3 to 4 months history of blood in the stool.  Subsequently the patient was referred for colonoscopy on March 3, 2020.  The palpable rectal mass on digital examination.  Biopsy came back positive for squamous cell carcinoma, focally keratinizing.  The patient has a previous colonoscopy on November 2018 which shows no abnormalities.  The patient was started on chemoradiation regimen with 5-FU and mitomycin.      CARDIOVASCULAR SCREENING; LDL GOAL LESS THAN 160 02/25/2015     Priority: Medium       Medications:  Current Outpatient Medications   Medication Sig Dispense Refill    cholestyramine (QUESTRAN) 4 g packet Take 1 packet (4 g) by mouth 2 times daily (with meals) (Patient taking differently: Take 1 packet by mouth 2 times daily (with meals) Uses PRN) 60 packet 3    diltiazem 2% in PLO gel To anal opening three times daily.  Use a pea-sized amount.  Store at room temperature. (Patient not taking: Reported on 6/9/2023) 60 g 3    estradiol (ESTRACE) 0.1 MG/GM vaginal cream Topically 3x/week (Patient not taking: Reported on 6/9/2023)      Hydrophilic ointment PRN      Magnesium Oxide 250 MG TABS Take 2 tablets by mouth 2 times daily      Multiple Vitamin (MULTI-VITAMINS PO) Take 1 tablet by mouth daily       Omega-3 Fatty Acids (OMEGA 3 PO) Take 1 capsule by mouth daily       triamcinolone (KENALOG) 0.1 % external cream Apply topically 2 times daily (Patient not taking: Reported on 6/9/2023) 30 g 3       Allergies:  Allergies   Allergen Reactions    Rabies Vaccine (Hamster) Unknown     Scratch test resulted, it was not a good idea to give it to her.    Shellfish-Derived Products Nausea and Vomiting    Sulfa Antibiotics Rash       Family history:  Family History   Problem Relation Age of Onset    Hypertension Father     Myocardial Infarction Maternal Grandfather     Melanoma No family hx  "of        Social history:  Social History     Tobacco Use    Smoking status: Never    Smokeless tobacco: Never   Substance Use Topics    Alcohol use: Yes     Alcohol/week: 0.0 standard drinks of alcohol     Comment: 1-2 per month      Marital status: .    Nursing Notes:   Americo Flores, EMT  12/12/2023 11:23 AM  Signed  Chief Complaint   Patient presents with    RECHECK     JOSUÉ       Vitals:    12/12/23 1119   BP: 124/77   BP Location: Left arm   Patient Position: Sitting   Cuff Size: Adult Large   Pulse: 91   SpO2: 100%   Height: 5' 5\"       Body mass index is 34.51 kg/m .     Americo Flores, EMT- P      10 minutes spent on the date of encounter performing chart review, history and exam, documentation and further activities as noted above with an additional 2 minutes for anoscopy.     Letitia Arevalo, NP-C  Colon and Rectal Surgery  [facility]    "

## 2023-12-12 NOTE — LETTER
"2023       RE: Susu Lagos  [id number]  [facility] [address]       Dear Colleague,    Thank you for referring your patient, Susu Lagos, to the [facility] [place] at . Please see a copy of my visit note below.    Colon and Rectal Surgery Follow-Up Clinic Note    RE: Susu Lagos  : 1965  MALGORZATA: 2023    Susu Lagos is a very pleasant 58 year old female with history of anal cancer s/p chemoradiation completed 20 here for follow-up.    MR Pelvis 23:  IMPRESSION: Radiation treatment changes without evidence of residual  or recurrent malignancy in the pelvis.    PET 23:  Overall, findings are stable post treatment changes with  hypermetabolic uptake within the anal canal.  No definite evidence of  recurrent or metastatic tumor.    Interval history: Susu has been doing well. No anorectal complaints.    Physical Examination:   /77 (BP Location: Left arm, Patient Position: Sitting, Cuff Size: Adult Large)   Pulse 91   Ht 5' 5\"   LMP 2016   SpO2 100%   BMI 34.51 kg/m    General: alert, oriented, in no acute distress, sitting comfortably  HEENT:moist mucous membranes  No palpable inguinal adenopathy.  Perianal external examination:  Perianal skin: Intact with no excoriation or lichenification. Telangectasia consistent with prior radiation.  Lesions: No evidence of an external lesion, nodularity, or induration in the perianal region.  Eversion of buttocks: There was not evidence of an anal fissure. Details: N/A.  Skin tags or external hemorrhoids: No.    Digital rectal examination: Was performed.   Sphincter tone: Good.  Palpable lesions: No.  Other: None.  Bimanual examination: was not performed.    Anoscopy: Was performed.   Hemorrhoids: Small internal hemorrhoids. Small fissure opened in the posterior midline with anosocopy.  Lesions: Non telangectasia " consistent with prior radiation.    Assessment/Plan: 58 year old female with history of anal cancer s/p chemoradiation completed 5/18/20. No evidence of recurrence on exam today. Follow up in 6 months to check for recurrence. Patient's questions were answered to her stated satisfaction and she is in agreement with this plan.     Anal Cancer Surveillance:  Digital rectal examination, anoscopy/flexible sigmoidoscopy, and inguinal node palpation every 6 months for 5 years. Next 6/2024  CT Chest and MR AP annually for 3 years. Completed      Medical history:  Past Medical History:   Diagnosis Date    Menorrhagia with irregular cycle 2/28/2016    Ovarian cyst 6/4/2015    Right, 6 cm dermoid on MRI-recommend removal.  7/29/2015 Surgery for right salpingoophorectomy.      Perimenopause 6/4/2015    Rectal cancer (H)     had radiation and chemo 2020       Surgical history:  Past Surgical History:   Procedure Laterality Date    CHOLECYSTECTOMY  2013    [facility]    COLONOSCOPY N/A 11/13/2018    Procedure: colonoscopy;  Surgeon: Sesar Dos Santos MD;  Location: [facility]    COLONOSCOPY N/A 3/3/2020    Procedure: COLONOSCOPY, WITH POLYPECTOMY AND BIOPSY;  Surgeon: Sesar Dos Santos MD;  Location: [facility]    DILATION AND CURETTAGE, OPERATIVE HYSTEROSCOPY, COMBINED N/A 2/29/2016    Procedure: COMBINED DILATION AND CURETTAGE, OPERATIVE HYSTEROSCOPY;  Surgeon: Panda Knight MD;  Location: [facility]    ESOPHAGOSCOPY, GASTROSCOPY, DUODENOSCOPY (EGD), COMBINED N/A 9/3/2020    Procedure: ESOPHAGOGASTRODUODENOSCOPY (EGD);  Surgeon: Karlos Burnett MD;  Location: [facility]    GYN SURGERY  1995    tubes tied    LAPAROSCOPY DIAGNOSTIC (GYN) Right 7/29/2015    Procedure: LAPAROSCOPY DIAGNOSTIC (GYN);  Surgeon: Lian Betancur DO;  Location: [facility]       Problem list:      Patient Active Problem List    Diagnosis Date Noted    Anemia with low platelet count (H24) 06/02/2021     Priority: Medium    Intractable pain 05/18/2020     Priority: Medium     Cervical cancer screening 03/20/2020     Priority: Medium     2008, 2010, 2011, 2013 All NIL paps.   4/22/15 NIL Pap, Neg HPV. Plan: Cotest in 5 yr       **  3/2020 Dx rectal cancer **  3/13/20 NIL Pap, Neg HPV. TZ absent. Plan: cotest in 3 years per provider and Oncologist.       Anal squamous cell carcinoma (H) 03/03/2020     Priority: Medium     Sent to . Check 7.21 [facility] for f/u [facility]  Susu Lagos presented to the primary care physician with 3 to 4 months history of blood in the stool.  Subsequently the patient was referred for colonoscopy on March 3, 2020.  The palpable rectal mass on digital examination.  Biopsy came back positive for squamous cell carcinoma, focally keratinizing.  The patient has a previous colonoscopy on November 2018 which shows no abnormalities.  The patient was started on chemoradiation regimen with 5-FU and mitomycin.      CARDIOVASCULAR SCREENING; LDL GOAL LESS THAN 160 02/25/2015     Priority: Medium       Medications:  Current Outpatient Medications   Medication Sig Dispense Refill    cholestyramine (QUESTRAN) 4 g packet Take 1 packet (4 g) by mouth 2 times daily (with meals) (Patient taking differently: Take 1 packet by mouth 2 times daily (with meals) Uses PRN) 60 packet 3    diltiazem 2% in PLO gel To anal opening three times daily.  Use a pea-sized amount.  Store at room temperature. (Patient not taking: Reported on 6/9/2023) 60 g 3    estradiol (ESTRACE) 0.1 MG/GM vaginal cream Topically 3x/week (Patient not taking: Reported on 6/9/2023)      Hydrophilic ointment PRN      Magnesium Oxide 250 MG TABS Take 2 tablets by mouth 2 times daily      Multiple Vitamin (MULTI-VITAMINS PO) Take 1 tablet by mouth daily       Omega-3 Fatty Acids (OMEGA 3 PO) Take 1 capsule by mouth daily       triamcinolone (KENALOG) 0.1 % external cream Apply topically 2 times daily (Patient not taking: Reported on 6/9/2023) 30 g 3       Allergies:  Allergies   Allergen Reactions    Rabies Vaccine  "(Hamster) Unknown     Scratch test resulted, it was not a good idea to give it to her.    Shellfish-Derived Products Nausea and Vomiting    Sulfa Antibiotics Rash       Family history:  Family History   Problem Relation Age of Onset    Hypertension Father     Myocardial Infarction Maternal Grandfather     Melanoma No family hx of        Social history:  Social History     Tobacco Use    Smoking status: Never    Smokeless tobacco: Never   Substance Use Topics    Alcohol use: Yes     Alcohol/week: 0.0 standard drinks of alcohol     Comment: 1-2 per month      Marital status: .    Nursing Notes:   Americo Flores, EMT  12/12/2023 11:23 AM  Signed  Chief Complaint   Patient presents with    RECHECK     JOSUÉ       Vitals:    12/12/23 1119   BP: 124/77   BP Location: Left arm   Patient Position: Sitting   Cuff Size: Adult Large   Pulse: 91   SpO2: 100%   Height: 5' 5\"       Body mass index is 34.51 kg/m .     Americo Flores, EMT- P      10 minutes spent on the date of encounter performing chart review, history and exam, documentation and further activities as noted above with an additional 2 minutes for anoscopy.       Again, thank you for allowing me to participate in the care of your patient.      Sincerely,    RADHA Higginbotham CNP    "

## 2023-12-12 NOTE — NURSING NOTE
"Chief Complaint   Patient presents with    RECHECK     JOSUÉ       Vitals:    12/12/23 1119   BP: 124/77   BP Location: Left arm   Patient Position: Sitting   Cuff Size: Adult Large   Pulse: 91   SpO2: 100%   Height: 5' 5\"       Body mass index is 34.51 kg/m .     Americo Flores, EMT- P    "

## 2023-12-19 ENCOUNTER — OFFICE VISIT (OUTPATIENT)
Dept: RADIATION THERAPY | Facility: OUTPATIENT CENTER | Age: 58
End: 2023-12-19
Payer: COMMERCIAL

## 2023-12-19 VITALS
RESPIRATION RATE: 16 BRPM | BODY MASS INDEX: 35.56 KG/M2 | DIASTOLIC BLOOD PRESSURE: 83 MMHG | SYSTOLIC BLOOD PRESSURE: 121 MMHG | HEART RATE: 76 BPM | WEIGHT: 213.67 LBS | OXYGEN SATURATION: 97 %

## 2023-12-19 DIAGNOSIS — C21.0 ANAL SQUAMOUS CELL CARCINOMA (H): Primary | ICD-10-CM

## 2023-12-19 NOTE — NURSING NOTE
FOLLOW-UP VISIT    Patient Name: Susu Lagos      : 1965     Age: 58 year old        ______________________________________________________________________________     Chief Complaint   Patient presents with    Radiation Therapy     Follow up visit with Dr. Remy     /83 (BP Location: Left arm, Patient Position: Sitting, Cuff Size: Adult Large)   Pulse 76   Resp 16   Wt 96.9 kg (213 lb 10.7 oz)   LMP 2016   SpO2 97%   BMI 35.56 kg/m       Date Radiation Completed: 20    Pain  Denies    Meds  Current Med List Reviewed: Yes  Medication Note:     Imaging  PET/CT: 23    On Chemo?: No  Nausea:no  Bowel:  has improved but still has some leaking/less control than prior to cancer treatment  Bladder: some urgency/leaking - same as above as noted for bowel  Skin: Warm  Dry  Intact  Energy Level: normal - just started an exercise program - she is looking forward to increasing strength/stamina/health  Vaginal dilator     Has not needed to use recenlty - reports no pain with intercourse    Other Appointments:     Date  Oncologist: Ashly Pierce   2024   Surgeon: Dr. Letitia Manning   23     Other Notes:

## 2023-12-19 NOTE — PROGRESS NOTES
Department of Radiation Oncology  Radiation Therapy Center  [facility] [place] [address]  [phone]       Radiation Oncology Follow-up Visit  23    Susu Lagos  MRN: [id number]   : 1965     DIAGNOSIS: squamous cell carcinoma of the anal canal  PATHOLOGY:  squamous cell carcinoma                              STAGE: clinical T1N0  INTENT OF RADIOTHERAPY: definitve CRT  CONCURRENT SYSTEMIC THERAPY: Yes  Oncologist: Dr. Real  Drug Name/Frequency 1: 5FU  Drug Name/Frequency 1: Mitomycin     ONCOLOGIC HISTORY:   Ms. 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       The patient initially presented with symptoms of hematochezia for several months eventually prompting further evaluation.     On 3/3/2020 the patient underwent colonoscopy which demonstrated a mass located at the dentate line in the left anterior position.  The mass was less than one third circumferentially involved.  Biopsy of the mass obtained demonstrated squamous cell carcinoma.  The patient was evaluated by gynecology on 3/13/2020, Pap smear was negative.  On 3/19/2020 the patient underwent a PET scan.  Imaging demonstrated a focal hypermetabolic activity in the anal canal, max SUV 12.6.  No regional or distant disease was noted.  On 3/19/2020 the patient underwent MRI which demonstrated a 1.2 x 0.9 cm mass located 2.7 cm above the anal verge.  No enlarged regional nodes were noted.  The patient was seen by Dr. Real of medical oncology who discussed treatment with chemoradiation therapy.Patient subsequently presented to radiation oncology clinic to discuss potential role of radiation therapy as a part of treatment strategy.            SITE OF TREATMENT: pelvis     DATES  OF TREATMENT: 20-20     TOTAL DOSE OF TREATMENT: 5040 cGy     DOSE PER FRACTION OF TREATMENT: 180 cGy x 28 fractions    INTERVAL SINCE COMPLETION OF RADIATION  THERAPY:   ~3.5 years    SUBJECTIVE:   The patient presents for follow up.     Post treatment MRI (20) was JOSE LUIS. PET on 20 demonstrated excellent response with indeterminate but mild activity in anal canal region without clear evidence of disease. Most recent PET (23) and MRI pelvis (23) remained without any clear evidence of disease. Exam by  Surgery team on 23 was JOSE LUIS.     Patient is overall doing well. She has intermittent bouts of bowel urgency and incontinence at times. These episodes are less frequent since pelvic floor rehab therapy in the past. No bleeding. No  trouble. No lower extremity lymphedema.     PHYSICAL EXAM:  /83 (BP Location: Left arm, Patient Position: Sitting, Cuff Size: Adult Large)   Pulse 76   Resp 16   Wt 96.9 kg (213 lb 10.7 oz)   LMP 2016   SpO2 97%   BMI 35.56 kg/m        LABS AND IMAGIN/921  RECTUM, CANAL, BIOPSY:  Squamous (anal) mucosa with no dysplasia, malignancy, or other significant histologic abnormality (rectal mucosa not represented)       22  PET  IMPRESSION: In this patient with a history of anal carcinoma, status  post chemoradiation completed on 2020:     1. Post treatment changes to the anus with similar soft tissue  thickening when compared with the previous examination. Mild decrease  in SUV uptake along the anus and perineum likely secondary to  inflammatory changes such as patient's known anal fissure and/or  hemorrhoids.   2. No metastatic disease in the chest, abdomen, or pelvis.  3. Diffuse hepatic steatosis.    23  PET  IMPRESSION:   Context: 58-year-old woman with anal carcinoma status post  chemoradiotherapy for for surveillance imaging:     Overall, findings are stable post treatment changes with  hypermetabolic uptake within the anal canal.  No definite evidence of  recurrent or metastatic tumor.      22  MRI  IMPRESSION:  No evidence for recurrent or metastatic malignancy in the pelvis.      9/5/23  MRI  IMPRESSION: Radiation treatment changes without evidence of residual  or recurrent malignancy in the pelvis.    IMPRESSION:   Ms. 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  She completed definitive CRT to a total dose of 50.4 Gy in 28 fractions with concurrent 5FU-[facility]  (completed on 5/18/20).    PLAN:   1. Post treatment MRI (8/6/20) was JOSE LUIS. PET on 7/28/20 demonstrated excellent response with indeterminate but mild activity in anal canal region without clear evidence of disease. Most recent PET (9/5/23) and MRI pelvis (9/5/23) remained without any clear evidence of disease. Exam by surgery team on 12/12/23 was JOSE LUIS.     2. GI bother. Urgency and mild incontinence.  Improved with pelvic floor rehab in the past continue exercises at home. Would consider re-referral to pelvic floor rehab team if needed in future.    3.  Continued oncologic surveillance from our colo-rectal colleagues.     4. RTC in 12 months.      Tyree Remy M.D.  Department of Radiation Oncology  [facility]

## 2023-12-19 NOTE — LETTER
2023         RE: Susu Lagos  [id number]  [facility] [address]        Dear Colleague,    Thank you for referring your patient, Susu Lagos, to the [facility] RADIATION [facility]. Please see a copy of my visit note below.       Department of Radiation Oncology  Radiation Therapy Center  [facility] [place] [address]  [phone]       Radiation Oncology Follow-up Visit  23    Susu Lagos  MRN: [id number]   : 1965     DIAGNOSIS: squamous cell carcinoma of the anal canal  PATHOLOGY:  squamous cell carcinoma                              STAGE: clinical T1N0  INTENT OF RADIOTHERAPY: definitve CRT  CONCURRENT SYSTEMIC THERAPY: Yes  Oncologist: Dr. Real  Drug Name/Frequency 1: 5FU  Drug Name/Frequency 1: Mitomycin     ONCOLOGIC HISTORY:   Ms. 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       The patient initially presented with symptoms of hematochezia for several months eventually prompting further evaluation.     On 3/3/2020 the patient underwent colonoscopy which demonstrated a mass located at the dentate line in the left anterior position.  The mass was less than one third circumferentially involved.  Biopsy of the mass obtained demonstrated squamous cell carcinoma.  The patient was evaluated by gynecology on 3/13/2020, Pap smear was negative.  On 3/19/2020 the patient underwent a PET scan.  Imaging demonstrated a focal hypermetabolic activity in the anal canal, max SUV 12.6.  No regional or distant disease was noted.  On 3/19/2020 the patient underwent MRI which demonstrated a 1.2 x 0.9 cm mass located 2.7 cm above the anal verge.  No enlarged regional nodes were noted.  The patient was seen by Dr. Real of medical oncology who discussed treatment with chemoradiation therapy.Patient subsequently presented to radiation oncology clinic to discuss potential role of radiation  therapy as a part of treatment strategy.            SITE OF TREATMENT: pelvis     DATES  OF TREATMENT: 20-20     TOTAL DOSE OF TREATMENT: 5040 cGy     DOSE PER FRACTION OF TREATMENT: 180 cGy x 28 fractions    INTERVAL SINCE COMPLETION OF RADIATION THERAPY:   ~3.5 years    SUBJECTIVE:   The patient presents for follow up.     Post treatment MRI (20) was JOSE LUIS. PET on 20 demonstrated excellent response with indeterminate but mild activity in anal canal region without clear evidence of disease. Most recent PET (23) and MRI pelvis (23) remained without any clear evidence of disease. Exam by  Surgery team on 23 was JOSE LUIS.     Patient is overall doing well. She has intermittent bouts of bowel urgency and incontinence at times. These episodes are less frequent since pelvic floor rehab therapy in the past. No bleeding. No  trouble. No lower extremity lymphedema.     PHYSICAL EXAM:  /83 (BP Location: Left arm, Patient Position: Sitting, Cuff Size: Adult Large)   Pulse 76   Resp 16   Wt 96.9 kg (213 lb 10.7 oz)   LMP 2016   SpO2 97%   BMI 35.56 kg/m        LABS AND IMAGIN/921  RECTUM, CANAL, BIOPSY:  Squamous (anal) mucosa with no dysplasia, malignancy, or other significant histologic abnormality (rectal mucosa not represented)       22  PET  IMPRESSION: In this patient with a history of anal carcinoma, status  post chemoradiation completed on 2020:     1. Post treatment changes to the anus with similar soft tissue  thickening when compared with the previous examination. Mild decrease  in SUV uptake along the anus and perineum likely secondary to  inflammatory changes such as patient's known anal fissure and/or  hemorrhoids.   2. No metastatic disease in the chest, abdomen, or pelvis.  3. Diffuse hepatic steatosis.    23  PET  IMPRESSION:   Context: 58-year-old woman with anal carcinoma status post  chemoradiotherapy for for surveillance imaging:     Overall,  findings are stable post treatment changes with  hypermetabolic uptake within the anal canal.  No definite evidence of  recurrent or metastatic tumor.      9/2/22  MRI  IMPRESSION:  No evidence for recurrent or metastatic malignancy in the pelvis.     9/5/23  MRI  IMPRESSION: Radiation treatment changes without evidence of residual  or recurrent malignancy in the pelvis.    IMPRESSION:   Ms. 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  She completed definitive CRT to a total dose of 50.4 Gy in 28 fractions with concurrent 5FU-[facility]  (completed on 5/18/20).    PLAN:   1. Post treatment MRI (8/6/20) was JOSE LUIS. PET on 7/28/20 demonstrated excellent response with indeterminate but mild activity in anal canal region without clear evidence of disease. Most recent PET (9/5/23) and MRI pelvis (9/5/23) remained without any clear evidence of disease. Exam by surgery team on 12/12/23 was JOSE LUIS.     2. GI bother. Urgency and mild incontinence.  Improved with pelvic floor rehab in the past continue exercises at home. Would consider re-referral to pelvic floor rehab team if needed in future.    3.  Continued oncologic surveillance from our colo-rectal colleagues.     4. RTC in 12 months.      Tyree Remy M.D.  Department of Radiation Oncology  [facility]

## 2024-02-05 ENCOUNTER — OFFICE VISIT (OUTPATIENT)
Dept: URGENT CARE | Facility: URGENT CARE | Age: 59
End: 2024-02-05
Payer: COMMERCIAL

## 2024-02-05 ENCOUNTER — ANCILLARY PROCEDURE (OUTPATIENT)
Dept: GENERAL RADIOLOGY | Facility: CLINIC | Age: 59
End: 2024-02-05
Attending: PHYSICIAN ASSISTANT
Payer: COMMERCIAL

## 2024-02-05 VITALS
HEART RATE: 108 BPM | DIASTOLIC BLOOD PRESSURE: 90 MMHG | BODY MASS INDEX: 34.45 KG/M2 | TEMPERATURE: 98.9 F | WEIGHT: 207 LBS | OXYGEN SATURATION: 96 % | SYSTOLIC BLOOD PRESSURE: 128 MMHG | RESPIRATION RATE: 16 BRPM

## 2024-02-05 DIAGNOSIS — R05.1 ACUTE COUGH: Primary | ICD-10-CM

## 2024-02-05 DIAGNOSIS — J01.90 ACUTE SINUSITIS WITH SYMPTOMS > 10 DAYS: ICD-10-CM

## 2024-02-05 DIAGNOSIS — R05.1 ACUTE COUGH: ICD-10-CM

## 2024-02-05 PROCEDURE — 99214 OFFICE O/P EST MOD 30 MIN: CPT | Performed by: PHYSICIAN ASSISTANT

## 2024-02-05 PROCEDURE — 71046 X-RAY EXAM CHEST 2 VIEWS: CPT | Mod: TC | Performed by: RADIOLOGY

## 2024-02-05 RX ORDER — PREDNISONE 20 MG/1
40 TABLET ORAL DAILY
Qty: 10 TABLET | Refills: 0 | Status: SHIPPED | OUTPATIENT
Start: 2024-02-05 | End: 2024-02-10

## 2024-02-05 RX ORDER — ALBUTEROL SULFATE 90 UG/1
AEROSOL, METERED RESPIRATORY (INHALATION)
Qty: 18 G | Refills: 0 | Status: SHIPPED | OUTPATIENT
Start: 2024-02-05

## 2024-02-05 NOTE — PROGRESS NOTES
"  Assessment & Plan     Acute cough  Reassurance, chest x-ray is clear. Will treat with prednisone burst and albuterol every 4-6 hrs as needed. Continue with supportive care. Return to clinic if symptoms worsen or do not improve; otherwise follow up as needed       - XR Chest 2 Views; Future  - predniSONE (DELTASONE) 20 MG tablet; Take 2 tablets (40 mg) by mouth daily for 5 days  - albuterol (PROAIR HFA/PROVENTIL HFA/VENTOLIN HFA) 108 (90 Base) MCG/ACT inhaler; Inhale 2 puffs every 4-6 hours as needed for cough, wheezing, or shortness of breath    Acute sinusitis with symptoms > 10 days    - amoxicillin-clavulanate (AUGMENTIN) 875-125 MG tablet; Take 1 tablet by mouth 2 times daily for 7 days                  Return in about 1 week (around 2/12/2024), or if symptoms worsen or fail to improve.            Subjective   Chief Complaint   Patient presents with    Cough     About 1 1/2 wks ago pt had \"a bad cold\", now has developed into a cough and chest congestion.         HPI     URI Adult    Onset of symptoms was 1.5 week(s) ago.  Course of illness is same.    Severity moderate  Current and Associated symptoms: cough, congestion, sinus pain/pressure   Treatment measures tried include OTC Cough med.  Predisposing factors include None.                      Objective    BP (!) 128/90   Pulse 108   Temp 98.9  F (37.2  C) (Tympanic)   Resp 16   Wt 93.9 kg (207 lb)   LMP 01/29/2016   SpO2 96%   BMI 34.45 kg/m    Body mass index is 34.45 kg/m .  Physical Exam  Constitutional:       Appearance: She is well-developed.   HENT:      Head: Normocephalic.      Right Ear: Tympanic membrane and ear canal normal.      Left Ear: Tympanic membrane and ear canal normal.   Eyes:      Conjunctiva/sclera: Conjunctivae normal.   Cardiovascular:      Rate and Rhythm: Normal rate.      Heart sounds: Normal heart sounds.   Pulmonary:      Effort: Pulmonary effort is normal.      Breath sounds: Normal breath sounds.      Comments: " Frequent reactive cough   Skin:     General: Skin is warm and dry.      Findings: No rash.   Psychiatric:         Behavior: Behavior normal.            CXR - Reviewed and interpreted by me: no acute infiltrates         Signed Electronically by: Linda Hua PA-C

## 2024-02-06 ENCOUNTER — NURSE TRIAGE (OUTPATIENT)
Dept: NURSING | Facility: CLINIC | Age: 59
End: 2024-02-06
Payer: COMMERCIAL

## 2024-02-07 NOTE — TELEPHONE ENCOUNTER
Nurse Triage SBAR    Situation: Medication question.     Background: Patient calling. Pt is on prednisone, albuterol and augmenting.      Assessment: Cough. Patient wants to know if she can take nyquil with her new medications. Patient declined triage. Per micromedex no drug on drug interaction with the nyquil.     Protocol Recommended Disposition: Home care/ Telephone Advise    Recommendation: According to the protocol, Patient should do home care. Home care reviewed. Advised Patient that the patient needs to do home care. Home care reviewed. Care advice given. Patient verbalizes understanding and agrees with plan of care. Reviewed concerning symptoms and when to call back.     Adali Farr RN Nursing Advisor 2/6/2024 10:35 PM     Reason for Disposition   Caller has medicine question, adult has minor symptoms, caller declines triage, AND triager answers question    Additional Information   Negative: [1] Intentional drug overdose AND [2] suicidal thoughts or ideas   Negative: Drug overdose and triager unable to answer question   Negative: Caller requesting a renewal or refill of a medicine patient is currently taking   Negative: Caller requesting information unrelated to medicine   Negative: Caller requesting information about COVID-19 Vaccine   Negative: Caller requesting information about Emergency Contraception   Negative: Caller requesting information about Combined Birth Control Pills   Negative: Caller requesting information about Progestin Birth Control Pills   Negative: Caller requesting information about Post-Op pain or medicines   Negative: Caller requesting a prescription antibiotic (such as Penicillin) for Strep throat and has a positive culture result   Negative: Caller requesting a prescription anti-viral med (such as Tamiflu) and has influenza (flu) symptoms   Negative: Immunization reaction suspected   Negative: Rash while taking a medicine or within 3 days of stopping it   Negative: [1] Asthma and  [2] having symptoms of asthma (cough, wheezing, etc.)   Negative: [1] Symptom of illness (e.g., headache, abdominal pain, earache, vomiting) AND [2] more than mild   Negative: Breastfeeding questions about mother's medicines and diet   Negative: MORE THAN A DOUBLE DOSE of a prescription or over-the-counter (OTC) drug   Negative: [1] DOUBLE DOSE (an extra dose or lesser amount) of prescription drug AND [2] any symptoms (e.g., dizziness, nausea, pain, sleepiness)   Negative: [1] DOUBLE DOSE (an extra dose or lesser amount) of over-the-counter (OTC) drug AND [2] any symptoms (e.g., dizziness, nausea, pain, sleepiness)   Negative: Took another person's prescription drug   Negative: [1] DOUBLE DOSE (an extra dose or lesser amount) of prescription drug AND [2] NO symptoms  (Exception: A double dose of antibiotics.)   Negative: Diabetes drug error or overdose (e.g., took wrong type of insulin or took extra dose)   Negative: [1] Prescription not at pharmacy AND [2] was prescribed by PCP recently (Exception: Triager has access to EMR and prescription is recorded there. Go to Home Care and confirm for pharmacy.)   Negative: [1] Pharmacy calling with prescription question AND [2] triager unable to answer question   Negative: [1] Caller has URGENT medicine question about med that PCP or specialist prescribed AND [2] triager unable to answer question   Negative: Medicine patch causing local rash or itching   Negative: [1] Caller has medicine question about med NOT prescribed by PCP AND [2] triager unable to answer question (e.g., compatibility with other med, storage)   Negative: Prescription request for new medicine (not a refill)   Negative: [1] Caller has NON-URGENT medicine question about med that PCP prescribed AND [2] triager unable to answer question   Negative: Caller wants to use a complementary or alternative medicine   Negative: [1] Prescription prescribed recently is not at pharmacy AND [2] triager has access to  patient's EMR AND [3] prescription is recorded in the EMR   Negative: [1] DOUBLE DOSE (an extra dose or lesser amount) of over-the-counter (OTC) drug AND [2] NO symptoms   Negative: [1] DOUBLE DOSE (an extra dose or lesser amount) of antibiotic drug AND [2] NO symptoms   Negative: Caller has medicine question only, adult not sick, AND triager answers question    Protocols used: Medication Question Call-A-AH

## 2024-02-07 NOTE — TELEPHONE ENCOUNTER
Nurse Triage SBAR    Situation: Medication question    Background: Patient calling. Pt is on prednisone, albuterol and augmenting.     Assessment: Cough. Patient wants to know if she can take ibuprofen or tylenol. Patient declined triage. Per micromedex a drug on drug interaction with ibuprofen and prednisone can case GI upset such as ulcers and bleeding.     Protocol Recommended Disposition: Home care/ Telephone Advise    Recommendation: According to the protocol, Patient should do home care. Home care reviewed. Advised Patient that the patient needs to do home care. Home care reviewed. Care advice given. Patient verbalizes understanding and agrees with plan of care. Reviewed concerning symptoms and when to call back.     Adali Farr RN Nursing Advisor 2/6/2024 9:32 PM     Reason for Disposition   Caller has medicine question, adult has minor symptoms, caller declines triage, AND triager answers question    Additional Information   Negative: [1] Intentional drug overdose AND [2] suicidal thoughts or ideas   Negative: Drug overdose and triager unable to answer question   Negative: Caller requesting a renewal or refill of a medicine patient is currently taking   Negative: Caller requesting information unrelated to medicine   Negative: Caller requesting information about COVID-19 Vaccine   Negative: Caller requesting information about Emergency Contraception   Negative: Caller requesting information about Combined Birth Control Pills   Negative: Caller requesting information about Progestin Birth Control Pills   Negative: Caller requesting information about Post-Op pain or medicines   Negative: Caller requesting a prescription antibiotic (such as Penicillin) for Strep throat and has a positive culture result   Negative: Caller requesting a prescription anti-viral med (such as Tamiflu) and has influenza (flu) symptoms   Negative: Immunization reaction suspected   Negative: Rash while taking a medicine or within 3  days of stopping it   Negative: [1] Asthma and [2] having symptoms of asthma (cough, wheezing, etc.)   Negative: [1] Symptom of illness (e.g., headache, abdominal pain, earache, vomiting) AND [2] more than mild   Negative: Breastfeeding questions about mother's medicines and diet   Negative: MORE THAN A DOUBLE DOSE of a prescription or over-the-counter (OTC) drug   Negative: [1] DOUBLE DOSE (an extra dose or lesser amount) of prescription drug AND [2] any symptoms (e.g., dizziness, nausea, pain, sleepiness)   Negative: [1] DOUBLE DOSE (an extra dose or lesser amount) of over-the-counter (OTC) drug AND [2] any symptoms (e.g., dizziness, nausea, pain, sleepiness)   Negative: Took another person's prescription drug   Negative: [1] DOUBLE DOSE (an extra dose or lesser amount) of prescription drug AND [2] NO symptoms  (Exception: A double dose of antibiotics.)   Negative: Diabetes drug error or overdose (e.g., took wrong type of insulin or took extra dose)   Negative: [1] Prescription not at pharmacy AND [2] was prescribed by PCP recently (Exception: Triager has access to EMR and prescription is recorded there. Go to Home Care and confirm for pharmacy.)   Negative: [1] Pharmacy calling with prescription question AND [2] triager unable to answer question   Negative: [1] Caller has URGENT medicine question about med that PCP or specialist prescribed AND [2] triager unable to answer question   Negative: Medicine patch causing local rash or itching   Negative: [1] Caller has medicine question about med NOT prescribed by PCP AND [2] triager unable to answer question (e.g., compatibility with other med, storage)   Negative: Prescription request for new medicine (not a refill)   Negative: [1] Caller has NON-URGENT medicine question about med that PCP prescribed AND [2] triager unable to answer question   Negative: Caller wants to use a complementary or alternative medicine   Negative: [1] Prescription prescribed recently is not  at pharmacy AND [2] triager has access to patient's EMR AND [3] prescription is recorded in the EMR   Negative: [1] DOUBLE DOSE (an extra dose or lesser amount) of over-the-counter (OTC) drug AND [2] NO symptoms   Negative: [1] DOUBLE DOSE (an extra dose or lesser amount) of antibiotic drug AND [2] NO symptoms    Protocols used: Medication Question Call-A-AH

## 2024-02-13 ENCOUNTER — TELEPHONE (OUTPATIENT)
Dept: SURGERY | Facility: CLINIC | Age: 59
End: 2024-02-13
Payer: COMMERCIAL

## 2024-02-13 NOTE — TELEPHONE ENCOUNTER
Left Voicemail (1st Attempt) and Sent Mychart (1st Attempt) for the patient to call back and schedule the following:    Appointment type: RETURN PATIENT  Provider: Letitia Arevalo  Return date: Reschedule 6/12/24 as provider unavailable  Specialty phone number: [phone]  Additional appointment(s) needed: co  Additonal Notes: next available

## 2024-04-25 ENCOUNTER — TELEPHONE (OUTPATIENT)
Dept: SURGERY | Facility: CLINIC | Age: 59
End: 2024-04-25
Payer: COMMERCIAL

## 2024-04-25 NOTE — TELEPHONE ENCOUNTER
[facility] Call Center    Phone Message    May a detailed message be left on voicemail: yes     Reason for Call: Other: Pt states she believe she has another fissure, she is bleeding heavily. Please call her back.      Action Taken: Other: clr    Travel Screening: Not Applicable

## 2024-04-26 NOTE — TELEPHONE ENCOUNTER
Susu was last seen by Letitia Arevalo NP in December 2023.     Last note:   Assessment/Plan: 58 year old female with history of anal cancer s/p chemoradiation completed 5/18/20. No evidence of recurrence on exam today. Follow up in 6 months to check for recurrence. Patient's questions were answered to her stated satisfaction and she is in agreement with this plan.      Anal Cancer Surveillance:  Digital rectal examination, anoscopy/flexible sigmoidoscopy, and inguinal node palpation every 6 months for 5 years. Next 6/2024  CT Chest and MR AP annually for 3 years. Completed    She does have a history of an anal fissure. Treated with diltiazem. She calls in with painless rectal bleeding. Reports a copious amount of blood (filling the toilet) with BMs on Tuesday and Thursday. Due to hx of anal cancer scheduled exam with Letitia Arevalo NP.

## 2024-04-29 ENCOUNTER — OFFICE VISIT (OUTPATIENT)
Dept: SURGERY | Facility: CLINIC | Age: 59
End: 2024-04-29
Payer: COMMERCIAL

## 2024-04-29 VITALS — HEART RATE: 110 BPM | SYSTOLIC BLOOD PRESSURE: 136 MMHG | DIASTOLIC BLOOD PRESSURE: 95 MMHG | OXYGEN SATURATION: 96 %

## 2024-04-29 DIAGNOSIS — C21.0 ANAL SQUAMOUS CELL CARCINOMA (H): ICD-10-CM

## 2024-04-29 DIAGNOSIS — K62.5 RECTAL BLEEDING: Primary | ICD-10-CM

## 2024-04-29 PROCEDURE — 45330 DIAGNOSTIC SIGMOIDOSCOPY: CPT | Performed by: NURSE PRACTITIONER

## 2024-04-29 PROCEDURE — 99213 OFFICE O/P EST LOW 20 MIN: CPT | Mod: 25 | Performed by: NURSE PRACTITIONER

## 2024-04-29 ASSESSMENT — PAIN SCALES - GENERAL: PAINLEVEL: NO PAIN (0)

## 2024-04-29 NOTE — PROGRESS NOTES
Colon and Rectal Surgery Follow-Up Clinic Note    RE: Susu Lagos  : 1965  MALGORZATA: 2024    Susu Lagos is a very pleasant 58 year old female with history of anal cancer s/p chemoradiation completed 20 here for follow-up.    MR Pelvis 23:  IMPRESSION: Radiation treatment changes without evidence of residual  or recurrent malignancy in the pelvis.    PET 23:  Overall, findings are stable post treatment changes with  hypermetabolic uptake within the anal canal.  No definite evidence of  recurrent or metastatic tumor.    Interval history: Susu developed heavy rectal bleeding on Tuesday and Thursday of last week. This filled the toilet and was not associated with any pain. No prolapsing tissue. Bowel movements prior to the Tuesday episode were normal but had some diarrhea on Thursday since she couldn't take her morning fiber supplement. No bleeding in the past 3 days.  Colonoscopy 3/2020 at the time of her diagnosis.    Physical Examination:   BP (!) 136/95 (BP Location: Right arm, Patient Position: Sitting, Cuff Size: Adult Regular)   Pulse 110   LMP 2016   SpO2 96%   General: alert, oriented, in no acute distress, sitting comfortably  HEENT:moist mucous membranes  No palpable inguinal adenopathy.    Perianal external examination:  Perianal skin: Intact with no excoriation or lichenification. Telangectasia consistent with prior radiation.  Lesions: No evidence of an external lesion, nodularity, or induration in the perianal region.  Eversion of buttocks: There was not evidence of an anal fissure. Details: N/A.  Skin tags or external hemorrhoids: Small external hemorrhoidal skin tags    Digital rectal examination: Was performed.   Sphincter tone: Good.  Palpable lesions: No.  Other: None.  Bimanual examination: was not performed.    Anoscopy: Was performed.   Hemorrhoids: Small internal hemorrhoids without active bleeding  Lesions: Non telangectasia consistent with prior  radiation.    Procedure:  After discussing the risks and benefits, the patient agreed to proceed with flexible sigmoidoscopy.    A total of two fleet enema(s) were administered for a preparation.The sigmoidoscope was inserted to 30 cm and stopped due to inadequate bowel prep; stool blocking lumen.    Findings: normal mucosa without polyps, tumors or diverticula, no biopsies taken, procedure well tolerated without complications. Telangectasia consistent with prior radiation and this was friable with bleeding on retroflexion.  Retroflexion: Was performed. This was normal.  The patient tolerated the procedure well.    Procedure was performed under a collaborative agreement with Dr. Otto Mcduffie MD, Chief of the Division of Colon and Rectal Surgery    Assessment/Plan: 58 year old female with history of anal cancer s/p chemoradiation completed 5/18/20. With rectal bleeding. No evidence of recurrence on exam today. I think bleeding is likely due to internal hemorrhoids and/or telangectasia from prior radiation. Will have her increase her fiber supplement to twice a day and if bleeding returns, will try to see her at the time of the bleeding and would consider repeat colonoscopy. Otherwise, follow up in 6 months to check for recurrence. Patient's questions were answered to her stated satisfaction and she is in agreement with this plan.     Anal Cancer Surveillance:  Digital rectal examination, anoscopy/flexible sigmoidoscopy, and inguinal node palpation every 6 months for 5 years. Next 10/2024  CT Chest and MR AP annually for 3 years. Completed      Medical history:  Past Medical History:   Diagnosis Date    Menorrhagia with irregular cycle 2/28/2016    Ovarian cyst 6/4/2015    Right, 6 cm dermoid on MRI-recommend removal.  7/29/2015 Surgery for right salpingoophorectomy.      Perimenopause 6/4/2015    Rectal cancer (H)     had radiation and chemo 2020       Surgical history:  Past Surgical History:   Procedure  Laterality Date    CHOLECYSTECTOMY  2013    LSC    COLONOSCOPY N/A 11/13/2018    Procedure: colonoscopy;  Surgeon: Sesar Dos Santos MD;  Location: [facility]    COLONOSCOPY N/A 3/3/2020    Procedure: COLONOSCOPY, WITH POLYPECTOMY AND BIOPSY;  Surgeon: Sesar Dos Santos MD;  Location: [facility]    DILATION AND CURETTAGE, OPERATIVE HYSTEROSCOPY, COMBINED N/A 2/29/2016    Procedure: COMBINED DILATION AND CURETTAGE, OPERATIVE HYSTEROSCOPY;  Surgeon: Panda Knight MD;  Location: [facility]    ESOPHAGOSCOPY, GASTROSCOPY, DUODENOSCOPY (EGD), COMBINED N/A 9/3/2020    Procedure: ESOPHAGOGASTRODUODENOSCOPY (EGD);  Surgeon: Karlos Burnett MD;  Location: [facility]    GYN SURGERY  1995    tubes tied    LAPAROSCOPY DIAGNOSTIC (GYN) Right 7/29/2015    Procedure: LAPAROSCOPY DIAGNOSTIC (GYN);  Surgeon: Lian Betancur DO;  Location: [facility]       Problem list:      Patient Active Problem List    Diagnosis Date Noted    Anemia with low platelet count (H24) 06/02/2021     Priority: Medium    Intractable pain 05/18/2020     Priority: Medium    Cervical cancer screening 03/20/2020     Priority: Medium     2008, 2010, 2011, 2013 All NIL paps.   4/22/15 NIL Pap, Neg HPV. Plan: Cotest in 5 yr       **  3/2020 Dx rectal cancer **  3/13/20 NIL Pap, Neg HPV. TZ absent. Plan: cotest in 3 years per provider and Oncologist.       Anal squamous cell carcinoma (H) 03/03/2020     Priority: Medium     Sent to [facility]. Check 7.21 Farhan for f/u [facility]  Susu Lagos presented to the primary care physician with 3 to 4 months history of blood in the stool.  Subsequently the patient was referred for colonoscopy on March 3, 2020.  The palpable rectal mass on digital examination.  Biopsy came back positive for squamous cell carcinoma, focally keratinizing.  The patient has a previous colonoscopy on November 2018 which shows no abnormalities.  The patient was started on chemoradiation regimen with 5-FU and mitomycin.      CARDIOVASCULAR SCREENING; LDL GOAL LESS  THAN 160 02/25/2015     Priority: Medium       Medications:  Current Outpatient Medications   Medication Sig Dispense Refill    albuterol (PROAIR HFA/PROVENTIL HFA/VENTOLIN HFA) 108 (90 Base) MCG/ACT inhaler Inhale 2 puffs every 4-6 hours as needed for cough, wheezing, or shortness of breath 18 g 0    Magnesium Oxide 250 MG TABS Take 2 tablets by mouth 2 times daily      Multiple Vitamin (MULTI-VITAMINS PO) Take 1 tablet by mouth daily       Omega-3 Fatty Acids (OMEGA 3 PO) Take 1 capsule by mouth daily       cholestyramine (QUESTRAN) 4 g packet Take 1 packet (4 g) by mouth 2 times daily (with meals) (Patient not taking: Reported on 2/5/2024) 60 packet 3    diltiazem 2% in PLO gel To anal opening three times daily.  Use a pea-sized amount.  Store at room temperature. (Patient not taking: Reported on 6/9/2023) 60 g 3    estradiol (ESTRACE) 0.1 MG/GM vaginal cream Topically 3x/week (Patient not taking: Reported on 6/9/2023)      Hydrophilic ointment PRN (Patient not taking: Reported on 2/5/2024)      triamcinolone (KENALOG) 0.1 % external cream Apply topically 2 times daily (Patient not taking: Reported on 6/9/2023) 30 g 3       Allergies:  Allergies   Allergen Reactions    Rabies Vaccine (Hamster) Unknown     Scratch test resulted, it was not a good idea to give it to her.    Shellfish-Derived Products Nausea and Vomiting    Sulfa Antibiotics Rash       Family history:  Family History   Problem Relation Age of Onset    Hypertension Father     Myocardial Infarction Maternal Grandfather     Melanoma No family hx of        Social history:  Social History     Tobacco Use    Smoking status: Never    Smokeless tobacco: Never   Substance Use Topics    Alcohol use: Yes     Alcohol/week: 0.0 standard drinks of alcohol     Comment: 1-2 per month      Marital status: .    Nursing Notes:   Shoaib Chamberlain, EMT  4/29/2024  9:23 AM  Signed  Chief Complaint   Patient presents with    Follow Up       Vitals:    04/29/24 0920    BP: (!) 136/95   BP Location: Right arm   Patient Position: Sitting   Cuff Size: Adult Regular   Pulse: 110   SpO2: 96%       There is no height or weight on file to calculate BMI.    Shoaib Chamberlain EMT-P      10 minutes spent on the date of encounter performing chart review, history and exam, documentation and further activities as noted above with an additional 2 minutes for anoscopy and 10 minutes for flexible sigmoidoscopy.     Letitia Arevalo NP-C  Colon and Rectal Surgery  [facility]

## 2024-04-29 NOTE — NURSING NOTE
Chief Complaint   Patient presents with    Follow Up       Vitals:    04/29/24 0920   BP: (!) 136/95   BP Location: Right arm   Patient Position: Sitting   Cuff Size: Adult Regular   Pulse: 110   SpO2: 96%       There is no height or weight on file to calculate BMI.    Shoaib Chamberlain EMT-P

## 2024-04-29 NOTE — LETTER
2024       RE: Susu Lagos  [id number]  [facility] [address]       Dear Colleague,    Thank you for referring your patient, Susu Lagos, to the [facility] COLON AND RECTAL SURGERY [facility]INIC [place] at [facility]. Please see a copy of my visit note below.    Colon and Rectal Surgery Follow-Up Clinic Note    RE: Susu Lagos  : 1965  MALGORZATA: 2024    Susu Lagos is a very pleasant 58 year old female with history of anal cancer s/p chemoradiation completed 20 here for follow-up.    MR Pelvis 23:  IMPRESSION: Radiation treatment changes without evidence of residual  or recurrent malignancy in the pelvis.    PET 23:  Overall, findings are stable post treatment changes with  hypermetabolic uptake within the anal canal.  No definite evidence of  recurrent or metastatic tumor.    Interval history: Susu developed heavy rectal bleeding on Tuesday and Thursday of last week. This filled the toilet and was not associated with any pain. No prolapsing tissue. Bowel movements prior to the Tuesday episode were normal but had some diarrhea on Thursday since she couldn't take her morning fiber supplement. No bleeding in the past 3 days.  Colonoscopy 3/2020 at the time of her diagnosis.    Physical Examination:   BP (!) 136/95 (BP Location: Right arm, Patient Position: Sitting, Cuff Size: Adult Regular)   Pulse 110   LMP 2016   SpO2 96%   General: alert, oriented, in no acute distress, sitting comfortably  HEENT:moist mucous membranes  No palpable inguinal adenopathy.    Perianal external examination:  Perianal skin: Intact with no excoriation or lichenification. Telangectasia consistent with prior radiation.  Lesions: No evidence of an external lesion, nodularity, or induration in the perianal region.  Eversion of buttocks: There was not evidence of an anal fissure. Details: N/A.  Skin tags or external hemorrhoids:  Small external hemorrhoidal skin tags    Digital rectal examination: Was performed.   Sphincter tone: Good.  Palpable lesions: No.  Other: None.  Bimanual examination: was not performed.    Anoscopy: Was performed.   Hemorrhoids: Small internal hemorrhoids without active bleeding  Lesions: Non telangectasia consistent with prior radiation.    Procedure:  After discussing the risks and benefits, the patient agreed to proceed with flexible sigmoidoscopy.    A total of two fleet enema(s) were administered for a preparation.The sigmoidoscope was inserted to 30 cm and stopped due to inadequate bowel prep; stool blocking lumen.    Findings: normal mucosa without polyps, tumors or diverticula, no biopsies taken, procedure well tolerated without complications. Telangectasia consistent with prior radiation and this was friable with bleeding on retroflexion.  Retroflexion: Was performed. This was normal.  The patient tolerated the procedure well.    Procedure was performed under a collaborative agreement with Dr. Otto Mcduffei MD, Chief of the Division of Colon and Rectal Surgery    Assessment/Plan: 58 year old female with history of anal cancer s/p chemoradiation completed 5/18/20. With rectal bleeding. No evidence of recurrence on exam today. I think bleeding is likely due to internal hemorrhoids and/or telangectasia from prior radiation. Will have her increase her fiber supplement to twice a day and if bleeding returns, will try to see her at the time of the bleeding and would consider repeat colonoscopy. Otherwise, follow up in 6 months to check for recurrence. Patient's questions were answered to her stated satisfaction and she is in agreement with this plan.     Anal Cancer Surveillance:  Digital rectal examination, anoscopy/flexible sigmoidoscopy, and inguinal node palpation every 6 months for 5 years. Next 10/2024  CT Chest and MR AP annually for 3 years. Completed      Medical history:  Past Medical History:    Diagnosis Date    Menorrhagia with irregular cycle 2/28/2016    Ovarian cyst 6/4/2015    Right, 6 cm dermoid on MRI-recommend removal.  7/29/2015 Surgery for right salpingoophorectomy.      Perimenopause 6/4/2015    Rectal cancer (H)     had radiation and chemo 2020       Surgical history:  Past Surgical History:   Procedure Laterality Date    CHOLECYSTECTOMY  2013    LSC    COLONOSCOPY N/A 11/13/2018    Procedure: colonoscopy;  Surgeon: Sesar Dos Santos MD;  Location: [facility]    COLONOSCOPY N/A 3/3/2020    Procedure: COLONOSCOPY, WITH POLYPECTOMY AND BIOPSY;  Surgeon: Sesar Dos Santos MD;  Location: [facility]    DILATION AND CURETTAGE, OPERATIVE HYSTEROSCOPY, COMBINED N/A 2/29/2016    Procedure: COMBINED DILATION AND CURETTAGE, OPERATIVE HYSTEROSCOPY;  Surgeon: Panda Knight MD;  Location: [facility]    ESOPHAGOSCOPY, GASTROSCOPY, DUODENOSCOPY (EGD), COMBINED N/A 9/3/2020    Procedure: ESOPHAGOGASTRODUODENOSCOPY (EGD);  Surgeon: Karlos Burnett MD;  Location: [facility]    GYN SURGERY  1995    tubes tied    LAPAROSCOPY DIAGNOSTIC (GYN) Right 7/29/2015    Procedure: LAPAROSCOPY DIAGNOSTIC (GYN);  Surgeon: Lian Betancur DO;  Location: [facility]       Problem list:      Patient Active Problem List    Diagnosis Date Noted    Anemia with low platelet count (H24) 06/02/2021     Priority: Medium    Intractable pain 05/18/2020     Priority: Medium    Cervical cancer screening 03/20/2020     Priority: Medium     2008, 2010, 2011, 2013 All NIL paps.   4/22/15 NIL Pap, Neg HPV. Plan: Cotest in 5 yr       **  3/2020 Dx rectal cancer **  3/13/20 NIL Pap, Neg HPV. TZ absent. Plan: cotest in 3 years per provider and Oncologist.       Anal squamous cell carcinoma (H) 03/03/2020     Priority: Medium     Sent to . Check 7.21 Farhan for f/u [facility]  Susu Lagos presented to the primary care physician with 3 to 4 months history of blood in the stool.  Subsequently the patient was referred for colonoscopy on March 3, 2020.  The  palpable rectal mass on digital examination.  Biopsy came back positive for squamous cell carcinoma, focally keratinizing.  The patient has a previous colonoscopy on November 2018 which shows no abnormalities.  The patient was started on chemoradiation regimen with 5-FU and mitomycin.      CARDIOVASCULAR SCREENING; LDL GOAL LESS THAN 160 02/25/2015     Priority: Medium       Medications:  Current Outpatient Medications   Medication Sig Dispense Refill    albuterol (PROAIR HFA/PROVENTIL HFA/VENTOLIN HFA) 108 (90 Base) MCG/ACT inhaler Inhale 2 puffs every 4-6 hours as needed for cough, wheezing, or shortness of breath 18 g 0    Magnesium Oxide 250 MG TABS Take 2 tablets by mouth 2 times daily      Multiple Vitamin (MULTI-VITAMINS PO) Take 1 tablet by mouth daily       Omega-3 Fatty Acids (OMEGA 3 PO) Take 1 capsule by mouth daily       cholestyramine (QUESTRAN) 4 g packet Take 1 packet (4 g) by mouth 2 times daily (with meals) (Patient not taking: Reported on 2/5/2024) 60 packet 3    diltiazem 2% in PLO gel To anal opening three times daily.  Use a pea-sized amount.  Store at room temperature. (Patient not taking: Reported on 6/9/2023) 60 g 3    estradiol (ESTRACE) 0.1 MG/GM vaginal cream Topically 3x/week (Patient not taking: Reported on 6/9/2023)      Hydrophilic ointment PRN (Patient not taking: Reported on 2/5/2024)      triamcinolone (KENALOG) 0.1 % external cream Apply topically 2 times daily (Patient not taking: Reported on 6/9/2023) 30 g 3       Allergies:  Allergies   Allergen Reactions    Rabies Vaccine (Hamster) Unknown     Scratch test resulted, it was not a good idea to give it to her.    Shellfish-Derived Products Nausea and Vomiting    Sulfa Antibiotics Rash       Family history:  Family History   Problem Relation Age of Onset    Hypertension Father     Myocardial Infarction Maternal Grandfather     Melanoma No family hx of        Social history:  Social History     Tobacco Use    Smoking status: Never     Smokeless tobacco: Never   Substance Use Topics    Alcohol use: Yes     Alcohol/week: 0.0 standard drinks of alcohol     Comment: 1-2 per month      Marital status: .    Nursing Notes:   Shoaib Chamberlain, EMT  4/29/2024  9:23 AM  Signed  Chief Complaint   Patient presents with    Follow Up       Vitals:    04/29/24 0920   BP: (!) 136/95   BP Location: Right arm   Patient Position: Sitting   Cuff Size: Adult Regular   Pulse: 110   SpO2: 96%       There is no height or weight on file to calculate BMI.    Shoaib Chamberlain EMT-P      10 minutes spent on the date of encounter performing chart review, history and exam, documentation and further activities as noted above with an additional 2 minutes for anoscopy and 10 minutes for flexible sigmoidoscopy.               Again, thank you for allowing me to participate in the care of your patient.      Sincerely,    RADHA Higginbotham CNP

## 2024-06-02 ENCOUNTER — HEALTH MAINTENANCE LETTER (OUTPATIENT)
Age: 59
End: 2024-06-02

## 2024-06-03 ENCOUNTER — ONCOLOGY VISIT (OUTPATIENT)
Dept: ONCOLOGY | Facility: CLINIC | Age: 59
End: 2024-06-03
Attending: NURSE PRACTITIONER
Payer: COMMERCIAL

## 2024-06-03 ENCOUNTER — LAB (OUTPATIENT)
Dept: LAB | Facility: CLINIC | Age: 59
End: 2024-06-03
Payer: COMMERCIAL

## 2024-06-03 VITALS
WEIGHT: 209 LBS | HEIGHT: 65 IN | OXYGEN SATURATION: 95 % | SYSTOLIC BLOOD PRESSURE: 128 MMHG | BODY MASS INDEX: 34.82 KG/M2 | DIASTOLIC BLOOD PRESSURE: 91 MMHG | TEMPERATURE: 98.6 F | RESPIRATION RATE: 16 BRPM | HEART RATE: 86 BPM

## 2024-06-03 DIAGNOSIS — C21.0 ANAL SQUAMOUS CELL CARCINOMA (H): ICD-10-CM

## 2024-06-03 DIAGNOSIS — C21.0 ANAL SQUAMOUS CELL CARCINOMA (H): Primary | ICD-10-CM

## 2024-06-03 LAB
ALBUMIN SERPL BCG-MCNC: 4.4 G/DL (ref 3.5–5.2)
ALP SERPL-CCNC: 55 U/L (ref 40–150)
ALT SERPL W P-5'-P-CCNC: 81 U/L (ref 0–50)
ANION GAP SERPL CALCULATED.3IONS-SCNC: 14 MMOL/L (ref 7–15)
AST SERPL W P-5'-P-CCNC: 46 U/L (ref 0–45)
BILIRUB SERPL-MCNC: 0.4 MG/DL
BUN SERPL-MCNC: 18.6 MG/DL (ref 6–20)
CALCIUM SERPL-MCNC: 9.9 MG/DL (ref 8.6–10)
CHLORIDE SERPL-SCNC: 105 MMOL/L (ref 98–107)
CREAT SERPL-MCNC: 0.82 MG/DL (ref 0.51–0.95)
DEPRECATED HCO3 PLAS-SCNC: 23 MMOL/L (ref 22–29)
EGFRCR SERPLBLD CKD-EPI 2021: 83 ML/MIN/1.73M2
ERYTHROCYTE [DISTWIDTH] IN BLOOD BY AUTOMATED COUNT: 11.9 % (ref 10–15)
GLUCOSE SERPL-MCNC: 95 MG/DL (ref 70–99)
HCT VFR BLD AUTO: 42.8 % (ref 35–47)
HGB BLD-MCNC: 14.7 G/DL (ref 11.7–15.7)
MCH RBC QN AUTO: 33.9 PG (ref 26.5–33)
MCHC RBC AUTO-ENTMCNC: 34.3 G/DL (ref 31.5–36.5)
MCV RBC AUTO: 99 FL (ref 78–100)
PLATELET # BLD AUTO: 217 10E3/UL (ref 150–450)
POTASSIUM SERPL-SCNC: 4.5 MMOL/L (ref 3.4–5.3)
PROT SERPL-MCNC: 7.2 G/DL (ref 6.4–8.3)
RBC # BLD AUTO: 4.33 10E6/UL (ref 3.8–5.2)
SODIUM SERPL-SCNC: 142 MMOL/L (ref 135–145)
WBC # BLD AUTO: 4.6 10E3/UL (ref 4–11)

## 2024-06-03 PROCEDURE — 99214 OFFICE O/P EST MOD 30 MIN: CPT | Performed by: NURSE PRACTITIONER

## 2024-06-03 PROCEDURE — 82374 ASSAY BLOOD CARBON DIOXIDE: CPT

## 2024-06-03 PROCEDURE — 82378 CARCINOEMBRYONIC ANTIGEN: CPT

## 2024-06-03 PROCEDURE — 85027 COMPLETE CBC AUTOMATED: CPT

## 2024-06-03 PROCEDURE — G2211 COMPLEX E/M VISIT ADD ON: HCPCS | Performed by: NURSE PRACTITIONER

## 2024-06-03 PROCEDURE — 36415 COLL VENOUS BLD VENIPUNCTURE: CPT

## 2024-06-03 ASSESSMENT — PAIN SCALES - GENERAL: PAINLEVEL: NO PAIN (0)

## 2024-06-03 NOTE — PROGRESS NOTES
"Oncology Rooming Note    Gisselle 3, 2024 11:40 AM   Susu Lagos is a 58 year old female who presents for:    Chief Complaint   Patient presents with    Oncology Clinic Visit     Anal squamous cell carcinoma - Labs and provider visits     Initial Vitals: BP (!) 128/91 (BP Location: Right arm, Patient Position: Sitting, Cuff Size: Adult Large)   Pulse 86   Temp 98.6  F (37  C) (Tympanic)   Resp 16   Ht 1.651 m (5' 5\")   Wt 94.8 kg (209 lb)   LMP 01/29/2016   SpO2 95%   BMI 34.78 kg/m   Estimated body mass index is 34.78 kg/m  as calculated from the following:    Height as of this encounter: 1.651 m (5' 5\").    Weight as of this encounter: 94.8 kg (209 lb). Body surface area is 2.09 meters squared.  No Pain (0) Comment: Data Unavailable   Patient's last menstrual period was 01/29/2016.  Allergies reviewed: Yes  Medications reviewed: Yes    Medications: Medication refills not needed today.  Pharmacy name entered into [facility]:    [facility] [place], [place] - [address] [place] [address] [id number] - [place], [place] - [address] [facility] - [place], [place] - [address]    Frailty Screening:   Is the patient here for a new oncology consult visit in cancer care? 2. No      Clinical concerns:  None      Sharron Diamond CMA            "

## 2024-06-03 NOTE — PROGRESS NOTES
[facility] Hematology and Oncology Outpatient Progress Note    Patient: Susu Lagos  MRN: [id number]  Date of Service: Josh 3, 2024          Reason for Visit    Hx anal cancer      Assessment/Plan  Hx stage I anal cancer  Treated for early stage with chemoRT 4 yrs ago. JOSE LUIS to date.     9/2023 PET: negative for recurrence/mets  9/2023 MRI pelvis: negative for local recurrence.     4/2024 CRS exam/anosocopy negative for local recurrence.    Labs: AST 46, ALT 81 (chronic/stable). CBC WNL. CEA has not been monitored in surveillance.    Labs and testing:  Repeat CBC / CMP at next visit  Next provider visit: 1 year  Treatment:  None for cancer    Digital rectal examination, anoscopy/flexible sigmoidoscopy, and inguinal node palpation every 6 months for 5 years. Next 10/2024  CT Chest and MR AP annually for 3 years. Completed    Plan:  -Add CEA to serum today  -Continue every 6 mth surveillance with rectal exam with CRS  -Due for colonoscopy this year.  -Had annually CT chest and MR A/P x 3 yrs. Without recurrence, so not doing routinely at this point.   -Return to Med Onc in 1 yr with labs (CEA, CMP, CBC). At that time, will be 5 yrs since her diagnosis and can transition to routine care with PCP or follow in Survivorship Clinic    ADDENDUM:  CEA WNL (1.6)    2.  Elevated LFTs, hepatic steatosis  Stable, chronic.    3.   Fecal incontinence, pelvic floor weakness (post-RT)  4.   Intermittent rectal bleeding (r/t internal hemorrhoids, post-RT changes)  Rectal exams in CRS negative for recurrence; bleeding favored to be related to post-RT telangiectasias. No anemia.     Considering returning for more PT for her pelvic floor weakness/fecal incontinence. Working with Dr. Remy (Rad Onc) re: that.     ______________________________________________________________________________    History of Present Illness/ Interval History    Ms. Susu Lagos is a 58 year old 4 yrs from dx of early stage anal cancer s/p  definitive chemoRT with CR. Follows CRS every 6 mths in surveillance. Returns for 1 yr follow-up in Oncology.      Intermittent rectal bleeding, chronic. Normal bowel movements. Started fiber, very minimal bleeding since then. CRS digital rectal and anoscopy exam normal. Felt related to internal hemorrhoids and post-RT telengectasia. Increased fiber in diet and follow. If recurrence, will get colonoscopy.    Stable weight. No new pain.   No residual chemo-related effects. Chronic pelvic floor weakness with stool incontinence post-RT.    ECOG Performance    0      Oncology History/Treatment  Diagnosis/Stage:   3/2020: Stage I anal cancer  -presented with 4-mth hx bright red blood in stool  -3/3/2020 colonoscopy: palpable rectal mass on digital exam. Biopsy: squamous cell carcinoma, focally keratinizing   -Staging MRI pelvis and PET scan: 1.2 cm anal mass. No met nodes or distant mets.     Treatment:  5/2020: completed chemoRT with 5FU + mitomycin  --resulted in CR (no surgery needed)    Surveillance MRI pelvis and PET scans. CRS exams      Physical Exam    GENERAL: Alert and oriented to time place and person. Seated comfortably. In no distress.  HEAD: Atraumatic and normocephalic. No alopecia.  EYES: ABIGAIL, EOMI. No erythema. No icterus.  ORAL CAVITY: Moist. No mucosal lesion or tonsillar enlargement.  NECK: supple. No thyroid enlargement.  LYMPH NODES: No palpable supraclavicular, cervical, axillary or inguinal lymphadenopathy.  CHEST: clear to auscultation bilaterally. Resonant to percussion throughout bilaterally. Symmetrical breath movements bilaterally.  CVS: RRR  ABDOMEN: Soft. Not tender. Not distended. No palpable hepatomegaly or splenomegaly. No other mass palpable. Bowel sounds present.  EXTREMITIES: Warm. No peripheral edema.  SKIN: no rash, or bruising or purpura.   NEURO: No gross deficit noted. Non-antalgic gait.      Lab Results    Recent Results (from the past 168 hour(s))   Comprehensive metabolic  panel (BMP + Alb, Alk Phos, ALT, AST, Total. Bili, TP)   Result Value Ref Range    Sodium 142 135 - 145 mmol/L    Potassium 4.5 3.4 - 5.3 mmol/L    Carbon Dioxide (CO2) 23 22 - 29 mmol/L    Anion Gap 14 7 - 15 mmol/L    Urea Nitrogen 18.6 6.0 - 20.0 mg/dL    Creatinine 0.82 0.51 - 0.95 mg/dL    GFR Estimate 83 >60 mL/min/1.73m2    Calcium 9.9 8.6 - 10.0 mg/dL    Chloride 105 98 - 107 mmol/L    Glucose 95 70 - 99 mg/dL    Alkaline Phosphatase 55 40 - 150 U/L    AST 46 (H) 0 - 45 U/L    ALT 81 (H) 0 - 50 U/L    Protein Total 7.2 6.4 - 8.3 g/dL    Albumin 4.4 3.5 - 5.2 g/dL    Bilirubin Total 0.4 <=1.2 mg/dL   **CBC with platelets FUTURE 6mo   Result Value Ref Range    WBC Count 4.6 4.0 - 11.0 10e3/uL    RBC Count 4.33 3.80 - 5.20 10e6/uL    Hemoglobin 14.7 11.7 - 15.7 g/dL    Hematocrit 42.8 35.0 - 47.0 %    MCV 99 78 - 100 fL    MCH 33.9 (H) 26.5 - 33.0 pg    MCHC 34.3 31.5 - 36.5 g/dL    RDW 11.9 10.0 - 15.0 %    Platelet Count 217 150 - 450 10e3/uL       Imaging    No results found.    Billing  Total time 30 minutes, to include face to face visit, review of EMR, ordering, documentation and coordination of care on date of service   complexity modifier for longitudinal care.     Signed by: Ashly Pierce NP

## 2024-06-03 NOTE — LETTER
"    6/3/2024         RE: Susu Lagos  [address]  [facility] [address]        Dear Colleague,    Thank you for referring your patient, Susu Lagos, to the [facility]CER [facility]. Please see a copy of my visit note below.    Oncology Rooming Note    Gisselle 3, 2024 11:40 AM   Susu Lagos is a 58 year old female who presents for:    Chief Complaint   Patient presents with     Oncology Clinic Visit     Anal squamous cell carcinoma - Labs and provider visits     Initial Vitals: BP (!) 128/91 (BP Location: Right arm, Patient Position: Sitting, Cuff Size: Adult Large)   Pulse 86   Temp 98.6  F (37  C) (Tympanic)   Resp 16   Ht 1.651 m (5' 5\")   Wt 94.8 kg (209 lb)   LMP 01/29/2016   SpO2 95%   BMI 34.78 kg/m   Estimated body mass index is 34.78 kg/m  as calculated from the following:    Height as of this encounter: 1.651 m (5' 5\").    Weight as of this encounter: 94.8 kg (209 lb). Body surface area is 2.09 meters squared.  No Pain (0) Comment: Data Unavailable   Patient's last menstrual period was 01/29/2016.  Allergies reviewed: Yes  Medications reviewed: Yes    Medications: Medication refills not needed today.  Pharmacy name entered into nChannel:    [facility], [place] - [address] [place] [address] PHARMACY [id number] [place], [place] - [address] [facility] - [place] [address]    Frailty Screening:   Is the patient here for a new oncology consult visit in cancer care? 2. No      Clinical concerns:  None      Sharron Diamond [facility] Hematology and Oncology Outpatient Progress Note    Patient: Susu Lagos  MRN: [id number]  Date of Service: Josh 3, 2024          Reason for Visit    Hx anal cancer      Assessment/Plan  Hx stage I anal cancer  Treated for early stage with chemoRT 4 yrs ago. JOSE LUIS to date.     9/2023 PET: negative for recurrence/mets  9/2023 MRI pelvis: negative for local " recurrence.     4/2024 CRS exam/anosocopy negative for local recurrence.    Labs: AST 46, ALT 81 (chronic/stable). CBC WNL. CEA has not been monitored in surveillance.    Labs and testing:  Repeat CBC / CMP at next visit  Next provider visit: 1 year  Treatment:  None for cancer    Digital rectal examination, anoscopy/flexible sigmoidoscopy, and inguinal node palpation every 6 months for 5 years. Next 10/2024  CT Chest and MR AP annually for 3 years. Completed    Plan:  -Add CEA to serum today  -Continue every 6 mth surveillance with rectal exam with CRS  -Due for colonoscopy this year.  -Had annually CT chest and MR A/P x 3 yrs. Without recurrence, so not doing routinely at this point.   -Return to Med Onc in 1 yr with labs (CEA, CMP, CBC). At that time, will be 5 yrs since her diagnosis and can transition to routine care with PCP or follow in Survivorship Clinic    2.  Elevated LFTs, hepatic steatosis  Stable, chronic.    3.   Fecal incontinence, pelvic floor weakness (post-RT)  4.   Intermittent rectal bleeding (r/t internal hemorrhoids, post-RT changes)  Rectal exams in CRS negative for recurrence; bleeding favored to be related to post-RT telangiectasias. No anemia.     Considering returning for more PT for her pelvic floor weakness/fecal incontinence. Working with Dr. Remy (Rad Onc) re: that.     ______________________________________________________________________________    History of Present Illness/ Interval History    Ms. Susu Lagos is a 58 year old 4 yrs from dx of early stage anal cancer s/p definitive chemoRT with CR. Follows CRS every 6 mths in surveillance. Returns for 1 yr follow-up in Oncology.      Intermittent rectal bleeding, chronic. Normal bowel movements. Started fiber, very minimal bleeding since then. CRS digital rectal and anoscopy exam normal. Felt related to internal hemorrhoids and post-RT telengectasia. Increased fiber in diet and follow. If recurrence, will get  colonoscopy.    Stable weight. No new pain.   No residual chemo-related effects. Chronic pelvic floor weakness with stool incontinence post-RT.    ECOG Performance    0      Oncology History/Treatment  Diagnosis/Stage:   3/2020: Stage I anal cancer  -presented with 4-mth hx bright red blood in stool  -3/3/2020 colonoscopy: palpable rectal mass on digital exam. Biopsy: squamous cell carcinoma, focally keratinizing   -Staging MRI pelvis and PET scan: 1.2 cm anal mass. No met nodes or distant mets.     Treatment:  5/2020: completed chemoRT with 5FU + mitomycin  --resulted in CR (no surgery needed)    Surveillance MRI pelvis and PET scans. CRS exams      Physical Exam    GENERAL: Alert and oriented to time place and person. Seated comfortably. In no distress.  HEAD: Atraumatic and normocephalic. No alopecia.  EYES: ABIGAIL, EOMI. No erythema. No icterus.  ORAL CAVITY: Moist. No mucosal lesion or tonsillar enlargement.  NECK: supple. No thyroid enlargement.  LYMPH NODES: No palpable supraclavicular, cervical, axillary or inguinal lymphadenopathy.  CHEST: clear to auscultation bilaterally. Resonant to percussion throughout bilaterally. Symmetrical breath movements bilaterally.  CVS: RRR  ABDOMEN: Soft. Not tender. Not distended. No palpable hepatomegaly or splenomegaly. No other mass palpable. Bowel sounds present.  EXTREMITIES: Warm. No peripheral edema.  SKIN: no rash, or bruising or purpura.   NEURO: No gross deficit noted. Non-antalgic gait.      Lab Results    Recent Results (from the past 168 hour(s))   Comprehensive metabolic panel (BMP + Alb, Alk Phos, ALT, AST, Total. Bili, TP)   Result Value Ref Range    Sodium 142 135 - 145 mmol/L    Potassium 4.5 3.4 - 5.3 mmol/L    Carbon Dioxide (CO2) 23 22 - 29 mmol/L    Anion Gap 14 7 - 15 mmol/L    Urea Nitrogen 18.6 6.0 - 20.0 mg/dL    Creatinine 0.82 0.51 - 0.95 mg/dL    GFR Estimate 83 >60 mL/min/1.73m2    Calcium 9.9 8.6 - 10.0 mg/dL    Chloride 105 98 - 107 mmol/L     Glucose 95 70 - 99 mg/dL    Alkaline Phosphatase 55 40 - 150 U/L    AST 46 (H) 0 - 45 U/L    ALT 81 (H) 0 - 50 U/L    Protein Total 7.2 6.4 - 8.3 g/dL    Albumin 4.4 3.5 - 5.2 g/dL    Bilirubin Total 0.4 <=1.2 mg/dL   **CBC with platelets FUTURE 6mo   Result Value Ref Range    WBC Count 4.6 4.0 - 11.0 10e3/uL    RBC Count 4.33 3.80 - 5.20 10e6/uL    Hemoglobin 14.7 11.7 - 15.7 g/dL    Hematocrit 42.8 35.0 - 47.0 %    MCV 99 78 - 100 fL    MCH 33.9 (H) 26.5 - 33.0 pg    MCHC 34.3 31.5 - 36.5 g/dL    RDW 11.9 10.0 - 15.0 %    Platelet Count 217 150 - 450 10e3/uL       Imaging    No results found.    Billing  Total time 30 minutes, to include face to face visit, review of EMR, ordering, documentation and coordination of care on date of service   complexity modifier for longitudinal care.     Signed by: Ashly Pierce NP      Again, thank you for allowing me to participate in the care of your patient.        Sincerely,        Ashly Pierce NP

## 2024-06-04 LAB — CEA SERPL-MCNC: 1.6 NG/ML

## 2024-07-11 ENCOUNTER — PATIENT OUTREACH (OUTPATIENT)
Dept: ONCOLOGY | Facility: CLINIC | Age: 59
End: 2024-07-11
Payer: COMMERCIAL

## 2024-07-11 NOTE — PROGRESS NOTES
[facility]: Cancer Care                                                                                          Enrollment status: maintenance  Follow up: Scheduled 5/19/25    Signature:  CHELA MOTA RN

## 2024-10-21 ENCOUNTER — OFFICE VISIT (OUTPATIENT)
Dept: SURGERY | Facility: CLINIC | Age: 59
End: 2024-10-21
Payer: COMMERCIAL

## 2024-10-21 VITALS
DIASTOLIC BLOOD PRESSURE: 84 MMHG | SYSTOLIC BLOOD PRESSURE: 121 MMHG | HEART RATE: 94 BPM | WEIGHT: 212.7 LBS | OXYGEN SATURATION: 98 % | HEIGHT: 65 IN | BODY MASS INDEX: 35.44 KG/M2

## 2024-10-21 DIAGNOSIS — C21.0 ANAL SQUAMOUS CELL CARCINOMA (H): Primary | ICD-10-CM

## 2024-10-21 DIAGNOSIS — K62.5 RECTAL BLEEDING: ICD-10-CM

## 2024-10-21 PROCEDURE — 46600 DIAGNOSTIC ANOSCOPY SPX: CPT | Performed by: NURSE PRACTITIONER

## 2024-10-21 PROCEDURE — 99213 OFFICE O/P EST LOW 20 MIN: CPT | Mod: 25 | Performed by: NURSE PRACTITIONER

## 2024-10-21 ASSESSMENT — PAIN SCALES - GENERAL: PAINLEVEL: NO PAIN (0)

## 2024-10-21 NOTE — PROGRESS NOTES
"Colon and Rectal Surgery Follow-Up Clinic Note    RE: Susu Lagos  : 1965  MALGORZATA: 10/21/2024    Susu Lagos is a very pleasant 58 year old female with history of anal cancer s/p chemoradiation completed 20 here for follow-up.    MR Pelvis 23:  IMPRESSION: Radiation treatment changes without evidence of residual  or recurrent malignancy in the pelvis.    PET 23:  Overall, findings are stable post treatment changes with  hypermetabolic uptake within the anal canal.  No definite evidence of  recurrent or metastatic tumor.    Interval history: Susu developed heavy rectal bleeding again last month. This filled the toilet and was not associated with any pain. No prolapsing tissue. She continues on a daily fiber supplement.   Colonoscopy 3/2020 at the time of her diagnosis. Flexible sigmoidoscopy 2024 was normal.    Physical Examination:   /84 (BP Location: Left arm, Patient Position: Sitting, Cuff Size: Adult Regular)   Pulse 94   Ht 5' 5\"   Wt 212 lb 11.2 oz   LMP 2016   SpO2 98%   BMI 35.40 kg/m    General: alert, oriented, in no acute distress, sitting comfortably  HEENT:moist mucous membranes  No palpable inguinal adenopathy.    Perianal external examination:  Perianal skin: Intact with no excoriation or lichenification. Telangectasia consistent with prior radiation.  Lesions: No evidence of an external lesion, nodularity, or induration in the perianal region.  Eversion of buttocks: There was not evidence of an anal fissure. Details: N/A.  Skin tags or external hemorrhoids: Small external hemorrhoidal skin tags    Digital rectal examination: Was performed.   Sphincter tone: Good.  Palpable lesions: No.  Other: None.  Bimanual examination: was not performed.    Anoscopy: Was performed.   Hemorrhoids: Small internal hemorrhoids without active bleeding  Lesions: Non telangectasia consistent with prior radiation. And very tiny scope trauma versus anal fissure in the " posterior midline    Assessment/Plan: 59 year old female with history of anal cancer s/p chemoradiation completed 5/18/20. With rectal bleeding. No evidence of recurrence on exam today. I think bleeding is likely due to internal hemorrhoids, telangectasia from prior radiation, or anal fissure. However, given recurrent bleeding, would like her to have repeat colonoscopy. Increase fiber supplement to twice a day. Follow up in 6 months to check for recurrence. Patient's questions were answered to her stated satisfaction and she is in agreement with this plan.     Anal Cancer Surveillance:  Digital rectal examination, anoscopy/flexible sigmoidoscopy, and inguinal node palpation every 6 months for 5 years. Next 4/2025  CT Chest and MR AP annually for 3 years. Completed      Medical history:  Past Medical History:   Diagnosis Date    Menorrhagia with irregular cycle 2/28/2016    Ovarian cyst 6/4/2015    Right, 6 cm dermoid on MRI-recommend removal.  7/29/2015 Surgery for right salpingoophorectomy.      Perimenopause 6/4/2015    Rectal cancer (H)     had radiation and chemo 2020       Surgical history:  Past Surgical History:   Procedure Laterality Date    CHOLECYSTECTOMY  2013    LSC    COLONOSCOPY N/A 11/13/2018    Procedure: colonoscopy;  Surgeon: Sesar Dos Santos MD;  Location: [facility]    COLONOSCOPY N/A 3/3/2020    Procedure: COLONOSCOPY, WITH POLYPECTOMY AND BIOPSY;  Surgeon: Sesar Dos Santos MD;  Location: [facility]    DILATION AND CURETTAGE, OPERATIVE HYSTEROSCOPY, COMBINED N/A 2/29/2016    Procedure: COMBINED DILATION AND CURETTAGE, OPERATIVE HYSTEROSCOPY;  Surgeon: Panda Knight MD;  Location: [facility]    ESOPHAGOSCOPY, GASTROSCOPY, DUODENOSCOPY (EGD), COMBINED N/A 9/3/2020    Procedure: ESOPHAGOGASTRODUODENOSCOPY (EGD);  Surgeon: Karlos Burnett MD;  Location: [facility]    GYN SURGERY  1995    tubes tied    LAPAROSCOPY DIAGNOSTIC (GYN) Right 7/29/2015    Procedure: LAPAROSCOPY DIAGNOSTIC (GYN);  Surgeon: Pipe  Lian Lagos, DO;  Location: [facility]       Problem list:      Patient Active Problem List    Diagnosis Date Noted    Anemia with low platelet count (H) 06/02/2021     Priority: Medium    Intractable pain 05/18/2020     Priority: Medium    Cervical cancer screening 03/20/2020     Priority: Medium     2008, 2010, 2011, 2013 All NIL paps.   4/22/15 NIL Pap, Neg HPV. Plan: Cotest in 5 yr       **  3/2020 Dx rectal cancer **  3/13/20 NIL Pap, Neg HPV. TZ absent. Plan: cotest in 3 years per provider and Oncologist.       Anal squamous cell carcinoma (H) 03/03/2020     Priority: Medium     Sent to [facility]. Check 7.21 [facility] for f/u [facility]  Susu Lagos presented to the primary care physician with 3 to 4 months history of blood in the stool.  Subsequently the patient was referred for colonoscopy on March 3, 2020.  The palpable rectal mass on digital examination.  Biopsy came back positive for squamous cell carcinoma, focally keratinizing.  The patient has a previous colonoscopy on November 2018 which shows no abnormalities.  The patient was started on chemoradiation regimen with 5-FU and mitomycin.      CARDIOVASCULAR SCREENING; LDL GOAL LESS THAN 160 02/25/2015     Priority: Medium       Medications:  Current Outpatient Medications   Medication Sig Dispense Refill    albuterol (PROAIR HFA/PROVENTIL HFA/VENTOLIN HFA) 108 (90 Base) MCG/ACT inhaler Inhale 2 puffs every 4-6 hours as needed for cough, wheezing, or shortness of breath 18 g 0    Magnesium Oxide 250 MG TABS Take 2 tablets by mouth 2 times daily      Multiple Vitamin (MULTI-VITAMINS PO) Take 1 tablet by mouth daily       Omega-3 Fatty Acids (OMEGA 3 PO) Take 1 capsule by mouth daily       cholestyramine (QUESTRAN) 4 g packet Take 1 packet (4 g) by mouth 2 times daily (with meals) (Patient not taking: Reported on 2/5/2024) 60 packet 3    diltiazem 2% in PLO gel To anal opening three times daily.  Use a pea-sized amount.  Store at room temperature. (Patient not taking:  "Reported on 6/9/2023) 60 g 3    estradiol (ESTRACE) 0.1 MG/GM vaginal cream Topically 3x/week (Patient not taking: Reported on 6/9/2023)      Hydrophilic ointment PRN (Patient not taking: Reported on 2/5/2024)      triamcinolone (KENALOG) 0.1 % external cream Apply topically 2 times daily (Patient not taking: Reported on 6/9/2023) 30 g 3       Allergies:  Allergies   Allergen Reactions    Rabies Vaccine (Hamster) Unknown     Scratch test resulted, it was not a good idea to give it to her.    Shellfish-Derived Products Nausea and Vomiting    Sulfa Antibiotics Rash       Family history:  Family History   Problem Relation Age of Onset    Hypertension Father     Myocardial Infarction Maternal Grandfather     Melanoma No family hx of        Social history:  Social History     Tobacco Use    Smoking status: Never    Smokeless tobacco: Never   Substance Use Topics    Alcohol use: Yes     Alcohol/week: 0.0 standard drinks of alcohol     Comment: 1-2 per month      Marital status: .    Nursing Notes:   Destini Campbell  10/21/2024 10:03 AM  Signed  Chief Complaint   Patient presents with    Follow Up     Anal scc follow up       Vitals:    10/21/24 1001   BP: 121/84   BP Location: Left arm   Patient Position: Sitting   Cuff Size: Adult Regular   Pulse: 94   SpO2: 98%   Weight: 212 lb 11.2 oz   Height: 5' 5\"       Body mass index is 35.4 kg/m .    JULIA Elaine    10 minutes spent on the date of encounter performing chart review, history and exam, documentation and further activities as noted above with an additional 2 minutes for anoscopy    BENJAMÍN Hair  Colon and Rectal Surgery  [facility]    "

## 2024-10-21 NOTE — NURSING NOTE
"Chief Complaint   Patient presents with    Follow Up     Anal scc follow up       Vitals:    10/21/24 1001   BP: 121/84   BP Location: Left arm   Patient Position: Sitting   Cuff Size: Adult Regular   Pulse: 94   SpO2: 98%   Weight: 212 lb 11.2 oz   Height: 5' 5\"       Body mass index is 35.4 kg/m .    Destini Campbell, EMT  "

## 2024-10-21 NOTE — LETTER
"10/21/2024       RE: Susu Lagos  [address]  [facility] [address]     Dear Colleague,    Thank you for referring your patient, Susu Lagos, to the [facility] COLON AND RECTAL SURGERY CLINIC [place] [facility]. Please see a copy of my visit note below.    Colon and Rectal Surgery Follow-Up Clinic Note    RE: Susu Lagos  : 1965  MALGORZATA: 10/21/2024    Susu Lagos is a very pleasant 58 year old female with history of anal cancer s/p chemoradiation completed 20 here for follow-up.    MR Pelvis 23:  IMPRESSION: Radiation treatment changes without evidence of residual  or recurrent malignancy in the pelvis.    PET 23:  Overall, findings are stable post treatment changes with  hypermetabolic uptake within the anal canal.  No definite evidence of  recurrent or metastatic tumor.    Interval history: Susu developed heavy rectal bleeding again last month. This filled the toilet and was not associated with any pain. No prolapsing tissue. She continues on a daily fiber supplement.   Colonoscopy 3/2020 at the time of her diagnosis. Flexible sigmoidoscopy 2024 was normal.    Physical Examination:   /84 (BP Location: Left arm, Patient Position: Sitting, Cuff Size: Adult Regular)   Pulse 94   Ht 5' 5\"   Wt 212 lb 11.2 oz   LMP 2016   SpO2 98%   BMI 35.40 kg/m    General: alert, oriented, in no acute distress, sitting comfortably  HEENT:moist mucous membranes  No palpable inguinal adenopathy.    Perianal external examination:  Perianal skin: Intact with no excoriation or lichenification. Telangectasia consistent with prior radiation.  Lesions: No evidence of an external lesion, nodularity, or induration in the perianal region.  Eversion of buttocks: There was not evidence of an anal fissure. Details: N/A.  Skin tags or external hemorrhoids: Small external hemorrhoidal skin tags    Digital rectal examination: " Was performed.   Sphincter tone: Good.  Palpable lesions: No.  Other: None.  Bimanual examination: was not performed.    Anoscopy: Was performed.   Hemorrhoids: Small internal hemorrhoids without active bleeding  Lesions: Non telangectasia consistent with prior radiation. And very tiny scope trauma versus anal fissure in the posterior midline    Assessment/Plan: 59 year old female with history of anal cancer s/p chemoradiation completed 5/18/20. With rectal bleeding. No evidence of recurrence on exam today. I think bleeding is likely due to internal hemorrhoids, telangectasia from prior radiation, or anal fissure. However, given recurrent bleeding, would like her to have repeat colonoscopy. Increase fiber supplement to twice a day. Follow up in 6 months to check for recurrence. Patient's questions were answered to her stated satisfaction and she is in agreement with this plan.     Anal Cancer Surveillance:  Digital rectal examination, anoscopy/flexible sigmoidoscopy, and inguinal node palpation every 6 months for 5 years. Next 4/2025  CT Chest and MR AP annually for 3 years. Completed      Medical history:  Past Medical History:   Diagnosis Date     Menorrhagia with irregular cycle 2/28/2016     Ovarian cyst 6/4/2015    Right, 6 cm dermoid on MRI-recommend removal.  7/29/2015 Surgery for right salpingoophorectomy.       Perimenopause 6/4/2015     Rectal cancer (H)     had radiation and chemo 2020       Surgical history:  Past Surgical History:   Procedure Laterality Date     CHOLECYSTECTOMY  2013    LSC     COLONOSCOPY N/A 11/13/2018    Procedure: colonoscopy;  Surgeon: Sesar Dos Santos MD;  Location: [facility]     COLONOSCOPY N/A 3/3/2020    Procedure: COLONOSCOPY, WITH POLYPECTOMY AND BIOPSY;  Surgeon: Sesar Dos Santos MD;  Location: [facility]     DILATION AND CURETTAGE, OPERATIVE HYSTEROSCOPY, COMBINED N/A 2/29/2016    Procedure: COMBINED DILATION AND CURETTAGE, OPERATIVE HYSTEROSCOPY;  Surgeon: Panda Knight MD;   Location: [facility]     ESOPHAGOSCOPY, GASTROSCOPY, DUODENOSCOPY (EGD), COMBINED N/A 9/3/2020    Procedure: ESOPHAGOGASTRODUODENOSCOPY (EGD);  Surgeon: Karlos Burnett MD;  Location: [facility]     GYN SURGERY  1995    tubes tied     LAPAROSCOPY DIAGNOSTIC (GYN) Right 7/29/2015    Procedure: LAPAROSCOPY DIAGNOSTIC (GYN);  Surgeon: Lian Betancur DO;  Location: [facility]       Problem list:      Patient Active Problem List    Diagnosis Date Noted     Anemia with low platelet count (H) 06/02/2021     Priority: Medium     Intractable pain 05/18/2020     Priority: Medium     Cervical cancer screening 03/20/2020     Priority: Medium     2008, 2010, 2011, 2013 All NIL paps.   4/22/15 NIL Pap, Neg HPV. Plan: Cotest in 5 yr       **  3/2020 Dx rectal cancer **  3/13/20 NIL Pap, Neg HPV. TZ absent. Plan: cotest in 3 years per provider and Oncologist.        Anal squamous cell carcinoma (H) 03/03/2020     Priority: Medium     Sent to . Check 7.21 [facility] for f/u [place]  Susu Lagos presented to the primary care physician with 3 to 4 months history of blood in the stool.  Subsequently the patient was referred for colonoscopy on March 3, 2020.  The palpable rectal mass on digital examination.  Biopsy came back positive for squamous cell carcinoma, focally keratinizing.  The patient has a previous colonoscopy on November 2018 which shows no abnormalities.  The patient was started on chemoradiation regimen with 5-FU and mitomycin.       CARDIOVASCULAR SCREENING; LDL GOAL LESS THAN 160 02/25/2015     Priority: Medium       Medications:  Current Outpatient Medications   Medication Sig Dispense Refill     albuterol (PROAIR HFA/PROVENTIL HFA/VENTOLIN HFA) 108 (90 Base) MCG/ACT inhaler Inhale 2 puffs every 4-6 hours as needed for cough, wheezing, or shortness of breath 18 g 0     Magnesium Oxide 250 MG TABS Take 2 tablets by mouth 2 times daily       Multiple Vitamin (MULTI-VITAMINS PO) Take 1 tablet by mouth daily        Omega-3  "Fatty Acids (OMEGA 3 PO) Take 1 capsule by mouth daily        cholestyramine (QUESTRAN) 4 g packet Take 1 packet (4 g) by mouth 2 times daily (with meals) (Patient not taking: Reported on 2/5/2024) 60 packet 3     diltiazem 2% in PLO gel To anal opening three times daily.  Use a pea-sized amount.  Store at room temperature. (Patient not taking: Reported on 6/9/2023) 60 g 3     estradiol (ESTRACE) 0.1 MG/GM vaginal cream Topically 3x/week (Patient not taking: Reported on 6/9/2023)       Hydrophilic ointment PRN (Patient not taking: Reported on 2/5/2024)       triamcinolone (KENALOG) 0.1 % external cream Apply topically 2 times daily (Patient not taking: Reported on 6/9/2023) 30 g 3       Allergies:  Allergies   Allergen Reactions     Rabies Vaccine (Hamster) Unknown     Scratch test resulted, it was not a good idea to give it to her.     Shellfish-Derived Products Nausea and Vomiting     Sulfa Antibiotics Rash       Family history:  Family History   Problem Relation Age of Onset     Hypertension Father      Myocardial Infarction Maternal Grandfather      Melanoma No family hx of        Social history:  Social History     Tobacco Use     Smoking status: Never     Smokeless tobacco: Never   Substance Use Topics     Alcohol use: Yes     Alcohol/week: 0.0 standard drinks of alcohol     Comment: 1-2 per month      Marital status: .    Nursing Notes:   Destini Campbell  10/21/2024 10:03 AM  Signed  Chief Complaint   Patient presents with     Follow Up     Anal scc follow up       Vitals:    10/21/24 1001   BP: 121/84   BP Location: Left arm   Patient Position: Sitting   Cuff Size: Adult Regular   Pulse: 94   SpO2: 98%   Weight: 212 lb 11.2 oz   Height: 5' 5\"       Body mass index is 35.4 kg/m .    Destini Campbell, EMT    10 minutes spent on the date of encounter performing chart review, history and exam, documentation and further activities as noted above with an additional 2 minutes for anoscopy    Letitia Arevalo, " NPMargeC  Colon and Rectal Surgery  [facility]      Again, thank you for allowing me to participate in the care of your patient.      Sincerely,    RADHA Higginbotham CNP

## 2024-11-12 ENCOUNTER — TELEPHONE (OUTPATIENT)
Dept: GASTROENTEROLOGY | Facility: CLINIC | Age: 59
End: 2024-11-12
Payer: COMMERCIAL

## 2024-11-12 ENCOUNTER — TELEPHONE (OUTPATIENT)
Dept: SURGERY | Facility: CLINIC | Age: 59
End: 2024-11-12
Payer: COMMERCIAL

## 2024-11-12 NOTE — TELEPHONE ENCOUNTER
"Endoscopy Scheduling Screen    Have you had any respiratory illness or flu-like symptoms in the last 10 days?  No    What is your communication preference for Instructions and/or Bowel Prep?   MyChart    What insurance is in the chart?  Other:  [employer]    Ordering/Referring Provider: Letitia Duffy APRN CNP in [facility]   (If ordering provider performs procedure, schedule with ordering provider unless otherwise instructed. )    BMI: Estimated body mass index is 35.4 kg/m  as calculated from the following:    Height as of 10/21/24: 1.651 m (5' 5\").    Weight as of 10/21/24: 96.5 kg (212 lb 11.2 oz).     Sedation Ordered  moderate sedation.   If patient BMI > 50 do not schedule in [facility].    If patient BMI > 45 do not schedule at [facility].    Are you taking methadone or Suboxone?  NO, No RN review required.    Have you been diagnosed and are being treated for severe PTSD or severe anxiety?  NO, No RN review required.    Are you taking any prescription medications for pain 3 or more times per week?   NO, No RN review required.    Do you have a history of malignant hyperthermia?  No    (Females) Are you currently pregnant?   No     Have you been diagnosed or told you have pulmonary hypertension?   No    Do you have an LVAD?  No    Have you been told you have moderate to severe sleep apnea?  No.    Have you been told you have COPD, asthma, or any other lung disease?  No    Do you have any heart conditions?  No     Have you ever had or are you waiting for an organ transplant?  No. Continue scheduling, no site restrictions.    Have you had a stroke or transient ischemic attack (TIA aka \"mini stroke\" in the last 6 months?   No    Have you been diagnosed with or been told you have cirrhosis of the liver?   No.    Are you currently on dialysis?   No    Do you need assistance transferring?   No    BMI: Estimated body mass index is 35.4 kg/m  as calculated from the following:    Height as of 10/21/24: " "1.651 m (5' 5\").    Weight as of 10/21/24: 96.5 kg (212 lb 11.2 oz).     Is patients BMI > 40 and scheduling location [facility]?  No    Do you take an injectable or oral medication for weight loss or diabetes (excluding insulin)?  No    Do you take the medication Naltrexone?  No    Do you take blood thinners?  No       Prep   Are you currently on dialysis or do you have chronic kidney disease?  No    Do you have a diagnosis of diabetes?  No    Do you have a diagnosis of cystic fibrosis (CF)?  No    On a regular basis do you go 3 -5 days between bowel movements?  No    BMI > 40?  No    Preferred Pharmacy:    [facility] [id number] [place] [id number]  Phone: [phone] Fax: [phone]    Final Scheduling Details     Procedure scheduled  Colonoscopy    Surgeon:  Echo     Date of procedure:  12.16.24     Pre-OP / PAC:   No - Not required for this site.    Location  [facility] - Patient preference.    Sedation   Moderate Sedation - Per order.      Patient Reminders:   You will receive a call from a Nurse to review instructions and health history.  This assessment must be completed prior to your procedure.  Failure to complete the Nurse assessment may result in the procedure being cancelled.      On the day of your procedure, please designate an adult(s) who can drive you home stay with you for the next 24 hours. The medicines used in the exam will make you sleepy. You will not be able to drive.      You cannot take public transportation, ride share services, or non-medical taxi service without a responsible caregiver.  Medical transport services are allowed with the requirement that a responsible caregiver will receive you at your destination.  We require that drivers and caregivers are confirmed prior to your procedure.    "

## 2024-11-12 NOTE — TELEPHONE ENCOUNTER
A user error has taken place: encounter opened in error, closed for administrative reasons.

## 2024-11-12 NOTE — TELEPHONE ENCOUNTER
Pt stated that due to radiation, she will need infant size equipment for procedure.  She will want to discuss further at pre-assessment or prior.

## 2024-11-12 NOTE — PROGRESS NOTES
This is a recent snapshot of the patient's [facility] medical record.  For current drug dose and complete information and questions, call [phone] or In Basket pool, [facility] ([id number])  CSN Number:  [id number]      
failure (HCC)    Shortness of breath    RAMOS (dyspnea on exertion)    Coronary artery disease involving native coronary artery of native heart without angina pectoris    Chronic obstructive pulmonary disease (HCC)    Bilateral carotid bruits    Esophageal dysphagia    Esophageal stricture    Esophagitis    Chronic gastritis without bleeding    Sedative, hypnotic or anxiolytic use, unspecified with unspecified sedative, hypnotic or anxiolytic-induced disorder    Sedative, hypnotic or anxiolytic dependence with unspecified sedative, hypnotic or anxiolytic-induced disorder    Sedative, hypnotic or anxiolytic dependence, uncomplicated    Tonsillitis, chronic    Abscess of tonsil    Acquired deviated nasal septum    Angina pectoris, unspecified    Oropharyngeal mass, right base of tongue    Lymphadenopathy    Peripheral vascular disease (HCC)    Hypothyroidism    Hyperlipidemia    Sensorineural hearing loss (SNHL) of both ears    Lung nodule       There are no discontinued medications.          Modified Medications    No medications on file       No orders of the defined types were placed in this encounter.      Assessment/Plan:    1. Dyslipidemia   LDL Elevated- on Atorva.  Repeat labs reviewed. Much better   2. Essential hypertension  Stable - continue meds. Low salt diet    3. Shortness of breath /CP - worse- will obtain TM SPECT - spect shows small defect On ferior segments. He has known RCA . Cpontioneu med rx.  No CP    4. Acute on chronic diastolic heart failure (HCC) - stable     5. Carotid Bruits- CUS - will need conontued surveillance.     6. Lung Nodule- f/u CT ordered. F/u Dr. ARAIZA     7. ED- wants to try Viagra- no angina. BP on low side.  Advance Viagra to 100 mg PRN. No nitrates.     8. Throat and Tongue - PET scan and f/u for further staging and Rx. - stable.  Finished chemo now.     9. CAD -stable no angina.  BB stopped in the past due to low BP.     10. Prostate Ca s/p Prostatectomy.

## 2024-12-12 ENCOUNTER — TELEPHONE (OUTPATIENT)
Dept: GASTROENTEROLOGY | Facility: CLINIC | Age: 59
End: 2024-12-12
Payer: COMMERCIAL

## 2024-12-12 NOTE — TELEPHONE ENCOUNTER
Left voicemail of arrival time of 8:30 AM.     [employer] message sent with updated arrival time.

## 2024-12-16 ENCOUNTER — HOSPITAL ENCOUNTER (OUTPATIENT)
Facility: AMBULATORY SURGERY CENTER | Age: 59
Discharge: HOME | End: 2024-12-16
Attending: INTERNAL MEDICINE | Admitting: INTERNAL MEDICINE
Payer: COMMERCIAL

## 2024-12-16 VITALS
HEART RATE: 84 BPM | SYSTOLIC BLOOD PRESSURE: 105 MMHG | DIASTOLIC BLOOD PRESSURE: 79 MMHG | TEMPERATURE: 96.6 F | RESPIRATION RATE: 18 BRPM | OXYGEN SATURATION: 99 %

## 2024-12-16 LAB — COLONOSCOPY: NORMAL

## 2024-12-16 PROCEDURE — 88305 TISSUE EXAM BY PATHOLOGIST: CPT | Mod: GC | Performed by: PATHOLOGY

## 2024-12-16 PROCEDURE — G8907 PT DOC NO EVENTS ON DISCHARG: HCPCS

## 2024-12-16 PROCEDURE — 45380 COLONOSCOPY AND BIOPSY: CPT

## 2024-12-16 PROCEDURE — G8918 PT W/O PREOP ORDER IV AB PRO: HCPCS

## 2024-12-16 RX ORDER — PROCHLORPERAZINE MALEATE 10 MG
10 TABLET ORAL EVERY 6 HOURS PRN
Status: DISCONTINUED | OUTPATIENT
Start: 2024-12-16 | End: 2024-12-17 | Stop reason: HOSPADM

## 2024-12-16 RX ORDER — FENTANYL CITRATE 50 UG/ML
INJECTION, SOLUTION INTRAMUSCULAR; INTRAVENOUS PRN
Status: DISCONTINUED | OUTPATIENT
Start: 2024-12-16 | End: 2024-12-16 | Stop reason: HOSPADM

## 2024-12-16 RX ORDER — ONDANSETRON 4 MG/1
4 TABLET, ORALLY DISINTEGRATING ORAL EVERY 6 HOURS PRN
Status: DISCONTINUED | OUTPATIENT
Start: 2024-12-16 | End: 2024-12-17 | Stop reason: HOSPADM

## 2024-12-16 RX ORDER — ONDANSETRON 2 MG/ML
4 INJECTION INTRAMUSCULAR; INTRAVENOUS
Status: DISCONTINUED | OUTPATIENT
Start: 2024-12-16 | End: 2024-12-17 | Stop reason: HOSPADM

## 2024-12-16 RX ORDER — FLUMAZENIL 0.1 MG/ML
0.2 INJECTION, SOLUTION INTRAVENOUS
Status: ACTIVE | OUTPATIENT
Start: 2024-12-16 | End: 2024-12-16

## 2024-12-16 RX ORDER — ONDANSETRON 2 MG/ML
4 INJECTION INTRAMUSCULAR; INTRAVENOUS EVERY 6 HOURS PRN
Status: DISCONTINUED | OUTPATIENT
Start: 2024-12-16 | End: 2024-12-17 | Stop reason: HOSPADM

## 2024-12-16 RX ORDER — NALOXONE HYDROCHLORIDE 0.4 MG/ML
0.4 INJECTION, SOLUTION INTRAMUSCULAR; INTRAVENOUS; SUBCUTANEOUS
Status: DISCONTINUED | OUTPATIENT
Start: 2024-12-16 | End: 2024-12-17 | Stop reason: HOSPADM

## 2024-12-16 RX ORDER — NALOXONE HYDROCHLORIDE 0.4 MG/ML
0.2 INJECTION, SOLUTION INTRAMUSCULAR; INTRAVENOUS; SUBCUTANEOUS
Status: DISCONTINUED | OUTPATIENT
Start: 2024-12-16 | End: 2024-12-17 | Stop reason: HOSPADM

## 2024-12-16 RX ORDER — LIDOCAINE 40 MG/G
CREAM TOPICAL
Status: DISCONTINUED | OUTPATIENT
Start: 2024-12-16 | End: 2024-12-17 | Stop reason: HOSPADM

## 2024-12-16 NOTE — H&P
[facility] Anesthesia Pre-op History and Physical    Susu Lagos MRN# [id number]   Age: 59 year old YOB: 1965            Date of Exam 12/16/2024         Primary care provider: Lakesha Aguirre         Chief Complaint and/or Reason for Procedure:     Rectal bleeding, longstanding diarrhea. 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   4/22/15 NIL Pap, Neg HPV. Plan: Cotest in 5 yr       **  3/2020 Dx rectal cancer **  3/13/20 NIL Pap, Neg HPV. TZ absent. Plan: cotest in 3 years per provider and Oncologist.       Anal squamous cell carcinoma (H) 03/03/2020     Priority: Medium     Sent to . Check 7.21 Farhan for f/u [place]  Susu Lagos presented to the primary care physician with 3 to 4 months history of blood in the stool.  Subsequently the patient was referred for colonoscopy on March 3, 2020.  The palpable rectal mass on digital examination.  Biopsy came back positive for squamous cell carcinoma, focally keratinizing.  The patient has a previous colonoscopy on November 2018 which shows no abnormalities.  The patient was started on chemoradiation regimen with 5-FU and mitomycin.      CARDIOVASCULAR SCREENING; LDL GOAL LESS THAN 160 02/25/2015     Priority: Medium            Medications (include herbals and vitamins):   Any Plavix use in the last 7 days? No     Current Outpatient Medications   Medication Sig Dispense Refill    Magnesium Oxide 250 MG TABS Take 2 tablets by mouth 2 times daily      Multiple Vitamin (MULTI-VITAMINS PO) Take 1 tablet by mouth daily       Omega-3 Fatty Acids (OMEGA 3 PO) Take 1 capsule by mouth daily       albuterol (PROAIR  HFA/PROVENTIL HFA/VENTOLIN HFA) 108 (90 Base) MCG/ACT inhaler Inhale 2 puffs every 4-6 hours as needed for cough, wheezing, or shortness of breath 18 g 0    cholestyramine (QUESTRAN) 4 g packet Take 1 packet (4 g) by mouth 2 times daily (with meals) (Patient not taking: Reported on 2/5/2024) 60 packet 3    diltiazem 2% in PLO gel To anal opening three times daily.  Use a pea-sized amount.  Store at room temperature. (Patient not taking: Reported on 6/9/2023) 60 g 3    estradiol (ESTRACE) 0.1 MG/GM vaginal cream Topically 3x/week (Patient not taking: Reported on 6/9/2023)      Hydrophilic ointment PRN (Patient not taking: Reported on 2/5/2024)      triamcinolone (KENALOG) 0.1 % external cream Apply topically 2 times daily (Patient not taking: Reported on 6/9/2023) 30 g 3     Current Facility-Administered Medications   Medication Dose Route Frequency Provider Last Rate Last Admin    lidocaine (LMX4) kit   Topical Q1H PRN Tonya Dodge, DO        lidocaine 1 % 0.1-1 mL  0.1-1 mL Other Q1H PRN McBeath, Tonya, DO        ondansetron (ZOFRAN) injection 4 mg  4 mg Intravenous Once PRN Echo, Tonya, DO        sodium chloride (PF) 0.9% PF flush 3 mL  3 mL Intracatheter Q8H McBeath, Tonya, DO        sodium chloride (PF) 0.9% PF flush 3 mL  3 mL Intracatheter q1 min prn Russell Dodgeistin, DO                 Allergies:      Allergies   Allergen Reactions    Rabies Vaccine (Hamster) Unknown     Scratch test resulted, it was not a good idea to give it to her.    Shellfish-Derived Products Nausea and Vomiting    Sulfa Antibiotics Rash     Allergy to Latex? No  Allergy to tape?   No  Intolerances:             Physical Exam:   All vitals have been reviewed  Patient Vitals for the past 8 hrs:   BP Temp Temp src Pulse Resp SpO2   12/16/24 0835 120/80 (!) 96.6  F (35.9  C) Temporal 79 16 97 %     No intake/output data recorded.  Lungs:   no increased work of breathing     Cardiovascular:   RRR             Lab / Radiology  Results:            Anesthetic risk and/or ASA classification:   2    Tonya Dodge DO

## 2024-12-17 LAB
PATH REPORT.COMMENTS IMP SPEC: NORMAL
PATH REPORT.FINAL DX SPEC: NORMAL
PATH REPORT.GROSS SPEC: NORMAL
PATH REPORT.MICROSCOPIC SPEC OTHER STN: NORMAL
PATH REPORT.RELEVANT HX SPEC: NORMAL
PHOTO IMAGE: NORMAL

## 2024-12-19 ENCOUNTER — OFFICE VISIT (OUTPATIENT)
Dept: RADIATION THERAPY | Facility: OUTPATIENT CENTER | Age: 59
End: 2024-12-19
Payer: COMMERCIAL

## 2024-12-19 VITALS
RESPIRATION RATE: 18 BRPM | SYSTOLIC BLOOD PRESSURE: 124 MMHG | DIASTOLIC BLOOD PRESSURE: 77 MMHG | HEART RATE: 70 BPM | OXYGEN SATURATION: 95 %

## 2024-12-19 DIAGNOSIS — Z92.3 HISTORY OF RADIATION THERAPY: ICD-10-CM

## 2024-12-19 DIAGNOSIS — C21.0 ANAL SQUAMOUS CELL CARCINOMA (H): Primary | Chronic | ICD-10-CM

## 2024-12-19 ASSESSMENT — PAIN SCALES - GENERAL: PAINLEVEL_OUTOF10: NO PAIN (0)

## 2024-12-19 NOTE — NURSING NOTE
FOLLOW-UP VISIT    Patient Name: Susu Lagos      : 1965     Age: 59 year old        ______________________________________________________________________________     Chief Complaint   Patient presents with    Radiation Therapy     return     /77   Pulse 70   Resp 18   LMP 2016   SpO2 95%      Date EBRT Completed: 20  chemoradiation    Pain  Denies pelvic pain    Meds  Current Med List Reviewed: Yes  Medication Note:     Labs  Other Labs: No    Imaging  None    Bladder: some urgency/leaking - interested in seeing pelvic floor again  Bowel:  some leaking/less control after radiation - interested in seeing pelvic floor again  Skin: Warm  Dry  Intact  Energy Level: normal  Dilator: no significant pain or problem with intercourse. Pt aware of need for dilator or intercourse to prevent  Tightening of tissue in vaginal area    Appointments:     Date  Oncology:  Ashly Pierce   25   Surgeon: Dr. Letitia Kaur   25     Other Notes:

## 2024-12-19 NOTE — PROGRESS NOTES
Department of Radiation Oncology  Radiation Therapy Center  [facility] [place] [address]  [phone]       Radiation Oncology Follow-up Visit  24    Susu Lagos  MRN: [id number]   : 1965     DIAGNOSIS: squamous cell carcinoma of the anal canal  PATHOLOGY:  squamous cell carcinoma                              STAGE: clinical T1N0  INTENT OF RADIOTHERAPY: definitve CRT  CONCURRENT SYSTEMIC THERAPY: Yes  Oncologist: Dr. Real  Drug Name/Frequency 1: 5FU  Drug Name/Frequency 1: Mitomycin     ONCOLOGIC HISTORY:   Ms. 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       The patient initially presented with symptoms of hematochezia for several months eventually prompting further evaluation.     On 3/3/2020 the patient underwent colonoscopy which demonstrated a mass located at the dentate line in the left anterior position.  The mass was less than one third circumferentially involved.  Biopsy of the mass obtained demonstrated squamous cell carcinoma.  The patient was evaluated by gynecology on 3/13/2020, Pap smear was negative.  On 3/19/2020 the patient underwent a PET scan.  Imaging demonstrated a focal hypermetabolic activity in the anal canal, max SUV 12.6.  No regional or distant disease was noted.  On 3/19/2020 the patient underwent MRI which demonstrated a 1.2 x 0.9 cm mass located 2.7 cm above the anal verge.  No enlarged regional nodes were noted.  The patient was seen by Dr. Real of medical oncology who discussed treatment with chemoradiation therapy.Patient subsequently presented to radiation oncology clinic to discuss potential role of radiation therapy as a part of treatment strategy.            SITE OF TREATMENT: pelvis     DATES  OF TREATMENT: 20-20     TOTAL DOSE OF TREATMENT: 5040 cGy     DOSE PER FRACTION OF TREATMENT: 180 cGy x 28 fractions    INTERVAL SINCE COMPLETION OF RADIATION  THERAPY:   ~4.5 years    SUBJECTIVE:   The patient presents for follow up.     Post treatment MRI (20) was JOSE LUIS. PET on 20 demonstrated excellent response with indeterminate but mild activity in anal canal region without clear evidence of disease. Most recent PET (23) and MRI pelvis (23) remained without any clear evidence of disease. Exam by  Surgery team on 10/21/24 was JOSE LUIS.  Colonoscopy obtained on 2024 was JOSE LUIS.  There was noted telangiectasia change in the distal rectum likely post RT change.    Patient is overall doing well. She has intermittent bouts of bowel urgency and incontinence at times. These episodes are less frequent since pelvic floor rehab therapy in the past.  Had intermittent bleeding. No  trouble. No lower extremity lymphedema.     PHYSICAL EXAM:  /77   Pulse 70   Resp 18   LMP 2016   SpO2 95%       LABS AND IMAGIN/921  RECTUM, CANAL, BIOPSY:  Squamous (anal) mucosa with no dysplasia, malignancy, or other significant histologic abnormality (rectal mucosa not represented)       22  PET  IMPRESSION: In this patient with a history of anal carcinoma, status  post chemoradiation completed on 2020:     1. Post treatment changes to the anus with similar soft tissue  thickening when compared with the previous examination. Mild decrease  in SUV uptake along the anus and perineum likely secondary to  inflammatory changes such as patient's known anal fissure and/or  hemorrhoids.   2. No metastatic disease in the chest, abdomen, or pelvis.  3. Diffuse hepatic steatosis.    23  PET  IMPRESSION:   Context: 58-year-old woman with anal carcinoma status post  chemoradiotherapy for for surveillance imaging:     Overall, findings are stable post treatment changes with  hypermetabolic uptake within the anal canal.  No definite evidence of  recurrent or metastatic tumor.      22  MRI  IMPRESSION:  No evidence for recurrent or metastatic malignancy in the  pelvis.     9/5/23  MRI  IMPRESSION: Radiation treatment changes without evidence of residual  or recurrent malignancy in the pelvis.    IMPRESSION:   Ms. 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  She completed definitive CRT to a total dose of 50.4 Gy in 28 fractions with concurrent 5FU-[facility]  (completed on 5/18/20).    PLAN:   1. Post treatment MRI (8/6/20) was JOSE LUIS. PET on 7/28/20 demonstrated excellent response with indeterminate but mild activity in anal canal region without clear evidence of disease. Most recent PET (9/5/23) and MRI pelvis (9/5/23) remained without any clear evidence of disease. Exam by  Surgery team on 10/21/24 was JOSE LUIS.  Colonoscopy obtained on 12/16/2024 was JOSE LUIS.  There was noted telangiectasia change in the distal rectum likely post RT change    2. GI bother. Urgency and mild incontinence.  Improved with pelvic floor rehab in the past continue exercises at home. Would consider re-referral to pelvic floor rehab team if needed in future.  Patient is interested we will make referral.    3.  Continued oncologic surveillance from our colo-rectal colleagues.     4. RTC in 6 months.    30 minutes spent on the date of the encounter doing chart review, review of outside records, review of test results, interpretation of tests, patient visit, documentation, discussion, and plan.      Tyree Remy M.D.  Department of Radiation Oncology  [facility]

## 2024-12-19 NOTE — LETTER
2024      Susu Lagos  [address]  [facility] [address]      Dear Colleague,    Thank you for referring your patient, Susu Lagos, to the [facility] RADIATION THERAPY [facility]INIC. Please see a copy of my visit note below.       Department of Radiation Oncology  Radiation Therapy Center  [facility] [place] [address]  [phone]       Radiation Oncology Follow-up Visit  24    Susu Lagos  MRN: [id number]   : 1965     DIAGNOSIS: squamous cell carcinoma of the anal canal  PATHOLOGY:  squamous cell carcinoma                              STAGE: clinical T1N0  INTENT OF RADIOTHERAPY: definitve CRT  CONCURRENT SYSTEMIC THERAPY: Yes  Oncologist: Dr. Real  Drug Name/Frequency 1: 5FU  Drug Name/Frequency 1: Mitomycin     ONCOLOGIC HISTORY:   Ms. 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       The patient initially presented with symptoms of hematochezia for several months eventually prompting further evaluation.     On 3/3/2020 the patient underwent colonoscopy which demonstrated a mass located at the dentate line in the left anterior position.  The mass was less than one third circumferentially involved.  Biopsy of the mass obtained demonstrated squamous cell carcinoma.  The patient was evaluated by gynecology on 3/13/2020, Pap smear was negative.  On 3/19/2020 the patient underwent a PET scan.  Imaging demonstrated a focal hypermetabolic activity in the anal canal, max SUV 12.6.  No regional or distant disease was noted.  On 3/19/2020 the patient underwent MRI which demonstrated a 1.2 x 0.9 cm mass located 2.7 cm above the anal verge.  No enlarged regional nodes were noted.  The patient was seen by Dr. Real of medical oncology who discussed treatment with chemoradiation therapy.Patient subsequently presented to radiation oncology clinic to discuss potential role of radiation therapy as a part  of treatment strategy.            SITE OF TREATMENT: pelvis     DATES  OF TREATMENT: 20-20     TOTAL DOSE OF TREATMENT: 5040 cGy     DOSE PER FRACTION OF TREATMENT: 180 cGy x 28 fractions    INTERVAL SINCE COMPLETION OF RADIATION THERAPY:   ~4.5 years    SUBJECTIVE:   The patient presents for follow up.     Post treatment MRI (20) was JOSE LUIS. PET on 20 demonstrated excellent response with indeterminate but mild activity in anal canal region without clear evidence of disease. Most recent PET (23) and MRI pelvis (23) remained without any clear evidence of disease. Exam by  Surgery team on 10/21/24 was JOSE LUIS.  Colonoscopy obtained on 2024 was JOSE LUIS.  There was noted telangiectasia change in the distal rectum likely post RT change.    Patient is overall doing well. She has intermittent bouts of bowel urgency and incontinence at times. These episodes are less frequent since pelvic floor rehab therapy in the past.  Had intermittent bleeding. No  trouble. No lower extremity lymphedema.     PHYSICAL EXAM:  /77   Pulse 70   Resp 18   LMP 2016   SpO2 95%       LABS AND IMAGIN/921  RECTUM, CANAL, BIOPSY:  Squamous (anal) mucosa with no dysplasia, malignancy, or other significant histologic abnormality (rectal mucosa not represented)       22  PET  IMPRESSION: In this patient with a history of anal carcinoma, status  post chemoradiation completed on 2020:     1. Post treatment changes to the anus with similar soft tissue  thickening when compared with the previous examination. Mild decrease  in SUV uptake along the anus and perineum likely secondary to  inflammatory changes such as patient's known anal fissure and/or  hemorrhoids.   2. No metastatic disease in the chest, abdomen, or pelvis.  3. Diffuse hepatic steatosis.    23  PET  IMPRESSION:   Context: 58-year-old woman with anal carcinoma status post  chemoradiotherapy for for surveillance imaging:     Overall,  findings are stable post treatment changes with  hypermetabolic uptake within the anal canal.  No definite evidence of  recurrent or metastatic tumor.      9/2/22  MRI  IMPRESSION:  No evidence for recurrent or metastatic malignancy in the pelvis.     9/5/23  MRI  IMPRESSION: Radiation treatment changes without evidence of residual  or recurrent malignancy in the pelvis.    IMPRESSION:   Ms. 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  She completed definitive CRT to a total dose of 50.4 Gy in 28 fractions with concurrent 5FU-MMC  (completed on 5/18/20).    PLAN:   1. Post treatment MRI (8/6/20) was JOSE LUIS. PET on 7/28/20 demonstrated excellent response with indeterminate but mild activity in anal canal region without clear evidence of disease. Most recent PET (9/5/23) and MRI pelvis (9/5/23) remained without any clear evidence of disease. Exam by  Surgery team on 10/21/24 was JOSE LUIS.  Colonoscopy obtained on 12/16/2024 was JOSE LUIS.  There was noted telangiectasia change in the distal rectum likely post RT change    2. GI bother. Urgency and mild incontinence.  Improved with pelvic floor rehab in the past continue exercises at home. Would consider re-referral to pelvic floor rehab team if needed in future.  Patient is interested we will make referral.    3.  Continued oncologic surveillance from our colo-rectal colleagues.     4. RTC in 6 months.    30 minutes spent on the date of the encounter doing chart review, review of outside records, review of test results, interpretation of tests, patient visit, documentation, discussion, and plan.      Tyree Remy M.D.  Department of Radiation Oncology  [facility]       Again, thank you for allowing me to participate in the care of your patient.        Sincerely,        Tyree Remy MD

## 2025-01-28 ENCOUNTER — THERAPY VISIT (OUTPATIENT)
Dept: PHYSICAL THERAPY | Facility: CLINIC | Age: 60
End: 2025-01-28
Attending: RADIOLOGY
Payer: COMMERCIAL

## 2025-01-28 DIAGNOSIS — C21.0 ANAL SQUAMOUS CELL CARCINOMA (H): Chronic | ICD-10-CM

## 2025-01-28 DIAGNOSIS — Z92.3 HISTORY OF RADIATION THERAPY: ICD-10-CM

## 2025-01-28 DIAGNOSIS — R32 URINARY INCONTINENCE: Primary | ICD-10-CM

## 2025-01-28 DIAGNOSIS — R15.9 FECAL INCONTINENCE: ICD-10-CM

## 2025-01-28 PROCEDURE — 97110 THERAPEUTIC EXERCISES: CPT | Mod: GP | Performed by: PHYSICAL THERAPIST

## 2025-01-28 PROCEDURE — 97535 SELF CARE MNGMENT TRAINING: CPT | Mod: GP | Performed by: PHYSICAL THERAPIST

## 2025-01-28 PROCEDURE — 97162 PT EVAL MOD COMPLEX 30 MIN: CPT | Mod: GP | Performed by: PHYSICAL THERAPIST

## 2025-01-28 NOTE — PROGRESS NOTES
"PHYSICAL THERAPY EVALUATION  Type of Visit: Evaluation       Fall Risk Screen:  Fall screen completed by: PT  Have you fallen 2 or more times in the past year?: No  Have you fallen and had an injury in the past year?: No  Is patient a fall risk?: No    Subjective       Pt was dx'd in 3/2020 with anal squamous cell carcinoma. Pt underwent radiation rx successfully and is 5 yrs post cancer diagnosis. Denies pain, but has primary c/o fecal and mild urinary incontinence.   Presenting condition or subjective complaint: incontinence  Date of onset: 12/19/24    Relevant medical history:     Dates & types of surgery: gall bladder/removal of one ovary & both tubes    Prior diagnostic imaging/testing results:       Prior therapy history for the same diagnosis, illness or injury: Yes pelvic floor - dates are charted    Prior Level of Function  Transfers: Independent  Ambulation: Independent  ADL: Independent    Living Environment  Social support: With a significant other or spouse   Type of home: House   Stairs to enter the home:         Ramp: No   Stairs inside the home: Yes 13 Is there a railing: Yes     Help at home: None  Equipment owned:       Employment:      Hobbies/Interests:      Patient goals for therapy: wear underwear/swim    Pain assessment:  vaginal pain with initial penetration; \"then after it is fine.\"  Pt states burning with urination after sexual intercourse.     Objective      PELVIC EVALUATION  ADDITIONAL HISTORY:  Sex assigned at birth: Female  Gender identity: Female    Pronouns: She/Her Hers      Bladder History:  Feels bladder filling: No  Triggers for feeling of inability to wait to go to the bathroom: Yes caffeine  How long can you wait to urinate: depends  Gets up at night to urinate: Yes 1-2  Can stop the flow of urine when urinating: Sometimes  Volume of urine usually released: Average   Other issues: Dribbling after urinating  Number of bladder infections in last 12 months:    Fluid intake per " day: 48 16    Medications taken for bladder: No     Activities causing urine leak: Hurrying to the bathroom due to a strong urge to urinate (pee)    Amount of urine typically leaked:    Pads used to help with leaking:          Bowel History:  Frequency of bowel movement: 5-7  Consistency of stool: Soft    Ignores the urge to defecate: No  Other bowel issues: Loss of stool; Loss of gas  Length of time spent trying to have a bowel movement:      Sexual Function History:  Sexual orientation: Straight    Sexually active: Yes  Lubrication used: Yes Yes  Pelvic pain: Initial penetration (rectal or vaginal)    Pain or difficulty with orgasms/erection/ejaculation: No    State of menopause: Post-menopause (I am done with menopause)  Hormone medications: No      Are you currently pregnant: No  Number of previous pregnancies: 3  Number of deliveries: 3  If you have delivered before, did you have any of these issues during delivery: Tearing; Episiotomy  Have you been diagnosed with pelvic prolapse or abdominal separation: No  Do you get regular exercise: Yes  I do this type of exercise: walking  Do you have any history of trauma that is relevant to your care that you d like to share: No      Discussed reason for referral regarding pelvic health needs and external/internal pelvic floor muscle examination with patient/guardian.  Opportunity provided to ask questions and verbal consent for assessment and intervention was given.    POSTURE: WFL  LUMBAR SCREEN: AROM WFL  HIP SCREEN:  Strength: WFL   Functional Strength Testing:  WFL for all transitional mvmts during session    PELVIC/SI SCREEN:  WFL   PAIN PROVOCATION TEST: WNL  PELVIS/SI SPECIAL TESTS: WFL  BREATHING SYMMETRY: Asymmetrical, increased upper chest breathing    PELVIC EXAM  Deferred today based on time constraints of session.      ABDOMINAL ASSESSMENT  Diastasis Rectus Abdominis (THONY):  THONY presence: No    Abdominal Activation/Strength:  WFL for all transitional  mvmts    Scar:   Location/Type: NA  Mobility:  NA    Fascial Tension/Restriction: WNL    RUSI: TAUS: able to visualize bladder and bladder base. Elevation .5cm (norm = 1+cm).     DERMATOMES: WNL    Assessment & Plan   CLINICAL IMPRESSIONS  Medical Diagnosis: Anal squamous cell carcinoma (H); Fecal Incontinence; Urinary Incontinence    Treatment Diagnosis: Pelvic Floor Dysfunction of radiated tissue   Impression/Assessment: Patient is a 59 year old female with urinary urge and stress incontinence, and mild/rare fecal incontinence complaints.  The following significant findings have been identified: Pain, Decreased ROM/flexibility, Decreased joint mobility, Decreased strength, Impaired sensation, Impaired muscle performance, and Decreased activity tolerance. These impairments interfere with their ability to perform self care tasks, recreational activities, and household chores as compared to previous level of function.     Clinical Decision Making (Complexity):  Clinical Presentation: Unstable/Unpredictable   Clinical Presentation Rationale: based on medical and personal factors listed in PT evaluation  Clinical Decision Making (Complexity): Moderate complexity    PLAN OF CARE  Treatment Interventions:  Modalities: Biofeedback, E-stim, Ultrasound  Interventions: Manual Therapy, Neuromuscular Re-education, Therapeutic Activity, Therapeutic Exercise, Self-Care/Home Management    Long Term Goals     PT Goal 1  Goal Identifier: STG  Goal Description: 1) Pt will report 2 full weeks without leaking urine in 4 weeks.  Target Date: 02/25/25  PT Goal 2  Goal Identifier: STG  Goal Description: 2)Pt will report 50% of all strong urges, making it to toilet without any dribbles, in 6 weeks.  Target Date: 03/11/25  PT Goal 3  Goal Identifier: LTG  Goal Description: 3)Pt will report no fecal leaking for 4 full weeks, in 12 weeks.  Target Date: 04/22/25  PT Goal 4  Goal Identifier: LTG  Goal Description: 4)Pt will be indep in HEP to  prevent return of symptoms in 12 weeks.  Target Date: 04/22/25      Frequency of Treatment: 1x per week, weaning to every other week  Duration of Treatment: 12 weeks    Recommended Referrals to Other Professionals:  none  Education Assessment:   Learner/Method: Patient;Listening;Reading;Demonstration;Pictures/Video;No Barriers to Learning    Risks and benefits of evaluation/treatment have been explained.   Patient/Family/caregiver agrees with Plan of Care.     Evaluation Time:     PT Eval, Moderate Complexity Minutes (71499): 30   Present: Not applicable     Signing Clinician: Marielena Prasad PT

## 2025-02-04 ENCOUNTER — THERAPY VISIT (OUTPATIENT)
Dept: PHYSICAL THERAPY | Facility: CLINIC | Age: 60
End: 2025-02-04
Attending: RADIOLOGY
Payer: COMMERCIAL

## 2025-02-04 DIAGNOSIS — R32 URINARY INCONTINENCE: ICD-10-CM

## 2025-02-04 DIAGNOSIS — C21.0 ANAL SQUAMOUS CELL CARCINOMA (H): Primary | Chronic | ICD-10-CM

## 2025-02-04 DIAGNOSIS — R15.9 FECAL INCONTINENCE: ICD-10-CM

## 2025-02-04 PROCEDURE — 97530 THERAPEUTIC ACTIVITIES: CPT | Mod: GP | Performed by: PHYSICAL THERAPIST

## 2025-02-04 PROCEDURE — 90913 BFB TRAINING EA ADDL 15 MIN: CPT | Mod: GP | Performed by: PHYSICAL THERAPIST

## 2025-02-04 PROCEDURE — 90912 BFB TRAINING 1ST 15 MIN: CPT | Mod: GP | Performed by: PHYSICAL THERAPIST

## 2025-02-04 PROCEDURE — 97110 THERAPEUTIC EXERCISES: CPT | Mod: GP | Performed by: PHYSICAL THERAPIST

## 2025-02-25 ENCOUNTER — THERAPY VISIT (OUTPATIENT)
Dept: PHYSICAL THERAPY | Facility: CLINIC | Age: 60
End: 2025-02-25
Attending: RADIOLOGY
Payer: COMMERCIAL

## 2025-02-25 DIAGNOSIS — R15.9 FECAL INCONTINENCE: ICD-10-CM

## 2025-02-25 DIAGNOSIS — R32 URINARY INCONTINENCE: ICD-10-CM

## 2025-02-25 DIAGNOSIS — C21.0 ANAL SQUAMOUS CELL CARCINOMA (H): Primary | Chronic | ICD-10-CM

## 2025-02-25 PROCEDURE — 90901 BIOFEEDBACK TRAIN ANY METH: CPT | Mod: GP | Performed by: PHYSICAL THERAPIST

## 2025-02-25 PROCEDURE — 97110 THERAPEUTIC EXERCISES: CPT | Mod: GP | Performed by: PHYSICAL THERAPIST

## 2025-02-25 PROCEDURE — 97530 THERAPEUTIC ACTIVITIES: CPT | Mod: GP | Performed by: PHYSICAL THERAPIST

## 2025-03-25 ENCOUNTER — THERAPY VISIT (OUTPATIENT)
Dept: PHYSICAL THERAPY | Facility: CLINIC | Age: 60
End: 2025-03-25
Attending: RADIOLOGY
Payer: COMMERCIAL

## 2025-03-25 DIAGNOSIS — C21.0 ANAL SQUAMOUS CELL CARCINOMA (H): Primary | Chronic | ICD-10-CM

## 2025-03-25 DIAGNOSIS — R15.9 FECAL INCONTINENCE: ICD-10-CM

## 2025-03-25 DIAGNOSIS — R32 URINARY INCONTINENCE: ICD-10-CM

## 2025-03-25 PROCEDURE — 90913 BFB TRAINING EA ADDL 15 MIN: CPT | Mod: GP | Performed by: PHYSICAL THERAPIST

## 2025-03-25 PROCEDURE — 90912 BFB TRAINING 1ST 15 MIN: CPT | Mod: GP | Performed by: PHYSICAL THERAPIST

## 2025-03-25 PROCEDURE — 97110 THERAPEUTIC EXERCISES: CPT | Mod: GP | Performed by: PHYSICAL THERAPIST

## 2025-04-07 ENCOUNTER — OFFICE VISIT (OUTPATIENT)
Dept: SURGERY | Facility: CLINIC | Age: 60
End: 2025-04-07
Payer: COMMERCIAL

## 2025-04-07 VITALS
BODY MASS INDEX: 36.1 KG/M2 | OXYGEN SATURATION: 96 % | DIASTOLIC BLOOD PRESSURE: 95 MMHG | HEART RATE: 92 BPM | WEIGHT: 216.7 LBS | HEIGHT: 65 IN | SYSTOLIC BLOOD PRESSURE: 135 MMHG

## 2025-04-07 DIAGNOSIS — C21.0 ANAL SQUAMOUS CELL CARCINOMA (H): Primary | ICD-10-CM

## 2025-04-07 PROCEDURE — 1126F AMNT PAIN NOTED NONE PRSNT: CPT | Performed by: NURSE PRACTITIONER

## 2025-04-07 PROCEDURE — 46600 DIAGNOSTIC ANOSCOPY SPX: CPT | Performed by: NURSE PRACTITIONER

## 2025-04-07 PROCEDURE — 3080F DIAST BP >= 90 MM HG: CPT | Performed by: NURSE PRACTITIONER

## 2025-04-07 PROCEDURE — 3075F SYST BP GE 130 - 139MM HG: CPT | Performed by: NURSE PRACTITIONER

## 2025-04-07 PROCEDURE — 99213 OFFICE O/P EST LOW 20 MIN: CPT | Mod: 25 | Performed by: NURSE PRACTITIONER

## 2025-04-07 ASSESSMENT — PAIN SCALES - GENERAL: PAINLEVEL_OUTOF10: NO PAIN (0)

## 2025-04-07 NOTE — PROGRESS NOTES
Colon and Rectal Surgery Follow-Up Clinic Note    RE: Susu Lagos  : 1965  MALGORZATA: 2025    Susu Lagos is a very pleasant 59 year old female with history of anal cancer s/p chemoradiation completed 20 here for follow-up.    MR Pelvis 23:  IMPRESSION: Radiation treatment changes without evidence of residual  or recurrent malignancy in the pelvis.    PET 23:  Overall, findings are stable post treatment changes with  hypermetabolic uptake within the anal canal.  No definite evidence of  recurrent or metastatic tumor.    Colonoscopy 24:  Findings:       The perianal and digital rectal examinations were normal. Unable to        retroflex due to narrow rectal vault        The mucosa vascular pattern in the rectum was increased with few        scattered telangiectasias. Biopsies were taken with a cold forceps for        histology.        The colon (entire examined portion) appeared normal. Biopsies for        histology were taken with a cold forceps from the right colon and left        colon for evaluation of microscopic colitis.        The terminal ileum appeared normal.   Final Diagnosis   A. RIGHT COLON, BIOPSY:  - Colonic mucosa with no significant histologic abnormality  - Negative for active inflammation and lymphocytic colitis     B. LEFT COLON, BIOPSY:  - Colonic mucosa with no significant histologic abnormality  - Negative for active inflammation and lymphocytic colitis     C. RECTUM, BIOPSY:  - Colorectal mucosa with glandular distortion, subepithelial fibrosis and vascular telangiectasia  - Negative for dysplasia and malignancy, see comment       Interval history: Susu denies any anorectal complaints.    Physical Examination:   [facility] 2016   General: alert, oriented, in no acute distress, sitting comfortably  HEENT:moist mucous membranes  No palpable inguinal adenopathy.    Perianal external examination:  Perianal skin: Intact with no excoriation or lichenification.  Telangectasia consistent with prior radiation.  Lesions: No evidence of an external lesion, nodularity, or induration in the perianal region.  Eversion of buttocks: There was not evidence of an anal fissure. Details: N/A.  Skin tags or external hemorrhoids: Small external hemorrhoidal skin tags    Digital rectal examination: Was performed.   Sphincter tone: Good.  Palpable lesions: No.  Other: None.  Bimanual examination: was not performed.    Anoscopy: Was performed.   Hemorrhoids: Small internal hemorrhoids without active bleeding  Lesions: Telangectasia consistent with prior radiation. And very tiny scope trauma versus anal fissure in the posterior midline    Assessment/Plan: 59 year old female with history of anal cancer s/p chemoradiation completed 5/18/20.  No evidence of recurrence on exam today. She is now 5 years cancer free so no additional surveillance visits needed. Can follow up as needed.      Medical history:  Past Medical History:   Diagnosis Date    Menorrhagia with irregular cycle 2/28/2016    Ovarian cyst 6/4/2015    Right, 6 cm dermoid on MRI-recommend removal.  7/29/2015 Surgery for right salpingoophorectomy.      Perimenopause 6/4/2015    Rectal cancer (H)     had radiation and chemo 2020       Surgical history:  Past Surgical History:   Procedure Laterality Date    CHOLECYSTECTOMY  01/01/2013    LSC    COLONOSCOPY N/A 11/13/2018    Procedure: colonoscopy;  Surgeon: Sesar Dos Santos MD;  Location: [facility]    COLONOSCOPY N/A 03/03/2020    Procedure: COLONOSCOPY, WITH POLYPECTOMY AND BIOPSY;  Surgeon: Sesar Dos Santos MD;  Location: [facility]    COLONOSCOPY N/A 12/16/2024    COLONOSCOPY N/A 12/16/2024    Procedure: COLONOSCOPY, WITH POLYPECTOMY AND BIOPSY;  Surgeon: Tonya Dodge DO;  Location: [facility]    COLONOSCOPY WITH CO2 INSUFFLATION N/A 12/16/2024    Procedure: Colonoscopy with CO2 insufflation;  Surgeon: Tonya Dodge DO;  Location: [facility]    DILATION AND CURETTAGE, OPERATIVE HYSTEROSCOPY,  COMBINED N/A 02/29/2016    Procedure: COMBINED DILATION AND CURETTAGE, OPERATIVE HYSTEROSCOPY;  Surgeon: Panda Knight MD;  [facility]    ESOPHAGOSCOPY, GASTROSCOPY, DUODENOSCOPY (EGD), COMBINED N/A 09/03/2020    Procedure: ESOPHAGOGASTRODUODENOSCOPY (EGD);  Surgeon: Karlos Burnett MD;  Location: [facility]    GYN SURGERY  01/01/1995    tubes tied    LAPAROSCOPY DIAGNOSTIC (GYN) Right 07/29/2015    Procedure: LAPAROSCOPY DIAGNOSTIC (GYN);  Surgeon: Lian Betancur DO;  Location: [facility]       Problem list:      Patient Active Problem List    Diagnosis Date Noted    Urinary incontinence 01/28/2025     Priority: Medium    Fecal incontinence 01/28/2025     Priority: Medium    Anemia with low platelet count 06/02/2021     Priority: Medium    Intractable pain 05/18/2020     Priority: Medium    Cervical cancer screening 03/20/2020     Priority: Medium     2008, 2010, 2011, 2013 All NIL paps.   4/22/15 NIL Pap, Neg HPV. Plan: Cotest in 5 yr       **  3/2020 Dx rectal cancer **  3/13/20 NIL Pap, Neg HPV. TZ absent. Plan: cotest in 3 years per provider and Oncologist.       Anal squamous cell carcinoma (H) 03/03/2020     Priority: Medium     Sent to [facility]. Check 7.21 Farhan for f/u [facility]  Susu Lagos presented to the primary care physician with 3 to 4 months history of blood in the stool.  Subsequently the patient was referred for colonoscopy on March 3, 2020.  The palpable rectal mass on digital examination.  Biopsy came back positive for squamous cell carcinoma, focally keratinizing.  The patient has a previous colonoscopy on November 2018 which shows no abnormalities.  The patient was started on chemoradiation regimen with 5-FU and mitomycin.      CARDIOVASCULAR SCREENING; LDL GOAL LESS THAN 160 02/25/2015     Priority: Medium       Medications:  Current Outpatient Medications   Medication Sig Dispense Refill    cholestyramine (QUESTRAN) 4 g packet Take 1 packet (4 g) by mouth 2 times daily (with meals)  (Patient not taking: Reported on 2/5/2024) 60 packet 3    diltiazem 2% in PLO gel To anal opening three times daily.  Use a pea-sized amount.  Store at room temperature. (Patient not taking: Reported on 6/9/2023) 60 g 3    estradiol (ESTRACE) 0.1 MG/GM vaginal cream Topically 3x/week (Patient not taking: Reported on 6/9/2023)      Hydrophilic ointment PRN (Patient not taking: Reported on 2/5/2024)      Magnesium Oxide 250 MG TABS Take 2 tablets by mouth 2 times daily      Multiple Vitamin (MULTI-VITAMINS PO) Take 1 tablet by mouth daily       Omega-3 Fatty Acids (OMEGA 3 PO) Take 1 capsule by mouth daily       triamcinolone (KENALOG) 0.1 % external cream Apply topically 2 times daily (Patient not taking: Reported on 6/9/2023) 30 g 3       Allergies:  Allergies   Allergen Reactions    Rabies Vaccine (Hamster) Unknown     Scratch test resulted, it was not a good idea to give it to her.    Shellfish-Derived Products Nausea and Vomiting    Sulfa Antibiotics Rash       Family history:  Family History   Problem Relation Age of Onset    Hypertension Father     Myocardial Infarction Maternal Grandfather     Melanoma No family hx of        Social history:  Social History     Tobacco Use    Smoking status: Never    Smokeless tobacco: Never   Substance Use Topics    Alcohol use: Yes     Alcohol/week: 0.0 standard drinks of alcohol     Comment: 1-2 per month      Marital status: .    There are no exam notes on file for this visit.    10 minutes spent on the date of encounter performing chart review, history and exam, documentation and further activities as noted above with an additional 2 minutes for anoscopy    BENJAMÍN Hair  Colon and Rectal Surgery  [facility]

## 2025-04-07 NOTE — LETTER
2025       RE: Susu Lagos  [id number]  [facility] [address]     Dear Colleague,    Thank you for referring your patient, Susu Lagso, to the [facility] COLON AND RECTAL SURGERY [facility]INIC [place] at [facility]. Please see a copy of my visit note below.    Colon and Rectal Surgery Follow-Up Clinic Note    RE: Susu Lagos  : 1965  MALGORZATA: 2025    Susu Lagos is a very pleasant 59 year old female with history of anal cancer s/p chemoradiation completed 20 here for follow-up.    MR Pelvis 23:  IMPRESSION: Radiation treatment changes without evidence of residual  or recurrent malignancy in the pelvis.    PET 23:  Overall, findings are stable post treatment changes with  hypermetabolic uptake within the anal canal.  No definite evidence of  recurrent or metastatic tumor.    Colonoscopy 24:  Findings:       The perianal and digital rectal examinations were normal. Unable to        retroflex due to narrow rectal vault        The mucosa vascular pattern in the rectum was increased with few        scattered telangiectasias. Biopsies were taken with a cold forceps for        histology.        The colon (entire examined portion) appeared normal. Biopsies for        histology were taken with a cold forceps from the right colon and left        colon for evaluation of microscopic colitis.        The terminal ileum appeared normal.   Final Diagnosis   A. RIGHT COLON, BIOPSY:  - Colonic mucosa with no significant histologic abnormality  - Negative for active inflammation and lymphocytic colitis     B. LEFT COLON, BIOPSY:  - Colonic mucosa with no significant histologic abnormality  - Negative for active inflammation and lymphocytic colitis     C. RECTUM, BIOPSY:  - Colorectal mucosa with glandular distortion, subepithelial fibrosis and vascular telangiectasia  - Negative for dysplasia and malignancy, see comment        Interval history: Susu denies any anorectal complaints.    Physical Examination:   LMP 01/29/2016   General: alert, oriented, in no acute distress, sitting comfortably  HEENT:moist mucous membranes  No palpable inguinal adenopathy.    Perianal external examination:  Perianal skin: Intact with no excoriation or lichenification. Telangectasia consistent with prior radiation.  Lesions: No evidence of an external lesion, nodularity, or induration in the perianal region.  Eversion of buttocks: There was not evidence of an anal fissure. Details: N/A.  Skin tags or external hemorrhoids: Small external hemorrhoidal skin tags    Digital rectal examination: Was performed.   Sphincter tone: Good.  Palpable lesions: No.  Other: None.  Bimanual examination: was not performed.    Anoscopy: Was performed.   Hemorrhoids: Small internal hemorrhoids without active bleeding  Lesions: Telangectasia consistent with prior radiation. And very tiny scope trauma versus anal fissure in the posterior midline    Assessment/Plan: 59 year old female with history of anal cancer s/p chemoradiation completed 5/18/20.  No evidence of recurrence on exam today. She is now 5 years cancer free so no additional surveillance visits needed. Can follow up as needed.      Medical history:  Past Medical History:   Diagnosis Date     Menorrhagia with irregular cycle 2/28/2016     Ovarian cyst 6/4/2015    Right, 6 cm dermoid on MRI-recommend removal.  7/29/2015 Surgery for right salpingoophorectomy.       Perimenopause 6/4/2015     Rectal cancer (H)     had radiation and chemo 2020       Surgical history:  Past Surgical History:   Procedure Laterality Date     CHOLECYSTECTOMY  01/01/2013    LSC     COLONOSCOPY N/A 11/13/2018    Procedure: colonoscopy;  Surgeon: Sesar Dos Santos MD;  Location: [facility]     COLONOSCOPY N/A 03/03/2020    Procedure: COLONOSCOPY, WITH POLYPECTOMY AND BIOPSY;  Surgeon: Sesar Dos Santos MD;  Location: [facility]     COLONOSCOPY N/A  12/16/2024     COLONOSCOPY N/A 12/16/2024    Procedure: COLONOSCOPY, WITH POLYPECTOMY AND BIOPSY;  Surgeon: Tonya Dodge DO;  Location:  OR     COLONOSCOPY WITH CO2 INSUFFLATION N/A 12/16/2024    Procedure: Colonoscopy with CO2 insufflation;  Surgeon: Tonya Dodge DO;  Location:  OR     DILATION AND CURETTAGE, OPERATIVE HYSTEROSCOPY, COMBINED N/A 02/29/2016    Procedure: COMBINED DILATION AND CURETTAGE, OPERATIVE HYSTEROSCOPY;  Surgeon: Panda Knight MD;  [facility]: [facility]     ESOPHAGOSCOPY, GASTROSCOPY, DUODENOSCOPY (EGD), COMBINED N/A 09/03/2020    Procedure: ESOPHAGOGASTRODUODENOSCOPY (EGD);  Surgeon: Karlos Burnett MD;  Location: [facility]     GYN SURGERY  01/01/1995    tubes tied     LAPAROSCOPY DIAGNOSTIC (GYN) Right 07/29/2015    Procedure: LAPAROSCOPY DIAGNOSTIC (GYN);  Surgeon: Lian Betancur DO;  Location: [facility]       Problem list:      Patient Active Problem List    Diagnosis Date Noted     Urinary incontinence 01/28/2025     Priority: Medium     Fecal incontinence 01/28/2025     Priority: Medium     Anemia with low platelet count 06/02/2021     Priority: Medium     Intractable pain 05/18/2020     Priority: Medium     Cervical cancer screening 03/20/2020     Priority: Medium     2008, 2010, 2011, 2013 All NIL paps.   4/22/15 NIL Pap, Neg HPV. Plan: Cotest in 5 yr       **  3/2020 Dx rectal cancer **  3/13/20 NIL Pap, Neg HPV. TZ absent. Plan: cotest in 3 years per provider and Oncologist.        Anal squamous cell carcinoma (H) 03/03/2020     Priority: Medium     Sent to [facility]. Check 7.21 Farhan for f/u [facility]  Susu Lagos presented to the primary care physician with 3 to 4 months history of blood in the stool.  Subsequently the patient was referred for colonoscopy on March 3, 2020.  The palpable rectal mass on digital examination.  Biopsy came back positive for squamous cell carcinoma, focally keratinizing.  The patient has a previous colonoscopy on November 2018 which shows  no abnormalities.  The patient was started on chemoradiation regimen with 5-FU and mitomycin.       CARDIOVASCULAR SCREENING; LDL GOAL LESS THAN 160 02/25/2015     Priority: Medium       Medications:  Current Outpatient Medications   Medication Sig Dispense Refill     cholestyramine (QUESTRAN) 4 g packet Take 1 packet (4 g) by mouth 2 times daily (with meals) (Patient not taking: Reported on 2/5/2024) 60 packet 3     diltiazem 2% in PLO gel To anal opening three times daily.  Use a pea-sized amount.  Store at room temperature. (Patient not taking: Reported on 6/9/2023) 60 g 3     estradiol (ESTRACE) 0.1 MG/GM vaginal cream Topically 3x/week (Patient not taking: Reported on 6/9/2023)       Hydrophilic ointment PRN (Patient not taking: Reported on 2/5/2024)       Magnesium Oxide 250 MG TABS Take 2 tablets by mouth 2 times daily       Multiple Vitamin (MULTI-VITAMINS PO) Take 1 tablet by mouth daily        Omega-3 Fatty Acids (OMEGA 3 PO) Take 1 capsule by mouth daily        triamcinolone (KENALOG) 0.1 % external cream Apply topically 2 times daily (Patient not taking: Reported on 6/9/2023) 30 g 3       Allergies:  Allergies   Allergen Reactions     Rabies Vaccine (Hamster) Unknown     Scratch test resulted, it was not a good idea to give it to her.     Shellfish-Derived Products Nausea and Vomiting     Sulfa Antibiotics Rash       Family history:  Family History   Problem Relation Age of Onset     Hypertension Father      Myocardial Infarction Maternal Grandfather      Melanoma No family hx of        Social history:  Social History     Tobacco Use     Smoking status: Never     Smokeless tobacco: Never   Substance Use Topics     Alcohol use: Yes     Alcohol/week: 0.0 standard drinks of alcohol     Comment: 1-2 per month      Marital status: .    There are no exam notes on file for this visit.    10 minutes spent on the date of encounter performing chart review, history and exam, documentation and further  activities as noted above with an additional 2 minutes for anoscopy    BENJAMÍN Hair  Colon and Rectal Surgery  [facility]      Again, thank you for allowing me to participate in the care of your patient.      Sincerely,    RADHA Hair CNP

## 2025-04-07 NOTE — NURSING NOTE
"Chief Complaint   Patient presents with    Follow Up     6 month follow up, [facility]       Vitals:    04/07/25 1013   BP: (!) 135/95   BP Location: Left arm   Patient Position: Sitting   Cuff Size: Adult Large   Pulse: 92   SpO2: 96%   Weight: 216 lb 11.2 oz   Height: 5' 5\"       Body mass index is 36.06 kg/m .    Destini Campbell, EMT  "

## 2025-04-08 ENCOUNTER — THERAPY VISIT (OUTPATIENT)
Dept: PHYSICAL THERAPY | Facility: CLINIC | Age: 60
End: 2025-04-08
Attending: RADIOLOGY
Payer: COMMERCIAL

## 2025-04-08 DIAGNOSIS — R32 URINARY INCONTINENCE: ICD-10-CM

## 2025-04-08 DIAGNOSIS — C21.0 ANAL SQUAMOUS CELL CARCINOMA (H): Primary | Chronic | ICD-10-CM

## 2025-04-08 DIAGNOSIS — R15.9 FECAL INCONTINENCE: ICD-10-CM

## 2025-04-08 PROCEDURE — 97110 THERAPEUTIC EXERCISES: CPT | Mod: GP | Performed by: PHYSICAL THERAPIST

## 2025-04-08 PROCEDURE — 97535 SELF CARE MNGMENT TRAINING: CPT | Mod: GP | Performed by: PHYSICAL THERAPIST

## 2025-05-19 ENCOUNTER — ONCOLOGY VISIT (OUTPATIENT)
Dept: ONCOLOGY | Facility: CLINIC | Age: 60
End: 2025-05-19
Attending: NURSE PRACTITIONER
Payer: COMMERCIAL

## 2025-05-19 VITALS
OXYGEN SATURATION: 97 % | BODY MASS INDEX: 36.15 KG/M2 | WEIGHT: 217 LBS | DIASTOLIC BLOOD PRESSURE: 78 MMHG | HEIGHT: 65 IN | TEMPERATURE: 97.2 F | HEART RATE: 69 BPM | SYSTOLIC BLOOD PRESSURE: 118 MMHG | RESPIRATION RATE: 16 BRPM

## 2025-05-19 DIAGNOSIS — C21.0 ANAL SQUAMOUS CELL CARCINOMA (H): ICD-10-CM

## 2025-05-19 DIAGNOSIS — R79.89 ELEVATED LFTS: Primary | ICD-10-CM

## 2025-05-19 LAB
ALBUMIN SERPL BCG-MCNC: 4.3 G/DL (ref 3.5–5.2)
ALP SERPL-CCNC: 66 U/L (ref 40–150)
ALT SERPL W P-5'-P-CCNC: 165 U/L (ref 0–50)
ANION GAP SERPL CALCULATED.3IONS-SCNC: 10 MMOL/L (ref 7–15)
AST SERPL W P-5'-P-CCNC: 105 U/L (ref 0–45)
BASOPHILS # BLD AUTO: 0 10E3/UL (ref 0–0.2)
BASOPHILS NFR BLD AUTO: 0 %
BILIRUB SERPL-MCNC: 0.4 MG/DL
BUN SERPL-MCNC: 15.9 MG/DL (ref 6–20)
CALCIUM SERPL-MCNC: 10 MG/DL (ref 8.8–10.4)
CEA SERPL-MCNC: 1.7 NG/ML
CHLORIDE SERPL-SCNC: 105 MMOL/L (ref 98–107)
CREAT SERPL-MCNC: 0.82 MG/DL (ref 0.51–0.95)
EGFRCR SERPLBLD CKD-EPI 2021: 83 ML/MIN/1.73M2
EOSINOPHIL # BLD AUTO: 0.1 10E3/UL (ref 0–0.7)
EOSINOPHIL NFR BLD AUTO: 2 %
ERYTHROCYTE [DISTWIDTH] IN BLOOD BY AUTOMATED COUNT: 11.8 % (ref 10–15)
GLUCOSE SERPL-MCNC: 89 MG/DL (ref 70–99)
HCO3 SERPL-SCNC: 27 MMOL/L (ref 22–29)
HCT VFR BLD AUTO: 41.3 % (ref 35–47)
HGB BLD-MCNC: 14 G/DL (ref 11.7–15.7)
IMM GRANULOCYTES # BLD: 0 10E3/UL
IMM GRANULOCYTES NFR BLD: 0 %
LYMPHOCYTES # BLD AUTO: 1.6 10E3/UL (ref 0.8–5.3)
LYMPHOCYTES NFR BLD AUTO: 31 %
MCH RBC QN AUTO: 32.7 PG (ref 26.5–33)
MCHC RBC AUTO-ENTMCNC: 33.9 G/DL (ref 31.5–36.5)
MCV RBC AUTO: 97 FL (ref 78–100)
MONOCYTES # BLD AUTO: 0.5 10E3/UL (ref 0–1.3)
MONOCYTES NFR BLD AUTO: 9 %
NEUTROPHILS # BLD AUTO: 2.9 10E3/UL (ref 1.6–8.3)
NEUTROPHILS NFR BLD AUTO: 57 %
NRBC # BLD AUTO: 0 10E3/UL
NRBC BLD AUTO-RTO: 0 /100
PLATELET # BLD AUTO: 176 10E3/UL (ref 150–450)
POTASSIUM SERPL-SCNC: 4.4 MMOL/L (ref 3.4–5.3)
PROT SERPL-MCNC: 7 G/DL (ref 6.4–8.3)
RBC # BLD AUTO: 4.28 10E6/UL (ref 3.8–5.2)
SODIUM SERPL-SCNC: 142 MMOL/L (ref 135–145)
WBC # BLD AUTO: 5.2 10E3/UL (ref 4–11)

## 2025-05-19 PROCEDURE — 85004 AUTOMATED DIFF WBC COUNT: CPT | Performed by: NURSE PRACTITIONER

## 2025-05-19 PROCEDURE — 36415 COLL VENOUS BLD VENIPUNCTURE: CPT | Performed by: NURSE PRACTITIONER

## 2025-05-19 PROCEDURE — 82378 CARCINOEMBRYONIC ANTIGEN: CPT | Performed by: NURSE PRACTITIONER

## 2025-05-19 PROCEDURE — G2211 COMPLEX E/M VISIT ADD ON: HCPCS | Performed by: NURSE PRACTITIONER

## 2025-05-19 PROCEDURE — 80053 COMPREHEN METABOLIC PANEL: CPT | Performed by: NURSE PRACTITIONER

## 2025-05-19 PROCEDURE — 99214 OFFICE O/P EST MOD 30 MIN: CPT | Performed by: NURSE PRACTITIONER

## 2025-05-19 PROCEDURE — 99213 OFFICE O/P EST LOW 20 MIN: CPT | Performed by: NURSE PRACTITIONER

## 2025-05-19 ASSESSMENT — PAIN SCALES - GENERAL: PAINLEVEL_OUTOF10: NO PAIN (0)

## 2025-05-19 NOTE — PROGRESS NOTES
"Oncology Rooming Note    May 19, 2025 11:40 AM   Susu Lagos is a 59 year old female who presents for:    Chief Complaint   Patient presents with    Oncology Clinic Visit     Anal squamous cell carcinoma - labs and provider visit     Initial Vitals: /78 (BP Location: Right arm, Patient Position: Sitting, Cuff Size: Adult Regular)   Pulse 69   Temp 97.2  F (36.2  C) (Tympanic)   Resp 16   Ht 1.651 m (5' 5\")   Wt 98.4 kg (217 lb)   LMP 01/29/2016   SpO2 97%   BMI 36.11 kg/m   Estimated body mass index is 36.11 kg/m  as calculated from the following:    Height as of this encounter: 1.651 m (5' 5\").    Weight as of this encounter: 98.4 kg (217 lb). Body surface area is 2.12 meters squared.  No Pain (0) Comment: Data Unavailable   Patient's last menstrual period was 01/29/2016.  Allergies reviewed: Yes  Medications reviewed: Yes    Medications: Medication refills not needed today.  Pharmacy name entered into [facility]:    [facility] - [place], [place] - [address] [place] [address] PHARMACY - [place], [place] - [address]    Frailty Screening:   Is the patient here for a new oncology consult visit in cancer care? 2. No    PHQ9:  Did this patient require a PHQ9?: No      Clinical concerns: None today.        Frida Fischer MA              "

## 2025-05-19 NOTE — PROGRESS NOTES
[facility] Hematology and Oncology Outpatient Progress Note    Patient: Susu Lagos  MRN: [id number]  Date of Service: May 19, 2025          Reason for Visit    Hx anal cancer      Assessment/Plan  Hx stage I anal cancer  Treated for early stage with chemoRT 5 yrs ago. JOSE LUIS to date.     9/2023 PET: negative for recurrence/mets  9/2023 MRI pelvis: negative for local recurrence.     12/2024 colonoscopy: scattered telangiectasias (post-RT), no cancer recurrence. Biopsies negative for cancer recurrence.     4/2025 CRS exam/anosocopy negative for local recurrence. She has graduated routine surveillance.     Labs: ,  (chronic/stable). CBC WNL. CEA pending.      Plan:  -Monitor pending CEA  -Considered cured, no recurrence at 5 yrs.   -Monitor CBC every 1-2 yrs post-chemo  -Every 5-yr colonoscopy screening (next due Fall, 2029) or sooner for symptoms.   -Graduate from routine Oncology surveillance. Survivorship Visit in the next 1 yr    2.  Chronically elevated LFTs, hepatic steatosis  Stable, chronic. LFTs are a bit higher than usual trend today (, ).     Plan:  -If CEA WNL, recommend avoiding ETOH and repeating LFTs in 6-8 weeks  -If CEA elevated or LFT trend not improving on recheck, would recommend CT abd    3.   Fecal incontinence, pelvic floor weakness (post-RT)  4.   Intermittent rectal bleeding (r/t internal hemorrhoids, post-RT changes)  Rectal exams in CRS negative for recurrence; bleeding favored to be related to post-RT telangiectasias noted on colonoscopy. No anemia.     Has completed pelvic PT on a few occasions with benefit.     Plan:  -Continue pelvic floor rehab recommendations.   -Consider visiting with CRS about other interventions; I believe Dr. Blevins has helped other patients undergo procedure for this. She will reach out to CRS if needed.     ______________________________________________________________________________    History of Present Illness/  Interval History    Ms. Susu Lagos is a 59 year old 5 yrs from dx of early stage anal cancer s/p definitive chemoRT with CR. Follows CRS every 6 mths in surveillance. Returns for 1 yr follow-up in Oncology.      Intermittent mild rectal bleeding, chronic. Normal bowel movements. Started fiber, very minimal bleeding since then. CRS digital rectal and anoscopy exam normal. Colonoscopy normal. Felt related to internal hemorrhoids and post-RT telengectasia.    Stable weight. No new pain.   No residual chemo-related effects. Chronic pelvic floor weakness with stool incontinence post-RT with improvement.    ECOG Performance    0      Oncology History/Treatment  Diagnosis/Stage:   3/2020: Stage I anal cancer  -presented with 4-mth hx bright red blood in stool  -3/3/2020 colonoscopy: palpable rectal mass on digital exam. Biopsy: squamous cell carcinoma, focally keratinizing   -Staging MRI pelvis and PET scan: 1.2 cm anal mass. No met nodes or distant mets.     Treatment:  5/2020: completed chemoRT with 5FU + mitomycin  --resulted in CR (no surgery needed)    Surveillance MRI pelvis and PET scans. CRS exams      Physical Exam    GENERAL: Alert and oriented to time place and person. Seated comfortably. In no distress.  HEAD: Atraumatic and normocephalic. No alopecia.  EYES: ABIGAIL, EOMI. No erythema. No icterus.  ORAL CAVITY: Moist. No mucosal lesion or tonsillar enlargement.  NECK: supple. No thyroid enlargement.  LYMPH NODES: No palpable supraclavicular, cervical, axillary or inguinal lymphadenopathy.  CHEST: clear to auscultation bilaterally. Resonant to percussion throughout bilaterally. Symmetrical breath movements bilaterally.  CVS: RRR  ABDOMEN: Soft. Not tender. Not distended. No palpable hepatomegaly or splenomegaly. No other mass palpable. Bowel sounds present.  EXTREMITIES: Warm. No peripheral edema.  SKIN: no rash, or bruising or purpura.   NEURO: No gross deficit noted. Non-antalgic gait.      Lab  Results    Recent Results (from the past week)   Comprehensive metabolic panel (BMP + Alb, Alk Phos, ALT, AST, Total. Bili, TP)   Result Value Ref Range    Sodium 142 135 - 145 mmol/L    Potassium 4.4 3.4 - 5.3 mmol/L    Carbon Dioxide (CO2) 27 22 - 29 mmol/L    Anion Gap 10 7 - 15 mmol/L    Urea Nitrogen 15.9 6.0 - 20.0 mg/dL    Creatinine 0.82 0.51 - 0.95 mg/dL    GFR Estimate 83 >60 mL/min/1.73m2    Calcium 10.0 8.8 - 10.4 mg/dL    Chloride 105 98 - 107 mmol/L    Glucose 89 70 - 99 mg/dL    Alkaline Phosphatase 66 40 - 150 U/L     (H) 0 - 45 U/L     (H) 0 - 50 U/L    Protein Total 7.0 6.4 - 8.3 g/dL    Albumin 4.3 3.5 - 5.2 g/dL    Bilirubin Total 0.4 <=1.2 mg/dL   CBC with platelets and differential   Result Value Ref Range    WBC Count 5.2 4.0 - 11.0 10e3/uL    RBC Count 4.28 3.80 - 5.20 10e6/uL    Hemoglobin 14.0 11.7 - 15.7 g/dL    Hematocrit 41.3 35.0 - 47.0 %    MCV 97 78 - 100 fL    MCH 32.7 26.5 - 33.0 pg    MCHC 33.9 31.5 - 36.5 g/dL    RDW 11.8 10.0 - 15.0 %    Platelet Count 176 150 - 450 10e3/uL    % Neutrophils 57 %    % Lymphocytes 31 %    % Monocytes 9 %    % Eosinophils 2 %    % Basophils 0 %    % Immature Granulocytes 0 %    NRBCs per 100 WBC 0 <1 /100    Absolute Neutrophils 2.9 1.6 - 8.3 10e3/uL    Absolute Lymphocytes 1.6 0.8 - 5.3 10e3/uL    Absolute Monocytes 0.5 0.0 - 1.3 10e3/uL    Absolute Eosinophils 0.1 0.0 - 0.7 10e3/uL    Absolute Basophils 0.0 0.0 - 0.2 10e3/uL    Absolute Immature Granulocytes 0.0 <=0.4 10e3/uL    Absolute NRBCs 0.0 10e3/uL         Imaging    No results found.    Billing  Total time 30 minutes, to include face to face visit, review of EMR, ordering, documentation and coordination of care on date of service   complexity modifier for longitudinal care.     Signed by: Ashly Pierce NP

## 2025-05-19 NOTE — LETTER
5/19/2025      Susu Lagos  [id number]  [facility] [address]      Dear Colleague,    Thank you for referring your patient, Susu Lagos, to the [facility]. Please see a copy of my visit note below.    [facility] Hematology and Oncology Outpatient Progress Note    Patient: Susu Lagos  MRN: [id number]  Date of Service: May 19, 2025          Reason for Visit    Hx anal cancer      Assessment/Plan  Hx stage I anal cancer  Treated for early stage with chemoRT 5 yrs ago. JOSE LUIS to date.     9/2023 PET: negative for recurrence/mets  9/2023 MRI pelvis: negative for local recurrence.     12/2024 colonoscopy: scattered telangiectasias (post-RT), no cancer recurrence. Biopsies negative for cancer recurrence.     4/2025 CRS exam/anosocopy negative for local recurrence. She has graduated routine surveillance.     Labs: ,  (chronic/stable). CBC WNL. CEA pending.      Plan:  -Monitor pending CEA  -Considered cured, no recurrence at 5 yrs.   -Monitor CBC every 1-2 yrs post-chemo  -Every 5-yr colonoscopy screening (next due Fall, 2029) or sooner for symptoms.   -Graduate from routine Oncology surveillance. Survivorship Visit in the next 1 yr    2.  Chronically elevated LFTs, hepatic steatosis  Stable, chronic. LFTs are a bit higher than usual trend today (, ).     Plan:  -If CEA WNL, recommend avoiding ETOH and repeating LFTs in 6-8 weeks  -If CEA elevated or LFT trend not improving on recheck, would recommend CT abd    3.   Fecal incontinence, pelvic floor weakness (post-RT)  4.   Intermittent rectal bleeding (r/t internal hemorrhoids, post-RT changes)  Rectal exams in CRS negative for recurrence; bleeding favored to be related to post-RT telangiectasias noted on colonoscopy. No anemia.     Has completed pelvic PT on a few occasions with benefit.     Plan:  -Continue pelvic floor rehab recommendations.   -Consider visiting with CRS about other  interventions; I believe Dr. Blevins has helped other patients undergo procedure for this. She will reach out to CRS if needed.     ______________________________________________________________________________    History of Present Illness/ Interval History    Ms. Susu Lagos is a 59 year old 5 yrs from dx of early stage anal cancer s/p definitive chemoRT with CR. Follows CRS every 6 mths in surveillance. Returns for 1 yr follow-up in Oncology.      Intermittent mild rectal bleeding, chronic. Normal bowel movements. Started fiber, very minimal bleeding since then. CRS digital rectal and anoscopy exam normal. Colonoscopy normal. Felt related to internal hemorrhoids and post-RT telengectasia.    Stable weight. No new pain.   No residual chemo-related effects. Chronic pelvic floor weakness with stool incontinence post-RT with improvement.    ECOG Performance    0      Oncology History/Treatment  Diagnosis/Stage:   3/2020: Stage I anal cancer  -presented with 4-mth hx bright red blood in stool  -3/3/2020 colonoscopy: palpable rectal mass on digital exam. Biopsy: squamous cell carcinoma, focally keratinizing   -Staging MRI pelvis and PET scan: 1.2 cm anal mass. No met nodes or distant mets.     Treatment:  5/2020: completed chemoRT with 5FU + mitomycin  --resulted in CR (no surgery needed)    Surveillance MRI pelvis and PET scans. CRS exams      Physical Exam    GENERAL: Alert and oriented to time place and person. Seated comfortably. In no distress.  HEAD: Atraumatic and normocephalic. No alopecia.  EYES: ABIGAIL, EOMI. No erythema. No icterus.  ORAL CAVITY: Moist. No mucosal lesion or tonsillar enlargement.  NECK: supple. No thyroid enlargement.  LYMPH NODES: No palpable supraclavicular, cervical, axillary or inguinal lymphadenopathy.  CHEST: clear to auscultation bilaterally. Resonant to percussion throughout bilaterally. Symmetrical breath movements bilaterally.  CVS: RRR  ABDOMEN: Soft. Not tender. Not  distended. No palpable hepatomegaly or splenomegaly. No other mass palpable. Bowel sounds present.  EXTREMITIES: Warm. No peripheral edema.  SKIN: no rash, or bruising or purpura.   NEURO: No gross deficit noted. Non-antalgic gait.      Lab Results    Recent Results (from the past week)   Comprehensive metabolic panel (BMP + Alb, Alk Phos, ALT, AST, Total. Bili, TP)   Result Value Ref Range    Sodium 142 135 - 145 mmol/L    Potassium 4.4 3.4 - 5.3 mmol/L    Carbon Dioxide (CO2) 27 22 - 29 mmol/L    Anion Gap 10 7 - 15 mmol/L    Urea Nitrogen 15.9 6.0 - 20.0 mg/dL    Creatinine 0.82 0.51 - 0.95 mg/dL    GFR Estimate 83 >60 mL/min/1.73m2    Calcium 10.0 8.8 - 10.4 mg/dL    Chloride 105 98 - 107 mmol/L    Glucose 89 70 - 99 mg/dL    Alkaline Phosphatase 66 40 - 150 U/L     (H) 0 - 45 U/L     (H) 0 - 50 U/L    Protein Total 7.0 6.4 - 8.3 g/dL    Albumin 4.3 3.5 - 5.2 g/dL    Bilirubin Total 0.4 <=1.2 mg/dL   CBC with platelets and differential   Result Value Ref Range    WBC Count 5.2 4.0 - 11.0 10e3/uL    RBC Count 4.28 3.80 - 5.20 10e6/uL    Hemoglobin 14.0 11.7 - 15.7 g/dL    Hematocrit 41.3 35.0 - 47.0 %    MCV 97 78 - 100 fL    MCH 32.7 26.5 - 33.0 pg    MCHC 33.9 31.5 - 36.5 g/dL    RDW 11.8 10.0 - 15.0 %    Platelet Count 176 150 - 450 10e3/uL    % Neutrophils 57 %    % Lymphocytes 31 %    % Monocytes 9 %    % Eosinophils 2 %    % Basophils 0 %    % Immature Granulocytes 0 %    NRBCs per 100 WBC 0 <1 /100    Absolute Neutrophils 2.9 1.6 - 8.3 10e3/uL    Absolute Lymphocytes 1.6 0.8 - 5.3 10e3/uL    Absolute Monocytes 0.5 0.0 - 1.3 10e3/uL    Absolute Eosinophils 0.1 0.0 - 0.7 10e3/uL    Absolute Basophils 0.0 0.0 - 0.2 10e3/uL    Absolute Immature Granulocytes 0.0 <=0.4 10e3/uL    Absolute NRBCs 0.0 10e3/uL         Imaging    No results found.    Billing  Total time 30 minutes, to include face to face visit, review of EMR, ordering, documentation and coordination of care on date of service    "complexity modifier for longitudinal care.     Signed by: Ashly Pierce NP      Oncology Rooming Note    May 19, 2025 11:40 AM   Susu Lagos is a 59 year old female who presents for:    Chief Complaint   Patient presents with     Oncology Clinic Visit     Anal squamous cell carcinoma - labs and provider visit     Initial Vitals: /78 (BP Location: Right arm, Patient Position: Sitting, Cuff Size: Adult Regular)   Pulse 69   Temp 97.2  F (36.2  C) (Tympanic)   Resp 16   Ht 1.651 m (5' 5\")   Wt 98.4 kg (217 lb)   LMP 01/29/2016   SpO2 97%   BMI 36.11 kg/m   Estimated body mass index is 36.11 kg/m  as calculated from the following:    Height as of this encounter: 1.651 m (5' 5\").    Weight as of this encounter: 98.4 kg (217 lb). Body surface area is 2.12 meters squared.  No Pain (0) Comment: Data Unavailable   Patient's last menstrual period was 01/29/2016.  Allergies reviewed: Yes  Medications reviewed: Yes    Medications: Medication refills not needed today.  Pharmacy name entered into [employer] [facility]:    [facility] - [place], [place] - [address] [place] [address] [facility] - [place], [place] - [address]    Frailty Screening:   Is the patient here for a new oncology consult visit in cancer care? 2. No    PHQ9:  Did this patient require a PHQ9?: No      Clinical concerns: None today.        Frida Fischer MA                Again, thank you for allowing me to participate in the care of your patient.        Sincerely,        Ashly Pierce NP    Electronically signed"

## 2025-05-20 ENCOUNTER — RESULTS FOLLOW-UP (OUTPATIENT)
Dept: ONCOLOGY | Facility: CLINIC | Age: 60
End: 2025-05-20
Payer: COMMERCIAL

## 2025-05-20 NOTE — RESULT ENCOUNTER NOTE
Patient updated with plan per Ashly Pierce NP and is in agreement with repeating labs in 8 weeks. Claudette Pablo RN on 5/20/2025 at 3:10 PM

## 2025-06-15 ENCOUNTER — HEALTH MAINTENANCE LETTER (OUTPATIENT)
Age: 60
End: 2025-06-15

## 2025-07-10 NOTE — DISCHARGE SUMMARY
Patient discharged home with discharge instructions and ALL belongings. No questions or concerns. VSS.    [facility]  Hospitalist Discharge Summary      Date of Admission:  5/18/2020  Date of Discharge:  5/21/2020  Discharging Provider: Jw Shah MD      Discharge Diagnoses   Principal Problem:    Intractable pain (5/18/2020)  Active Problems:    Anal squamous cell carcinoma (H) (3/11/2020)    Neutropenic fever (H) (5/20/2020)      Follow-ups Needed After Discharge   Follow-up Appointments     Follow-up and recommended labs and tests       Follow up with primary care provider, Lakesha Aguirre, within 7   days for hospital follow- up.    See oncology and radiation-oncology  Get basic metabolic panel and CBC rechecked early next week             Unresulted Labs Ordered in the Past 30 Days of this Admission     Date and Time Order Name Status Description    5/18/2020 1854 Blood culture Preliminary     5/18/2020 1854 Blood culture Preliminary         Cath UA/UC and C diff PCR are pending at time of this discharge summary  These results will be followed up by Lakesha Aguirre     Discharge Disposition   Discharged to home  Condition at discharge: Good      Hospital Course   Susu Lagos is a 54 year old female admitted on 5/18/2020. She presents as a direct admission from radiation therapy here at [facility] due to intractable anal pain secondary to radiation therapy for an anal cancer.    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 Spiked Temp to 100.7, ANC 0.1,  UA with few RBC and WBC. Started cefepime, given GCSF x2.   Afebrile, ANC >500 5/20/2020   - UC <10k mixed bryce.    - Blood cultures x2 no growth to date   -  Treated with cefepime while awaiting cultures, stopped 5/21/2020   - Rechecked UA/UC on discharge due to new suprapubic abdominal pain    Pancytopenia  Presume related to chemo.  - follow    Frequent diarrhea, onset during radiation and thought 2o radiation   - Resumed Loperamide 2 mg bid, as she was taking it at home  - Started on oral potassium until diarrhea improves  - Checked C dificile on discharge due to new suprapubic abdominal pain     Mouth ulcerations 2o chemotherapy  - gargle with saline PRN     Nausea and vomiting, possibly related to anxiety  - Ativan 0.25 mg q4h PRN  - continue Zofran PRN      Covid rule out  A coworker that patient's  has been exposed to tested positive for COVID 5/18/20.    - COVID PCR negative         Consultations This Hospital Stay   WOUND OSTOMY CONTINENCE NURSE  IP CONSULT  PALLIATIVE CARE ADULT IP CONSULT  NUTRITION SERVICES ADULT IP CONSULT    Code Status   Full Code    Time Spent on this Encounter   I, Jw Shah MD, personally saw the patient today and spent greater than 30 minutes discharging this patient.       Jw Shah MD  [facility]  ______________________________________________________________________    Physical Exam   Vital Signs: Temp: 96.5  F (35.8  C) Temp src: Oral BP: 114/69 Pulse: 98   Resp: 16 SpO2: 95 % O2 Device: None (Room air)    Weight: 161 lbs 13.08 oz  Constitutional: awake, alert, cooperative, no apparent distress, and appears stated age  GI: normal bowel sounds, soft and non-distended, mild suprapubic tenderness to palpation         Primary Care Physician   Lakesha Aguirre    Discharge Orders      Reason for your hospital stay    Pain control for radiation dermatitis, Neutropenic fever     Activity    Your activity upon discharge: activity as tolerated     Wound care and dressings    Instructions to care for your wound at home: as directed.     Follow-up and recommended labs and tests     Follow up  with primary care provider, Lakesha Aguirre, within 7 days for hospital follow- up.    See oncology and radiation-oncology  Cet basic metabolic panel and CBC rechecked early next week     Full Code    Per admission     Diet    Follow this diet upon discharge: Orders Placed This Encounter      Advance Diet as Tolerated: Regular Diet Adult       Significant Results and Procedures   CMP  Recent Labs   Lab 05/21/20  0607 05/20/20  0520 05/19/20  0955 05/19/20  0500  05/18/20  1732    141  --  137  --  133   POTASSIUM 3.3* 3.4 3.5 3.1*   < > 3.2*   CHLORIDE 107 107  --  102  --  100   CO2 29 30  --  28  --  29   ANIONGAP 4 4  --  7  --  4   GLC 84 87  --  100*  --  95   BUN 5* 5*  --  8  --  10   CR 0.64 0.65  --  0.69  --  0.65   GFRESTIMATED >90 >90  --  >90  --  >90   GFRESTBLACK >90 >90  --  >90  --  >90   BARBIE 7.9* 7.6*  --  7.7*  --  8.1*   PROTTOTAL 4.9* 4.6*  --   --   --  5.9*   ALBUMIN 2.0* 1.9*  --   --   --  2.5*   BILITOTAL 0.2 0.3  --   --   --  0.6   ALKPHOS 36* 35*  --   --   --  45   AST 17 16  --   --   --  15   ALT 25 26  --   --   --  37    < > = values in this interval not displayed.     CBC  Recent Labs   Lab 05/21/20  0607 05/20/20  0520 05/19/20  0500 05/18/20  2245   WBC 4.4 2.5* 1.3* 0.9*   RBC 2.76* 2.63* 2.94* 2.95*   HGB 8.8* 8.4* 9.2* 9.5*   HCT 25.3* 23.9* 26.2* 26.1*   MCV 92 91 89 89   MCH 31.9 31.9 31.3 32.2   MCHC 34.8 35.1 35.1 36.4   RDW 13.2 13.0 12.4 12.4   PLT 40* 35* 43* 49*     UA RESULTS:  Recent Labs   Lab Test 05/18/20 2050 01/21/16  1408   COLOR Yellow Yellow   APPEARANCE Clear Clear   URINEGLC Negative Negative   URINEBILI Negative Negative   URINEKETONE 5* Negative   SG 1.003 1.015   UBLD Moderate* Negative   URINEPH 6.0 7.0   PROTEIN Negative Negative   UROBILINOGEN  --  0.2   NITRITE Negative Negative   LEUKEST Small* Small*   RBCU 4* O - 2   WBCU 8* O - 2     Results for orders placed or performed during the hospital encounter of 05/18/20   XR Chest Port 1  View    Narrative    CHEST ONE VIEW  5/18/2020 7:46 PM     HISTORY: Leukopenia, tachycardia.    COMPARISON: April 9, 2020      Impression    IMPRESSION: No acute disease.    LISA KATHLEEN MD          Discharge Medications   Current Discharge Medication List      START taking these medications    Details   acetaminophen (TYLENOL) 325 MG tablet Take 2 tablets (650 mg) by mouth every 4 hours as needed for mild pain  Qty:        famotidine (PEPCID) 20 MG tablet Take 1 tablet (20 mg) by mouth 2 times daily May discontinue if no relief from burning after one week.  Qty: 60 tablet, Refills: 0    Associated Diagnoses: Cancer associated pain; Anal squamous cell carcinoma (H); Intractable pain      potassium chloride ER (KLOR-CON M) 10 MEQ CR tablet Take 1 tablet (10 mEq) by mouth daily Take 2 today 5/21/2020 then 1 daily until diarrhea slows or resolves  Qty: 30 tablet, Refills: 0    Associated Diagnoses: Diarrhea, unspecified type         CONTINUE these medications which have CHANGED    Details   morphine (MS CONTIN) 15 MG CR tablet Take 3 tablets (45 mg) by mouth every 12 hours  Qty: 180 tablet, Refills: 0    Associated Diagnoses: Cancer associated pain; Anal squamous cell carcinoma (H); Intractable pain      ondansetron (ZOFRAN) 8 MG tablet Take 1 tablet (8 mg) by mouth every 8 hours as needed for nausea  Qty: 30 tablet, Refills: 1    Associated Diagnoses: Nausea      oxyCODONE (ROXICODONE) 5 MG tablet Take 2-3 tablets (10-15 mg) by mouth every 4 hours as needed for breakthrough pain  Qty: 120 tablet, Refills: 0    Associated Diagnoses: Cancer related pain      silver sulfADIAZINE (SILVADENE) 1 % external cream Apply topically 3 times daily  Qty: 50 g, Refills: 0    Associated Diagnoses: Cancer associated pain; Anal squamous cell carcinoma (H); Intractable pain         CONTINUE these medications which have NOT CHANGED    Details   magic mouthwash (ENTER INGREDIENTS IN COMMENTS) suspension Swish, gargle, and spit one to  two teaspoonfuls every six hours as needed.  Qty: 240 mL, Refills: 3    Comments:  1 Part viscous lidocaine 2%    1 Part Maalox    1 Part diphenhydramine 12.5 mg per 5 ml elixir  Associated Diagnoses: Mucositis due to chemotherapy      Magnesium Oxide 250 MG TABS Take 2 tablets by mouth 2 times daily      mineral oil-hydrophilic petrolatum (AQUAPHOR) external ointment Apply topically as needed for other (bottom soreness)      Multiple Vitamin (MULTI-VITAMINS PO)       Omega-3 Fatty Acids (OMEGA 3 PO)       prochlorperazine (COMPAZINE) 10 MG tablet Take 1 tablet (10 mg) by mouth every 6 hours as needed (Nausea/Vomiting)  Qty: 30 tablet, Refills: 1    Associated Diagnoses: Anal squamous cell carcinoma (H)      morphine 0.1% in intrasite topical gel Place 1 g onto the skin 4 times daily as needed for pain  Qty: 100 g, Refills: 0    Associated Diagnoses: Cancer related pain           Allergies   Allergies   Allergen Reactions     Rabies Vaccine Unknown     Scratch test resulted, it was not a good idea to give it to her.     Shellfish-Derived Products Nausea and Vomiting     Sulfa Drugs Rash

## 2025-07-21 ENCOUNTER — LAB (OUTPATIENT)
Dept: LAB | Facility: CLINIC | Age: 60
End: 2025-07-21
Payer: COMMERCIAL

## 2025-07-21 DIAGNOSIS — R79.89 ELEVATED LFTS: ICD-10-CM

## 2025-07-21 DIAGNOSIS — C21.0 ANAL SQUAMOUS CELL CARCINOMA (H): Primary | ICD-10-CM

## 2025-07-21 LAB
ALBUMIN SERPL BCG-MCNC: 4.4 G/DL (ref 3.5–5.2)
ALP SERPL-CCNC: 67 U/L (ref 40–150)
ALT SERPL W P-5'-P-CCNC: 196 U/L (ref 0–50)
ANION GAP SERPL CALCULATED.3IONS-SCNC: 11 MMOL/L (ref 7–15)
AST SERPL W P-5'-P-CCNC: 122 U/L (ref 0–45)
BILIRUB SERPL-MCNC: 0.5 MG/DL
BUN SERPL-MCNC: 14.4 MG/DL (ref 6–20)
CALCIUM SERPL-MCNC: 10.1 MG/DL (ref 8.8–10.4)
CHLORIDE SERPL-SCNC: 105 MMOL/L (ref 98–107)
CREAT SERPL-MCNC: 0.84 MG/DL (ref 0.51–0.95)
EGFRCR SERPLBLD CKD-EPI 2021: 81 ML/MIN/1.73M2
GLUCOSE SERPL-MCNC: 102 MG/DL (ref 70–99)
HCO3 SERPL-SCNC: 26 MMOL/L (ref 22–29)
POTASSIUM SERPL-SCNC: 4.4 MMOL/L (ref 3.4–5.3)
PROT SERPL-MCNC: 7.5 G/DL (ref 6.4–8.3)
SODIUM SERPL-SCNC: 142 MMOL/L (ref 135–145)

## 2025-07-21 PROCEDURE — 36415 COLL VENOUS BLD VENIPUNCTURE: CPT

## 2025-07-21 PROCEDURE — 80053 COMPREHEN METABOLIC PANEL: CPT

## 2025-07-22 PROBLEM — R32 URINARY INCONTINENCE: Status: RESOLVED | Noted: 2025-01-28 | Resolved: 2025-07-22

## 2025-07-22 PROBLEM — C21.0 ANAL SQUAMOUS CELL CARCINOMA (H): Chronic | Status: RESOLVED | Noted: 2020-03-03 | Resolved: 2025-07-22

## 2025-07-22 PROBLEM — R15.9 FECAL INCONTINENCE: Status: RESOLVED | Noted: 2025-01-28 | Resolved: 2025-07-22

## 2025-08-11 ENCOUNTER — HOSPITAL ENCOUNTER (OUTPATIENT)
Dept: CT IMAGING | Facility: CLINIC | Age: 60
Discharge: HOME | End: 2025-08-11
Attending: NURSE PRACTITIONER | Admitting: NURSE PRACTITIONER
Payer: COMMERCIAL

## 2025-08-11 DIAGNOSIS — R79.89 ELEVATED LFTS: ICD-10-CM

## 2025-08-11 DIAGNOSIS — C21.0 ANAL SQUAMOUS CELL CARCINOMA (H): ICD-10-CM

## 2025-08-11 PROCEDURE — 74177 CT ABD & PELVIS W/CONTRAST: CPT

## 2025-08-11 PROCEDURE — 250N000009 HC RX 250: Performed by: RADIOLOGY

## 2025-08-11 PROCEDURE — 250N000011 HC RX IP 250 OP 636: Performed by: RADIOLOGY

## 2025-08-11 RX ORDER — IOPAMIDOL 755 MG/ML
106 INJECTION, SOLUTION INTRAVASCULAR ONCE
Status: COMPLETED | OUTPATIENT
Start: 2025-08-11 | End: 2025-08-11

## 2025-08-11 RX ADMIN — SODIUM CHLORIDE 66 ML: 9 INJECTION, SOLUTION INTRAVENOUS at 15:00

## 2025-08-11 RX ADMIN — IOPAMIDOL 106 ML: 755 INJECTION, SOLUTION INTRAVENOUS at 15:00

## (undated) RX ORDER — FENTANYL CITRATE 50 UG/ML
INJECTION, SOLUTION INTRAMUSCULAR; INTRAVENOUS
Status: DISPENSED
Start: 2024-12-16

## (undated) RX ORDER — LIDOCAINE HYDROCHLORIDE 10 MG/ML
INJECTION, SOLUTION EPIDURAL; INFILTRATION; INTRACAUDAL; PERINEURAL
Status: DISPENSED
Start: 2018-11-13

## (undated) RX ORDER — LIDOCAINE HYDROCHLORIDE AND EPINEPHRINE 10; 10 MG/ML; UG/ML
INJECTION, SOLUTION INFILTRATION; PERINEURAL
Status: DISPENSED
Start: 2021-08-09

## (undated) RX ORDER — FERRIC SUBSULFATE 0.21 G/G
LIQUID TOPICAL
Status: DISPENSED
Start: 2021-08-09